# Patient Record
Sex: MALE | Race: WHITE | NOT HISPANIC OR LATINO | Employment: FULL TIME | URBAN - METROPOLITAN AREA
[De-identification: names, ages, dates, MRNs, and addresses within clinical notes are randomized per-mention and may not be internally consistent; named-entity substitution may affect disease eponyms.]

---

## 2017-02-17 ENCOUNTER — ALLSCRIPTS OFFICE VISIT (OUTPATIENT)
Dept: OTHER | Facility: OTHER | Age: 57
End: 2017-02-17

## 2017-06-19 DIAGNOSIS — R97.20 ELEVATED PROSTATE SPECIFIC ANTIGEN (PSA): ICD-10-CM

## 2017-06-29 ENCOUNTER — ALLSCRIPTS OFFICE VISIT (OUTPATIENT)
Dept: OTHER | Facility: OTHER | Age: 57
End: 2017-06-29

## 2017-08-03 DIAGNOSIS — E78.5 HYPERLIPIDEMIA: ICD-10-CM

## 2017-08-03 DIAGNOSIS — I10 ESSENTIAL (PRIMARY) HYPERTENSION: ICD-10-CM

## 2017-08-07 ENCOUNTER — GENERIC CONVERSION - ENCOUNTER (OUTPATIENT)
Dept: OTHER | Facility: OTHER | Age: 57
End: 2017-08-07

## 2017-08-07 LAB
AMBIG ABBREV CMP14 DEFAULT (HISTORICAL): NORMAL
AMBIG ABBREV LP DEFAULT (HISTORICAL): NORMAL
BASOPHILS # BLD AUTO: 0 X10E3/UL (ref 0–0.2)
BASOPHILS # BLD AUTO: 1 %
DEPRECATED RDW RBC AUTO: 13.1 % (ref 12.3–15.4)
EOSINOPHIL # BLD AUTO: 0.1 X10E3/UL (ref 0–0.4)
EOSINOPHIL # BLD AUTO: 2 %
HCT VFR BLD AUTO: 45 % (ref 37.5–51)
HGB BLD-MCNC: 15.2 G/DL (ref 12.6–17.7)
IMM.GRANULOCYTES (CD4/8) (HISTORICAL): 0 %
IMM.GRANULOCYTES (CD4/8) (HISTORICAL): 0 X10E3/UL (ref 0–0.1)
LYMPHOCYTES # BLD AUTO: 1.5 X10E3/UL (ref 0.7–3.1)
LYMPHOCYTES # BLD AUTO: 33 %
MCH RBC QN AUTO: 29.6 PG (ref 26.6–33)
MCHC RBC AUTO-ENTMCNC: 33.8 G/DL (ref 31.5–35.7)
MCV RBC AUTO: 88 FL (ref 79–97)
MONOCYTES # BLD AUTO: 0.5 X10E3/UL (ref 0.1–0.9)
MONOCYTES (HISTORICAL): 11 %
NEUTROPHILS # BLD AUTO: 2.3 X10E3/UL (ref 1.4–7)
NEUTROPHILS # BLD AUTO: 53 %
PLATELET # BLD AUTO: 208 X10E3/UL (ref 150–379)
RBC (HISTORICAL): 5.14 X10E6/UL (ref 4.14–5.8)
WBC # BLD AUTO: 4.4 X10E3/UL (ref 3.4–10.8)

## 2017-08-08 LAB
A/G RATIO (HISTORICAL): 2.3 (ref 1.2–2.2)
ALBUMIN SERPL BCP-MCNC: 4.4 G/DL (ref 3.5–5.5)
ALP SERPL-CCNC: 49 IU/L (ref 39–117)
ALT SERPL W P-5'-P-CCNC: 26 IU/L (ref 0–44)
AST SERPL W P-5'-P-CCNC: 25 IU/L (ref 0–40)
BILIRUB SERPL-MCNC: 0.4 MG/DL (ref 0–1.2)
BUN SERPL-MCNC: 20 MG/DL (ref 6–24)
BUN/CREA RATIO (HISTORICAL): 22 (ref 9–20)
CALCIUM SERPL-MCNC: 9.2 MG/DL (ref 8.7–10.2)
CHLORIDE SERPL-SCNC: 103 MMOL/L (ref 96–106)
CHOLEST SERPL-MCNC: 185 MG/DL (ref 100–199)
CO2 SERPL-SCNC: 23 MMOL/L (ref 18–29)
CREAT SERPL-MCNC: 0.92 MG/DL (ref 0.76–1.27)
EGFR AFRICAN AMERICAN (HISTORICAL): 108 ML/MIN/1.73
EGFR-AMERICAN CALC (HISTORICAL): 93 ML/MIN/1.73
GLUCOSE SERPL-MCNC: 92 MG/DL (ref 65–99)
HDLC SERPL-MCNC: 46 MG/DL
LDLC SERPL CALC-MCNC: 112 MG/DL (ref 0–99)
POTASSIUM SERPL-SCNC: 4.5 MMOL/L (ref 3.5–5.2)
SODIUM SERPL-SCNC: 143 MMOL/L (ref 134–144)
TOT. GLOBULIN, SERUM (HISTORICAL): 1.9 G/DL (ref 1.5–4.5)
TOTAL PROTEIN (HISTORICAL): 6.3 G/DL (ref 6–8.5)
TRIGL SERPL-MCNC: 135 MG/DL (ref 0–149)
TSH SERPL DL<=0.05 MIU/L-ACNC: 1.25 UIU/ML (ref 0.45–4.5)

## 2017-08-21 ENCOUNTER — ALLSCRIPTS OFFICE VISIT (OUTPATIENT)
Dept: OTHER | Facility: OTHER | Age: 57
End: 2017-08-21

## 2017-10-11 ENCOUNTER — ALLSCRIPTS OFFICE VISIT (OUTPATIENT)
Dept: OTHER | Facility: OTHER | Age: 57
End: 2017-10-11

## 2017-10-12 NOTE — PROGRESS NOTES
Assessment  1  Trigger little finger of right hand (727 03) (M65 351)    Plan  Trigger little finger of right hand    · Injection Tendon Sheath Ligament Single - POC; Status:Complete;   Done: 67JZI9783  09:38AM   · Follow-up visit in 2 months Evaluation and Treatment  Follow-up  Status: Hold For -  Scheduling  Requested for: 37WFH4043   · Please refer to the patient information sheet that was provided at your office visit today for  information on  your current condition ; Status:Complete;   Done: 70SGK2047 09:38AM    Discussion/Summary    Assessment: Right hand small finger trigger finger  anatomy and physiology of trigger finger was discussed with the patient today in the office  Edema and increased contact pressure within the flexor tendons at the A1 pulley can cause pain, crepitation, and limitation of function  Treatment options include resting MP blocking splints to decrease edema, oral anti-inflammatory medications, home or formal therapy exercises, up to 2 steroid injections or surgical release  While majority of patients do respond to conservative treatment, up to 20% may require surgical release  The patient has elected for injection  After the patient, site, and procedure were verbally agreed upon by the patient and the treatment team, verbal consent was obtained  The area was prepped sterilely, the right small finger trigger finger was injected with a combination of 1% lidocaine without epinephrine and 6 mg of Celestone  The patient tolerated the procedure well, sterile dressings were applied  Risks and benefits of the injection were discussed  note was dictated with dictation software  As such, and may contain typographical errors, improperly dictated words, background noise, or other errors  patient presents loose right hand small finger trigger finger that is been going on for approximately a year   Pain at night worse then during the day, he has to unlock it with his other hand multiple times during the night  He has pain along the flexor tendon sheath, no numbness tingling fevers chills constitutional symptoms  He denies any previous traumatic events  No other fingers are bothering him  past medical history, surgical history, family history, social history, medications, allergies, and review of systems have been read and reviewed on the chart and have been updated  Review of Systems was recorded in the office today on a separate evaluation sheet and is listed below  of Systems:NegativeNegativeNegativeNegativeNegativeNegativeNegative except as aboveAs aboveNegative except as aboveNegativeNegative    / Psych: Awake and Oriented, No acute distress, age appropriateNormocephalic, atraumatic, mucous membranes moist, neck supple, trachea midline  No rebound or signs of guardingNo discernible arrhythmia, 2+ pulses with good capillary refillNo audible wheezing or audible stridor[The patient is neurovascularly intact in the median, ulnar, and radial nerve distribution  There is normal sensation and good capillary refill within the digits  2+ pulses  ][No lesions, rashes, streaking, purulence, abscesses, lymphangitis]Tenderness to palpation right hand small finger  He has a nodule with active locking  No crepitation, full range of motion  Negative CMC shock, no evidence of carpal tunnel with negative Tinel's no other fingers triggering, negative de Quervain and no CMC joint pain  Negative Tinel's at the level of the cubital tunnel  Studies: [none]  Chief Complaint  1  Finger Problem    Active Problems  1  Acid reflux disease (530 81) (K21 9)   2  Allergic rhinitis due to pollen (477 0) (J30 1)   3  Arthralgia Of The Left Knee/Patella/Tibia/Fibula (719 46)   4  Bee sting-induced anaphylaxis (989 5,E905 3) (T63 441A)   5  Cardiomyopathy (425 4) (I42 9)   6  Chest pain, precordial (786 51) (R07 2)   7  Chronic low back pain (724 2,338 29) (M54 5,G89 29)   8  Elevated PSA (790 93) (R97 20)   9   Encounter for screening for malignant neoplasm of colon (V76 51) (Z12 11)   10  Failed back syndrome (722 80) (M96 1)   11  GERD (gastroesophageal reflux disease) (530 81) (K21 9)   12  Hematospermia (608 82) (R36 1)   13  Hypertension (401 9) (I10)   14  Lichenoid keratosis (701 1) (L82 1)   15  Lumbar Hernia Repair   16  Male erectile dysfunction (607 84) (N52 9)   17  Mild hyperlipidemia (272 4) (E78 5)   18  Nodular prostate (600 10) (N40 2)   19  Postoperative visit (V58 49) (Z48 89)   20  Trigger little finger of right hand (727 03) (M65 351)    Past Medical History   · History of Coronary Artery Disease (V12 59)   · History of Heart attack (410 90) (I21 9)   · History of Umbilical hernia (673 7) (K42 9)    Surgical History   · History of Back Surgery   · History of Laminectomy Lumbar   · Lumbar Hernia Repair   · History of Needle Biopsy Of Prostate   · History of Shoulder Surgery   · History of Tonsillectomy    Family History   · Family history of Carcinoma Of The Pancreas   · Denied: Family history of Colon cancer   · Denied: Family history of Crohn's disease   · Denied: Family history of liver disease   · Denied: Family history of Colon cancer   · Denied: Family history of Crohn's disease   · Denied: Family history of liver disease   · Family history of Cancer   · Family history of Family Health Status Of Father - Alive   · Family history of Mother  At Age 46    Social History   · Alcohol Use (History)   · Always uses seat belt   · Caffeine Use   · Full-time employment   ·    ·    · Never a smoker    Current Meds   1  Aspirin 325 MG Oral Tablet; Take 1 tablet  every other day; Therapy: (Recorded:83Nfq1726) to Recorded   2  BL Vitamin B-12 TABS; TAKE 1 TABLET DAILY AS DIRECTED; Therapy: (Recorded:16Ncx4265) to Recorded   3  Cyclobenzaprine HCl - 10 MG Oral Tablet; TAKE 1 TABLET AT BEDTIME AS NEEDED;    Therapy: 44Iaq1136 to (Logan Antonio)  Requested for: 2017; Last   Rx:2017 Ordered   4  DilTIAZem HCl ER Coated Beads 120 MG Oral Capsule Extended Release 24 Hour;   TAKE 1 CAPSULE ONCE DAILY; Therapy: 42FKG1198 to (Evaluate:65Cqq9263)  Requested for: 00Anu4263; Last   Rx:79Rao7578 Ordered   5  EpiPen 2-Jose F 0 3 MG/0 3ML Injection Solution Auto-injector; use; Therapy: 54MUA0931 to (Last Rx:74Tbg3084)  Requested for: 77Srl3293 Ordered   6  Fish Oil CAPS Recorded   7  Fluticasone Propionate 50 MCG/ACT Nasal Suspension; USE 2 SPRAYS IN EACH   NOSTRIL ONCE DAILY; Therapy: 72DIF5236 to (Last SD:73NWQ6051)  Requested for: 21Gut9032 Ordered   8  Omeprazole 40 MG Oral Capsule Delayed Release; take 1 capsule daily; Therapy: 49AXA7282 to (60 124 37 75)  Requested for: 44Shy2470; Last   DF:61MDT7951 Ordered   9  Viagra 100 MG Oral Tablet; TAKE AS DIRECTED; Therapy: 19DKL9019 to (Last Rx:42Htz2695)  Requested for: 36ETK9658 Ordered    Allergies  1  No Known Drug Allergies  2  Other   3  Seasonal    Vitals  Signs   Heart Rate: 74  Systolic: 444  Diastolic: 93  Height: 5 ft 7 in  Weight: 196 lb 8 oz  BMI Calculated: 30 78  BSA Calculated: 2 01    Future Appointments    Date/Time Provider Specialty Site   01/08/2018 09:20 AM Humaira Milton MD Neurology [facility] [address] [place] [address]   08/22/2018 03:00 PM STEPHANIA Avendano   [address] [facility]   01/03/2018 03:30 PM Chanda Thapa MD Urology [address]     Signatures   Electronically signed by : STEPHANIA Carlton ; Oct 11 2017  9:40AM EST                       (Author)

## 2017-12-13 ENCOUNTER — ALLSCRIPTS OFFICE VISIT (OUTPATIENT)
Dept: OTHER | Facility: OTHER | Age: 57
End: 2017-12-13

## 2017-12-15 NOTE — PROGRESS NOTES
Assessment  1  Trigger little finger of right hand (727 03) (M65 351)   2  De Quervain's tenosynovitis (727 04) (M65 4)    Plan  De Quervain's tenosynovitis    · Betamethasone Sod Phos & Acet 6 (3-3) MG/ML Injection Suspension(Celestone Soluspan)   · Follow-up visit in 2 months Evaluation and Treatment  Follow-up  Status: Hold For -Scheduling  Requested for: 25SAT5862   · Please refer to the patient information sheet that was provided at your office visit today forinformation on  your current condition ; Status:Complete;   Done: 14DGS9196  De Quervain's tenosynovitis, Trigger little finger of right hand    · Injection Tendon Sheath Ligament Single - POC; Status:Complete;   Done: 84AAE413526:79AF   · Injection Tendon Sheath Ligament Single - POC; Status:Complete;   Done: 55PXB091561:33ZG  Trigger little finger of right hand    · Betamethasone Sod Phos & Acet 6 (3-3) MG/ML Injection Suspension(Celestone Soluspan)    Discussion/Summary    19-year-old male with a history exam consistent with right small finger trigger and right de Quervain tenosynovitis  Injections were advised accepted administered as outlined above  We will see back in follow-up for repeat examination in 2 months  If he has recurrence of symptoms in his small finger trigger we will discuss surgical options  Chief Complaint  1  Finger Problem  Right wrist pain and right small finger catching      History of Present Illness  HPI: 19-year-old male with a right small trigger presenting for follow-up evaluation  His last visit 2 months steroid injection given the significant relief  For 6 weeks he had no catching locking or pain approximately 2 weeks ago noticed recurrence of the locking catching and small finger but has no pain   Over the past with received also noticed radial sided right wrist pain that radiates into his thumb this is made worse repetitive activity is relieved by rest  Denies any injury to the wrist The patient's medical history, surgical history, social history, family history, medications, allergies, and review of systems were reviewed and updated today _______________________________________________________________________________Review of Systems: Constitutional: No fever or chills, feels well, no tiredness, no recent weight gain or loss  Eyes: No complaints of eyesight problems, no red eyes  ENT: No loss of hearing, no nosebleeds, no sore throat  Cardiovascular: No chest pain, palpitations, leg claudication, or lower extremity edema  Respiratory: No shortness of breath, wheezing, or cough  Gastrointestinal: No abdominal pain, constipation, nausea / vomiting, no diarrhea  Genitourinary: No dysruia or incontinence  Musculoskeletal: As noted in HPI  Integumentary: No rash or skin lesions, no itching or dry skin, no wounds  Neurological: No headache, confusion, numbness or tingling, or dizziness  Endocrine: No muscle weakness, frequent urination, or excessive thirst  Psychiatric: No suicidal thoughts, anxiety, or depression  Active Problems  1  Acid reflux disease (530 81) (K21 9)   2  Allergic rhinitis due to pollen (477 0) (J30 1)   3  Arthralgia Of The Left Knee/Patella/Tibia/Fibula (719 46)   4  Bee sting-induced anaphylaxis (989 5,E905 3) (T63 441A)   5  Cardiomyopathy (425 4) (I42 9)   6  Chest pain, precordial (786 51) (R07 2)   7  Chronic low back pain (724 2,338 29) (M54 5,G89 29)   8  Elevated PSA (790 93) (R97 20)   9  Encounter for screening for malignant neoplasm of colon (V76 51) (Z12 11)   10  Failed back syndrome (722 80) (M96 1)   11  GERD (gastroesophageal reflux disease) (530 81) (K21 9)   12  Hematospermia (608 82) (R36 1)   13  Hypertension (401 9) (I10)   14  Lichenoid keratosis (701 1) (L82 1)   15  Lumbar Hernia Repair   16  Male erectile dysfunction (607 84) (N52 9)   17  Mild hyperlipidemia (272 4) (E78 5)   18  Nodular prostate (600 10) (N40 2)   19  Postoperative visit (V58 49) (Z48 89)   20   Trigger little finger of right hand (727 03) (M65 351)    Current Meds   1  Aspirin 325 MG Oral Tablet; Take 1 tablet  every other day; Therapy: (Recorded:62Iuc2419) to Recorded   2  BL Vitamin B-12 TABS; TAKE 1 TABLET DAILY AS DIRECTED; Therapy: (Recorded:01Wmc7982) to Recorded   3  Cyclobenzaprine HCl - 10 MG Oral Tablet; TAKE 1 TABLET AT BEDTIME AS NEEDED; Therapy: 44Wgj6811 to (William Espinal)  Requested for: 29Zwl8027; Last Rx:44Rak2068 Ordered   4  DilTIAZem HCl ER Coated Beads 120 MG Oral Capsule Extended Release 24 Hour; TAKE 1 CAPSULE ONCE DAILY; Therapy: 10LFW8219 to (Evaluate:34Bai5488)  Requested for: 38Hcg8792; Last Rx:14Tdv9634 Ordered   5  EpiPen 2-Jose F 0 3 MG/0 3ML Injection Solution Auto-injector; use; Therapy: 14YMK1211 to (Last Rx:13Yzl1300)  Requested for: 47Pao0432 Ordered   6  Fish Oil CAPS Recorded   7  Fluticasone Propionate 50 MCG/ACT Nasal Suspension; USE 2 SPRAYS IN EACH NOSTRIL ONCE DAILY; Therapy: 37BBE3510 to (Last VQ:52KPP4727)  Requested for: 89Tuz8447 Ordered   8  Omeprazole 40 MG Oral Capsule Delayed Release; take 1 capsule daily; Therapy: 43CSV4037 to (0731 40 44 23)  Requested for: 74Dxc8009; Last BD:40FGJ1999 Ordered   9  Viagra 100 MG Oral Tablet; TAKE AS DIRECTED; Therapy: 90ECE7443 to (Last Rx:33Keo1575)  Requested for: 82LCJ5560 Ordered    Allergies  1  No Known Drug Allergies  2  Other   3  Seasonal    Vitals  Signs   Heart Rate: 73  Systolic: 769  Diastolic: 84  Height: 5 ft 7 in  Weight: 201 lb 2 oz  BMI Calculated: 31 5  BSA Calculated: 2 03    Physical Exam     General: No acute distress, age-appropriate  Neck: Supple, trachea midline  HEENT: Normocephalic atraumatic, mucous membranes are moist, sclera are nonicteric  Cardiovascular: No discernable arrhythmia  Respiratory: Breathing is even and unlabored, no stridor or audible wheezing  Psychiatric: Awake alert and oriented x3, normal mood and affect  Abdomen: Without rebound or guarding     Right Trigger Finger: Crepitus, Nodules and Locking Small finger (Positive Tinel's on the radial aspect of wrist  Positive Finkelstein's  Nontender over the thumb A1 pulley no catching or locking of the thumb)   Skin: was evaluated and demonstrated no masses, no abrasions, no erythema, no effusion, no edema, no fluctuance, no induration, no laceration, no ulceration, no lymphadenopathy  Right Upper Neurovascular: were evaluated and demonstrated: The patient has normal motor strength to the median, ulnar and radial nerve  Normal sensation to the median, ulnar, and radial nerve  Normal pulses in the radial and ulnar artery  Capillary refill is < 2 seconds  Procedure                   After sterile preparation overlying the skin a mixture of 1 cc of lidocaine and Celestone was injected into the right small finger trigger right 1st dorsal compartment  Band-Aid applied patient tolerated procedure well      Attending Note  Attending Note Arelis Saldana: Attending Note: I interviewed, took the history and examined the patient,-- I discussed the case with the Resident and reviewed the Resident's note-- and-- I agree with the Resident management plan as it was presented to me  Level of Participation: I was present in clinic and examined the patient  Patient's History: Patient right hand small finger triggering and right de Quervain stenosing tenosynovitis  Patient failed conservative management  Previous injection worked pretty well  Key Parts of the Exam: Positive triggering right small finger, positive de Quervain with a positive Finkelstein test  Neurologically intact with full motion  Diagnosis and Plan: Right de Quervain and right small finger triggering-injection provided  Follow up 2 months  I agree with the Resident's note  Future Appointments    Date/Time Provider Specialty Site   01/08/2018 09:20 AM Jannette Fontanez MD Neurology [facility] [address] [place] [address]   02/14/2018 08:30 AM STEPHANIA Majano   Orthopedic Surgery [facility] [place]   08/22/2018 03:00 PM STEPHANIA Hendrix  [address] [facility] [employer]   01/03/2018 03:30 PM Herminia Burgos MD Urology [address]     Signatures   Electronically signed by :  STEPHANIA Hidalgo ; Dec 13 2017  9:47AM EST                       (Author)    Electronically signed by : STEPHANIA Morrell ; Dec 13 2017  3:22PM EST                       (Author)

## 2017-12-26 ENCOUNTER — GENERIC CONVERSION - ENCOUNTER (OUTPATIENT)
Dept: OTHER | Facility: OTHER | Age: 57
End: 2017-12-26

## 2017-12-27 LAB — PROSTATE SPECIFIC ANTIGEN (HISTORICAL): 6.7 NG/ML (ref 0–4)

## 2017-12-29 DIAGNOSIS — R97.20 ELEVATED PROSTATE SPECIFIC ANTIGEN (PSA): ICD-10-CM

## 2017-12-31 ENCOUNTER — APPOINTMENT (EMERGENCY)
Dept: RADIOLOGY | Facility: HOSPITAL | Age: 57
End: 2017-12-31
Payer: COMMERCIAL

## 2017-12-31 ENCOUNTER — HOSPITAL ENCOUNTER (EMERGENCY)
Facility: HOSPITAL | Age: 57
Discharge: HOME | End: 2017-12-31
Admitting: EMERGENCY MEDICINE
Payer: COMMERCIAL

## 2017-12-31 VITALS
TEMPERATURE: 97.4 F | HEIGHT: 67 IN | HEART RATE: 68 BPM | RESPIRATION RATE: 17 BRPM | SYSTOLIC BLOOD PRESSURE: 133 MMHG | BODY MASS INDEX: 31.71 KG/M2 | DIASTOLIC BLOOD PRESSURE: 89 MMHG | OXYGEN SATURATION: 97 % | WEIGHT: 202 LBS
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
## 2017-12-31 DIAGNOSIS — M65.4 TENDINITIS, DE QUERVAIN'S: Primary | ICD-10-CM

## 2017-12-31 PROCEDURE — 99283 EMERGENCY DEPT VISIT LOW MDM: CPT

## 2017-12-31 PROCEDURE — 73110 X-RAY EXAM OF WRIST: CPT

## 2017-12-31 RX ORDER — IBUPROFEN 400 MG/1
400 TABLET ORAL ONCE
Status: COMPLETED | OUTPATIENT
Start: 2017-12-31 | End: 2017-12-31

## 2017-12-31 RX ORDER — OMEGA-3 FATTY ACIDS CAP DELAYED RELEASE 1000 MG 1000 MG
1 CAPSULE DELAYED RELEASE ORAL DAILY
COMMUNITY

## 2017-12-31 RX ORDER — OMEPRAZOLE 40 MG/1
40 CAPSULE, DELAYED RELEASE ORAL DAILY
COMMUNITY
End: 2018-04-07 | Stop reason: SDUPTHER

## 2017-12-31 RX ORDER — NAPROXEN 500 MG/1
500 TABLET ORAL 2 TIMES DAILY WITH MEALS
Qty: 14 TABLET | Refills: 0 | Status: SHIPPED | OUTPATIENT
Start: 2017-12-31 | End: 2018-10-26 | Stop reason: SDDI

## 2017-12-31 RX ORDER — IBUPROFEN 600 MG/1
600 TABLET ORAL EVERY 6 HOURS PRN
COMMUNITY
End: 2022-06-13 | Stop reason: ALTCHOICE

## 2017-12-31 RX ORDER — DILTIAZEM HYDROCHLORIDE 120 MG/1
120 CAPSULE, EXTENDED RELEASE ORAL DAILY
COMMUNITY
End: 2018-10-26 | Stop reason: SDUPTHER

## 2017-12-31 RX ORDER — NAPROXEN 250 MG/1
500 TABLET ORAL ONCE
Status: DISCONTINUED | OUTPATIENT
Start: 2017-12-31 | End: 2017-12-31

## 2017-12-31 RX ADMIN — IBUPROFEN 400 MG: 400 TABLET, FILM COATED ORAL at 15:35

## 2017-12-31 NOTE — ED PROVIDER NOTES
History  Chief Complaint   Patient presents with    Wrist Injury     Pt presents to ED from home after injuring right wrist while chopping wood yesterday  Pt (+) wrist movement but w/ pain  Pt (+) cardiac hx  Patient presents emergency room 1 day after tapping are ice off flow void repetitively  Today he woke with the tenderness over the lateral aspect of his distal radius  He complains of swelling and increased pain with the range of motion and ulnar deviation  He has no history of having any previous problems with the wrist   He has never fractured in the past   He does have a history having and trigger finger which she has seen Dr  moved to 0 4 on his right little finger  He had 2 cortisone injections for that in the past with good relief  He denies any numbness or tingling  He does complain of giveaway weakness secondary to pain and guarding  History provided by:  Patient  Wrist Pain   Location:  Right wrist  Severity:  Moderate  Onset quality:  Gradual  Duration:  1 day  Timing:  Constant  Progression:  Waxing and waning  Chronicity:  New  Context:  Overuse injury chopping wood  Relieved by:  Rest  Worsened by:  Range of motion  Associated symptoms: no fatigue, no fever and no rash  Abdominal pain: none tried  Prior to Admission Medications   Prescriptions Last Dose Informant Patient Reported? Taking?    Omega-3 Fatty Acids (FISH OIL) 1000 MG CPDR  Self Yes Yes   Sig: Take by mouth   aspirin (ECOTRIN) 325 mg EC tablet 12/30/2017 Self Yes Yes   Sig: Take 325 mg by mouth every other day   diltiazem (DILACOR XR) 120 MG 24 hr capsule  Self Yes Yes   Sig: Take 120 mg by mouth daily   ibuprofen (MOTRIN) 600 mg tablet 12/31/2017 at 2200 Self Yes Yes   Sig: Take by mouth every 6 (six) hours as needed for mild pain   omeprazole (PriLOSEC) 40 MG capsule  Self Yes Yes   Sig: Take 40 mg by mouth daily      Facility-Administered Medications: None       Past Medical History:   Diagnosis Date    BPH (benign prostatic hyperplasia)     Cardiac disease     Cardiomyopathy [facility])        Past Surgical History:   Procedure Laterality Date    BACK SURGERY      TONSILLECTOMY         History reviewed  No pertinent family history  I have reviewed and agree with the history as documented  Social History   Substance Use Topics    Smoking status: Never Smoker    Smokeless tobacco: Never Used    Alcohol use Yes      Comment: occ        Review of Systems   Constitutional: Positive for activity change  Negative for appetite change, chills, fatigue and fever  Gastrointestinal: Abdominal pain: none tried  Musculoskeletal: Positive for arthralgias  Skin: Negative for color change, pallor and rash  Neurological: Negative for numbness  Psychiatric/Behavioral: Negative for confusion  All other systems reviewed and are negative  Physical Exam  ED Triage Vitals [12/31/17 1529]   Temperature Pulse Respirations Blood Pressure SpO2   (!) 97 4 °F (36 3 °C) 74 16 (!) 158/102 98 %      Temp Source Heart Rate Source Patient Position - Orthostatic VS BP Location FiO2 (%)   Oral Monitor Sitting Left arm --      Pain Score       7           Orthostatic Vital Signs  Vitals:    12/31/17 1529 12/31/17 1611   BP: (!) 158/102 133/89   Pulse: 74 68   Patient Position - Orthostatic VS: Sitting Sitting       Physical Exam   Constitutional: He is oriented to person, place, and time  He appears well-developed and well-nourished  No distress  HENT:   Head: Normocephalic and atraumatic  Eyes: Conjunctivae are normal  Right eye exhibits no discharge  Left eye exhibits no discharge  No scleral icterus  Pulmonary/Chest: Effort normal    Musculoskeletal:   Examination of the right wrist and hand there is tenderness palpated over the 1st dorsal compartment of the wrist   There is a positive Finkelstein test suggestive of to go veins tendinitis    The remainder of the wrist hand forearm and elbow are nontender with full range of motion  There is a trigger finger present over the 5th finger  There is tenderness over the A1 pulley  Capillary refills less than 2 seconds  There are palpable ulnar and radial pulses that are +2 and symmetrical   Sensation and motor is intact to the ulnar, radial, median distribution of the hand   Neurological: He is alert and oriented to person, place, and time  Skin: Skin is warm  Capillary refill takes less than 2 seconds  He is not diaphoretic  Psychiatric: He has a normal mood and affect  His behavior is normal  Judgment and thought content normal    Nursing note and vitals reviewed        ED Medications  Medications   ibuprofen (MOTRIN) tablet 400 mg (400 mg Oral Given 12/31/17 0925)       Diagnostic Studies  Results Reviewed     None                 XR wrist 3+ views RIGHT   ED Interpretation by Josette Thomson PA-C (12/31 1620)   No acute abnormality                 Procedures  Static Splint Application  Date/Time: 12/31/2017 4:42 PM  Performed by: Cheyanne Jacob  Authorized by: Cheyanne Jacob     Patient location:  Bedside  Procedure performed by emergency physician: Yes    Consent:     Consent obtained:  Verbal    Consent given by:  Patient    Risks discussed:  Discoloration, numbness, pain and swelling    Alternatives discussed:  No treatment  Universal protocol:     Procedure explained and questions answered to patient or proxy's satisfaction: yes      Imaging studies available: yes      Site/side marked: yes      Immediately prior to procedure a time out was called: yes      Patient identity confirmed:  Verbally with patient  Indication:     Indications: sprain/strain    Pre-procedure details:     Sensation:  Normal  Procedure details:     Laterality:  Right    Location:  Wrist    Splint type:  Thumb spica    Supplies:  Ortho-Glass  Post-procedure details:     Pain:  Improved    Sensation:  Normal    Neurovascular Exam: skin pink, capillary refill <2 sec, normal pulses and skin intact, warm, and dry      Patient tolerance of procedure: Tolerated well, no immediate complications           Phone Contacts  ED Phone Contact    ED Course  ED Course                                MDM  Number of Diagnoses or Management Options  Overuse injury: new and requires workup  Tendinitis, de Quervain's: new and requires workup     Amount and/or Complexity of Data Reviewed  Tests in the radiology section of CPT®: ordered and reviewed  Tests in the medicine section of CPT®: ordered and reviewed    Risk of Complications, Morbidity, and/or Mortality  Presenting problems: moderate  Diagnostic procedures: moderate  General comments: Patient presented to the emergency room for evaluation of his right wrist   He was chopping wood yesterday and noticed an onset of pain this morning when he awoke  He states that the pain became worse throughout the day  It is worse with range of motion  He complains of swelling over the lateral aspect of his wrist   He was seen and evaluated  He was diagnosed with the veins tendinitis  His x-rays were normal   A thumb spica splint was applied  The patient was medicated with Motrin upon arrival   He was discharged home with Naprosyn and was instructed to take his 325 mg aspirin in the morning and Naprosyn at night as needed for pain  He will follow up with Dr Fer Mancini,  his hand surgeon, within the next week  Patient Progress  Patient progress: stable    CritCare Time    Disposition  Final diagnoses:   Tendinitis, de Quervain's - Acute right wrist   Overuse injury - Acute right wrist     Time reflects when diagnosis was documented in both MDM as applicable and the Disposition within this note     Time User Action Codes Description Comment    12/31/2017  4:53 PM Gillian Abbasi Add [M65 4] Tendinitis, de Quervain's     12/31/2017  4:54 PM Stuart Abbasi Modify [M65 4] Tendinitis, de Quervain's Acute right wrist    12/31/2017  4:54 PM Gillian Abbasi Add Reny Keating Overuse injury     12/31/2017  4:54 PM Jayesh Louis [T14  8XXA] Overuse injury Acute right wrist      ED Disposition     ED Disposition Condition Comment    Discharge  Arun KAT Harper discharge to home/self care  Condition at discharge: Good        Follow-up Information     Follow up With Specialties Details Why Contact Info    Juan Manuel Hernandez MD Orthopedic Surgery In 1 week  [facility]  2nd [address] [place] [address]  [phone]          Discharge Medication List as of 12/31/2017  4:27 PM      START taking these medications    Details   naproxen (NAPROSYN) 500 mg tablet Take 1 tablet by mouth 2 (two) times a day with meals, Starting Sun 12/31/2017, Print         CONTINUE these medications which have NOT CHANGED    Details   aspirin (ECOTRIN) 325 mg EC tablet Take 325 mg by mouth every other day, Historical Med      diltiazem (DILACOR XR) 120 MG 24 hr capsule Take 120 mg by mouth daily, Historical Med      ibuprofen (MOTRIN) 600 mg tablet Take by mouth every 6 (six) hours as needed for mild pain, Historical Med      Omega-3 Fatty Acids (FISH OIL) 1000 MG CPDR Take by mouth, Historical Med      omeprazole (PriLOSEC) 40 MG capsule Take 40 mg by mouth daily, Historical Med           No discharge procedures on file      ED Provider  Electronically Signed by           Dorie Owen PA-C  12/31/17 7009

## 2017-12-31 NOTE — DISCHARGE INSTRUCTIONS
Dorthula Citron Disease   WHAT YOU NEED TO KNOW:   Florence Dhillonlist disease is inflammation of the tendons on the thumb side of your wrist  Tendons are thick strands of tissue that connect muscles to bones  DISCHARGE INSTRUCTIONS:   Splint or brace: These devices will help decrease pain, limit movement, and protect your wrist so that it can heal  Make sure your device is comfortable  If it is too tight, your fingers may feel numb or tingly  Do not push or lean on your device because it can break  Physical or occupational therapy:  You may need to see a physical or occupational therapist to teach you special exercises  These exercises help improve movement and decrease pain  They also help improve strength and decrease your risk for loss of function  Therapists will help you make changes to your daily activities to decrease stress and pressure on the tendons  Rest:  Rest your injured thumb or wrist  Avoid twisting, grasping, or gripping movements  Ask when you can return to your normal activities  Medicines:   · NSAIDs:  These medicines decrease swelling, pain, and fever  They are available without a doctor's order  Ask which medicine is right for you, and how much to take  Take as directed  NSAIDs can cause stomach bleeding or kidney problems if not taken correctly  · Take your medicine as directed  Contact your healthcare provider if you think your medicine is not helping or if you have side effects  Tell him of her if you are allergic to any medicine  Keep a list of the medicines, vitamins, and herbs you take  Include the amounts, and when and why you take them  Bring the list or the pill bottles to follow-up visits  Carry your medicine list with you in case of an emergency  Follow up with your healthcare provider as directed:  Write down your questions so you remember to ask them during your visits     Contact your healthcare provider if:   · Your splint or brace is too tight and you cannot loosen it     · You have a fever  · Your pain and swelling get worse or do not go away  · Your cannot grasp objects because of the pain and swelling  · You have questions or concerns about your condition or care  Return to the emergency department if:   · You cannot move your thumb or wrist     · Your fingers feel numb, tingly, cool to the touch, or look blue or pale  © 2017 [facility] Information is for End User's use only and may not be sold, redistributed or otherwise used for commercial purposes  All illustrations and images included in CareNotes® are the copyrighted property of [employer] , [employer]  or Donald Chacon  The above information is an  only  It is not intended as medical advice for individual conditions or treatments  Talk to your doctor, nurse or pharmacist before following any medical regimen to see if it is safe and effective for you

## 2017-12-31 NOTE — ED NOTES
Splint placed by Basilia Blanco [facility]; patient discharged after placement       See Marie RN  12/31/17 2604

## 2018-01-03 ENCOUNTER — ALLSCRIPTS OFFICE VISIT (OUTPATIENT)
Dept: OTHER | Facility: OTHER | Age: 58
End: 2018-01-03

## 2018-01-04 NOTE — PROGRESS NOTES
Assessment   1  Elevated PSA (790 93) (R97 20)    Plan   Elevated PSA    · Ciprofloxacin HCl - 500 MG Oral Tablet; TAKE 1 TABLET EVERY 12 HOURS DAILY   Rx By: Sheryl Brewer; Dispense: 3 Days ; #:6 Tablet; Refill: 0;For: Elevated PSA; LAYLA = N; Verified Transmission to [facility] #0274 Last Updated By: System, Jane; 1/3/2018 3:55:07 PM   · Prostate Needle Biopsy - POC; Status:Active; Requested PKW:[id number]; Perform: In Office; WVL:[id number];TPJDCDY;KIG:OSFDGLPV PSA; Ordered By:Missy Villanueva; Discussion/Summary   Discussion Summary:    My impression is history of Elevated PSA, prior prostate biopsy 2015  PSA he has increased to 6 7, previously 5 8  He underwent prostate biopsy in 2015 for PSA of 4 6  This revealed HGPIN  We discussed that because his PSA continues to rise he would benefit from a repeat prostate biopsy  This procedure was reviewed with the patient in detail  Risks and benefits were discussed  Preprocedure preparation was reviewed including antibiotics, holding all blood thinners, and bowel prep  he is agreeable to this treatment plan  He will return 2 weeks after the prostate biopsy for pathology review  All questions were answered  Counseling Documentation With Imm: total time of encounter was 15 minutes-- and-- 10 minutes was spent counseling  Chief Complaint   Chief Complaint Free Text Note Form: Patient presents for elevated PSA      History of Present Illness   HPI: 51-year-old male with history of elevated PSA presents today for six-month follow-up  He underwent prostate biopsy 9/2015 for a PSA of 4 6  This revealed HGPIN  he was last seen in June 2017 at which time his PSA had increased to 5 8  He opted to continue with conservative management and repeat PSA  He returns at this time  He denies any lower urinary tract symptoms except for occasional nocturia 2 times a night, urinary urgency and hesitancy  He does not find his urinary symptoms bothersome   He denies any changes in his overall health or recent illness or infection  Review of Systems   Complete-Male Urology:      Constitutional: No fever or chills, feels well, no tiredness, no recent weight gain or weight loss  Respiratory: No complaints of shortness of breath, no wheezing, no cough, no SOB on exertion, no orthopnea or PND  Cardiovascular: No complaints of slow heart rate, no fast heart rate, no chest pain, no palpitations, no leg claudication, no lower extremity  Gastrointestinal: No complaints of abdominal pain, no constipation, no nausea or vomiting, no diarrhea or bloody stools  Genitourinary: Empty sensation,-- feelings of urinary urgency-- and-- stream quality fair, but-- no dysuria,-- no hematuria-- and-- no incontinence--       The patient presents with complaints of urinary hesitancy (sometimes)  The patient presents with complaints of nocturia (2-3 times per night)      Musculoskeletal: No complaints of arthralgia, no myalgias, no joint swelling or stiffness, no limb pain or swelling  Integumentary: No complaints of skin rash or skin lesions, no itching, no skin wound, no dry skin  Hematologic/Lymphatic: No complaints of swollen glands, no swollen glands in the neck, does not bleed easily, no easy bruising  Neurological: No compliants of headache, no confusion, no convulsions, no numbness or tingling, no dizziness or fainting, no limb weakness, no difficulty walking  ROS Reviewed:    ROS reviewed  Active Problems   1  Acid reflux disease (530 81) (K21 9)   2  Allergic rhinitis due to pollen (477 0) (J30 1)   3  Arthralgia Of The Left Knee/Patella/Tibia/Fibula (719 46)   4  Bee sting-induced anaphylaxis (989 5,E905 3) (T63 441A)   5  Cardiomyopathy (425 4) (I42 9)   6  Chest pain, precordial (786 51) (R07 2)   7  Chronic low back pain (724 2,338 29) (M54 5,G89 29)   8  De Quervain's tenosynovitis (727 04) (M65 4)   9  Elevated PSA (790 93) (R97 20)   10  Encounter for screening for malignant neoplasm of colon (V76 51) (Z12 11)   11  Failed back syndrome (722 80) (M96 1)   12  GERD (gastroesophageal reflux disease) (530 81) (K21 9)   13  Hematospermia (608 82) (R36 1)   14  Hypertension (401 9) (I10)   15  Lichenoid keratosis (701 1) (L82 1)   16  Lumbar Hernia Repair   17  Male erectile dysfunction (607 84) (N52 9)   18  Mild hyperlipidemia (272 4) (E78 5)   19  Nodular prostate (600 10) (N40 2)   20  Postoperative visit (V58 49) (Z48 89)   21  Trigger little finger of right hand (727 03) (M65 351)    Past Medical History   1  History of Coronary Artery Disease (V12 59)   2  History of Heart attack (410 90) (I21 9)   3  History of Umbilical hernia (676 0) (K42 9)  Active Problems And Past Medical History Reviewed: The active problems and past medical history were reviewed and updated today  Surgical History   1  History of Back Surgery   2  History of Laminectomy Lumbar   3  Lumbar Hernia Repair   4  History of Needle Biopsy Of Prostate   5  History of Shoulder Surgery   6  History of Tonsillectomy  Surgical History Reviewed: The surgical history was reviewed and updated today  Family History   Mother    1  Family history of Carcinoma Of The Pancreas   2  Denied: Family history of Colon cancer   3  Denied: Family history of Crohn's disease   4  Denied: Family history of liver disease  Father    11  Denied: Family history of Colon cancer   6  Denied: Family history of Crohn's disease   7  Denied: Family history of liver disease  Family History    8  Family history of Cancer   9  Family history of Family Health Status Of Father - Alive   8  Family history of Mother  At Age 46  Family History Reviewed: The family history was reviewed and updated today  Social History    · Alcohol Use (History)   · Always uses seat belt   · Caffeine Use   · Full-time employment   ·    · Never a smoker  Social History Reviewed:  The social history was reviewed and updated today  The social history was reviewed and is unchanged  Current Meds    1  Aspirin 325 MG Oral Tablet; Take 1 tablet  every other day; Therapy: (Recorded:60Lig1496) to Recorded   2  BL Vitamin B-12 TABS; TAKE 1 TABLET DAILY AS DIRECTED; Therapy: (Recorded:51Xgz3399) to Recorded   3  Cyclobenzaprine HCl - 10 MG Oral Tablet; TAKE 1 TABLET AT BEDTIME AS NEEDED; Therapy: 46Tsx8871 to (Cecile Sampson)  Requested for: 21Ink2734; Last     Rx:17Fui4113 Ordered   4  DilTIAZem HCl ER Coated Beads 120 MG Oral Capsule Extended Release 24 Hour;     TAKE 1 CAPSULE ONCE DAILY; Therapy: 38FTD9631 to (Evaluate:2018)  Requested for: 2017; Last     Rx:53Urj2376 Ordered   5  EpiPen 2-Jose F 0 3 MG/0 3ML Injection Solution Auto-injector; use; Therapy: 60VXJ7665 to (Last Rx:20Nij7822)  Requested for: 02Vfc4000 Ordered   6  Fish Oil CAPS Recorded   7  Fluticasone Propionate 50 MCG/ACT Nasal Suspension; USE 2 SPRAYS IN EACH     NOSTRIL ONCE DAILY; Therapy: 99YIR4036 to (Last DE:85UQS6416)  Requested for: 22Mtu5232 Ordered   8  Omeprazole 40 MG Oral Capsule Delayed Release; take 1 capsule daily; Therapy: 87MZB0956 to (60 124 37 75)  Requested for: 48Ynh9919; Last     A85EYK4827 Ordered   9  Viagra 100 MG Oral Tablet; TAKE AS DIRECTED; Therapy: 32KQH5061 to (Last Rx:2015)  Requested for: 23JXT7450 Ordered  Medication List Reviewed: The medication list was reviewed and updated today  Allergies   1  No Known Drug Allergies  2  Other   3  Seasonal    Vitals   Vital Signs    Recorded: 01IUQ3068 03:27PM   Heart Rate 80   Systolic 214   Diastolic 439   Height 5 ft 7 in   Weight 202 lb    BMI Calculated 31 64   BSA Calculated 2 03     Physical Exam        Constitutional      General appearance: No acute distress, well appearing and well nourished  Pulmonary      Respiratory effort: No increased work of breathing or signs of respiratory distress  Cardiovascular      Palpation of heart: Normal PMI, no thrills  Examination of extremities for edema and/or varicosities: Normal        Abdomen      Abdomen: Non-tender, no masses  Genitourinary      Digital rectal exam of prostate: Normal size, no masses  -- soft, no nodule, about 40g  Musculoskeletal      Gait and station: Normal        Skin      Skin and subcutaneous tissue: Normal without rashes or lesions  Results/Data   (1) PSA (SCREEN) (Dx V76 44 Screen for Prostate Cancer) 72HNO1731 08:35AM Doloris Inches courtesy copy of this report has been sent to     [phone]  Test Name Result Flag Reference   Prostate Specific Ag, Serum 6 7 ng/mL H 0 0-4 0   Roche ECLIA methodology  According to the American Urological Association, Serum PSA should     decrease and remain at undetectable levels after radical     prostatectomy  The AUA defines biochemical recurrence as an initial     PSA value 0 2 ng/mL or greater followed by a subsequent confirmatory     PSA value 0 2 ng/mL or greater  Values obtained with different assay methods or kits cannot be used     interchangeably  Results cannot be interpreted as absolute evidence     of the presence or absence of malignant disease  Future Appointments      Date/Time Provider Specialty Site   01/08/2018 09:20 AM Marija Andujar MD Neurology [facility] [address] [place] [address]   02/14/2018 08:30 AM STEPHANIA Vieyra  Orthopedic Surgery [address]   08/22/2018 03:00 PM STEPHANIA Phillips   [address] [place] [address]   02/27/2018 09:30 AM Navneet Tyler MD Urology [address]     Signatures    Electronically signed by : Justin Casey[address]; [place]  3 2018  3:54PM EST                       (Author)     Electronically signed by : Nighat Blanco MD; David  3 2018  4:08PM EST                       (Author)

## 2018-01-10 NOTE — CONSULTS
Chief Complaint    1  Finger Problem    Active Problems    1  Acid reflux disease (530 81) (K21 9)   2  Allergic rhinitis due to pollen (477 0) (J30 1)   3  Arthralgia Of The Left Knee/Patella/Tibia/Fibula (719 46)   4  Bee sting-induced anaphylaxis (989 5,E905 3) (T63 441A)   5  Cardiomyopathy (425 4) (I42 9)   6  Chest pain, precordial (786 51) (R07 2)   7  Chronic low back pain (724 2,338 29) (M54 5,G89 29)   8  Elevated PSA (790 93) (R97 20)   9  Encounter for screening for malignant neoplasm of colon (V76 51) (Z12 11)   10  Failed back syndrome (722 80) (M96 1)   11  GERD (gastroesophageal reflux disease) (530 81) (K21 9)   12  Hematospermia (608 82) (R36 1)   13  Hypertension (401 9) (I10)   14  Lichenoid keratosis (701 1) (L82 1)   15  Lumbar Hernia Repair   16  Male erectile dysfunction (607 84) (N52 9)   17  Mild hyperlipidemia (272 4) (E78 5)   18  Nodular prostate (600 10) (N40 2)   19  Postoperative visit (V58 49) (Z48 89)   20  Trigger little finger of right hand (727 03) (M65 351)    Past Medical History    1  History of Coronary Artery Disease (V12 59)   2  History of Heart attack (410 90) (I21 9)   3  History of Umbilical hernia (349 5) (K42 9)    Surgical History    1  History of Back Surgery   2  History of Laminectomy Lumbar   3  Lumbar Hernia Repair   4  History of Needle Biopsy Of Prostate   5  History of Shoulder Surgery   6  History of Tonsillectomy    Family History    1  Family history of Carcinoma Of The Pancreas   2  Denied: Family history of Colon cancer   3  Denied: Family history of Crohn's disease   4  Denied: Family history of liver disease    5  Denied: Family history of Colon cancer   6  Denied: Family history of Crohn's disease   7  Denied: Family history of liver disease    8  Family history of Cancer   9  Family history of Family Health Status Of Father - Alive   8   Family history of Mother  At Age 46    Social History    · Alcohol Use (History)   · Always uses seat belt   · Caffeine Use   · Full-time employment   ·    · Never a smoker    Current Meds   1  Aspirin 325 MG Oral Tablet; Take 1 tablet  every other day; Therapy: (Recorded:70Grw7712) to Recorded   2  BL Vitamin B-12 TABS; TAKE 1 TABLET DAILY AS DIRECTED; Therapy: (Recorded:65Frk0484) to Recorded   3  Cyclobenzaprine HCl - 10 MG Oral Tablet; TAKE 1 TABLET AT BEDTIME AS NEEDED; Therapy: 56Dqo4979 to (Candance Barban)  Requested for: 80Mnj7737; Last   Rx:25Luj6746 Ordered   4  DilTIAZem HCl ER Coated Beads 120 MG Oral Capsule Extended Release 24 Hour;   TAKE 1 CAPSULE ONCE DAILY; Therapy: 23QFD4632 to (Evaluate:20Apr2018)  Requested for: 26Apr2017; Last   Rx:97Dtk1628 Ordered   5  EpiPen 2-Jose F 0 3 MG/0 3ML Injection Solution Auto-injector; use; Therapy: 93FBD1653 to (Last Rx:91Oxj7941)  Requested for: 57Imr0530 Ordered   6  Fish Oil CAPS Recorded   7  Fluticasone Propionate 50 MCG/ACT Nasal Suspension; USE 2 SPRAYS IN EACH   NOSTRIL ONCE DAILY; Therapy: 31APV9776 to (Last BV:90RSM4334)  Requested for: 89Btr3472 Ordered   8  Omeprazole 40 MG Oral Capsule Delayed Release; take 1 capsule daily; Therapy: 15SLM0589 to (Sahra Alcocer)  Requested for: 96Krz7002; Last   FB:65RSL1559 Ordered   9  Viagra 100 MG Oral Tablet; TAKE AS DIRECTED; Therapy: 13IGN9630 to (Last Rx:53Slg8251)  Requested for: 52ZGN5826 Ordered    Allergies    1  No Known Drug Allergies    2  Other   3  Seasonal    Vitals  Signs   Recorded: 35TPT6673 09:26AM   Heart Rate: 74  Systolic: 974  Diastolic: 93  Height: 5 ft 7 in  Weight: 196 lb 8 oz  BMI Calculated: 30 78  BSA Calculated: 2 01    Assessment    1  Trigger little finger of right hand (727 03) (M65 351)    Plan  Trigger little finger of right hand    1  Injection Tendon Sheath Ligament Single - POC; Status:Complete;   Done: 87EYY1103   09:38AM   2   Follow-up visit in 2 months Evaluation and Treatment  Follow-up  Status: Hold For -   Scheduling  Requested for: 18KEP9978   3  Please refer to the patient information sheet that was provided at your office visit today for   information on  your current condition ; Status:Complete;   Done: 55ORZ6178 09:38AM    Discussion/Summary  Discussion Summary:   Assessment: Right hand small finger trigger finger    Plan:   The anatomy and physiology of trigger finger was discussed with the patient today in the office  Edema and increased contact pressure within the flexor tendons at the A1 pulley can cause pain, crepitation, and limitation of function  Treatment options include resting MP blocking splints to decrease edema, oral anti-inflammatory medications, home or formal therapy exercises, up to 2 steroid injections or surgical release  While majority of patients do respond to conservative treatment, up to 20% may require surgical release  The patient has elected for injection  Procedure: After the patient, site, and procedure were verbally agreed upon by the patient and the treatment team, verbal consent was obtained  The area was prepped sterilely, the right small finger trigger finger was injected with a combination of 1% lidocaine without epinephrine and 6 mg of Celestone  The patient tolerated the procedure well, sterile dressings were applied  Risks and benefits of the injection were discussed  This note was dictated with dictation software  As such, and may contain typographical errors, improperly dictated words, background noise, or other errors  HPI:  The patient presents loose right hand small finger trigger finger that is been going on for approximately a year  Pain at night worse then during the day, he has to unlock it with his other hand multiple times during the night  He has pain along the flexor tendon sheath, no numbness tingling fevers chills constitutional symptoms  He denies any previous traumatic events  No other fingers are bothering him      The past medical history, surgical history, family history, social history, medications, allergies, and review of systems have been read and reviewed on the chart and have been updated  Review of Systems was recorded in the office today on a separate evaluation sheet and is listed below  Review of Systems:  Constitutional: Negative  HEENT: Negative  Cardiovascular: Negative  Pulmonary: Negative  : Negative  GI: Negative  Skin: Negative except as above  Musculoskeletal: As above  Neurologic: Negative except as above  Endocrine: Negative  Psychiatric: Negative    Examination:    General / Psych: Awake and Oriented, No acute distress, age appropriate  HEENT: Normocephalic, atraumatic, mucous membranes moist, neck supple, trachea midline  Abdomen: No rebound or signs of guarding  Cardio: No discernible arrhythmia, 2+ pulses with good capillary refill  Pulmonary: No audible wheezing or audible stridor  Neurologic: [The patient is neurovascularly intact in the median, ulnar, and radial nerve distribution  There is normal sensation and good capillary refill within the digits  2+ pulses ]  Skin: [No lesions, rashes, streaking, purulence, abscesses, lymphangitis]  Musculoskeletal: Tenderness to palpation right hand small finger  He has a nodule with active locking  No crepitation, full range of motion  Negative CMC shock, no evidence of carpal tunnel with negative Tinel's no other fingers triggering, negative de Quervain and no CMC joint pain  Negative Tinel's at the level of the cubital tunnel  Diagnostic Studies: [none]        Future Appointments    Signatures   Electronically signed by : STEPHANIA Nelson ; Oct 11 2017  9:40AM EST                       (Author)

## 2018-01-12 NOTE — PROGRESS NOTES
Assessment    1  Rhinosinusitis (473 9) (J32 9)    Plan  Rhinosinusitis    · Azithromycin 250 MG Oral Tablet (Zithromax Z-Jose F); TAKE 2 TABLETS ON DAY 1  THEN TAKE 1 TABLET A DAY FOR 4 DAYS    Chief Complaint    1  Cold Symptoms   2  Cough    History of Present Illness  Cold Symptoms:   aGry Loza presents with complaints of sudden onset of frequent episodes of moderate cold symptoms  Episodes started about 6 days ago  Associated symptoms include nasal congestion, runny nose, post nasal drainage, scratchy throat and productive cough  Active Problems    1  Acid reflux disease (530 81) (K21 9)   2  Allergic rhinitis due to pollen (477 0) (J30 1)   3  Arthralgia Of The Left Knee/Patella/Tibia/Fibula (719 46)   4  Bee sting-induced anaphylaxis (989 5,E905 3) (T63 441A)   5  Cardiomyopathy (425 4) (I42 9)   6  Elevated PSA (790 93) (R97 2)   7  Encounter for screening for malignant neoplasm of colon (V76 51) (Z12 11)   8  GERD (gastroesophageal reflux disease) (530 81) (K21 9)   9  Hematospermia (608 82) (R36 1)   10  Hypertension (401 9) (I10)   11  Left knee pain (719 46) (M25 562)   12  Lichenoid keratosis (701 1) (L57 0)   13  Lumbar Hernia Repair   14  Male erectile dysfunction (607 84) (N52 9)   15  Nodular prostate (600 10) (N40 2)   16  Postoperative visit (V58 49) (Z48 89)    Past Medical History    1  History of Coronary Artery Disease (V12 59)   2  History of Heart attack (410 90) (I21 3)   3  History of Umbilical hernia (521 0) (K42 9)    Family History    1  Family history of Carcinoma Of The Pancreas   2  Denied: Family history of Colon cancer   3  Denied: Family history of Crohn's disease   4  Denied: Family history of liver disease    5  Denied: Family history of Colon cancer   6  Denied: Family history of Crohn's disease   7  Denied: Family history of liver disease    8  Family history of Cancer   9  Family history of Family Health Status Of Father - Alive   8   Family history of Mother  At Age 46    Social History    · Alcohol Use (History)   · Always uses seat belt   · Caffeine Use   · Full-time employment   ·    · Never A Smoker   · Tobacco Non-user    Surgical History    1  History of Back Surgery   2  History of Laminectomy Lumbar   3  Lumbar Hernia Repair   4  History of Needle Biopsy Of Prostate   5  History of Shoulder Surgery   6  History of Tonsillectomy    Current Meds   1  Aspirin 325 MG Oral Tablet Recorded   2  Betamethasone Sod Phos & Acet 6 (3-3) MG/ML Injection Suspension; INJECT 6  MG   Intra-articular; To Be Done: 67EDZ8376; Status: HOLD FOR - Administration Ordered   3  BL Vitamin B-12 TABS Recorded   4  Daily Multiple Vitamins TABS Recorded   5  Diltiazem HCl ER Coated Beads 120 MG Oral Capsule Extended Release 24 Hour;   TAKE 1 CAPSULE EVERY DAY; Therapy: 61BSL7540 to (Desiree Jered)  Requested for: 31GKE2748; Last   Rx:04Jan2016 Ordered   6  EpiPen 2-Jose F 0 3 MG/0 3ML Injection Solution Auto-injector; use; Therapy: 83NLG8660 to (Last Rx:23Jan2015)  Requested for: 80GQZ5589 Ordered   7  Fish Oil CAPS Recorded   8  Flexeril 10 MG TABS; TAKE 1 TABLET AT BEDTIME AS NEEDED; Therapy: (Recorded:18Feb2015) to Recorded   9  Fluticasone Propionate 50 MCG/ACT Nasal Suspension; USE 2 SPRAYS IN EACH   NOSTRIL ONCE DAILY; Therapy: 94TJY0460 to (Last IW:18CXP5330)  Requested for: 08RCF5646 Ordered   10  Omeprazole 40 MG Oral Capsule Delayed Release; TAKE 1 CAPSULE DAILY; Therapy: 72BCB2619 to (Evaluate:06Stp1090)  Requested for: 77WYF5034; Last    Rx:17Jan2016 Ordered   11  Viagra 100 MG Oral Tablet; TAKE AS DIRECTED; Therapy: 31AUU7664 to (Last Rx:18Nov2015)  Requested for: 79NFF8820 Ordered   12  Vitamin D3 2000 UNIT Oral Capsule; TAKE 1 CAPSULE ONCE DAILY Recorded    Allergies    1   No Known Drug Allergies    Vitals   Recorded: 28OBD9799 02:31PM   Temperature 97 2 F   Heart Rate 77   Respiration 16   Systolic 420   Diastolic 78   Height 5 ft 8 in   Weight 216 lb BMI Calculated 32 84   BSA Calculated 2 12   O2 Saturation 97     Physical Exam    Constitutional   General appearance: No acute distress, well appearing and well nourished  Eyes   Conjunctiva and lids: No swelling, erythema, or discharge  Pupils and irises: Equal, round and reactive to light  Ears, Nose, Mouth, and Throat   External inspection of ears and nose: Normal     Otoscopic examination: Tympanic membrance translucent with normal light reflex  Canals patent without erythema  Nasal mucosa, septum, and turbinates: Abnormal   red nasal turbs  Oropharynx: Abnormal   post nasal dfrip  Pulmonary   Auscultation of lungs: Clear to auscultation, equal breath sounds bilaterally, no wheezes, no rales, no rhonci  Cardiovascular   Auscultation of heart: Normal rate and rhythm, normal S1 and S2, without murmurs  Future Appointments    Date/Time Provider Specialty Site   08/09/2016 01:00 PM STEPHANIA Infante   [address] [facility] [employer]   03/15/2016 01:45 PM Jerica Mayer MD Urology [address] [facility]   Electronically signed by : STEPHANIA Cosme ; Jan 29 2016  2:45PM EST                       (Author)

## 2018-01-12 NOTE — PROGRESS NOTES
Assessment    1  GERD (gastroesophageal reflux disease) (530 81) (K21 9)   2  Encounter for preventive health examination (V70 0) (Z00 00)   3  Allergic rhinitis due to pollen (477 0) (J30 1)   4  Trigger little finger of right hand (727 03) (M65 351)    Plan  Acid reflux disease    · Omeprazole 40 MG Oral Capsule Delayed Release; take 1 capsule daily  Allergic rhinitis due to pollen    · Fluticasone Propionate 50 MCG/ACT Nasal Suspension; USE 2 SPRAYS IN  EACH NOSTRIL ONCE DAILY  Allergic rhinitis due to pollen, Health Maintenance, GERD (gastroesophageal reflux  disease)    · Follow-up visit in 1 year Evaluation and Treatment  Follow-up and CPX  Status: Complete   Done: 05Vuw6839  Trigger little finger of right hand    · *1 - [facility]  *  Status: Active  Requested for:  37Kin2284  Care Summary provided  : Yes    Discussion/Summary  health maintenance visit Currently, he eats a healthy diet and has an adequate exercise regimen  Prostate cancer screening: the risks and benefits of prostate cancer screening were discussed, prostate cancer screening is managed by and prostate cancer screening is current  Colorectal cancer screening: the risks and benefits of colorectal cancer screening were discussed, colorectal cancer screening is current and colorectal cancer screening is managed by [facility]  History of Present Illness  HM, Adult Male: The patient is being seen for a health maintenance evaluation  General Health: The patient's health since the last visit is described as good  He has regular dental visits  He denies vision problems  He denies hearing loss  Immunizations status: up to date  Lifestyle:  He consumes a diverse and healthy diet  He does not have any weight concerns  He exercises regularly  He does not use tobacco  He denies alcohol use  He denies drug use  Screening: cancer screening reviewed and current  metabolic screening reviewed and current     risk screening reviewed and current  Gastroesophageal Reflux Disease (Follow-Up): The patient is being seen for follow-up of gastroesophageal reflux disease  The patient reports doing well  There are no comorbid illnesses  He has had no significant interval events  The patient is currently asymptomatic  No associated symptoms are reported  Medications include omeprazole (Prilosec)  Medications:  the patient is adherent to his medication regimen, but he denies medication side effects  Disease management:  the patient is doing well with his goals  Seasonal Allergy (Brief): The patient is being seen for a routine clinic follow-up of seasonal allergy  The patient is currently asymptomatic  No associated symptoms are reported  Current treatment includes intranasal corticosteroids  By report, there is fair compliance with treatment, good tolerance of treatment and good symptom control  Review of Systems    Constitutional: No fever or chills, feels well, no tiredness, no recent weight gain or weight loss  Eyes: No complaints of eye pain, no red eyes, no discharge from eyes, no itchy eyes  ENT: no complaints of earache, no hearing loss, no nosebleeds, no nasal discharge, no sore throat, no hoarseness  Cardiovascular: No complaints of slow heart rate, no fast heart rate, no chest pain, no palpitations, no leg claudication, no lower extremity  Respiratory: No complaints of shortness of breath, no wheezing, no cough, no SOB on exertion, no orthopnea or PND  Gastrointestinal: No complaints of abdominal pain, no constipation, no nausea or vomiting, no diarrhea or bloody stools  Genitourinary: No complaints of dysuria, no incontinence, no hesitancy, no nocturia, no genital lesion, no testicular pain  Musculoskeletal: Trigger finger right 5th, but as noted in HPI  Integumentary: No complaints of skin rash or skin lesions, no itching, no skin wound, no dry skin  Active Problems    1   Acid reflux disease (530 81) (K21 9)   2  Allergic rhinitis due to pollen (477 0) (J30 1)   3  Arthralgia Of The Left Knee/Patella/Tibia/Fibula (719 46)   4  Bee sting-induced anaphylaxis (989 5,E905 3) (T63 441A)   5  Cardiomyopathy (425 4) (I42 9)   6  Chest pain, precordial (786 51) (R07 2)   7  Chronic low back pain (724 2,338 29) (M54 5,G89 29)   8  Elevated PSA (790 93) (R97 20)   9  Encounter for screening for malignant neoplasm of colon (V76 51) (Z12 11)   10  Failed back syndrome (722 80) (M96 1)   11  GERD (gastroesophageal reflux disease) (530 81) (K21 9)   12  Hematospermia (608 82) (R36 1)   13  Hypertension (401 9) (I10)   14  Lichenoid keratosis (701 1) (L82 1)   15  Lumbar Hernia Repair   16  Male erectile dysfunction (607 84) (N52 9)   17  Mild hyperlipidemia (272 4) (E78 5)   18  Nodular prostate (600 10) (N40 2)   19  Postoperative visit (V58 49) (Z48 89)    Past Medical History    · History of Coronary Artery Disease (V12 59)   · History of Heart attack (410 90) (I21 3)   · History of Umbilical hernia (371 3) (K42 9)    The past medical history was reviewed and updated today  Surgical History    · History of Back Surgery   · History of Laminectomy Lumbar   · Lumbar Hernia Repair   · History of Needle Biopsy Of Prostate   · History of Shoulder Surgery   · History of Tonsillectomy    The surgical history was reviewed and updated today  Family History  Mother    · Family history of Carcinoma Of The Pancreas   · Denied: Family history of Colon cancer   · Denied: Family history of Crohn's disease   · Denied: Family history of liver disease  Father    · Denied: Family history of Colon cancer   · Denied: Family history of Crohn's disease   · Denied: Family history of liver disease  Family History    · Family history of Cancer   · Family history of Family Health Status Of Father - Alive   · Family history of Mother  At Age 46    The family history was reviewed and updated today         Social History    · Alcohol Use (History)   · Always uses seat belt   · Caffeine Use   · Full-time employment   ·    ·    · Never a smoker  The social history was reviewed and updated today  The social history was reviewed and is unchanged  Current Meds   1  Aspirin 325 MG Oral Tablet; Take 1 tablet  every other day; Therapy: (Recorded:08Loc6156) to Recorded   2  BL Vitamin B-12 TABS; TAKE 1 TABLET DAILY AS DIRECTED; Therapy: (Recorded:80Lfy1736) to Recorded   3  Cyclobenzaprine HCl - 10 MG Oral Tablet; TAKE 1 TABLET AT BEDTIME AS NEEDED; Therapy: 42Ehb5928 to (Devin Qiu)  Requested for: 77Dpt9325; Last   Rx:57Awp1132 Ordered   4  DilTIAZem HCl ER Coated Beads 120 MG Oral Capsule Extended Release 24 Hour;   TAKE 1 CAPSULE ONCE DAILY; Therapy: 47WQL5656 to (Evaluate:20Apr2018)  Requested for: 26Apr2017; Last   Rx:75Myv2884 Ordered   5  EpiPen 2-Jose F 0 3 MG/0 3ML Injection Solution Auto-injector; use; Therapy: 89RDS2797 to (Last Rx:71Hdz7387)  Requested for: 69Rei8901 Ordered   6  Fish Oil CAPS Recorded   7  Fluticasone Propionate 50 MCG/ACT Nasal Suspension; USE 2 SPRAYS IN EACH   NOSTRIL ONCE DAILY; Therapy: 73RTL0347 to (Last BB:33JDW0726)  Requested for: 04WNG3047 Ordered   8  Omeprazole 40 MG Oral Capsule Delayed Release; take 1 capsule daily; Therapy: 75KEI2399 to (Annette Prado)  Requested for: 54SVT4885; Last   Rx:09Mar2017 Ordered   9  Viagra 100 MG Oral Tablet; TAKE AS DIRECTED; Therapy: 70WDQ4034 to (Last Rx:00Qgq3329)  Requested for: 15RUE6157 Ordered    The medication list was reviewed and updated today  Allergies    1  No Known Drug Allergies    2  Other   3  Seasonal    Vitals   Recorded: 21Aug2017 09:06AM   Heart Rate 56   Respiration 16   Systolic 646   Diastolic 80   Weight 551 lb 6 oz   BMI Calculated 30 44   BSA Calculated 2     Physical Exam    Constitutional   General appearance: No acute distress, well appearing and well nourished      Eyes   Conjunctiva and lids: No swelling, erythema, or discharge  Pupils and irises: Equal, round and reactive to light  Ears, Nose, Mouth, and Throat   External inspection of ears and nose: Normal     Otoscopic examination: Tympanic membrance translucent with normal light reflex  Canals patent without erythema  Oropharynx: Normal with no erythema, edema, exudate or lesions  Pulmonary   Respiratory effort: No increased work of breathing or signs of respiratory distress  Auscultation of lungs: Clear to auscultation  Cardiovascular   Palpation of heart: Normal PMI, no thrills  Auscultation of heart: Normal rate and rhythm, normal S1 and S2, without murmurs  Examination of extremities for edema and/or varicosities: Normal     Abdomen   Abdomen: Non-tender, no masses  Liver and spleen: No hepatomegaly or splenomegaly  Lymphatic   Palpation of lymph nodes in neck: No lymphadenopathy  Musculoskeletal   Gait and station: Normal     Digits and nails: Normal without clubbing or cyanosis  Inspection/palpation of joints, bones, and muscles: Abnormal   trigger finger 5th  Skin   Skin and subcutaneous tissue: Normal without rashes or lesions  Neurologic   Cranial nerves: Cranial nerves 2-12 intact  Reflexes: 2+ and symmetric  Sensation: No sensory loss  Psychiatric   Orientation to person, place and time: Normal     Mood and affect: Normal        Future Appointments    Date/Time Provider Specialty Site   01/08/2018 09:20 AM Rina Stubbs MD Neurology [facility] [address] [place] [address]   10/11/2017 09:00 AM STEPHANIA Dubon  Orthopedic Surgery [address]   08/22/2018 03:00 PM STEPHANIA Krishnan   [address] [facility]   01/03/2018 03:30 PM Yesy Ha MD Urology [address] [place] [address]     Signatures   Electronically signed by : STEPHANIA Ratliff ; Aug 21 2017  9:05PM EST                       (Author)

## 2018-01-12 NOTE — CONSULTS
Chief Complaint    1  Finger Problem    Active Problems    1  Acid reflux disease (530 81) (K21 9)   2  Allergic rhinitis due to pollen (477 0) (J30 1)   3  Arthralgia Of The Left Knee/Patella/Tibia/Fibula (719 46)   4  Bee sting-induced anaphylaxis (989 5,E905 3) (T63 441A)   5  Cardiomyopathy (425 4) (I42 9)   6  Chest pain, precordial (786 51) (R07 2)   7  Chronic low back pain (724 2,338 29) (M54 5,G89 29)   8  Elevated PSA (790 93) (R97 20)   9  Encounter for screening for malignant neoplasm of colon (V76 51) (Z12 11)   10  Failed back syndrome (722 80) (M96 1)   11  GERD (gastroesophageal reflux disease) (530 81) (K21 9)   12  Hematospermia (608 82) (R36 1)   13  Hypertension (401 9) (I10)   14  Lichenoid keratosis (701 1) (L82 1)   15  Lumbar Hernia Repair   16  Male erectile dysfunction (607 84) (N52 9)   17  Mild hyperlipidemia (272 4) (E78 5)   18  Nodular prostate (600 10) (N40 2)   19  Postoperative visit (V58 49) (Z48 89)   20   Trigger little finger of right hand (727 03) (M65 351)    Past Medical History    · History of Coronary Artery Disease (V12 59)   · History of Heart attack (410 90) (I21 9)   · History of Umbilical hernia (193 3) (K42 9)    Surgical History    · History of Back Surgery   · History of Laminectomy Lumbar   · Lumbar Hernia Repair   · History of Needle Biopsy Of Prostate   · History of Shoulder Surgery   · History of Tonsillectomy    Family History    · Family history of Carcinoma Of The Pancreas   · Denied: Family history of Colon cancer   · Denied: Family history of Crohn's disease   · Denied: Family history of liver disease    · Denied: Family history of Colon cancer   · Denied: Family history of Crohn's disease   · Denied: Family history of liver disease    · Family history of Cancer   · Family history of Family Health Status Of Father - Alive   · Family history of Mother  At Age 46    Social History    · Alcohol Use (History)   · Always uses seat belt   · Caffeine Use   · Full-time employment   ·    ·    · Never a smoker    Current Meds   1  Aspirin 325 MG Oral Tablet; Take 1 tablet  every other day; Therapy: (Recorded:51Taw9349) to Recorded   2  BL Vitamin B-12 TABS; TAKE 1 TABLET DAILY AS DIRECTED; Therapy: (Recorded:14Rmm8180) to Recorded   3  Cyclobenzaprine HCl - 10 MG Oral Tablet; TAKE 1 TABLET AT BEDTIME AS NEEDED; Therapy: 23Oyv7293 to (Golden Fonseca)  Requested for: 12Dfo5811; Last   Rx:98Glr4442 Ordered   4  DilTIAZem HCl ER Coated Beads 120 MG Oral Capsule Extended Release 24 Hour;   TAKE 1 CAPSULE ONCE DAILY; Therapy: 33HTY6806 to (Evaluate:20Apr2018)  Requested for: 26Apr2017; Last   Rx:11Bxu8265 Ordered   5  EpiPen 2-Jose F 0 3 MG/0 3ML Injection Solution Auto-injector; use; Therapy: 28HKS6116 to (Last Rx:74Mfh2651)  Requested for: 76Ago5600 Ordered   6  Fish Oil CAPS Recorded   7  Fluticasone Propionate 50 MCG/ACT Nasal Suspension; USE 2 SPRAYS IN EACH   NOSTRIL ONCE DAILY; Therapy: 81HWC4709 to (Last XT:23LML3516)  Requested for: 63Tms3446 Ordered   8  Omeprazole 40 MG Oral Capsule Delayed Release; take 1 capsule daily; Therapy: 42PGG6370 to (66 91 28)  Requested for: 90Tdk3871; Last   CX:28YEN6364 Ordered   9  Viagra 100 MG Oral Tablet; TAKE AS DIRECTED; Therapy: 87KTM8887 to (Last Rx:18Nov2015)  Requested for: 88QMZ2637 Ordered    Allergies    1  No Known Drug Allergies    2  Other   3  Seasonal    Vitals  Signs    Heart Rate: 99SIBKYLTA: 793LWVLEVTOZ: 35TVNLWJ: 5 ft 7 inWeight: 196 lb 8 ozBMI Calculated: 30 78BSA Calculated: 2 01    Assessment    1   Trigger little finger of right hand (727 03) (M65 351)    Plan     Trigger little finger of right hand    · Injection Tendon Sheath Ligament Single - POC; Status:Complete;   Done: 59TCZ9411  09:38AM   · Follow-up visit in 2 months Evaluation and Treatment  Follow-up  Status: Hold For -  Scheduling  Requested for: 51AOS0759   · Please refer to the patient information sheet that was provided at your office visit today for  information on  your current condition ; Status:Complete;   Done: 34OTR7563 09:38AM    Discussion/Summary    Assessment: Right hand small finger trigger finger    Plan:   The anatomy and physiology of trigger finger was discussed with the patient today in the office  Edema and increased contact pressure within the flexor tendons at the A1 pulley can cause pain, crepitation, and limitation of function  Treatment options include resting MP blocking splints to decrease edema, oral anti-inflammatory medications, home or formal therapy exercises, up to 2 steroid injections or surgical release  While majority of patients do respond to conservative treatment, up to 20% may require surgical release  The patient has elected for injection  Procedure: After the patient, site, and procedure were verbally agreed upon by the patient and the treatment team, verbal consent was obtained  The area was prepped sterilely, the right small finger trigger finger was injected with a combination of 1% lidocaine without epinephrine and 6 mg of Celestone  The patient tolerated the procedure well, sterile dressings were applied  Risks and benefits of the injection were discussed  This note was dictated with dictation software  As such, and may contain typographical errors, improperly dictated words, background noise, or other errors  HPI:  The patient presents loose right hand small finger trigger finger that is been going on for approximately a year  Pain at night worse then during the day, he has to unlock it with his other hand multiple times during the night  He has pain along the flexor tendon sheath, no numbness tingling fevers chills constitutional symptoms  He denies any previous traumatic events  No other fingers are bothering him      The past medical history, surgical history, family history, social history, medications, allergies, and review of systems have been read and reviewed on the chart and have been updated  Review of Systems was recorded in the office today on a separate evaluation sheet and is listed below  Review of Systems:  Constitutional: Negative  HEENT: Negative  Cardiovascular: Negative  Pulmonary: Negative  : Negative  GI: Negative  Skin: Negative except as above  Musculoskeletal: As above  Neurologic: Negative except as above  Endocrine: Negative  Psychiatric: Negative    Examination:    General / Psych: Awake and Oriented, No acute distress, age appropriate  HEENT: Normocephalic, atraumatic, mucous membranes moist, neck supple, trachea midline  Abdomen: No rebound or signs of guarding  Cardio: No discernible arrhythmia, 2+ pulses with good capillary refill  Pulmonary: No audible wheezing or audible stridor  Neurologic: [The patient is neurovascularly intact in the median, ulnar, and radial nerve distribution  There is normal sensation and good capillary refill within the digits  2+ pulses ]  Skin: [No lesions, rashes, streaking, purulence, abscesses, lymphangitis]  Musculoskeletal: Tenderness to palpation right hand small finger  He has a nodule with active locking  No crepitation, full range of motion  Negative CMC shock, no evidence of carpal tunnel with negative Tinel's no other fingers triggering, negative de Quervain and no CMC joint pain  Negative Tinel's at the level of the cubital tunnel  Diagnostic Studies: [none]        Signatures   Electronically signed by : STEPHANIA Godwin ; Oct 11 2017  9:40AM EST                       (Author) no

## 2018-01-13 VITALS
HEIGHT: 67 IN | SYSTOLIC BLOOD PRESSURE: 120 MMHG | HEART RATE: 68 BPM | BODY MASS INDEX: 31.43 KG/M2 | WEIGHT: 200.25 LBS | DIASTOLIC BLOOD PRESSURE: 96 MMHG

## 2018-01-13 VITALS
SYSTOLIC BLOOD PRESSURE: 132 MMHG | DIASTOLIC BLOOD PRESSURE: 93 MMHG | HEART RATE: 74 BPM | BODY MASS INDEX: 30.84 KG/M2 | HEIGHT: 67 IN | WEIGHT: 196.5 LBS

## 2018-01-13 VITALS
BODY MASS INDEX: 30.56 KG/M2 | RESPIRATION RATE: 18 BRPM | OXYGEN SATURATION: 99 % | TEMPERATURE: 97.5 F | SYSTOLIC BLOOD PRESSURE: 106 MMHG | WEIGHT: 201 LBS | HEART RATE: 76 BPM | DIASTOLIC BLOOD PRESSURE: 78 MMHG

## 2018-01-13 VITALS
SYSTOLIC BLOOD PRESSURE: 122 MMHG | WEIGHT: 194.38 LBS | BODY MASS INDEX: 29.56 KG/M2 | HEART RATE: 56 BPM | DIASTOLIC BLOOD PRESSURE: 80 MMHG | RESPIRATION RATE: 16 BRPM

## 2018-01-16 NOTE — PROGRESS NOTES
Assessment    1  Never a smoker   2  Encounter for preventive health examination (V70 0) (Z00 00)   3  Acid reflux disease (530 81) (K21 9)    Plan  Acid reflux disease    · Omeprazole 40 MG Oral Capsule Delayed Release   · Ranitidine HCl - 150 MG Oral Tablet; One or two daily as needed0 2  Health Maintenance    · Follow-up visit in 1 year Evaluation and Treatment  Follow-up  Status: Hold For -  Scheduling  Requested for: 74Fiw0328    Discussion/Summary  Impression: health maintenance visit  Currently, he eats a healthy diet and has an adequate exercise regimen  Prostate cancer screening: prostate cancer screening is current and prostate cancer screening is managed by [facility]  Testicular cancer screening: the risks and benefits of testicular cancer screening were discussed  Colorectal cancer screening: colonoscopy is needed every ten years, the next colonoscopy is due 2025 and colorectal cancer screening is managed by [facility]  He was advised to be evaluated by an ophthalmologist and [facility]  Advice and education were given regarding nutrition, aerobic exercise, weight bearing exercise and weight loss  Patient discussion: discussed with the patient  History of Present Illness  HM, Adult Male: The patient is being seen for a health maintenance evaluation  Social History: Household members include spouse and adult children  He is   Work status: working full time and occupation:   The patient has never smoked cigarettes  He reports rare alcohol use  He has never used illicit drugs  General Health: The patient's health since the last visit is described as good  He has regular dental visits  He denies vision problems  He denies hearing loss  Immunizations status: up to date  Lifestyle:  He consumes a diverse and healthy diet  He does not have any weight concerns  He exercises regularly  He exercises 3 or more times per week for 30 or more minutes per session   Exercise includes walking  He does not use tobacco  He consumes alcohol  He reports occasional alcohol use  Alcohol concern: The patient has no concerns about alcohol abuse  He denies drug use  Reproductive health:  the patient is sexually active  birth control is not being practiced  He denies erectile dysfunction  Screening: cancer screening reviewed and current  metabolic screening reviewed and current  risk screening reviewed and current  Review of Systems    Constitutional: No fever or chills, feels well, no tiredness, no recent weight gain or weight loss  Eyes: No complaints of eye pain, no red eyes, no discharge from eyes, no itchy eyes  ENT: no complaints of earache, no hearing loss, no nosebleeds, no nasal discharge, no sore throat, no hoarseness  Cardiovascular: No complaints of slow heart rate, no fast heart rate, no chest pain, no palpitations, no leg claudication, no lower extremity  Respiratory: No complaints of shortness of breath, no wheezing, no cough, no SOB on exertion, no orthopnea or PND  Gastrointestinal: No complaints of abdominal pain, no constipation, no nausea or vomiting, no diarrhea or bloody stools  Genitourinary: No complaints of dysuria, no incontinence, no hesitancy, no nocturia, no genital lesion, no testicular pain  Musculoskeletal: No complaints of arthralgia, no myalgias, no joint swelling or stiffness, no limb pain or swelling  Integumentary: No complaints of skin rash or skin lesions, no itching, no skin wound, no dry skin  Neurological: No compliants of headache, no confusion, no convulsions, no numbness or tingling, no dizziness or fainting, no limb weakness, no difficulty walking  Psychiatric: Is not suicidal, no sleep disturbances, no anxiety or depression, no change in personality, no emotional problems  Endocrine: No complaints of proptosis, no hot flashes, no muscle weakness, no erectile dysfunction, no deepening of the voice, no feelings of weakness  Hematologic/Lymphatic: No complaints of swollen glands, no swollen glands in the neck, does not bleed easily, no easy bruising  Active Problems    1  Acid reflux disease (530 81) (K21 9)   2  Allergic rhinitis due to pollen (477 0) (J30 1)   3  Arthralgia Of The Left Knee/Patella/Tibia/Fibula (719 46)   4  Bee sting-induced anaphylaxis (989 5,E905 3) (T63 441A)   5  Cardiomyopathy (425 4) (I42 9)   6  Chronic low back pain (724 2,338 29) (M54 5,G89 29)   7  Elevated PSA (790 93) (R97 2)   8  Encounter for screening for malignant neoplasm of colon (V76 51) (Z12 11)   9  Failed back syndrome (722 80) (M96 1)   10  GERD (gastroesophageal reflux disease) (530 81) (K21 9)   11  Hematospermia (608 82) (R36 1)   12  Hypertension (401 9) (I10)   13  Left knee pain (719 46) (M25 562)   14  Lichenoid keratosis (701 1) (L82 1)   15  Lumbar Hernia Repair   16  Male erectile dysfunction (607 84) (N52 9)   17  Nodular prostate (600 10) (N40 2)   18  Postoperative visit (V58 49) (Z48 89)   19  Rhinosinusitis (473 9) (J32 9)    Past Medical History    · History of Coronary Artery Disease (V12 59)   · History of Heart attack (410 90) (I21 3)   · History of Umbilical hernia (649 8) (K42 9)    The past medical history was reviewed and updated today        Surgical History    · History of Back Surgery   · History of Laminectomy Lumbar   · Lumbar Hernia Repair   · History of Needle Biopsy Of Prostate   · History of Shoulder Surgery   · History of Tonsillectomy    Family History  Mother    · Family history of Carcinoma Of The Pancreas   · Denied: Family history of Colon cancer   · Denied: Family history of Crohn's disease   · Denied: Family history of liver disease  Father    · Denied: Family history of Colon cancer   · Denied: Family history of Crohn's disease   · Denied: Family history of liver disease  Family History    · Family history of Cancer   · Family history of Family Health Status Of Father - Alive   · Family history of Mother  At Age 46    Social History    · Alcohol Use (History)   · Always uses seat belt   · Caffeine Use   · Full-time employment   ·    ·    · Never a smoker  The social history was reviewed and updated today  The social history was reviewed and is unchanged  Current Meds   1  Aspirin 325 MG Oral Tablet; Take 1 tablet  every other day; Therapy: (Recorded:60Miw0928) to Recorded   2  BL Vitamin B-12 TABS; TAKE 1 TABLET DAILY AS DIRECTED; Therapy: (Recorded:35Cfk0822) to Recorded   3  Cyclobenzaprine HCl - 10 MG Oral Tablet; TAKE 1 TABLET Bedtime PRN  Requested   for: 23Nbq5026; Last Rx:17Vdl6290 Ordered   4  Diltiazem HCl ER Coated Beads 120 MG Oral Capsule Extended Release 24 Hour;   TAKE 1 CAPSULE EVERY DAY; Therapy: 52LCG3946 to (David Conde)  Requested for: 03PND2800; Last   Rx:2016 Ordered   5  EpiPen 2-Jose F 0 3 MG/0 3ML Injection Solution Auto-injector; use; Therapy: 18RNO2193 to (Last Rx:2016)  Requested for: 35Eil8783 Ordered   6  Fish Oil CAPS Recorded   7  Fluticasone Propionate 50 MCG/ACT Nasal Suspension; USE 2 SPRAYS IN EACH   NOSTRIL ONCE DAILY; Therapy: 67ZWQ0991 to (Last B29MUC3298)  Requested for: 90QGW8067 Ordered   8  Omeprazole 40 MG Oral Capsule Delayed Release; TAKE 1 CAPSULE DAILY; Therapy: 10FRV7045 to (Evaluate:2017)  Requested for: 70JWM7371; Last   Rx:83Bjx3244 Ordered   9  Viagra 100 MG Oral Tablet; TAKE AS DIRECTED; Therapy: 02SPN5715 to (Last Rx:2015)  Requested for: 57UOP6223 Ordered   10  Vitamin D3 2000 UNIT Oral Capsule; TAKE 1 CAPSULE ONCE DAILY Recorded    The medication list was reviewed and updated today  Allergies    1  No Known Drug Allergies    2  Other   3   Seasonal    Vitals   Recorded: 58KXU3219 56:25PH   Systolic 298   Diastolic 78   Heart Rate 88   Respiration 18   Weight 197 lb 8 0 oz     Physical Exam    Constitutional   General appearance: No acute distress, well appearing and well nourished  Eyes   Conjunctiva and lids: No swelling, erythema, or discharge  Pupils and irises: Equal, round and reactive to light  Ears, Nose, Mouth, and Throat   External inspection of ears and nose: Normal     Otoscopic examination: Tympanic membrance translucent with normal light reflex  Canals patent without erythema  Oropharynx: Normal with no erythema, edema, exudate or lesions  Pulmonary   Respiratory effort: No increased work of breathing or signs of respiratory distress  Auscultation of lungs: Clear to auscultation  Cardiovascular   Palpation of heart: Normal PMI, no thrills  Auscultation of heart: Normal rate and rhythm, normal S1 and S2, without murmurs  Examination of extremities for edema and/or varicosities: Normal     Abdomen   Abdomen: Non-tender, no masses  Liver and spleen: No hepatomegaly or splenomegaly  Lymphatic   Palpation of lymph nodes in neck: No lymphadenopathy  Musculoskeletal   Gait and station: Normal     Digits and nails: Normal without clubbing or cyanosis  Inspection/palpation of joints, bones, and muscles: Normal     Skin   Skin and subcutaneous tissue: Normal without rashes or lesions  Neurologic   Cranial nerves: Cranial nerves 2-12 intact  Reflexes: 2+ and symmetric  Sensation: No sensory loss      Psychiatric   Orientation to person, place and time: Normal     Mood and affect: Normal        Results/Data  (1) CBC/PLT/DIFF 24Mkk4018 08:55AM Ordonez Mean     Test Name Result Flag Reference   WBC 4 2 x10E3/uL  3 4-10 8   RBC 5 21 x10E6/uL  4 14-5 80   Hemoglobin 15 1 g/dL  12 6-17 7   Hematocrit 45 2 %  37 5-51 0   MCV 87 fL  79-97   MCH 29 0 pg  26 6-33 0   MCHC 33 4 g/dL  31 5-35 7   RDW 13 5 %  12 3-15 4   Platelets 001 V23S4/YK  150-379   Neutrophils 48 %     Lymphs 37 %     Monocytes 12 %     Eos 2 %     Basos 1 %     Neutrophils (Absolute) 2 1 x10E3/uL  1 4-7 0   Lymphs (Absolute) 1 5 x10E3/uL  0 7-3 1   Monocytes(Absolute) 0 5 x10E3/uL  0 1-0 9   Eos (Absolute) 0 1 x10E3/uL  0 0-0 4   Baso (Absolute) 0 0 x10E3/uL  0 0-0 2   Immature Granulocytes 0 %     Immature Grans (Abs) 0 0 x10E3/uL  0 0-0 1     (1) COMPREHENSIVE METABOLIC PANEL 79ZVY8094 52:53LV [facility] [employer]     Test Name Result Flag Reference   Glucose, Serum 98 mg/dL  65-99   BUN 16 mg/dL  6-24   Creatinine, Serum 1 04 mg/dL  0 76-1 27   eGFR If NonAfricn Am 81 mL/min/1 73  >59   eGFR If Africn Am 94 mL/min/1 73  >59   BUN/Creatinine Ratio 15  9-20   Sodium, Serum 143 mmol/L  134-144   Potassium, Serum 4 5 mmol/L  3 5-5 2   Chloride, Serum 104 mmol/L     Carbon Dioxide, Total 23 mmol/L  18-29   Calcium, Serum 9 0 mg/dL  8 7-10 2   Protein, Total, Serum 6 2 g/dL  6 0-8 5   Albumin, Serum 4 3 g/dL  3 5-5 5   Globulin, Total 1 9 g/dL  1 5-4 5   A/G Ratio 2 3  1 1-2 5   Bilirubin, Total 0 5 mg/dL  0 0-1 2   Alkaline Phosphatase, S 46 IU/L     AST (SGOT) 33 IU/L  0-40   ALT (SGPT) 45 IU/L H 0-44     (LC) Lipid Panel 62Rwu5116 08:55AM [facility] [employer]     Test Name Result Flag Reference   Cholesterol, Total 182 mg/dL  100-199   Triglycerides 57 mg/dL  0-149   HDL Cholesterol 44 mg/dL  >39   According to ATP-III Guidelines, HDL-C >59 mg/dL is considered a  negative risk factor for CHD  VLDL Cholesterol Kapil 11 mg/dL  5-40   LDL Cholesterol Calc 127 mg/dL H 0-99     (1) LDL,DIRECT 96ROD4591 08:55AM Keren [place]     Test Name Result Flag Reference   LDL Chol  (Direct) 142 mg/dL H 0-99     (1) TSH 60Uzo5262 08:55AM [employer] [employer]     Test Name Result Flag Reference   TSH 1 220 uIU/mL  0 450-4 500     Taz Chaudhrygeoff Parker GTO59 Default 79HAY8198 08:55AM Keren Olliebhakti     Test Name Result Flag Reference   Oswald Parker CMP14 Default Comment     A hand-written panel/profile was received from your office  In  accordance with the [facility] Ambiguous Test Code Policy dated July 1425, we have completed your order by using the closest currently  or formerly recognized AMA panel    We have assigned Comprehensive  Metabolic Panel (14), Test Code #[id number] to this request   If this  is not the testing you wished to receive on this specimen, please  contact the [address] [place] [address] Client Inquiry/Technical Services Department  to clarify the test order  We appreciate your business  Future Appointments    Date/Time Provider Specialty Site   02/06/2017 09:05 AM Courtney Bee MD Neurology [facility] [address] [place] [address]   08/29/2016 10:00 AM STEPHANIA Robledo   Cardiology [facility]   06/29/2017 10:15 AM Lilliana Edmonds MD Urology [address]     Signatures   Electronically signed by : STEPHANIA Davalos ; Aug  9 2016  1:32PM EST                       (Author)

## 2018-01-22 VITALS
WEIGHT: 202 LBS | BODY MASS INDEX: 31.71 KG/M2 | SYSTOLIC BLOOD PRESSURE: 140 MMHG | HEART RATE: 80 BPM | DIASTOLIC BLOOD PRESSURE: 100 MMHG | HEIGHT: 67 IN

## 2018-01-23 VITALS
BODY MASS INDEX: 31.57 KG/M2 | WEIGHT: 201.13 LBS | HEART RATE: 73 BPM | SYSTOLIC BLOOD PRESSURE: 134 MMHG | HEIGHT: 67 IN | DIASTOLIC BLOOD PRESSURE: 84 MMHG

## 2018-02-27 ENCOUNTER — PROCEDURE VISIT (OUTPATIENT)
Dept: UROLOGY | Facility: AMBULATORY SURGERY CENTER | Age: 58
End: 2018-02-27
Payer: COMMERCIAL

## 2018-02-27 DIAGNOSIS — R35.1 BENIGN PROSTATIC HYPERPLASIA WITH NOCTURIA: ICD-10-CM

## 2018-02-27 DIAGNOSIS — N40.1 BENIGN PROSTATIC HYPERPLASIA WITH NOCTURIA: ICD-10-CM

## 2018-02-27 DIAGNOSIS — R97.20 ELEVATED PSA: Primary | ICD-10-CM

## 2018-02-27 PROCEDURE — G0416 PROSTATE BIOPSY, ANY MTHD: HCPCS | Performed by: UROLOGY

## 2018-02-27 PROCEDURE — 55700 PR BIOPSY OF PROSTATE,NEEDLE/PUNCH: CPT | Performed by: UROLOGY

## 2018-02-27 PROCEDURE — 76942 ECHO GUIDE FOR BIOPSY: CPT | Performed by: UROLOGY

## 2018-02-27 PROCEDURE — 88342 IMHCHEM/IMCYTCHM 1ST ANTB: CPT | Performed by: UROLOGY

## 2018-02-27 PROCEDURE — 76872 US TRANSRECTAL: CPT | Performed by: UROLOGY

## 2018-02-27 PROCEDURE — 88344 IMHCHEM/IMCYTCHM EA MLT ANTB: CPT | Performed by: UROLOGY

## 2018-02-27 RX ORDER — TAMSULOSIN HYDROCHLORIDE 0.4 MG/1
0.4 CAPSULE ORAL
Qty: 90 CAPSULE | Refills: 3 | Status: SHIPPED | OUTPATIENT
Start: 2018-02-27 | End: 2019-02-25 | Stop reason: SDUPTHER

## 2018-02-27 RX ORDER — CIPROFLOXACIN 500 MG/1
1 TABLET, FILM COATED ORAL EVERY 12 HOURS
COMMUNITY
Start: 2018-01-03 | End: 2019-01-07 | Stop reason: ALTCHOICE

## 2018-02-27 NOTE — PROGRESS NOTES
Adelfa Lanes is a 24-year-old male who underwent prostate biopsy in 2015 for PSA of 4 6  Over the last 3 years his PSA has risen to 5 8 and more recently up to 6 7  He returns to the office today to undergo repeat prostate biopsy  Preparation was deemed to be adequate  Prostate Biopsy note: The patient returns to the office today to undergo a transrectal ultrasound-guided biopsy of the prostate secondary to an elevated PSA  Risk and benefits of the procedure were discussed  Informed consent was obtained  The patient's prebiopsy preparation was deemed to be adequate  Antibiotics had been taken as prescribed  If appropriate blood thinners had been placed on hold  The patient completed an enema as prescribed  The patient was placed in the lateral decubitus position  Digital rectal examination was performed revealing a 60-70 gram prostate  Viscous lidocaine jelly was instilled into the rectum  Transrectal ultrasonography was then performed  The prostate measured 80 grams  5 cc of 2% lidocaine were then injected bilaterally between the junction of the base of the prostate and the seminal vesicles  Ultrasound guidance was utilized to place the needle into proper position for the administration of the local anesthetic  A standard 12 core biopsy was then performed  4 cores were taken from the right peripheral zone  2 cores were taken from the right central zone  4 cores were taken from the left peripheral zone  2 cores were taken from the left central zone  Overall the patient tolerated the procedure and there were no complications  My overall impression status post transrectal ultrasound and biopsy the prostate secondary to an elevated PSA  I have recommended rest and completing antibiotics  Possible postbiopsy sequelae were discussed including hematuria and hematospermia  I've asked that he return in the next one to 2 weeks to review the results of the biopsy

## 2018-03-20 ENCOUNTER — OFFICE VISIT (OUTPATIENT)
Dept: UROLOGY | Facility: AMBULATORY SURGERY CENTER | Age: 58
End: 2018-03-20
Payer: COMMERCIAL

## 2018-03-20 VITALS
WEIGHT: 203.2 LBS | HEIGHT: 67 IN | DIASTOLIC BLOOD PRESSURE: 88 MMHG | BODY MASS INDEX: 31.89 KG/M2 | SYSTOLIC BLOOD PRESSURE: 126 MMHG | HEART RATE: 60 BPM

## 2018-03-20 DIAGNOSIS — R97.20 ELEVATED PSA: Primary | ICD-10-CM

## 2018-03-20 PROCEDURE — 99214 OFFICE O/P EST MOD 30 MIN: CPT | Performed by: UROLOGY

## 2018-03-20 NOTE — PROGRESS NOTES
3/20/2018    Catana Notch  1960  [id number]        Assessment  Elevated PSA, status post transrectal ultrasound-guided biopsy of the prostate with atypical glands      Discussion  I had a lengthy discussion with the patient and his wife in the office today regarding his most recent biopsy results performed for PSA of 6 7  In the left a small focus of atypical glands was identified  The glans were suspicious for, but nondiagnostic for, prostate cancer  We discussed options including a repeat prostate biopsy at this time versus follow-up in 6 months with his next PSA  The PSA continues to rise we discussed proceeding directly to a repeat prostate biopsy versus a multiparametric MRI of the prostate  The patient is amenable with this plan      History of Present Illness  66 y o  male with a history of a PSA of 6 7  This prompted a transrectal ultrasound-guided biopsy root of the prostate revealing a small focus of atypical glands identified within the left side of the prostate  He returns to the office today with his wife for discussion regarding the significance of the pathologic findings  He denies any significant post biopsy sequelae  AUA Symptom Score      Review of Systems  Review of Systems   Constitutional: Negative  HENT: Negative  Eyes: Negative  Respiratory: Negative  Cardiovascular: Negative  Gastrointestinal: Negative  Endocrine: Negative  Genitourinary: Negative  Musculoskeletal: Negative  Skin: Negative  Allergic/Immunologic: Negative  Neurological: Negative  Hematological: Negative  Psychiatric/Behavioral: Negative  All other systems reviewed and are negative          Past Medical History  Past Medical History:   Diagnosis Date    BPH (benign prostatic hyperplasia)     Cardiac disease     Cardiomyopathy [facility])        Past Social History  Past Surgical History:   Procedure Laterality Date    BACK SURGERY      TONSILLECTOMY         Past Family History  Family History   Problem Relation Age of Onset    Hypertension Father     Diabetes Father     Pancreatic cancer Mother        Past Social history  Social History     Social History    Marital status: /Civil Union     Spouse name: N/A    Number of children: N/A    Years of education: N/A     Occupational History    Not on file  Social History Main Topics    Smoking status: Never Smoker    Smokeless tobacco: Never Used    Alcohol use Yes      Comment: occ    Drug use: No    Sexual activity: Not on file     Other Topics Concern    Not on file     Social History Narrative    No narrative on file       Current Medications  Current Outpatient Prescriptions   Medication Sig Dispense Refill    aspirin (ECOTRIN) 325 mg EC tablet Take 325 mg by mouth every other day      diltiazem (DILACOR XR) 120 MG 24 hr capsule Take 120 mg by mouth daily      ibuprofen (MOTRIN) 600 mg tablet Take by mouth every 6 (six) hours as needed for mild pain      naproxen (NAPROSYN) 500 mg tablet Take 1 tablet by mouth 2 (two) times a day with meals 14 tablet 0    Omega-3 Fatty Acids (FISH OIL) 1000 MG CPDR Take by mouth      omeprazole (PriLOSEC) 40 MG capsule Take 40 mg by mouth daily      tamsulosin (FLOMAX) 0 4 mg Take 1 capsule (0 4 mg total) by mouth daily with dinner 90 capsule 3    ciprofloxacin (CIPRO) 500 mg tablet Take 1 tablet by mouth every 12 (twelve) hours       No current facility-administered medications for this visit  Allergies  Allergies   Allergen Reactions    Other Anaphylaxis     Annotation - [id number]: YELLOW JACKETS       Past Medical History, Social History, Family History, medications and allergies were reviewed      Vitals  Vitals:    03/20/18 1134   BP: 126/88   Pulse: 60   Weight: 92 2 kg (203 lb 3 2 oz)   Height: 5' 7" (1 702 m)       Physical Exam  Physical Exam        Results  Lab Results   Component Value Date    PSA 6 7 (H) 12/26/2017    PSA 3 4 06/09/2014     Lab Results   Component Value Date    GLUCOSE 92 08/07/2017    CALCIUM 9 2 08/07/2017     08/07/2017    K 4 5 08/07/2017    CO2 23 08/07/2017     08/07/2017    BUN 20 08/07/2017    CREATININE 0 92 08/07/2017     Lab Results   Component Value Date    WBC 4 4 08/07/2017    HGB 15 2 08/07/2017    HCT 45 0 08/07/2017    MCV 88 08/07/2017     08/07/2017         Office Urine Dip  No results found for this or any previous visit (from the past 1 hour(s))  ]      Total visit time was 25 minutes of which over 50% was spent on counseling

## 2018-04-07 DIAGNOSIS — K21.9 GASTROESOPHAGEAL REFLUX DISEASE WITHOUT ESOPHAGITIS: Primary | ICD-10-CM

## 2018-04-10 RX ORDER — OMEPRAZOLE 40 MG/1
CAPSULE, DELAYED RELEASE ORAL
Qty: 30 CAPSULE | Refills: 6 | Status: SHIPPED | OUTPATIENT
Start: 2018-04-10 | End: 2018-09-27 | Stop reason: SDUPTHER

## 2018-04-23 DIAGNOSIS — I10 ESSENTIAL HYPERTENSION: Primary | ICD-10-CM

## 2018-04-23 RX ORDER — DILTIAZEM HYDROCHLORIDE 120 MG/1
CAPSULE, COATED, EXTENDED RELEASE ORAL
Qty: 90 CAPSULE | Refills: 3 | Status: SHIPPED | OUTPATIENT
Start: 2018-04-23 | End: 2019-04-12 | Stop reason: SDUPTHER

## 2018-05-07 DIAGNOSIS — Z91.030 ALLERGY TO YELLOW JACKETS: Primary | ICD-10-CM

## 2018-05-07 RX ORDER — EPINEPHRINE 0.3 MG/.3ML
0.3 INJECTION SUBCUTANEOUS ONCE
Qty: 0.3 ML | Refills: 1 | Status: SHIPPED | OUTPATIENT
Start: 2018-05-07 | End: 2019-04-12 | Stop reason: SDUPTHER

## 2018-08-03 LAB
ALBUMIN SERPL-MCNC: 4.1 G/DL (ref 3.5–5.5)
ALBUMIN/GLOB SERPL: 2.1 {RATIO} (ref 1.2–2.2)
ALP SERPL-CCNC: 47 IU/L (ref 39–117)
ALT SERPL-CCNC: 25 IU/L (ref 0–44)
AMBIG ABBREV DEFAULT: NORMAL
AMBIG ABBREV DEFAULT: NORMAL
AST SERPL-CCNC: 28 IU/L (ref 0–40)
BASOPHILS # BLD AUTO: 0 X10E3/UL (ref 0–0.2)
BASOPHILS NFR BLD AUTO: 1 %
BILIRUB SERPL-MCNC: 0.3 MG/DL (ref 0–1.2)
BUN SERPL-MCNC: 22 MG/DL (ref 6–24)
BUN/CREAT SERPL: 21 (ref 9–20)
CALCIUM SERPL-MCNC: 9.2 MG/DL (ref 8.7–10.2)
CHLORIDE SERPL-SCNC: 103 MMOL/L (ref 96–106)
CHOLEST SERPL-MCNC: 155 MG/DL (ref 100–199)
CO2 SERPL-SCNC: 23 MMOL/L (ref 20–29)
CREAT SERPL-MCNC: 1.05 MG/DL (ref 0.76–1.27)
EOSINOPHIL # BLD AUTO: 0.2 X10E3/UL (ref 0–0.4)
EOSINOPHIL NFR BLD AUTO: 4 %
ERYTHROCYTE [DISTWIDTH] IN BLOOD BY AUTOMATED COUNT: 13.3 % (ref 12.3–15.4)
GLOBULIN SER-MCNC: 2 G/DL (ref 1.5–4.5)
GLUCOSE SERPL-MCNC: 92 MG/DL (ref 65–99)
HCT VFR BLD AUTO: 43.3 % (ref 37.5–51)
HDLC SERPL-MCNC: 44 MG/DL
HGB BLD-MCNC: 14.4 G/DL (ref 13–17.7)
IMM GRANULOCYTES # BLD: 0 X10E3/UL (ref 0–0.1)
IMM GRANULOCYTES NFR BLD: 0 %
LDLC SERPL CALC-MCNC: 101 MG/DL (ref 0–99)
LYMPHOCYTES # BLD AUTO: 1.4 X10E3/UL (ref 0.7–3.1)
LYMPHOCYTES NFR BLD AUTO: 37 %
MCH RBC QN AUTO: 29 PG (ref 26.6–33)
MCHC RBC AUTO-ENTMCNC: 33.3 G/DL (ref 31.5–35.7)
MCV RBC AUTO: 87 FL (ref 79–97)
MONOCYTES # BLD AUTO: 0.4 X10E3/UL (ref 0.1–0.9)
MONOCYTES NFR BLD AUTO: 10 %
NEUTROPHILS # BLD AUTO: 1.9 X10E3/UL (ref 1.4–7)
NEUTROPHILS NFR BLD AUTO: 48 %
PLATELET # BLD AUTO: 183 X10E3/UL (ref 150–379)
POTASSIUM SERPL-SCNC: 4.3 MMOL/L (ref 3.5–5.2)
PROT SERPL-MCNC: 6.1 G/DL (ref 6–8.5)
PSA SERPL-MCNC: 5.7 NG/ML (ref 0–4)
RBC # BLD AUTO: 4.96 X10E6/UL (ref 4.14–5.8)
SL AMB EGFR AFRICAN AMERICAN: 91 ML/MIN/1.73
SL AMB EGFR NON AFRICAN AMERICAN: 79 ML/MIN/1.73
SL AMB VLDL CHOLESTEROL CALC: 10 MG/DL (ref 5–40)
SODIUM SERPL-SCNC: 141 MMOL/L (ref 134–144)
TRIGL SERPL-MCNC: 52 MG/DL (ref 0–149)
TSH SERPL DL<=0.005 MIU/L-ACNC: 1.82 UIU/ML (ref 0.45–4.5)
WBC # BLD AUTO: 3.9 X10E3/UL (ref 3.4–10.8)

## 2018-08-22 ENCOUNTER — OFFICE VISIT (OUTPATIENT)
Dept: FAMILY MEDICINE CLINIC | Facility: MEDICAL CENTER | Age: 58
End: 2018-08-22
Payer: COMMERCIAL

## 2018-08-22 VITALS
BODY MASS INDEX: 30.45 KG/M2 | WEIGHT: 194 LBS | RESPIRATION RATE: 14 BRPM | HEIGHT: 67 IN | SYSTOLIC BLOOD PRESSURE: 118 MMHG | HEART RATE: 88 BPM | DIASTOLIC BLOOD PRESSURE: 70 MMHG

## 2018-08-22 DIAGNOSIS — N40.0 BENIGN PROSTATIC HYPERPLASIA WITHOUT LOWER URINARY TRACT SYMPTOMS: ICD-10-CM

## 2018-08-22 DIAGNOSIS — I10 ESSENTIAL HYPERTENSION: Primary | ICD-10-CM

## 2018-08-22 DIAGNOSIS — K21.9 GASTROESOPHAGEAL REFLUX DISEASE WITHOUT ESOPHAGITIS: ICD-10-CM

## 2018-08-22 DIAGNOSIS — R97.20 ELEVATED PSA: ICD-10-CM

## 2018-08-22 DIAGNOSIS — Z00.00 PREVENTATIVE HEALTH CARE: ICD-10-CM

## 2018-08-22 PROCEDURE — 3078F DIAST BP <80 MM HG: CPT | Performed by: FAMILY MEDICINE

## 2018-08-22 PROCEDURE — 99396 PREV VISIT EST AGE 40-64: CPT | Performed by: FAMILY MEDICINE

## 2018-08-22 PROCEDURE — 3074F SYST BP LT 130 MM HG: CPT | Performed by: FAMILY MEDICINE

## 2018-08-22 PROCEDURE — 99214 OFFICE O/P EST MOD 30 MIN: CPT | Performed by: FAMILY MEDICINE

## 2018-08-22 NOTE — ASSESSMENT & PLAN NOTE
There is some disagreement here with the diagnosis of hypertension  Patient takes diltiazem for cardiac reasons  He did have a minor myocardial infarction a few years back with a transient decrease in his ejection fraction  It should be noted that his ejection fraction currently is excellent and there is no cardiac dysfunction  Continue diltiazem

## 2018-08-22 NOTE — PROGRESS NOTES
Assessment/Plan:    Elevated PSA  Patient is following up with Urology  Please see his notes  His last PSA actually represents declined  Biopsy of the prostate was negative but with some atypical glands  Continue follow-up with Urology and regular PSA testing  Benign prostatic hyperplasia without lower urinary tract symptoms  Patient has BPH which is well controlled with tamsulosin  Continue tamsulosin  Essential hypertension  There is some disagreement here with the diagnosis of hypertension  Patient takes diltiazem for cardiac reasons  He did have a minor myocardial infarction a few years back with a transient decrease in his ejection fraction  It should be noted that his ejection fraction currently is excellent and there is no cardiac dysfunction  Continue diltiazem  Gastroesophageal reflux disease without esophagitis  Patient takes omeprazole for GERD symptoms  He has taken omeprazole long-term  He has tried going off the omeprazole and finds that he gets rebound GERD symptoms  I have suggested that he get himself some Zantac  mg  When he goes off the omeprazole next time start taking two of these tablets every day and then taper down to one Zantac 150 mg q day  If he is able to make to transition to H2 blockers I have suggested that he do so  He may even get to the point where he just needs an H2 blocker p r n  occasionally  Will observe  Diagnoses and all orders for this visit:    Essential hypertension    Gastroesophageal reflux disease without esophagitis          Subjective:      Patient ID: Fredrick Marin is a 66 y o  male  Patient here for annual wellness and physical exam     Please see assessment and plan for ongoing medical problems which we also addressed at this visit  He does get yearly PSA screenings  He had a colonoscopy in 2015 and was told to return in 2025  Dr Kimber Mathews performed the procedure  He is happily     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 [place]  He has grown children  Past medical history, surgical history, social history and family history were reviewed         The following portions of the patient's history were reviewed and updated as appropriate: allergies, current medications, past family history, past medical history, past social history, past surgical history and problem list     Review of Systems   Constitutional: Negative for activity change, fatigue and fever  HENT: Negative for congestion, ear discharge, ear pain, postnasal drip, rhinorrhea, sinus pain, sneezing and sore throat  Eyes: Negative for photophobia, pain, discharge and redness  Respiratory: Negative for apnea, cough, shortness of breath and wheezing  Cardiovascular: Negative for chest pain and palpitations  Gastrointestinal: Negative for abdominal pain, blood in stool, constipation, diarrhea, nausea and vomiting  Endocrine: Negative for polydipsia, polyphagia and polyuria  Genitourinary: Negative for decreased urine volume, difficulty urinating, discharge, dysuria, frequency, penile pain and urgency  Musculoskeletal: Negative for arthralgias, gait problem, joint swelling and neck pain  Skin: Negative for color change and rash  Neurological: Negative for dizziness, tremors, seizures, weakness and headaches  Psychiatric/Behavioral: Negative for agitation and sleep disturbance  The patient is not nervous/anxious  Objective:      /70   Pulse 88   Resp 14   Ht 5' 6 75" (1 695 m)   Wt 88 kg (194 lb)   BMI 30 61 kg/m²          Physical Exam   Constitutional: He is oriented to person, place, and time  Vital signs are normal  He appears well-developed and well-nourished  He is cooperative  HENT:   Head: Normocephalic     Right Ear: External ear normal    Left Ear: External ear normal    Nose: Nose normal    Mouth/Throat: Oropharynx is clear and moist    Eyes: Conjunctivae, EOM and lids are normal  Pupils are equal, round, and reactive to light  Neck: Normal range of motion  Neck supple  Carotid bruit is not present  No thyromegaly present  Cardiovascular: Normal rate, regular rhythm, S1 normal, S2 normal, normal heart sounds, intact distal pulses and normal pulses  No murmur heard  Pulmonary/Chest: Effort normal and breath sounds normal  No respiratory distress  He has no wheezes  He has no rales  Abdominal: Soft  Normal appearance and bowel sounds are normal  He exhibits no mass  There is no hepatosplenomegaly  There is no tenderness  Musculoskeletal: Normal range of motion  Lymphadenopathy:     He has no cervical adenopathy  Neurological: He is alert and oriented to person, place, and time  He has normal strength and normal reflexes  No cranial nerve deficit or sensory deficit  Skin: Skin is warm, dry and intact  No rash noted  No pallor  Psychiatric: He has a normal mood and affect  His behavior is normal  Judgment and thought content normal  Cognition and memory are normal    Nursing note and vitals reviewed

## 2018-08-22 NOTE — PROGRESS NOTES
8/23/2018    Jhony Raines  1960  [id number]      Assessment  -Elevated PSA s/p TRUS biopsy with atypical glands    Discussion/Plan  Vj Burton is a 66 y o  male being managed by Dr Naina Early  -AUA score 9, QOL 3  -We reviewed results of recent PSA which is 5 7, previously 6 7  Patient's PSA has ranged from 3 4-6 7  Because his PSA has decreased since last office visit, we will proceed with rechecking in 6 months with repeat PSA and ISIDRO  -He will continue to take Flomax daily  -Follow up in 6 months with PSA  -All questions answered, patient agrees with plan      History of Present Illness  66 y o  male with a history of elevated PSA s/p TRUS biopsy with atypical glands presents today for follow up  Patient's PSA at the time of biopsy was 6 7  Pathology revealed small focus of atypical glands in the left side of prostate, and was suspicious for prostate cancer  Patient states he continues to do well on Flomax  He reports a significant decrease in nocturia episodes, and has a stronger urinary stream   He states he feels he empties his bladder to completion, and denies any gross hematuria or dysuria  No overall changes in health since last office visit  Patient denies any strong family history of prostate cancer  Review of Systems  Review of Systems   Constitutional: Negative  HENT: Negative  Respiratory: Negative  Cardiovascular: Negative  Gastrointestinal: Negative  Genitourinary: Negative for decreased urine volume, difficulty urinating, dysuria, flank pain, hematuria and urgency  Frequency: occasional    Musculoskeletal: Negative  Skin: Negative  Neurological: Negative  Psychiatric/Behavioral: Negative            Past Medical History  Past Medical History:   Diagnosis Date    BPH (benign prostatic hyperplasia)     CAD (coronary artery disease)     Cardiac disease     [facility] [address] [facility]     [facility]     Umbilical hernia     Last Assessed:3/25/2015 Past Social History  Past Surgical History:   Procedure Laterality Date    BACK SURGERY      HERNIA REPAIR      Last Assessed:3/12/2014 ,lumbar    LUMBAR LAMINECTOMY      Last Assessed:3/12/2014    PROSTATE BIOPSY  09/10/2015    needle biopsy    SHOULDER SURGERY      TONSILLECTOMY         Past Family History  Family History   Problem Relation Age of Onset    Hypertension Father     Diabetes Father     Pancreatic cancer Mother     Cancer Family        Past Social history  Social History     Social History    Marital status: /Civil Union     Spouse name: N/A    Number of children: N/A    Years of education: N/A     Occupational History          full time     Social History Main Topics    Smoking status: Never Smoker    Smokeless tobacco: Never Used    Alcohol use Yes      Comment: occ    Drug use: No    Sexual activity: Not on file     Other Topics Concern    Not on file     Social History Narrative    Always uses seat belt    Caffeine use           Current Medications  Current Outpatient Prescriptions   Medication Sig Dispense Refill    aspirin (ECOTRIN) 325 mg EC tablet Take 325 mg by mouth every other day      ciprofloxacin (CIPRO) 500 mg tablet Take 1 tablet by mouth every 12 (twelve) hours      diltiazem (CARDIZEM CD) 120 mg 24 hr capsule TAKE 1 CAPSULE ONCE DAILY  90 capsule 3    diltiazem (DILACOR XR) 120 MG 24 hr capsule Take 120 mg by mouth daily      EPINEPHrine (EPIPEN) 0 3 mg/0 3 mL SOAJ Inject 0 3 mL (0 3 mg total) into the shoulder, thigh, or buttocks once for 1 dose 0 3 mL 1    ibuprofen (MOTRIN) 600 mg tablet Take by mouth every 6 (six) hours as needed for mild pain      naproxen (NAPROSYN) 500 mg tablet Take 1 tablet by mouth 2 (two) times a day with meals 14 tablet 0    Omega-3 Fatty Acids (FISH OIL) 1000 MG CPDR Take by mouth      omeprazole (PriLOSEC) 40 MG capsule TAKE 1 CAPSULE DAILY   30 capsule 6    tamsulosin (FLOMAX) 0 4 mg Take 1 capsule (0 4 mg total) by mouth daily with dinner 90 capsule 3     No current facility-administered medications for this visit  Allergies  Allergies   Allergen Reactions    Other Anaphylaxis     Annotation - [id number]: YELLOW JACKETS       Past Medical History, Social History, Family History, medications and allergies were reviewed  Vitals  There were no vitals filed for this visit  Physical Exam  Physical Exam   Constitutional: He is oriented to person, place, and time  He appears well-developed and well-nourished  HENT:   Head: Normocephalic  Eyes: Pupils are equal, round, and reactive to light  Neck: Normal range of motion  Cardiovascular: Normal rate and regular rhythm  Pulmonary/Chest: Effort normal    Abdominal: Soft  Normal appearance  There is no CVA tenderness  Musculoskeletal: Normal range of motion  Neurological: He is alert and oriented to person, place, and time  He has normal reflexes  Skin: Skin is warm and dry  Psychiatric: He has a normal mood and affect  His behavior is normal  Judgment and thought content normal        Results    I have personally reviewed all pertinent lab results and reviewed with patient  Lab Results   Component Value Date    PSA 6 7 (H) 12/26/2017    PSA 3 4 06/09/2014     Lab Results   Component Value Date    GLUCOSE 92 08/07/2017    CALCIUM 9 2 08/07/2017     08/07/2017    K 4 5 08/07/2017    CO2 23 08/07/2017     08/07/2017    BUN 22 08/02/2018    CREATININE 1 05 08/02/2018     Lab Results   Component Value Date    WBC 3 9 08/02/2018    HGB 14 4 08/02/2018    HCT 43 3 08/02/2018    MCV 87 08/02/2018     08/02/2018     No results found for this or any previous visit (from the past 1 hour(s))

## 2018-08-22 NOTE — ASSESSMENT & PLAN NOTE
Patient takes omeprazole for GERD symptoms  He has taken omeprazole long-term  He has tried going off the omeprazole and finds that he gets rebound GERD symptoms  I have suggested that he get himself some Zantac  mg  When he goes off the omeprazole next time start taking two of these tablets every day and then taper down to one Zantac 150 mg q day  If he is able to make to transition to H2 blockers I have suggested that he do so  He may even get to the point where he just needs an H2 blocker p r n  occasionally  Will observe

## 2018-08-22 NOTE — ASSESSMENT & PLAN NOTE
Patient is following up with Urology  Please see his notes  His last PSA actually represents declined  Biopsy of the prostate was negative but with some atypical glands  Continue follow-up with Urology and regular PSA testing

## 2018-08-23 ENCOUNTER — OFFICE VISIT (OUTPATIENT)
Dept: CARDIOLOGY CLINIC | Facility: CLINIC | Age: 58
End: 2018-08-23
Payer: COMMERCIAL

## 2018-08-23 ENCOUNTER — OFFICE VISIT (OUTPATIENT)
Dept: UROLOGY | Facility: AMBULATORY SURGERY CENTER | Age: 58
End: 2018-08-23
Payer: COMMERCIAL

## 2018-08-23 VITALS
HEIGHT: 66 IN | WEIGHT: 193.2 LBS | HEART RATE: 87 BPM | BODY MASS INDEX: 31.05 KG/M2 | SYSTOLIC BLOOD PRESSURE: 116 MMHG | DIASTOLIC BLOOD PRESSURE: 82 MMHG

## 2018-08-23 VITALS
HEIGHT: 66 IN | WEIGHT: 194.2 LBS | DIASTOLIC BLOOD PRESSURE: 84 MMHG | SYSTOLIC BLOOD PRESSURE: 112 MMHG | BODY MASS INDEX: 31.21 KG/M2 | HEART RATE: 84 BPM

## 2018-08-23 DIAGNOSIS — I42.9 CARDIOMYOPATHY, UNSPECIFIED TYPE (HCC): Primary | ICD-10-CM

## 2018-08-23 DIAGNOSIS — N40.1 BENIGN PROSTATIC HYPERPLASIA WITH NOCTURIA: ICD-10-CM

## 2018-08-23 DIAGNOSIS — R35.1 BENIGN PROSTATIC HYPERPLASIA WITH NOCTURIA: ICD-10-CM

## 2018-08-23 DIAGNOSIS — R97.20 ELEVATED PSA: Primary | ICD-10-CM

## 2018-08-23 PROCEDURE — 99213 OFFICE O/P EST LOW 20 MIN: CPT | Performed by: INTERNAL MEDICINE

## 2018-08-23 PROCEDURE — 99212 OFFICE O/P EST SF 10 MIN: CPT | Performed by: NURSE PRACTITIONER

## 2018-08-24 PROCEDURE — 93000 ELECTROCARDIOGRAM COMPLETE: CPT | Performed by: INTERNAL MEDICINE

## 2018-08-24 NOTE — PROGRESS NOTES
Cardiology Follow Up    Judge Asher  1960  [address] [place] [address]  [phone]    1  Cardiomyopathy, unspecified type ([facility] [address] )  POCT ECG         Discussion/Summary: All of his assessed cardiac problems are stable  I have reviewed his medications and made no changes  RTO 2 years, repeat ECHO at that time  Interval History: He has not had any cardiac problems since his last OV 2 years ago  He has a history of CP with previous cardiac cath showing patent coronary arteries and possible coronary spasm causing his symptoms  He has done well since on Cardizem  EF was down but has returned to normal   He is very active and denies CP, SOB, palpitations      Patient Active Problem List   Diagnosis    Essential hypertension    Gastroesophageal reflux disease without esophagitis    Elevated PSA    Allergic rhinitis due to pollen    Benign prostatic hyperplasia without lower urinary tract symptoms     Past Medical History:   Diagnosis Date    BPH (benign prostatic hyperplasia)     CAD (coronary artery disease)     Cardiac disease     Cardiomyopathy ([facility] [address] )     Heart attack ([facility] )     Hypertension     Umbilical hernia     Last Assessed:3/25/2015     Social History     Social History    Marital status: /Civil Union     Spouse name: N/A    Number of children: N/A    Years of education: N/A     Occupational History          full time     Social History Main Topics    Smoking status: Never Smoker    Smokeless tobacco: Never Used    Alcohol use Yes      Comment: occ    Drug use: No    Sexual activity: Not on file     Other Topics Concern    Not on file     Social History Narrative    Always uses seat belt    Caffeine use          Family History   Problem Relation Age of Onset    Hypertension Father     Diabetes Father     Pancreatic cancer Mother  Cancer Family     No Known Problems Sister     No Known Problems Brother      Past Surgical History:   Procedure Laterality Date    BACK SURGERY      HERNIA REPAIR      Last Assessed:3/12/2014 ,lumbar    LUMBAR LAMINECTOMY      Last Assessed:3/12/2014    PROSTATE BIOPSY  09/10/2015    needle biopsy    SHOULDER SURGERY      TONSILLECTOMY         Current Outpatient Prescriptions:     aspirin (ECOTRIN) 325 mg EC tablet, Take 325 mg by mouth every other day, Disp: , Rfl:     ciprofloxacin (CIPRO) 500 mg tablet, Take 1 tablet by mouth every 12 (twelve) hours, Disp: , Rfl:     diltiazem (CARDIZEM CD) 120 mg 24 hr capsule, TAKE 1 CAPSULE ONCE DAILY  , Disp: 90 capsule, Rfl: 3    diltiazem (DILACOR XR) 120 MG 24 hr capsule, Take 120 mg by mouth daily, Disp: , Rfl:     EPINEPHrine (EPIPEN) 0 3 mg/0 3 mL SOAJ, Inject 0 3 mL (0 3 mg total) into the shoulder, thigh, or buttocks once for 1 dose, Disp: 0 3 mL, Rfl: 1    ibuprofen (MOTRIN) 600 mg tablet, Take 600 mg by mouth every 6 (six) hours as needed for mild pain  , Disp: , Rfl:     naproxen (NAPROSYN) 500 mg tablet, Take 1 tablet by mouth 2 (two) times a day with meals, Disp: 14 tablet, Rfl: 0    Omega-3 Fatty Acids (FISH OIL) 1000 MG CPDR, Take by mouth, Disp: , Rfl:     omeprazole (PriLOSEC) 40 MG capsule, TAKE 1 CAPSULE DAILY  , Disp: 30 capsule, Rfl: 6    tamsulosin (FLOMAX) 0 4 mg, Take 1 capsule (0 4 mg total) by mouth daily with dinner, Disp: 90 capsule, Rfl: 3  Allergies   Allergen Reactions    Other Anaphylaxis     Annotation - [id number]: YELLOW JACKETS     Vitals:    08/23/18 1719   BP: 112/84   BP Location: Right arm   Patient Position: Sitting   Cuff Size: Large   Pulse: 84   Weight: 88 1 kg (194 lb 3 2 oz)   Height: 5' 6" (1 676 m)     Weight (last 2 days)     Date/Time   Weight    08/23/18 1719  88 1 (194 2)             Blood pressure 112/84, pulse 84, height 5' 6" (1 676 m), weight 88 1 kg (194 lb 3 2 oz)  , Body mass index is 31 34 kg/m²      Labs:  Orders Only on 08/02/2018   Component Date Value    White Blood Cell Count 08/02/2018 3 9     Red Blood Cell Count 08/02/2018 4 96     Hemoglobin 08/02/2018 14 4     HCT 08/02/2018 43 3     MCV 08/02/2018 87     MCH 08/02/2018 29 0     MCHC 08/02/2018 33 3     RDW 08/02/2018 13 3     Platelet Count 08/83/0128 183     Neutrophils 08/02/2018 48     Lymphocytes 08/02/2018 37     Monocytes 08/02/2018 10     Eosinophils 08/02/2018 4     Basophils Relative 08/02/2018 1     Neutrophils (Absolute) 08/02/2018 1 9     Lymphocytes (Absolute) 08/02/2018 1 4     Monocytes (Absolute) 08/02/2018 0 4     Eosinophils (Absolute) 08/02/2018 0 2     Basophils (Absolute) 08/02/2018 0 0     Immature Granulocytes 08/02/2018 0     Immature Granulocytes (A* 08/02/2018 0 0     SL AMB GLUCOSE 08/02/2018 92     BUN 08/02/2018 22     Creatinine, Serum 08/02/2018 1 05     eGFR Non  08/02/2018 79     SL AMB EGFR  AMER* 08/02/2018 91     SL AMB BUN/CREATININE RA* 08/02/2018 21*    SL AMB SODIUM 08/02/2018 141     SL AMB POTASSIUM 08/02/2018 4 3     SL AMB CHLORIDE 08/02/2018 103     SL AMB CARBON DIOXIDE 08/02/2018 23     CALCIUM 08/02/2018 9 2     SL AMB PROTEIN, TOTAL 08/02/2018 6 1     Serum Albumin 08/02/2018 4 1     Globulin, Total 08/02/2018 2 0     SL AMB ALBUMIN/GLOBULIN * 08/02/2018 2 1     SL AMB BILIRUBIN, TOTAL 08/02/2018 0 3     Alk Phos Isoenzymes 08/02/2018 47     SL AMB AST 08/02/2018 28     SL AMB ALT 08/02/2018 25     Cholesterol, Total 08/02/2018 155     Triglycerides 08/02/2018 52     HDL 08/02/2018 44     SL AMB VLDL CHOLESTEROL * 08/02/2018 10     LDL Direct 08/02/2018 101*    TSH 08/02/2018 1 820     AMBIG ABBREV DEFAULT 08/02/2018 Comment     AMBIG ABBREV DEFAULT 08/02/2018 Comment    Orders Only on 08/02/2018   Component Date Value    Prostate Specific Antige* 08/02/2018 5 7*   Procedure visit on 02/27/2018   Component Date Value    Case Report 02/27/2018                      Value:Surgical Pathology Report                         Case: [id number]                                   Authorizing Provider:  Gareth Doty MD         Collected:           02/27/2018 3118              Ordering Location:     [facility]        Received:            02/27/2018 72                                     [facility]                                                            Pathologist:           Basilia Brooks MD                                                               Specimens:   A) - Prostate, RPZ                                                                                  B) - Prostate, RCZ                                                                                  C) - Prostate, HARDY                                                                                  D) - Prostate, LCZ                                                                         Final Diagnosis 02/27/2018                      Value: This result contains rich text formatting which cannot be displayed here   Additional Information 02/27/2018                      Value: This result contains rich text formatting which cannot be displayed here  Erika Thrasher Gross Description 02/27/2018                      Value: This result contains rich text formatting which cannot be displayed here   Clinical Information 02/27/2018                      Value:Elevated PSA     Imaging: No results found  Review of Systems:  Review of Systems   Constitutional: Negative for diaphoresis, fatigue, fever and unexpected weight change  HENT: Negative  Respiratory: Negative for cough, shortness of breath and wheezing  Cardiovascular: Negative for chest pain, palpitations and leg swelling  Gastrointestinal: Negative for abdominal pain, diarrhea and nausea  Musculoskeletal: Negative for gait problem and myalgias  Skin: Negative for rash     Neurological: Negative for dizziness and numbness  Psychiatric/Behavioral: Negative  Physical Exam:  Physical Exam   Constitutional: He is oriented to person, place, and time  He appears well-developed and well-nourished  HENT:   Head: Normocephalic and atraumatic  Eyes: Pupils are equal, round, and reactive to light  Neck: Normal range of motion  Neck supple  No JVD present  Cardiovascular: Regular rhythm, S1 normal, S2 normal and normal pulses  Pulses:       Carotid pulses are 2+ on the right side, and 2+ on the left side  Pulmonary/Chest: Effort normal and breath sounds normal  He has no wheezes  He has no rales  Abdominal: Soft  Bowel sounds are normal  There is no tenderness  Musculoskeletal: Normal range of motion  He exhibits no edema or tenderness  Neurological: He is alert and oriented to person, place, and time  He has normal reflexes  No cranial nerve deficit  Skin: Skin is warm  Psychiatric: He has a normal mood and affect

## 2018-09-27 DIAGNOSIS — K21.9 GASTROESOPHAGEAL REFLUX DISEASE WITHOUT ESOPHAGITIS: ICD-10-CM

## 2018-10-01 RX ORDER — OMEPRAZOLE 40 MG/1
CAPSULE, DELAYED RELEASE ORAL
Qty: 30 CAPSULE | Refills: 3 | Status: SHIPPED | OUTPATIENT
Start: 2018-10-01 | End: 2019-02-01 | Stop reason: SDUPTHER

## 2018-10-26 ENCOUNTER — OFFICE VISIT (OUTPATIENT)
Dept: NEUROLOGY | Facility: CLINIC | Age: 58
End: 2018-10-26
Payer: COMMERCIAL

## 2018-10-26 VITALS
WEIGHT: 196.6 LBS | HEART RATE: 77 BPM | DIASTOLIC BLOOD PRESSURE: 82 MMHG | BODY MASS INDEX: 29.8 KG/M2 | SYSTOLIC BLOOD PRESSURE: 120 MMHG | HEIGHT: 68 IN

## 2018-10-26 DIAGNOSIS — M96.1 FAILED BACK SYNDROME: Primary | ICD-10-CM

## 2018-10-26 DIAGNOSIS — M51.26 HNP (HERNIATED NUCLEUS PULPOSUS), LUMBAR: ICD-10-CM

## 2018-10-26 DIAGNOSIS — M54.16 RADICULOPATHY, LUMBAR REGION: ICD-10-CM

## 2018-10-26 PROCEDURE — 99214 OFFICE O/P EST MOD 30 MIN: CPT | Performed by: PSYCHIATRY & NEUROLOGY

## 2018-10-26 RX ORDER — CYCLOBENZAPRINE HCL 10 MG
10 TABLET ORAL
Qty: 30 TABLET | Refills: 1 | Status: SHIPPED | OUTPATIENT
Start: 2018-10-26 | End: 2018-12-22 | Stop reason: SDUPTHER

## 2018-10-26 RX ORDER — TRAMADOL HYDROCHLORIDE 50 MG/1
50 TABLET ORAL EVERY 8 HOURS PRN
Qty: 60 TABLET | Refills: 1 | Status: SHIPPED | OUTPATIENT
Start: 2018-10-26 | End: 2019-01-07 | Stop reason: ALTCHOICE

## 2018-10-26 RX ORDER — GABAPENTIN 100 MG/1
100 CAPSULE ORAL 3 TIMES DAILY
Qty: 90 CAPSULE | Refills: 1 | Status: SHIPPED | OUTPATIENT
Start: 2018-10-26 | End: 2018-12-22 | Stop reason: SDUPTHER

## 2018-10-26 NOTE — PROGRESS NOTES
Progress Note - Neurology   Papito Gooden 66 y o  male MRN: [id number]  Unit/Bed#:  Encounter: [id number]      Subjective:   Patient is here for a follow-up visit and was last seen by me over 2 years ago, and has a history of failed back syndrome  Intermittently he does experience low back pain for a couple of days with extra strenuous activities and generally uses cyclobenzaprine with relief of symptoms and uses it only p r n     But in the recent past patient claims for the last 1-1/2 months he has been experiencing constant back pain and over the last couple of weeks has also noticed pain radiating down the left lower extremity along the posterior thigh to the foot associated with tingling numbness and at times the left leg feels weak  Denies any bladder bowel symptoms  Patient is not seeing adequate relief with the help of Flexeril and denies any precipitating factors or traumatic injuries but does work as a  in school as a teacher  Patient's last MRI was in 2014 which showed evidence of scar tissue at L4-L5 with diskectomy done at L4-L5 on the left side  He also had bulging discs at L3-L4 and L5-S1     ROS:   Review of Systems   Constitutional: Negative  Negative for appetite change and fever  HENT: Negative  Negative for hearing loss, tinnitus, trouble swallowing and voice change  Eyes: Negative  Negative for photophobia and pain  Respiratory: Negative  Negative for shortness of breath  Cardiovascular: Negative  Negative for palpitations  Gastrointestinal: Negative  Negative for nausea and vomiting  Endocrine: Negative  Negative for cold intolerance and heat intolerance  Genitourinary: Positive for frequency and urgency  Negative for dysuria  Musculoskeletal: Positive for back pain and gait problem  Negative for myalgias and neck pain  Skin: Negative  Negative for rash  Neurological: Positive for numbness (left leg)   Negative for dizziness, tremors, seizures, syncope, facial asymmetry, speech difficulty, weakness, light-headedness and headaches  Hematological: Negative  Does not bruise/bleed easily  Psychiatric/Behavioral: Negative  Negative for confusion, hallucinations and sleep disturbance  All other systems reviewed and are negative  Vitals:   Vitals:    10/26/18 1006   BP: 120/82   Pulse: 77   ,Body mass index is 30 34 kg/m²  MEDS:      Current Outpatient Prescriptions:     aspirin (ECOTRIN) 325 mg EC tablet, Take 325 mg by mouth every other day, Disp: , Rfl:     ciprofloxacin (CIPRO) 500 mg tablet, Take 1 tablet by mouth every 12 (twelve) hours, Disp: , Rfl:     diltiazem (CARDIZEM CD) 120 mg 24 hr capsule, TAKE 1 CAPSULE ONCE DAILY  , Disp: 90 capsule, Rfl: 3    EPINEPHrine (EPIPEN) 0 3 mg/0 3 mL SOAJ, Inject 0 3 mL (0 3 mg total) into the shoulder, thigh, or buttocks once for 1 dose, Disp: 0 3 mL, Rfl: 1    ibuprofen (MOTRIN) 600 mg tablet, Take 600 mg by mouth every 6 (six) hours as needed for mild pain  , Disp: , Rfl:     Omega-3 Fatty Acids (FISH OIL) 1000 MG CPDR, Take 1 capsule by mouth daily  , Disp: , Rfl:     omeprazole (PriLOSEC) 40 MG capsule, TAKE 1 CAPSULE DAILY  , Disp: 30 capsule, Rfl: 3    tamsulosin (FLOMAX) 0 4 mg, Take 1 capsule (0 4 mg total) by mouth daily with dinner, Disp: 90 capsule, Rfl: 3  :    Physical Exam:  General appearance: alert, appears stated age and cooperative  Head: Normocephalic, without obvious abnormality, atraumatic    Neurologic:  Patient is alert awake oriented, high functions are intact, speech is fluent  No evidence of any aphasia or dysarthria    Cranial nerve examination reveals visual fields are full to threat, pupils equal and reactive, extraocular movements intact, fundi showed sharp disc margins, sensation in the V1 V2 V3 distribution is symmetric, no obvious facial asymmetry noted,Hearing is preserved, tongue is midline and gag is adequate, shoulder shrug is symmetric bilaterally  Motor examination reveals normal tone and bulk, no evidence of any drift to the outstretched extremities, strength is 5/5 preserved bilaterally in both upper and lower extremities except for trace weakness noted in the left hamstrings, dorsiflexors and the tibialis anterior in the 5-/5 range , deep tendon reflexes are diminished at the left ankle as compared to the right or the reflexes are preserved  , toes are downgoing  Sensory examination reveals diminished sensation to pinprick and light touch in the left S1 distribution as well as L5 distribution, proprioception vibration is preserved bilaterally  patient does not extinguish double simultaneous stimuli  Coordination no evidence of any finger-to-nose dysmetria  Gait patient ambulates with an antalgic pattern on the left  He has evidence of a positive SLR on the left with evidence of significant lumbosacral paraspinal tenderness  Lab Results: I have personally reviewed pertinent reports  Imaging Studies: I have personally reviewed pertinent reports  Assessment:  1  Worsening lumbosacral strain with left S1 radiculopathy most likely secondary to L5-S1 HNP  2  Failed back syndrome status post diskectomy at L4-L5  Plan:  Patient is advised adequate rest at this time, also has severe GERD and is advised to discontinue ibuprofen  Patient was prescribed tramadol 50 mg up to 3 times a day on a p r n  Basis for pain, along with gabapentin 100 mg 3 times a day, and Flexeril 10 mg at bedtime  MRI of the lumbosacral spine with and without contrast will be useful, patient will also be referred for physical therapy at this time  He does not wish to take time off from work and is advised to return back to see me in 6-8 weeks  Patient is familiar with his restrictions  10/26/2018,10:10 AM    Dictation voice to text software has been used in the creation of this document   Please consider this in light of any contextual or grammatical errors

## 2018-11-01 LAB
BUN SERPL-MCNC: 19 MG/DL (ref 6–24)
BUN/CREAT SERPL: 17 (ref 9–20)
CREAT SERPL-MCNC: 1.1 MG/DL (ref 0.76–1.27)
SL AMB EGFR AFRICAN AMERICAN: 86 ML/MIN/1.73
SL AMB EGFR NON AFRICAN AMERICAN: 75 ML/MIN/1.73

## 2018-11-09 ENCOUNTER — HOSPITAL ENCOUNTER (OUTPATIENT)
Dept: MRI IMAGING | Facility: HOSPITAL | Age: 58
Discharge: HOME | End: 2018-11-09
Attending: PSYCHIATRY & NEUROLOGY
Payer: COMMERCIAL

## 2018-11-09 DIAGNOSIS — M54.16 RADICULOPATHY, LUMBAR REGION: ICD-10-CM

## 2018-11-09 DIAGNOSIS — M96.1 FAILED BACK SYNDROME: ICD-10-CM

## 2018-11-09 DIAGNOSIS — M51.26 HNP (HERNIATED NUCLEUS PULPOSUS), LUMBAR: ICD-10-CM

## 2018-11-09 PROCEDURE — A9585 GADOBUTROL INJECTION: HCPCS | Performed by: PSYCHIATRY & NEUROLOGY

## 2018-11-09 PROCEDURE — 72158 MRI LUMBAR SPINE W/O & W/DYE: CPT

## 2018-11-09 RX ADMIN — GADOBUTROL 8 ML: 604.72 INJECTION INTRAVENOUS at 12:35

## 2018-12-22 DIAGNOSIS — M54.16 RADICULOPATHY, LUMBAR REGION: ICD-10-CM

## 2018-12-22 DIAGNOSIS — M51.26 HNP (HERNIATED NUCLEUS PULPOSUS), LUMBAR: ICD-10-CM

## 2018-12-22 DIAGNOSIS — M96.1 FAILED BACK SYNDROME: ICD-10-CM

## 2018-12-24 RX ORDER — GABAPENTIN 100 MG/1
CAPSULE ORAL
Qty: 90 CAPSULE | Refills: 1 | Status: SHIPPED | OUTPATIENT
Start: 2018-12-24 | End: 2019-01-07 | Stop reason: SDUPTHER

## 2018-12-24 RX ORDER — CYCLOBENZAPRINE HCL 10 MG
10 TABLET ORAL
Qty: 30 TABLET | Refills: 1 | Status: SHIPPED | OUTPATIENT
Start: 2018-12-24 | End: 2019-03-17 | Stop reason: SDUPTHER

## 2018-12-28 ENCOUNTER — TELEPHONE (OUTPATIENT)
Dept: NEUROLOGY | Facility: CLINIC | Age: 58
End: 2018-12-28

## 2018-12-28 NOTE — TELEPHONE ENCOUNTER
Wife calls for results of MRI, I did review MRI- please review & advise  Is upset that they cannot see neuro until may  Pt does not f/u w/PCP, PM, PT for LBP    Flexeril 10mg Qhs prn  Gabapentin 100mg, 1tab Qam &2tab Qhs    L/le pain worse w/movement & increased activity (such as climbing up on a ladder & painting the house), denies N/T  LBP generalized, pt is not home to discuss symptoms    Please advise

## 2018-12-28 NOTE — TELEPHONE ENCOUNTER
Patient can increase Neurontin to 200 mg twice a day and also would recommend referring the patient to see a pain specialist as he has a lumbar disc herniation in a failed back syndrome  If agreeable please let us know we could put in the consult

## 2019-01-07 ENCOUNTER — OFFICE VISIT (OUTPATIENT)
Dept: NEUROLOGY | Facility: CLINIC | Age: 59
End: 2019-01-07
Payer: COMMERCIAL

## 2019-01-07 VITALS
HEART RATE: 78 BPM | BODY MASS INDEX: 31.86 KG/M2 | SYSTOLIC BLOOD PRESSURE: 118 MMHG | WEIGHT: 203 LBS | HEIGHT: 67 IN | DIASTOLIC BLOOD PRESSURE: 82 MMHG

## 2019-01-07 DIAGNOSIS — M54.16 RADICULOPATHY, LUMBAR REGION: ICD-10-CM

## 2019-01-07 DIAGNOSIS — M51.26 HNP (HERNIATED NUCLEUS PULPOSUS), LUMBAR: ICD-10-CM

## 2019-01-07 DIAGNOSIS — M96.1 FAILED BACK SYNDROME: ICD-10-CM

## 2019-01-07 PROCEDURE — 99214 OFFICE O/P EST MOD 30 MIN: CPT | Performed by: PSYCHIATRY & NEUROLOGY

## 2019-01-07 RX ORDER — GABAPENTIN 300 MG/1
300 CAPSULE ORAL 2 TIMES DAILY
Qty: 60 CAPSULE | Refills: 1 | Status: SHIPPED | OUTPATIENT
Start: 2019-01-07 | End: 2019-01-16 | Stop reason: SDUPTHER

## 2019-01-07 RX ORDER — METHYLPREDNISOLONE 4 MG/1
TABLET ORAL
Qty: 21 TABLET | Refills: 0 | Status: SHIPPED | OUTPATIENT
Start: 2019-01-07 | End: 2019-01-28

## 2019-01-07 NOTE — PROGRESS NOTES
Progress Note - Neurology   Nery Smith 66 y o  male MRN: [id number]  Unit/Bed#:  Encounter: [id number]      Subjective:   Patient is here for a follow-up visit accompanied with his wife in a last seen by me in October with acute low back pain, with a history of prior 2 back surgeries in the past   Patient was experiencing pain in the left lower extremity and felt to have left sciatic radiculopathy and was advised an MRI of the LS spine which was subsequently performed and showed evidence of a disc protrusion at L5-S1 with mass effect on the left S1 nerve root with other postoperative changes  Patient was started on tramadol, gabapentin and Flexeril and advised physical therapy, patient decided to wait on physical therapy till his MRI was done and he had a follow-up appointment with me  He was doing well with relief of pain and actively working until this weekend when he has had a recurrent bout of severe low back pain radiating down the left lower extremity with tingling of his toes and difficulty ambulating  Denies any bladder bowel symptoms  ROS:   Review of Systems   Constitutional: Negative for appetite change and fever  HENT: Negative  Negative for ear pain, hearing loss, tinnitus, trouble swallowing and voice change  Eyes: Negative  Negative for photophobia, pain and visual disturbance  Respiratory: Negative  Negative for shortness of breath  Cardiovascular: Negative  Negative for chest pain and palpitations  Gastrointestinal: Negative  Negative for abdominal pain, constipation, diarrhea, nausea and vomiting  Endocrine: Negative  Negative for cold intolerance and heat intolerance  Genitourinary: Positive for difficulty urinating  Negative for dysuria, frequency and urgency  Musculoskeletal: Positive for back pain, gait problem and myalgias  Negative for neck pain  Skin: Negative  Negative for rash  Neurological: Positive for weakness   Negative for dizziness, tremors, seizures, syncope, facial asymmetry, speech difficulty, light-headedness, numbness and headaches  Left leg weakness  Tingling of toes of left foot   Hematological: Negative  Does not bruise/bleed easily  Psychiatric/Behavioral: Positive for sleep disturbance  Negative for confusion and hallucinations  Vitals:   Vitals:    01/07/19 1232   BP: 118/82   Pulse: 78   ,There is no height or weight on file to calculate BMI  MEDS:      Current Outpatient Prescriptions:     aspirin (ECOTRIN) 325 mg EC tablet, Take 325 mg by mouth every other day, Disp: , Rfl:     cyclobenzaprine (FLEXERIL) 10 mg tablet, TAKE 1 TABLET (10 MG TOTAL) BY MOUTH DAILY AT BEDTIME, Disp: 30 tablet, Rfl: 1    diltiazem (CARDIZEM CD) 120 mg 24 hr capsule, TAKE 1 CAPSULE ONCE DAILY  , Disp: 90 capsule, Rfl: 3    EPINEPHrine (EPIPEN) 0 3 mg/0 3 mL SOAJ, Inject 0 3 mL (0 3 mg total) into the shoulder, thigh, or buttocks once for 1 dose, Disp: 0 3 mL, Rfl: 1    gabapentin (NEURONTIN) 100 mg capsule, TAKE 1 CAPSULE BY MOUTH THREE TIMES A DAY, Disp: 90 capsule, Rfl: 1    ibuprofen (MOTRIN) 600 mg tablet, Take 600 mg by mouth every 6 (six) hours as needed for mild pain  , Disp: , Rfl:     Omega-3 Fatty Acids (FISH OIL) 1000 MG CPDR, Take 1 capsule by mouth daily  , Disp: , Rfl:     omeprazole (PriLOSEC) 40 MG capsule, TAKE 1 CAPSULE DAILY  , Disp: 30 capsule, Rfl: 3    tamsulosin (FLOMAX) 0 4 mg, Take 1 capsule (0 4 mg total) by mouth daily with dinner, Disp: 90 capsule, Rfl: 3    traMADol (ULTRAM) 50 mg tablet, Take 1 tablet (50 mg total) by mouth every 8 (eight) hours as needed for moderate pain, Disp: 60 tablet, Rfl: 1  :    Physical Exam:  General appearance: alert, appears stated age and cooperative  Head: Normocephalic, without obvious abnormality, atraumatic    Neurologic:  On examination patient has evidence of positive SLR bilaterally, with lumbosacral tenderness, and mild weakness of the left hamstrings as well as the plantar flexors in the 5-/5 range, of the left foot with sensory changes in the left L5-S1 distribution  Patient ambulates with an antalgic pattern  Lab Results: I have personally reviewed pertinent reports  Imaging Studies: I have personally reviewed pertinent reports  Assessment:  1  Left lumbar radiculopathy secondary to lumbar HNP with severe lumbosacral strain  Plan:  Patient is advised to resume physical therapy, will discontinue tramadol since it does not seem to be giving him any relief, will start the patient on Medrol Dosepak, increase the dose of gabapentin to 300 mg twice a day and continue Flexeril 10 mg at bedtime  In the meanwhile will also initiate a consultation for pain management as well as neurosurgical consultation  Patient is advised bedrest for the next 2 weeks and will return back to see me in 6-8 weeks  1/7/2019,12:37 PM    Dictation voice to text software has been used in the creation of this document  Please consider this in light of any contextual or grammatical errors

## 2019-01-10 ENCOUNTER — OFFICE VISIT (OUTPATIENT)
Dept: NEUROSURGERY | Facility: CLINIC | Age: 59
End: 2019-01-10
Payer: COMMERCIAL

## 2019-01-10 ENCOUNTER — TELEPHONE (OUTPATIENT)
Dept: OBGYN CLINIC | Facility: HOSPITAL | Age: 59
End: 2019-01-10

## 2019-01-10 VITALS
HEIGHT: 67 IN | SYSTOLIC BLOOD PRESSURE: 120 MMHG | HEART RATE: 79 BPM | BODY MASS INDEX: 31.86 KG/M2 | TEMPERATURE: 98.7 F | DIASTOLIC BLOOD PRESSURE: 90 MMHG | WEIGHT: 203 LBS

## 2019-01-10 DIAGNOSIS — M54.16 RADICULOPATHY, LUMBAR REGION: ICD-10-CM

## 2019-01-10 DIAGNOSIS — M51.36 DDD (DEGENERATIVE DISC DISEASE), LUMBAR: ICD-10-CM

## 2019-01-10 DIAGNOSIS — M47.26 OTHER SPONDYLOSIS WITH RADICULOPATHY, LUMBAR REGION: Primary | ICD-10-CM

## 2019-01-10 PROCEDURE — 99203 OFFICE O/P NEW LOW 30 MIN: CPT | Performed by: PHYSICIAN ASSISTANT

## 2019-01-10 NOTE — PATIENT INSTRUCTIONS
Recommend you pursue Pain Management evaluation as previously referred by your Neurologist     Contact our office or present to hospital Emergency Room if experience worsening or new back / leg pain, sensory or motor change, bladder or bowel dysfunction, or other neurological change

## 2019-01-10 NOTE — LETTER
January 14, 2019     Jp Minor MD  [facility] [address] [place] [address]    Patient: Willard Moyer   YOB: 1960   Date of Visit: 1/10/2019       Dear Dr Huong Albert:    Thank you for referring Orlando Worthington to me for evaluation  Below are my notes for this consultation  If you have questions, please do not hesitate to call me  I look forward to following your patient along with you  Sincerely,        Galilea Chong MD        CC: MD Glenroy Valencia PA-C  1/14/2019 12:54 AM  Sign at close encounter  Assessment/Plan:       Diagnoses and all orders for this visit:    Other spondylosis with radiculopathy, lumbar region    Radiculopathy, lumbar region  -     Ambulatory referral to Neurosurgery    DDD (degenerative disc disease), lumbar          Discussion / Summary: This is a 66 y o  male known to practice as he is s/p right L3-L4 microdiskectomy in 2010 and left L4-L5 microdiskectomy in 2012  He has history of chronic LBP but reports worsening LBP and development of new LLE pain in Sept 2018  Lumbar spine MRI  ( 11/9/18)  was reviewed in detail by Dr Reardon Letters  There is normal alignment of lumbar spine  There is presence of multilevel lumbar spondylosis but not significant stenosis  There is facet arthropathy at L4-L5  Neurosurgical intervention is not advised at this juncture  Mr Annamarie Gallo is recommended to pursue Pain Management evaluation  He already has a referral to [facility] that was provided by Dr Huong Albert  Mr Annamarie Gallo reports he will contact [facility] to arrange Pain Management consultation  Discussed with patient that he would likely benefit having his pain better controlled prior to course of PT  Further neurosurgical follow up with our office may be on an as needed basis from neurosurgical standpoint    Patient advised to contact our office if symptoms are refractory to conservative treatment or if symptoms worsen despite conservative treatment  Patient is to contact our office or present to hospital Emergency Room if experience worsening or new back / leg pain, sensory or motor change, bladder or bowel dysfunction, or other neurological change  Patient expressed understanding and agreement     __________________________________________________________________________________________    Subjective:      Patient ID: Willard Moyer is a 66 y o  male who presents for neurosurgical consultation in regards to worsening LBP and development of LLE in Sept 2018  He is referred by Dr Huong Albert  Mr Annamarie Gallo is s/p L-spine MRI 11/9/18  Mr Annamarie Gallo is accompanied with his wife  Mr Annamarie Gallo is known to practice as he is s/p L3-L4 right metrx microdiskectomy performed on 1/22/2010 by Dr Caron Raza  Patient underwent a metrx microdiskectomy at L4-L5 on the left on 7/30/2012 performed by Dr Caron Raza  HPI   Mr Annamarie Gallo has history of chronic LBP that predates his lumbar surgeries in 2010 but he reports worsening of his LBP and development of LLE pain in Sept 2018  While he denies a specific inciting event in Sept 2018 he does mention he had been chopping wood around that time  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  Patient sought evaluation with Dr Huong Albert of Neurology  He underwent L-spine MRI  He was placed on Gabapentin and Toradol which helped a bit  Patient reports worsening of his pain around Saturday  He reports he went to work on Monday but left because of his pain  He followed up with Dr Huong Albert who increased his Gabapentin and added a Medrol dose pack  Patient repots he has been out of work since this past Monday  Patient reports left-sided low back pain  He currently rates back pain as 7-8/10 on pain scale  He describes the back pain as throbbing in character and at times stabbing pain    His pain is aggravated with sitting, standing 15-20 minutes, and is worse when he is up and moving about  He reports he typically feels better in AMs  He reports pain extends from his low back to the left buttock down the posteriorlateral left thigh to anterior left shin  He rates left lower extremity pain currently as 6-7/10 on pain scale  He describes the leg pain as a throbbing numb pain  He occasionally experiences a shooting leg pain down the left leg  He reports sitting, standing, lying, leaning forward when brushing teeth and reaching are aggravating factors  He reports gabapentin and Medrol Dosepak provided a little relief  He takes cyclobenzaprine 10 mg oral daily at bedtime p r n  He reports intermittent tingling in the toes of his left foot although unable to identify which specific toes  He reports weakness of his left leg more noticeable since this past Saturday  He denies pain, numbness, tingling, or weakness of his right lower extremity    Patient reports he has Physical therapy consult pending for Monday  He denies seeing a Pain Management provider but reports he has referral to see a Pain Management provider (Darin)  Patient reports he is able controlled with urine but does indicate he has history of enlarged prostate -- follow with Urologist (Dr Rayshawn Sam) -- on tamsulosin  He denies bowel dysfunction  He denies saddle paresthesias  The following portions of the patient's history were reviewed and updated as appropriate: allergies, current medications, past family history, past medical history, past social history and past surgical history  Review of Systems   Constitutional: Negative for fever  HENT: Negative  Eyes: Negative for visual disturbance  Respiratory: Negative for shortness of breath  Cardiovascular: Negative for chest pain  Gastrointestinal: Negative for diarrhea and vomiting  Genitourinary: Negative for difficulty urinating  Musculoskeletal: Positive for back pain  Neurological:        See HPI   Psychiatric/Behavioral: Negative for agitation  Objective:      /90 (BP Location: Left arm, Patient Position: Sitting, Cuff Size: Standard)   Pulse 79   Temp 98 7 °F (37 1 °C) (Temporal)   Ht 5' 6 5" (1 689 m)   Wt 92 1 kg (203 lb)   BMI 32 27 kg/m²           Physical Exam   Constitutional: He appears well-developed and well-nourished  No distress  Eyes: Conjunctivae are normal  No scleral icterus  Pulmonary/Chest: Effort normal    Musculoskeletal:        Lumbar back: He exhibits tenderness (inferior lumbar in midline)  He exhibits no deformity  Mature lumbar spine scars x 2    Neurological: He is alert  He has a normal Tandem Gait Test    Reflex Scores:       Tricep reflexes are 1+ on the right side and 2+ on the left side  Bicep reflexes are 2+ on the right side and 2+ on the left side  Brachioradialis reflexes are 1+ on the right side and 2+ on the left side  Patellar reflexes are 2+ on the right side and 2+ on the left side  Achilles reflexes are 2+ on the right side and 2+ on the left side  Skin: Skin is warm and dry  Psychiatric: He has a normal mood and affect  His speech is normal        Neurologic Exam     Mental Status   Speech: speech is normal   Level of consciousness: alert    Motor Exam     Motor:  Shoulder abduction 5/5 bilaterally  Elbow flexion/extension 5/5 bilaterally  Wrist flexion/extension 5/5 bilaterally  Finger  / abduction 5/5 bilaterally  Hip flexion 5/5 bilaterally  (elicits left sided LBP when complete on left side)  Hip abduction/adduction 5/5 bilaterally  Knee flexion/extension 5/5 bilaterally  Ankle DF left 5-/5 and right 5/5  Ankle PF 5/5 bilaterally  Great toe DF 5/5 bilaterally  Sensory Exam     Sensation to pinprick is diminished of lateral left calf and lateral left foot    Sensation to pinprick otherwise intact of b/l upper extremities, torso, and b/l lower extremities  JPS and Vibratory sense intact b/l thumbs and great toes  Gait, Coordination, and Reflexes     Gait  Gait: (Independent  Antalgic appearing favoring LLE  No buckling or giveway of lower extremities )    Coordination   Tandem walking coordination: normal    Tremor   Resting tremor: absent    Reflexes   Right brachioradialis: 1+  Left brachioradialis: 2+  Right biceps: 2+  Left biceps: 2+  Right triceps: 1+  Left triceps: 2+  Right patellar: 2+  Left patellar: 2+  Right achilles: 2+  Left achilles: 2+  Right plantar: normal  Left plantar: normal  Right ankle clonus: absent  Left ankle clonus: absent      Imaging study:    11/9/18 [facility] L-spine MRI -- per report: " MRI LUMBAR SPINE WITH AND WITHOUT CONTRAST     INDICATION: M96 1: Postlaminectomy syndrome, not elsewhere classified  M54 16: Radiculopathy, lumbar region  M51 26: Other intervertebral disc displacement, lumbar region      COMPARISON:  3/25/2014     TECHNIQUE:  Sagittal T1, sagittal T2, sagittal inversion recovery, axial T1 and axial T2, coronal T2  Sagittal and axial T1 postcontrast      IV Contrast:  8 mL of gadobutrol injection (MULTI-DOSE)      IMAGE QUALITY:  Diagnostic     FINDINGS:     ALIGNMENT:  Normal alignment of the lumbar spine  No compression fracture  No spondylolysis or spondylolisthesis  No scoliosis      MARROW SIGNAL:  Mild endplate marrow degenerative change L3-4 and L4-5  Small hemangioma within the L5 vertebral body      DISTAL CORD AND CONUS:  Normal size and signal of the distal cord and conus  The conus ends at the L1 level      PARASPINAL SOFT TISSUES:  Paraspinal soft tissues are unremarkable      SACRUM:  Normal signal within the sacrum   No evidence of insufficiency or stress fracture      LOWER THORACIC DISC SPACES:  Normal disc height and signal   No disc herniation, canal stenosis or foraminal narrowing      LUMBAR DISC SPACES:     L1-L2:  Normal      L2-L3: Mild annular bulging with a tiny central disc herniation  No significant canal stenosis or foraminal narrowing  No nerve impingement      L3-L4:  Slight annular bulging with a tiny central disc protrusion  Minimal canal stenosis and foraminal narrowing without nerve impingement      L4-L5:  Loss of disc height with mild annular bulging  Mild enhancement within the left paramedian and foraminal epidural space suggesting mild epidural fibrosis  Minimal distortion of the left anterolateral aspect of the thecal sac  Minimal foraminal   narrowing without nerve impingement      L5-S1:  Loss of disc height  There is a focal left paracentral disc protrusion distorting the left anterolateral aspect of the thecal sac with mass effect upon the left S1 nerve  Slight peripheral enhancement of the disc protrusion  Mild left canal   stenosis and foraminal narrowing      POSTCONTRAST IMAGING:  There is no abnormal enhancement identified within the thecal sac  See above description of enhancement at L4-5 and L5-S1      IMPRESSION:     Mild noncompressive lumbar degenerative change at the L2-3 and L3-4 levels  Minimal canal stenosis without foraminal nerve impingement      At L4-5 there is minimal enhancement within the left paramedian and foraminal epidural space suggesting mild epidural fibrosis  There is minimal distortion of the left anterolateral aspect of the thecal sac      Small focal left paracentral disc protrusion at L5-S1 with minimal peripheral enhancement    Mild canal stenosis and foraminal narrowing         Workstation performed: [id number] "

## 2019-01-10 NOTE — PROGRESS NOTES
Assessment/Plan:       Diagnoses and all orders for this visit:    Other spondylosis with radiculopathy, lumbar region    Radiculopathy, lumbar region  -     Ambulatory referral to Neurosurgery    DDD (degenerative disc disease), lumbar          Discussion / Summary: This is a 66 y o  male known to practice as he is s/p right L3-L4 microdiskectomy in 2010 and left L4-L5 microdiskectomy in 2012  He has history of chronic LBP but reports worsening LBP and development of new LLE pain in Sept 2018  Lumbar spine MRI  ( 11/9/18)  was reviewed in detail by Dr Jenkins Dawley  There is normal alignment of lumbar spine  There is presence of multilevel lumbar spondylosis but not significant stenosis  There is facet arthropathy at L4-L5  Neurosurgical intervention is not advised at this juncture  Mr Shalonda Martinez is recommended to pursue Pain Management evaluation  He already has a referral to [facility] that was provided by Dr Nahid Aden  Mr Shalonda Martinez reports he will contact [facility] to arrange Pain Management consultation  Discussed with patient that he would likely benefit having his pain better controlled prior to course of PT  Further neurosurgical follow up with our office may be on an as needed basis from neurosurgical standpoint  Patient advised to contact our office if symptoms are refractory to conservative treatment or if symptoms worsen despite conservative treatment  Patient is to contact our office or present to hospital Emergency Room if experience worsening or new back / leg pain, sensory or motor change, bladder or bowel dysfunction, or other neurological change      Patient expressed understanding and agreement     __________________________________________________________________________________________    Subjective:      Patient ID: Jose Dean is a 66 y o  male who presents for neurosurgical consultation in regards to worsening LBP and development of LLE in Sept 2018  He is referred by Dr Rosa Altamirano  Mr Bryan Collier is s/p L-spine MRI 11/9/18  Mr Bryan Collier is accompanied with his wife  Mr Bryan Collier is known to practice as he is s/p L3-L4 right metrx microdiskectomy performed on 1/22/2010 by Dr Ly Chang  Patient underwent a metrx microdiskectomy at L4-L5 on the left on 7/30/2012 performed by Dr Ly Chang  HPI   Mr Bryan Collier has history of chronic LBP that predates his lumbar surgeries in 2010 but he reports worsening of his LBP and development of LLE pain in Sept 2018  While he denies a specific inciting event in Sept 2018 he does mention he had been chopping wood around that time  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  Patient sought evaluation with Dr Rosa Altamirano of Neurology  He underwent L-spine MRI  He was placed on Gabapentin and Toradol which helped a bit  Patient reports worsening of his pain around Saturday  He reports he went to work on Monday but left because of his pain  He followed up with Dr Rosa Altamirano who increased his Gabapentin and added a Medrol dose pack  Patient repots he has been out of work since this past Monday  Patient reports left-sided low back pain  He currently rates back pain as 7-8/10 on pain scale  He describes the back pain as throbbing in character and at times stabbing pain  His pain is aggravated with sitting, standing 15-20 minutes, and is worse when he is up and moving about  He reports he typically feels better in AMs  He reports pain extends from his low back to the left buttock down the posteriorlateral left thigh to anterior left shin  He rates left lower extremity pain currently as 6-7/10 on pain scale  He describes the leg pain as a throbbing numb pain  He occasionally experiences a shooting leg pain down the left leg  He reports sitting, standing, lying, leaning forward when brushing teeth and reaching are aggravating factors    He reports gabapentin and Medrol Dosepak provided a little relief  He takes cyclobenzaprine 10 mg oral daily at bedtime p r n  He reports intermittent tingling in the toes of his left foot although unable to identify which specific toes  He reports weakness of his left leg more noticeable since this past Saturday  He denies pain, numbness, tingling, or weakness of his right lower extremity    Patient reports he has Physical therapy consult pending for Monday  He denies seeing a Pain Management provider but reports he has referral to see a Pain Management provider (Darin)  Patient reports he is able controlled with urine but does indicate he has history of enlarged prostate -- follow with Urologist (Dr Danilo Llanes) -- on tamsulosin  He denies bowel dysfunction  He denies saddle paresthesias  The following portions of the patient's history were reviewed and updated as appropriate: allergies, current medications, past family history, past medical history, past social history and past surgical history  Review of Systems   Constitutional: Negative for fever  HENT: Negative  Eyes: Negative for visual disturbance  Respiratory: Negative for shortness of breath  Cardiovascular: Negative for chest pain  Gastrointestinal: Negative for diarrhea and vomiting  Genitourinary: Negative for difficulty urinating  Musculoskeletal: Positive for back pain  Neurological:        See HPI   Psychiatric/Behavioral: Negative for agitation  Objective:      /90 (BP Location: Left arm, Patient Position: Sitting, Cuff Size: Standard)   Pulse 79   Temp 98 7 °F (37 1 °C) (Temporal)   Ht 5' 6 5" (1 689 m)   Wt 92 1 kg (203 lb)   BMI 32 27 kg/m²          Physical Exam   Constitutional: He appears well-developed and well-nourished  No distress  Eyes: Conjunctivae are normal  No scleral icterus     Pulmonary/Chest: Effort normal    Musculoskeletal:        Lumbar back: He exhibits tenderness (inferior lumbar in midline)  He exhibits no deformity  Mature lumbar spine scars x 2    Neurological: He is alert  He has a normal Tandem Gait Test    Reflex Scores:       Tricep reflexes are 1+ on the right side and 2+ on the left side  Bicep reflexes are 2+ on the right side and 2+ on the left side  Brachioradialis reflexes are 1+ on the right side and 2+ on the left side  Patellar reflexes are 2+ on the right side and 2+ on the left side  Achilles reflexes are 2+ on the right side and 2+ on the left side  Skin: Skin is warm and dry  Psychiatric: He has a normal mood and affect  His speech is normal        Neurologic Exam     Mental Status   Speech: speech is normal   Level of consciousness: alert    Motor Exam     Motor:  Shoulder abduction 5/5 bilaterally  Elbow flexion/extension 5/5 bilaterally  Wrist flexion/extension 5/5 bilaterally  Finger  / abduction 5/5 bilaterally  Hip flexion 5/5 bilaterally  (elicits left sided LBP when complete on left side)  Hip abduction/adduction 5/5 bilaterally  Knee flexion/extension 5/5 bilaterally  Ankle DF left 5-/5 and right 5/5  Ankle PF 5/5 bilaterally  Great toe DF 5/5 bilaterally  Sensory Exam     Sensation to pinprick is diminished of lateral left calf and lateral left foot  Sensation to pinprick otherwise intact of b/l upper extremities, torso, and b/l lower extremities  JPS and Vibratory sense intact b/l thumbs and great toes  Gait, Coordination, and Reflexes     Gait  Gait: (Independent  Antalgic appearing favoring LLE    No buckling or giveway of lower extremities )    Coordination   Tandem walking coordination: normal    Tremor   Resting tremor: absent    Reflexes   Right brachioradialis: 1+  Left brachioradialis: 2+  Right biceps: 2+  Left biceps: 2+  Right triceps: 1+  Left triceps: 2+  Right patellar: 2+  Left patellar: 2+  Right achilles: 2+  Left achilles: 2+  Right plantar: normal  Left plantar: normal  Right ankle clonus: absent  Left ankle clonus: absent      Imaging study:    11/9/18 [facility] L-spine MRI -- per report: " MRI LUMBAR SPINE WITH AND WITHOUT CONTRAST     INDICATION: M96 1: Postlaminectomy syndrome, not elsewhere classified  M54 16: Radiculopathy, lumbar region  M51 26: Other intervertebral disc displacement, lumbar region      COMPARISON:  3/25/2014     TECHNIQUE:  Sagittal T1, sagittal T2, sagittal inversion recovery, axial T1 and axial T2, coronal T2  Sagittal and axial T1 postcontrast      IV Contrast:  8 mL of gadobutrol injection (MULTI-DOSE)      IMAGE QUALITY:  Diagnostic     FINDINGS:     ALIGNMENT:  Normal alignment of the lumbar spine  No compression fracture  No spondylolysis or spondylolisthesis  No scoliosis      MARROW SIGNAL:  Mild endplate marrow degenerative change L3-4 and L4-5  Small hemangioma within the L5 vertebral body      DISTAL CORD AND CONUS:  Normal size and signal of the distal cord and conus  The conus ends at the L1 level      PARASPINAL SOFT TISSUES:  Paraspinal soft tissues are unremarkable      SACRUM:  Normal signal within the sacrum  No evidence of insufficiency or stress fracture      LOWER THORACIC DISC SPACES:  Normal disc height and signal   No disc herniation, canal stenosis or foraminal narrowing      LUMBAR DISC SPACES:     L1-L2:  Normal      L2-L3:  Mild annular bulging with a tiny central disc herniation  No significant canal stenosis or foraminal narrowing  No nerve impingement      L3-L4:  Slight annular bulging with a tiny central disc protrusion  Minimal canal stenosis and foraminal narrowing without nerve impingement      L4-L5:  Loss of disc height with mild annular bulging  Mild enhancement within the left paramedian and foraminal epidural space suggesting mild epidural fibrosis  Minimal distortion of the left anterolateral aspect of the thecal sac    Minimal foraminal   narrowing without nerve impingement      L5-S1:  Loss of disc height  There is a focal left paracentral disc protrusion distorting the left anterolateral aspect of the thecal sac with mass effect upon the left S1 nerve  Slight peripheral enhancement of the disc protrusion  Mild left canal   stenosis and foraminal narrowing      POSTCONTRAST IMAGING:  There is no abnormal enhancement identified within the thecal sac  See above description of enhancement at L4-5 and L5-S1      IMPRESSION:     Mild noncompressive lumbar degenerative change at the L2-3 and L3-4 levels  Minimal canal stenosis without foraminal nerve impingement      At L4-5 there is minimal enhancement within the left paramedian and foraminal epidural space suggesting mild epidural fibrosis  There is minimal distortion of the left anterolateral aspect of the thecal sac      Small focal left paracentral disc protrusion at L5-S1 with minimal peripheral enhancement    Mild canal stenosis and foraminal narrowing         Workstation performed: [id number] "

## 2019-01-10 NOTE — TELEPHONE ENCOUNTER
Patient called to set up an appointment for back pain in the [facility] office  Patient he is being referred by Dr Raheem Fernandes  His family doctor is Dr Sabrina Grande  He has [employer] through an employer  Patient saw pain management 5+ years ago  Patient is scheduled for 1/28  Please mail the new patient paperwork to the address on file

## 2019-01-14 ENCOUNTER — EVALUATION (OUTPATIENT)
Dept: PHYSICAL THERAPY | Facility: CLINIC | Age: 59
End: 2019-01-14
Payer: COMMERCIAL

## 2019-01-14 DIAGNOSIS — M51.26 HNP (HERNIATED NUCLEUS PULPOSUS), LUMBAR: ICD-10-CM

## 2019-01-14 DIAGNOSIS — M54.16 RADICULOPATHY, LUMBAR REGION: Primary | ICD-10-CM

## 2019-01-14 PROCEDURE — 97112 NEUROMUSCULAR REEDUCATION: CPT | Performed by: PHYSICAL THERAPIST

## 2019-01-14 PROCEDURE — 97162 PT EVAL MOD COMPLEX 30 MIN: CPT | Performed by: PHYSICAL THERAPIST

## 2019-01-14 PROCEDURE — 97110 THERAPEUTIC EXERCISES: CPT | Performed by: PHYSICAL THERAPIST

## 2019-01-14 NOTE — PROGRESS NOTES
PT Evaluation     Today's date: 2019  Patient name: Kingston Soares  : 1960  MRN: [id number]  Referring provider: Macarena Faria MD  Dx:   Encounter Diagnosis     ICD-10-CM    1  Radiculopathy, lumbar region M54 16    2  HNP (herniated nucleus pulposus), lumbar M51 26                   Assessment  Assessment details: Kingtson Soares is a 66 y o  male referred with primary diagnosis of Radiculopathy, lumbar region  (primary encounter diagnosis)  HNP (herniated nucleus pulposus), lumbar   Patient presents with the following functional limitations:  Pain with bending, lifting and twisting  He demonstrates decreased core strength and LE flexibility, as well as, pain with mobility  Symptoms were centralized with extension based program   Treatment to include: Manual therapy techniques, extremity/core strengthening, neuromuscular control exercises,  instruction in a comprehensive HEP, and modalities as needed  They will benefit from skilled PT services to address the above functional deficits and to decrease pain to promote a return to their premorbid level of function  Impairments: abnormal or restricted ROM, activity intolerance, impaired physical strength, pain with function and poor posture   Functional limitations: Pain with bending, lifting and twisting  Understanding of Dx/Px/POC: good   Prognosis: good  Prognosis details: Chronic pain    Goals  STG (3 weeks)  1  Patient will report pain as a 3-4/10 at worst with activity  2  Patient will transfer sit to/from standing without increased pain  LTG (6 weeks)  1  Patient will report pain as a 1-2/10 at worst with normal activities  2  Patient will report the ability to resume previous level of function without increased pain  3  Patient will be independent and compliant with a HEP in order to  Maintain gains made with skilled PT services      Plan  Patient would benefit from: skilled physical therapy  Planned modality interventions: thermotherapy: hydrocollator packs  Planned therapy interventions: abdominal trunk stabilization, home exercise program, therapeutic exercise, therapeutic activities, stretching, strengthening, patient education, neuromuscular re-education, manual therapy and joint mobilization  Frequency: 2x week  Duration in weeks: 6  Plan of Care beginning date: 2019  Plan of Care expiration date: 2019  Treatment plan discussed with: patient        Subjective Evaluation    History of Present Illness  Mechanism of injury: Patient reports that in October he began having pain in his left lower back and then radiating down in to his left LE  A week ago he had severe pain into left lower back and left LE  Patient saw his neurologist who put him on a steroid pack  He saw his surgeon and was advised he is not a surgical candidate and was referred to pain management  He is referred to outpatient PT services  Quality of life: good    Pain  Current pain ratin  At best pain rating: 3  At worst pain rating: 10  Location: Left LB, left LB, anterior thigh, and numbness and tingling into L5-S1 distrubution  Quality: burning and sharp  Relieving factors: heat  Progression: improved    Social Support  Stairs in house: yes (Right HR)   12  Lives in: multiple-level home    Employment status: working ( [employer] )    Diagnostic Tests  MRI studies: abnormal    FCE comments: Previous history of 2 lumbar microdiscectomies with chronic, unremitting pain, which he rates as a 2/10  Negotiates stairs reciprocally, but slowly    Patient reports weakness in left LE since previous surgery, but feels it is a little more weak than normal Treatments  Previous treatment: medication  Current treatment: medication  Patient Goals  Patient goals for therapy: increased strength, decreased pain and independence with ADLs/IADLs          Objective     Special Questions  Negative for disturbed sleep, bladder dysfunction, bowel dysfunction and saddle (S4) numbness    Postural Observations  Seated posture: poor  Standing posture: poor  Correction of posture: makes symptoms better        Palpation   Left   No palpable tenderness to the erector spinae and lumbar paraspinals  Right   No palpable tenderness to the erector spinae and lumbar paraspinals  Tenderness     Lumbar Spine  Tenderness in the spinous process (L5)  Left Hip   No tenderness in the ASIS and PSIS  Right Hip   No tenderness in the ASIS and PSIS  Neurological Testing     Sensation     Lumbar   Left   Intact: light touch    Right   Intact: light touch    Active Range of Motion     Additional Active Range of Motion Details  Lumbar ROM 50% limited    Strength/Myotome Testing     Left Hip   Planes of Motion   Flexion: 4+  Extension: 4+  Abduction: 4+    Right Hip   Planes of Motion   Flexion: 4+  Extension: 4+  Abduction: 4+    Left Knee   Flexion: 4+  Extension: 4+    Right Knee   Flexion: 4+  Extension: 4+    Left Ankle/Foot   Dorsiflexion: 4+  Plantar flexion: 4+    Right Ankle/Foot   Dorsiflexion: 4+  Plantar flexion: 4+    Tests       Thoracic   Negative slump  Lumbar   Positive repeated extension  Negative repeated flexion and slump  Left   Negative crossed SLR and passive SLR  Right   Negative crossed SLR and passive SLR  Left Pelvic Girdle/Sacrum   Positive: sacral spring  Negative: sacrum compression and gapping  Right Pelvic Girdle/Sacrum   Negative: sacrum compression and gapping  Additional Tests Details  RFIS - no change in pain  MARSHALL - no change in pain  Prone press-ups 2x10 centralized pain to a 1/10  Long axis traction left LE relieved pain  (+) quad and HS tightness bilaterally        Precautions s/p right L3-L4 microdiskectomy in 2010 and left L4-L5 microdiskectomy in 2012      Specialty Daily Treatment Diary     Manual  1/14       Left LE LAD JF                                           Exercise Diary  1/14       Pt education Posture, body mechanics, disc pathology       Nustep with UE        Vicky garcia H,M,L        Standing HS stretch alis  Supine PPT        PPT with vinny North alis          Prone UE raise        Prone LE raise        Prone press-ups  2x10                                                                                   Modalities        HP prn lower back

## 2019-01-15 ENCOUNTER — OFFICE VISIT (OUTPATIENT)
Dept: PHYSICAL THERAPY | Facility: CLINIC | Age: 59
End: 2019-01-15
Payer: COMMERCIAL

## 2019-01-15 DIAGNOSIS — M54.16 RADICULOPATHY, LUMBAR REGION: Primary | ICD-10-CM

## 2019-01-15 DIAGNOSIS — M51.26 HNP (HERNIATED NUCLEUS PULPOSUS), LUMBAR: ICD-10-CM

## 2019-01-15 PROCEDURE — 97530 THERAPEUTIC ACTIVITIES: CPT

## 2019-01-15 PROCEDURE — 97112 NEUROMUSCULAR REEDUCATION: CPT

## 2019-01-15 PROCEDURE — 97110 THERAPEUTIC EXERCISES: CPT

## 2019-01-15 NOTE — PROGRESS NOTES
Daily Note     Today's date: 1/15/2019  Patient name: Carliss Closs  : 1960  MRN: [id number]  Referring provider: Isabel Sandhoff, MD  Dx:   Encounter Diagnosis     ICD-10-CM    1  Radiculopathy, lumbar region M54 16    2  HNP (herniated nucleus pulposus), lumbar M51 26                   Subjective: Pt c/o 2/10 pain in LB today  He states that today is not too bad  Objective: See treatment diary below  Precautions s/p right L3-L4 microdiskectomy in  and left L4-L5 microdiskectomy in       Specialty Daily Treatment Diary      Manual             Left LE LAD JF                                                                         Exercise Diary  1/14  1/15         Pt education Posture, body mechanics, disc pathology  postural alignment         Nustep with UE    L5 10'         Webslide rows H,M,L    GTB 20x         Standing HS stretch alis     30" 3x         Supine PPT    5" 20x         PPT with march    30x         Bridges    20x         Clamshells alis     20x         Prone UE raise             Prone LE raise             Prone press-ups  2x10  20x                                                                                                                                             Modalities             HP prn lower back                                              Assessment: pt had no pain post treatment despite noting some ms soreness  He was advised to focus on abdominal engagement to decrease LB pain  He was educated on HEP including DOMS due to increase in intensity today  He was advised to practice planks before beginning push ups with his class to ensure abdominal engagement to decrease risk of further injury  Plan: Progress treatment as tolerated

## 2019-01-16 DIAGNOSIS — M54.16 RADICULOPATHY, LUMBAR REGION: ICD-10-CM

## 2019-01-16 DIAGNOSIS — M51.26 HNP (HERNIATED NUCLEUS PULPOSUS), LUMBAR: ICD-10-CM

## 2019-01-16 DIAGNOSIS — M96.1 FAILED BACK SYNDROME: ICD-10-CM

## 2019-01-16 RX ORDER — GABAPENTIN 300 MG/1
300 CAPSULE ORAL 2 TIMES DAILY
Qty: 180 CAPSULE | Refills: 1 | Status: SHIPPED | OUTPATIENT
Start: 2019-01-16 | End: 2019-12-26 | Stop reason: SDUPTHER

## 2019-01-21 ENCOUNTER — OFFICE VISIT (OUTPATIENT)
Dept: PHYSICAL THERAPY | Facility: CLINIC | Age: 59
End: 2019-01-21
Payer: COMMERCIAL

## 2019-01-21 DIAGNOSIS — M54.16 RADICULOPATHY, LUMBAR REGION: Primary | ICD-10-CM

## 2019-01-21 DIAGNOSIS — M51.26 HNP (HERNIATED NUCLEUS PULPOSUS), LUMBAR: ICD-10-CM

## 2019-01-21 PROCEDURE — 97112 NEUROMUSCULAR REEDUCATION: CPT

## 2019-01-21 PROCEDURE — 97110 THERAPEUTIC EXERCISES: CPT

## 2019-01-21 NOTE — PROGRESS NOTES
Daily Note     Today's date: 2019  Patient name: Hans Crigler  : 1960  MRN: [id number]  Referring provider: Arlene Andrade MD  Dx:   Encounter Diagnosis     ICD-10-CM    1  Radiculopathy, lumbar region M54 16    2  HNP (herniated nucleus pulposus), lumbar M51 26                    Subjective: Pt denies any pain upon arrival today  He states that he has been feeling okay and that he has not been able to practice push ups because his work schedule has been busy  Objective: See treatment diary below  Precautions s/p right L3-L4 microdiskectomy in  and left L4-L5 microdiskectomy in       Specialty Daily Treatment Diary      Manual             Left LE LAD JF                                                                         Exercise Diary  1/14  1/15  1/21       Pt education Posture, body mechanics, disc pathology  postural alignment         Nustep with UE    L5 10'  L5x10'       Webslide rows H,M,L    GTB 20x  GTB x20       Standing HS stretch alis     30" 3x  30" 3x       Supine PPT    5" 20x  5" 20x       PPT with march    30x  30x       Bridges    20x  20x       Clamshells alis     20x  20x       Prone UE raise      20x       Prone LE raise      20x       Prone press-ups  2x10  20x  20x                                                                                                                                           Modalities             HP prn lower back                                               Assessment: Pt had no pain throughout nor post treatment today  He was educated on DOMS due to increase in intensity  He was advised to continue with HEP including new LINN to decrease pain outside of therapy  He was advised to practice abdominal engagement to limit LB tension  Plan: Progress treatment as tolerated

## 2019-01-22 ENCOUNTER — OFFICE VISIT (OUTPATIENT)
Dept: PHYSICAL THERAPY | Facility: CLINIC | Age: 59
End: 2019-01-22
Payer: COMMERCIAL

## 2019-01-22 DIAGNOSIS — M54.16 RADICULOPATHY, LUMBAR REGION: Primary | ICD-10-CM

## 2019-01-22 DIAGNOSIS — M51.26 HNP (HERNIATED NUCLEUS PULPOSUS), LUMBAR: ICD-10-CM

## 2019-01-22 PROCEDURE — 97110 THERAPEUTIC EXERCISES: CPT

## 2019-01-22 PROCEDURE — 97112 NEUROMUSCULAR REEDUCATION: CPT

## 2019-01-22 NOTE — PROGRESS NOTES
Daily Note     Today's date: 2019  Patient name: Khoi Romero  : 1960  MRN: [id number]  Referring provider: Vincent Roth MD  Dx:   Encounter Diagnosis     ICD-10-CM    1  Radiculopathy, lumbar region M54 16    2  HNP (herniated nucleus pulposus), lumbar M51 26                   Subjective: Pt c/o 5/10 pain and soreness in his L lumbar region  He states that he is a little sore from the new Destiny from the other day  Objective: See treatment diary below  Precautions s/p right L3-L4 microdiskectomy in  and left L4-L5 microdiskectomy in       Specialty Daily Treatment Diary      Manual             Left LE LAD JF                                                                         Exercise Diary  1/14  1/15  1/21  1/22     Pt education Posture, body mechanics, disc pathology  postural alignment    DOMS     Nustep with UE    L5 10'  L5x10'  L5 10'     Webslide rows H,M,L    GTB 20x  GTB x20  GTB 20x     Standing HS stretch alis     30" 3x  30" 3x  30" 3x     Supine PPT    5" 20x  5" 20x  5" 20x     PPT with march    30x  30x  30x     Bridges    20x  20x  20x     Clamshells alis     20x  20x  20x     Prone UE raise      20x  20x     Prone LE raise      20x  20x     Prone press-ups  2x10  20x  20x  20x                                                                                                                                         Modalities             HP prn lower back                                                Assessment: pt had no pain post treatment today  He noted feeling better and having much less tension after performing Destiny  He was educated on the importance of compliance with HEP and advised to perform Destiny when his pain begins to increase  Plan: Progress treatment as tolerated

## 2019-01-28 ENCOUNTER — OFFICE VISIT (OUTPATIENT)
Dept: PAIN MEDICINE | Facility: CLINIC | Age: 59
End: 2019-01-28
Payer: COMMERCIAL

## 2019-01-28 ENCOUNTER — OFFICE VISIT (OUTPATIENT)
Dept: PHYSICAL THERAPY | Facility: CLINIC | Age: 59
End: 2019-01-28
Payer: COMMERCIAL

## 2019-01-28 VITALS
DIASTOLIC BLOOD PRESSURE: 86 MMHG | HEART RATE: 98 BPM | TEMPERATURE: 97.7 F | WEIGHT: 203 LBS | SYSTOLIC BLOOD PRESSURE: 124 MMHG | BODY MASS INDEX: 32.27 KG/M2

## 2019-01-28 DIAGNOSIS — M51.17 INTERVERTEBRAL DISC DISORDER WITH RADICULOPATHY OF LUMBOSACRAL REGION: Primary | ICD-10-CM

## 2019-01-28 DIAGNOSIS — M51.26 HNP (HERNIATED NUCLEUS PULPOSUS), LUMBAR: ICD-10-CM

## 2019-01-28 DIAGNOSIS — M54.16 RADICULOPATHY, LUMBAR REGION: Primary | ICD-10-CM

## 2019-01-28 DIAGNOSIS — M96.1 POSTLAMINECTOMY SYNDROME OF LUMBAR REGION: ICD-10-CM

## 2019-01-28 PROCEDURE — 99204 OFFICE O/P NEW MOD 45 MIN: CPT | Performed by: ANESTHESIOLOGY

## 2019-01-28 PROCEDURE — 97112 NEUROMUSCULAR REEDUCATION: CPT | Performed by: PHYSICAL THERAPIST

## 2019-01-28 PROCEDURE — 97110 THERAPEUTIC EXERCISES: CPT | Performed by: PHYSICAL THERAPIST

## 2019-01-28 NOTE — PROGRESS NOTES
Daily Note     Today's date: 2019  Patient name: Miquel Stuart  : 1960  MRN: [id number]  Referring provider: Brady Salgado MD  Dx:   Encounter Diagnosis     ICD-10-CM    1  Radiculopathy, lumbar region M54 16    2  HNP (herniated nucleus pulposus), lumbar M51 26                   Subjective: Patient stated insidious onset of pain in the second digit of his left foot; no other c/o pain prior to treatment session  Objective: See treatment diary below  Precautions s/p right L3-L4 microdiskectomy in  and left L4-L5 microdiskectomy in       Specialty Daily Treatment Diary      Manual             Left LE LAD JF                                                                         Exercise Diary  1/14  1/15  1/21  1/22  1/28   Pt education Posture, body mechanics, disc pathology  postural alignment    DOMS     Nustep with UE    L5 10'  L5x10'  L5 10'  L5  10'   Webslide rows H,M,L    GTB 20x  GTB x20  GTB 20x  BTB 20x   Standing HS stretch alis     30" 3x  30" 3x  30" 3x  30"  3x   Supine PPT    5" 20x  5" 20x  5" 20x  5"  20x   PPT with march    30x  30x  30x  30x   Bridges    20x  20x  20x  20x   Clamshells alis     20x  20x  20x  GTB  20 ea  Prone UE raise      20x  20x  20x   Prone LE raise      20x  20x  20x   Prone press-ups  2x10  20x  20x  20x  20x                                                                                                                                       Modalities             HP prn lower back                                                Assessment: Patient performed progression to BTB with webslide rows without c/o pain; performed progression to GTB with clamshells without c/o pain; no c/o pain at completion of treatment session  Plan: Progress treatment as tolerated

## 2019-01-28 NOTE — PROGRESS NOTES
Assessment:  1  Intervertebral disc disorder with radiculopathy of lumbosacral region    2  Postlaminectomy syndrome of lumbar region      Plan:   The patient's symptoms, history/physical are consistent with pain that is multifactorial in origin but predominantly the result of his L5-S1 disc herniation which is leading to left-sided radicular pain  At this time, I discussed performing a left L5-S1 transforaminal epidural steroid injection to help reduce swelling and inflammation which is leading to his pain symptoms  He was apprised of the most common risks and would like to proceed  He will be scheduled for an upcoming Tuesday or Thursday under fluoroscopic guidance  Complete risks and benefits including bleeding, infection, tissue reaction, nerve injury and allergic reaction were discussed  The approach was demonstrated using models and literature was provided  Verbal and written consent was obtained  My impressions and treatment recommendations were discussed in detail with the patient who verbalized understanding and had no further questions  Discharge instructions were provided  I personally saw and examined the patient and I agree with the above discussed plan of care  Orders Placed This Encounter   Procedures    FL spine and pain procedure     Standing Status:   Future     Standing Expiration Date:   1/28/2023     Order Specific Question:   Reason for Exam:     Answer:   Left L5- S1 TF SOURAV     Order Specific Question:   Anticoagulant hold needed? Answer:   No     No orders of the defined types were placed in this encounter  History of Present Illness:    Karmen Prasad is a 66 y o  male who presents for consultation in regards to lower back and left-sided leg pain  Symptoms have been present for 5 months without any precipitating injury or trauma  Symptoms are moderate to severe rated 7-8/10 on a numeric rating scale and felt nearly constantly    Symptoms described to be dull, aching in the lower back, sharp and shooting with numbness into the left leg  Pain is aggravated with standing, bending, sitting, walking, exercise and decreased with lying down and relaxation  Treatment history has included lower back surgery in the 2010 and again in 2012 with Dr Arcadio Jade which provided excellent relief  Physical therapy and exercise has provided moderate relief as has heat/ice  He is currently on a combination of cyclobenzaprine, tramadol and gabapentin which provides moderate relief  I have personally reviewed and/or updated the patient's past medical history, past surgical history, family history, social history, current medications, allergies, and vital signs today  Review of Systems:    Review of Systems   Constitutional: Negative for fever and unexpected weight change  HENT: Negative for trouble swallowing  Eyes: Negative for visual disturbance  Respiratory: Negative for shortness of breath and wheezing  Cardiovascular: Negative for chest pain and palpitations  Gastrointestinal: Negative for constipation, diarrhea, nausea and vomiting  Endocrine: Negative for cold intolerance, heat intolerance and polydipsia  Genitourinary: Negative for difficulty urinating and frequency  Musculoskeletal: Negative for arthralgias, gait problem, joint swelling and myalgias  Skin: Negative for rash  Neurological: Negative for dizziness, seizures, syncope, weakness and headaches  Hematological: Does not bruise/bleed easily  Psychiatric/Behavioral: Negative for dysphoric mood  All other systems reviewed and are negative        Patient Active Problem List   Diagnosis    Essential hypertension    Gastroesophageal reflux disease without esophagitis    Elevated PSA    Allergic rhinitis due to pollen    Benign prostatic hyperplasia without lower urinary tract symptoms    Failed back syndrome    Radiculopathy, lumbar region    HNP (herniated nucleus pulposus), lumbar       Past Medical History:   Diagnosis Date    BPH (benign prostatic hyperplasia)     CAD (coronary artery disease)     Cardiac disease     [facility]opathy [facility] [address] [facility]     Failed back syndrome     Heart attack ([facility] [address]     Hypertension     Patient denies    Umbilical hernia     Last Assessed:3/25/2015       Past Surgical History:   Procedure Laterality Date    BACK SURGERY      HERNIA REPAIR      Last Assessed:3/12/2014 ,lumbar    LUMBAR LAMINECTOMY      Last Assessed:3/12/2014    PROSTATE BIOPSY  09/10/2015    needle biopsy    SHOULDER SURGERY      TONSILLECTOMY         Family History   Problem Relation Age of Onset    Hypertension Father     Diabetes Father     Pancreatic cancer Mother     Cancer Family     No Known Problems Sister     No Known Problems Brother        Social History     Occupational History          full time     Social History Main Topics    Smoking status: Never Smoker    Smokeless tobacco: Never Used    Alcohol use Yes      Comment: occ    Drug use: No    Sexual activity: Not on file       Current Outpatient Prescriptions on File Prior to Visit   Medication Sig    aspirin (ECOTRIN) 325 mg EC tablet Take 325 mg by mouth every other day    cyclobenzaprine (FLEXERIL) 10 mg tablet TAKE 1 TABLET (10 MG TOTAL) BY MOUTH DAILY AT BEDTIME    diltiazem (CARDIZEM CD) 120 mg 24 hr capsule TAKE 1 CAPSULE ONCE DAILY   EPINEPHrine (EPIPEN) 0 3 mg/0 3 mL SOAJ Inject 0 3 mL (0 3 mg total) into the shoulder, thigh, or buttocks once for 1 dose    gabapentin (NEURONTIN) 300 mg capsule Take 1 capsule (300 mg total) by mouth 2 (two) times a day    ibuprofen (MOTRIN) 600 mg tablet Take 600 mg by mouth every 6 (six) hours as needed for mild pain      Omega-3 Fatty Acids (FISH OIL) 1000 MG CPDR Take 1 capsule by mouth daily      omeprazole (PriLOSEC) 40 MG capsule TAKE 1 CAPSULE DAILY      tamsulosin (FLOMAX) 0 4 mg Take 1 capsule (0 4 mg total) by mouth daily with dinner (Patient taking differently: Take 0 4 mg by mouth daily as needed  )    [DISCONTINUED] Methylprednisolone 4 MG TBPK Use as directed on package     No current facility-administered medications on file prior to visit  Allergies   Allergen Reactions    Other Anaphylaxis     Awais Puga [id number]: YELLOW JACKETS       Physical Exam:    /86   Pulse 98   Temp 97 7 °F (36 5 °C) (Oral)   Wt 92 1 kg (203 lb)   BMI 32 27 kg/m²     Constitutional: normal, well developed, well nourished, alert, in no distress and non-toxic and no overt pain behavior   and obese  Eyes: anicteric  HEENT: grossly intact  Neck: supple, symmetric, trachea midline and no masses   Pulmonary:even and unlabored  Cardiovascular:No edema or pitting edema present  Skin:Normal without rashes or lesions and well hydrated  Psychiatric:Mood and affect appropriate  Neurologic:Cranial Nerves II-XII grossly intact  Musculoskeletal:normal     Lumbar Spine Exam  Appearance:  Normal lordosis  Palpation/Tenderness:  left piriformis tenderness  Sensory:  no sensory deficits noted  Range of Motion:  Flexion:  No limitation  with pain  Extension:  No limitation  without pain  Lateral Flexion - Left:  No limitation  without pain  Lateral Flexion - Right:  No limitation  without pain  Rotation - Left:  No limitation  without pain  Rotation - Right:  No limitation  without pain  Motor Strength:  Left hip flexion:  5/5  Left hip extension:  5/5  Right hip flexion:  5/5  Right hip extension:  5/5  Left knee flexion:  5/5  Left knee extension:  5/5  Right knee flexion:  5/5  Right knee extension:  5/5  Left foot dorsiflexion:  5/5  Left foot plantar flexion:  5/5  Right foot dorsiflexion:  5/5  Right foot plantar flexion:  5/5  Reflexes:  Left Patellar:  1+   Right Patellar:  1+   Left Achilles:  1+   Right Achilles:  1+   Special Tests:  Left Straight Leg Test:  positive  Right Straight Leg Test:  negative  Left Ricky's Maneuver:  negative  Right Ricky's Maneuver: negative    Imaging    MRI LUMBAR SPINE WITH AND WITHOUT CONTRAST (11/9/2018)     INDICATION: M96 1: Postlaminectomy syndrome, not elsewhere classified  M54 16: Radiculopathy, lumbar region  M51 26: Other intervertebral disc displacement, lumbar region      COMPARISON:  3/25/2014     TECHNIQUE:  Sagittal T1, sagittal T2, sagittal inversion recovery, axial T1 and axial T2, coronal T2  Sagittal and axial T1 postcontrast      IV Contrast:  8 mL of gadobutrol injection (MULTI-DOSE)      IMAGE QUALITY:  Diagnostic     FINDINGS:     ALIGNMENT:  Normal alignment of the lumbar spine  No compression fracture  No spondylolysis or spondylolisthesis  No scoliosis      MARROW SIGNAL:  Mild endplate marrow degenerative change L3-4 and L4-5  Small hemangioma within the L5 vertebral body      DISTAL CORD AND CONUS:  Normal size and signal of the distal cord and conus  The conus ends at the L1 level      PARASPINAL SOFT TISSUES:  Paraspinal soft tissues are unremarkable      SACRUM:  Normal signal within the sacrum  No evidence of insufficiency or stress fracture      LOWER THORACIC DISC SPACES:  Normal disc height and signal   No disc herniation, canal stenosis or foraminal narrowing      LUMBAR DISC SPACES:     L1-L2:  Normal      L2-L3:  Mild annular bulging with a tiny central disc herniation  No significant canal stenosis or foraminal narrowing  No nerve impingement      L3-L4:  Slight annular bulging with a tiny central disc protrusion  Minimal canal stenosis and foraminal narrowing without nerve impingement      L4-L5:  Loss of disc height with mild annular bulging  Mild enhancement within the left paramedian and foraminal epidural space suggesting mild epidural fibrosis  Minimal distortion of the left anterolateral aspect of the thecal sac  Minimal foraminal   narrowing without nerve impingement      L5-S1:  Loss of disc height    There is a focal left paracentral disc protrusion distorting the left anterolateral aspect of the thecal sac with mass effect upon the left S1 nerve  Slight peripheral enhancement of the disc protrusion  Mild left canal   stenosis and foraminal narrowing      POSTCONTRAST IMAGING:  There is no abnormal enhancement identified within the thecal sac  See above description of enhancement at L4-5 and L5-S1      IMPRESSION:     Mild noncompressive lumbar degenerative change at the L2-3 and L3-4 levels  Minimal canal stenosis without foraminal nerve impingement      At L4-5 there is minimal enhancement within the left paramedian and foraminal epidural space suggesting mild epidural fibrosis  There is minimal distortion of the left anterolateral aspect of the thecal sac      Small focal left paracentral disc protrusion at L5-S1 with minimal peripheral enhancement    Mild canal stenosis and foraminal narrowing

## 2019-01-29 ENCOUNTER — HOSPITAL ENCOUNTER (OUTPATIENT)
Dept: RADIOLOGY | Facility: CLINIC | Age: 59
Discharge: HOME | End: 2019-01-29
Attending: ANESTHESIOLOGY | Admitting: ANESTHESIOLOGY
Payer: COMMERCIAL

## 2019-01-29 ENCOUNTER — APPOINTMENT (OUTPATIENT)
Dept: PHYSICAL THERAPY | Facility: CLINIC | Age: 59
End: 2019-01-29
Payer: COMMERCIAL

## 2019-01-29 VITALS
HEART RATE: 86 BPM | SYSTOLIC BLOOD PRESSURE: 124 MMHG | DIASTOLIC BLOOD PRESSURE: 91 MMHG | OXYGEN SATURATION: 96 % | RESPIRATION RATE: 20 BRPM | TEMPERATURE: 98.2 F

## 2019-01-29 DIAGNOSIS — M51.17 INTERVERTEBRAL DISC DISORDER WITH RADICULOPATHY OF LUMBOSACRAL REGION: ICD-10-CM

## 2019-01-29 PROCEDURE — 64483 NJX AA&/STRD TFRM EPI L/S 1: CPT | Performed by: ANESTHESIOLOGY

## 2019-01-29 PROCEDURE — 64484 NJX AA&/STRD TFRM EPI L/S EA: CPT | Performed by: ANESTHESIOLOGY

## 2019-01-29 RX ORDER — 0.9 % SODIUM CHLORIDE 0.9 %
10 VIAL (ML) INJECTION ONCE
Status: COMPLETED | OUTPATIENT
Start: 2019-01-29 | End: 2019-01-29

## 2019-01-29 RX ORDER — BUPIVACAINE HCL/PF 2.5 MG/ML
30 VIAL (ML) INJECTION ONCE
Status: COMPLETED | OUTPATIENT
Start: 2019-01-29 | End: 2019-01-29

## 2019-01-29 RX ORDER — METHYLPREDNISOLONE ACETATE 80 MG/ML
80 INJECTION, SUSPENSION INTRA-ARTICULAR; INTRALESIONAL; INTRAMUSCULAR; PARENTERAL; SOFT TISSUE ONCE
Status: COMPLETED | OUTPATIENT
Start: 2019-01-29 | End: 2019-01-29

## 2019-01-29 RX ADMIN — Medication 5 ML: at 13:47

## 2019-01-29 RX ADMIN — Medication 4 ML: at 13:47

## 2019-01-29 RX ADMIN — SODIUM CHLORIDE 5 ML: 9 INJECTION, SOLUTION INTRAMUSCULAR; INTRAVENOUS; SUBCUTANEOUS at 13:47

## 2019-01-29 RX ADMIN — METHYLPREDNISOLONE ACETATE 80 MG: 80 INJECTION, SUSPENSION INTRA-ARTICULAR; INTRALESIONAL; INTRAMUSCULAR; PARENTERAL; SOFT TISSUE at 13:46

## 2019-01-29 RX ADMIN — IOHEXOL 1 ML: 300 INJECTION, SOLUTION INTRAVENOUS at 13:47

## 2019-01-29 NOTE — DISCHARGE INSTR - LAB
Epidural Steroid Injection   WHAT YOU NEED TO KNOW:   An epidural steroid injection (SOURAV) is a procedure to inject steroid medicine into the epidural space  The epidural space is between your spinal cord and vertebrae  Steroids reduce inflammation and fluid buildup in your spine that may be causing pain  You may be given pain medicine along with the steroids  ACTIVITY  · Do not drive or operate machinery today  · No strenuous activity today - bending, lifting, etc   · You may resume normal activites starting tomorrow - start slowly and as tolerated  · You may shower today, but no tub baths or hot tubs  · You may have numbness for several hours from the local anesthetic  Please use caution and common sense, especially with weight-bearing activities  CARE OF THE INJECTION SITE  · If you have soreness or pain, apply ice to the area today (20 minutes on/20 minutes off)  · Starting tomorrow, you may use warm, moist heat or ice if needed  · You may have an increase or change in your discomfort for 36-48 hours after your treatment  · Apply ice and continue with any pain medication you have been prescribed  · Notify the [facility] if you have any of the following: redness, drainage, swelling, headache, stiff neck or fever above 100°F     SPECIAL INSTRUCTIONS  · Our office will contact you in approximately 7 days for a progress report  MEDICATIONS  · Continue to take all routine medications  · Our office may have instructed you to hold some medications  If you have a problem specifically related to your procedure, please call our office at [phone]  Problems not related to your procedure should be directed to your primary care physician

## 2019-01-29 NOTE — H&P
History of Present Illness: The patient is a 66 y o  male who presents with complaints of left lower back and leg pain secondary to lumbar herniated disc and is here today for left L5-S1 transforaminal epidural steroid injection  Patient Active Problem List   Diagnosis    Essential hypertension    Gastroesophageal reflux disease without esophagitis    Elevated PSA    Allergic rhinitis due to pollen    Benign prostatic hyperplasia without lower urinary tract symptoms    Failed back syndrome    Radiculopathy, lumbar region    HNP (herniated nucleus pulposus), lumbar       Past Medical History:   Diagnosis Date    BPH (benign prostatic hyperplasia)     CAD (coronary artery disease)     Cardiac disease     [facility]omyopathy [facility] [address] [facility]     Failed back syndrome     Heart attack ([facility] [address] )     Hypertension     Patient denies    Umbilical hernia     Last Assessed:3/25/2015       Past Surgical History:   Procedure Laterality Date    BACK SURGERY      HERNIA REPAIR      Last Assessed:3/12/2014 ,lumbar    LUMBAR LAMINECTOMY      Last Assessed:3/12/2014    PROSTATE BIOPSY  09/10/2015    needle biopsy    SHOULDER SURGERY      TONSILLECTOMY           Current Outpatient Prescriptions:     aspirin (ECOTRIN) 325 mg EC tablet, Take 325 mg by mouth every other day, Disp: , Rfl:     cyclobenzaprine (FLEXERIL) 10 mg tablet, TAKE 1 TABLET (10 MG TOTAL) BY MOUTH DAILY AT BEDTIME, Disp: 30 tablet, Rfl: 1    diltiazem (CARDIZEM CD) 120 mg 24 hr capsule, TAKE 1 CAPSULE ONCE DAILY  , Disp: 90 capsule, Rfl: 3    EPINEPHrine (EPIPEN) 0 3 mg/0 3 mL SOAJ, Inject 0 3 mL (0 3 mg total) into the shoulder, thigh, or buttocks once for 1 dose, Disp: 0 3 mL, Rfl: 1    gabapentin (NEURONTIN) 300 mg capsule, Take 1 capsule (300 mg total) by mouth 2 (two) times a day, Disp: 180 capsule, Rfl: 1    ibuprofen (MOTRIN) 600 mg tablet, Take 600 mg by mouth every 6 (six) hours as needed for mild pain  , Disp: , Rfl:     Omega-3 Fatty Acids (FISH OIL) 1000 MG CPDR, Take 1 capsule by mouth daily  , Disp: , Rfl:     omeprazole (PriLOSEC) 40 MG capsule, TAKE 1 CAPSULE DAILY  , Disp: 30 capsule, Rfl: 3    tamsulosin (FLOMAX) 0 4 mg, Take 1 capsule (0 4 mg total) by mouth daily with dinner (Patient taking differently: Take 0 4 mg by mouth daily as needed  ), Disp: 90 capsule, Rfl: 3    Current Facility-Administered Medications:     bupivacaine (PF) (MARCAINE) 0 25 % injection 30 mL, 30 mL, Epidural, Once, Inga Skinner MD    iohexol (OMNIPAQUE) 300 mg/mL injection 50 mL, 50 mL, Epidural, Once, Inga Skinner MD    lidocaine (PF) (XYLOCAINE-MPF) 2 % injection 5 mL, 5 mL, Infiltration, Once, Inga Skinner MD    methylPREDNISolone acetate (DEPO-MEDROL) injection 80 mg, 80 mg, Epidural, Once, Inga Skinner MD    sodium chloride (PF) 0 9 % injection 10 mL, 10 mL, Infiltration, Once, Inga Skinner MD    Allergies   Allergen Reactions    Other Anaphylaxis     [id number]: YELLOW JACKETS       Physical Exam:   Vitals:    01/29/19 1332   BP: 129/84   Pulse: 84   Resp: 20   Temp: 98 2 °F (36 8 °C)   SpO2: 95%     General: Awake, Alert, Oriented x 3, Mood and affect appropriate  Respiratory: Respirations even and unlabored  Cardiovascular: Peripheral pulses intact; no edema  Musculoskeletal Exam:   Left lower back and leg tenderness    ASA Score: 3    Patient/Chart Verification  Patient ID Verified: Verbal  Consents Confirmed: Procedural, To be obtained in the Pre-Procedure area  H&P( within 30 days) Verified: To be obtained in the Pre-Procedure area  Allergies Reviewed: Yes  Anticoag/NSAID held?: NA  Currently on antibiotics?: No    Assessment:   1   Intervertebral disc disorder with radiculopathy of lumbosacral region        Plan: Left L5- S1 TF SOURAV

## 2019-02-01 DIAGNOSIS — K21.9 GASTROESOPHAGEAL REFLUX DISEASE WITHOUT ESOPHAGITIS: ICD-10-CM

## 2019-02-01 RX ORDER — OMEPRAZOLE 40 MG/1
CAPSULE, DELAYED RELEASE ORAL
Qty: 30 CAPSULE | Refills: 3 | Status: SHIPPED | OUTPATIENT
Start: 2019-02-01 | End: 2019-05-02 | Stop reason: SDUPTHER

## 2019-02-04 ENCOUNTER — OFFICE VISIT (OUTPATIENT)
Dept: PHYSICAL THERAPY | Facility: CLINIC | Age: 59
End: 2019-02-04
Payer: COMMERCIAL

## 2019-02-04 DIAGNOSIS — M54.16 RADICULOPATHY, LUMBAR REGION: Primary | ICD-10-CM

## 2019-02-04 DIAGNOSIS — M51.26 HNP (HERNIATED NUCLEUS PULPOSUS), LUMBAR: ICD-10-CM

## 2019-02-04 PROCEDURE — 97110 THERAPEUTIC EXERCISES: CPT

## 2019-02-04 PROCEDURE — 97112 NEUROMUSCULAR REEDUCATION: CPT

## 2019-02-04 PROCEDURE — 97530 THERAPEUTIC ACTIVITIES: CPT

## 2019-02-04 NOTE — PROGRESS NOTES
Daily Note     Today's date: 2019  Patient name: Verona Stevens  : 1960  MRN: [id number]  Referring provider: Marc Perera MD  Dx:   Encounter Diagnosis     ICD-10-CM    1  Radiculopathy, lumbar region M54 16    2  HNP (herniated nucleus pulposus), lumbar M51 26                   Subjective: Pt c/o 7/10 pain in LB today with radicular symptoms down his LLE  He states that all day his pain has been worse than the past few weeks  Objective: See treatment diary below  Precautions s/p right L3-L4 microdiskectomy in  and left L4-L5 microdiskectomy in       Specialty Daily Treatment Diary      Manual             Left LE LAD JF                                                                         Exercise Diary  2/4  1/15  1/21  1/22  1/28   Pt education Posture, body mechanics, disc pathology  postural alignment    DOMS     Nustep with UE  L5 10'  L5 10'  L5x10'  L5 10'  L5  10'   Webslide rows H,M,L  BTB 20x  GTB 20x  GTB x20  GTB 20x  BTB 20x   Standing HS stretch alis   30" 3x  30" 3x  30" 3x  30" 3x  30"  3x   Supine PPT  5" 20x  5" 20x  5" 20x  5" 20x  5"  20x   PPT with march  30x  30x  30x  30x  30x   Bridges  20x  20x  20x  20x  20x   Clamshells alis   20x  20x  20x  20x  GTB  20 ea  Prone UE raise  20x    20x  20x  20x   Prone LE raise  20x    20x  20x  20x   Prone press-ups  2x10  20x  20x  20x  20x    slump sliders  3" 10x                                                                                                                                 Modalities             HP prn lower back                                               Assessment: Pt had a decrease in pain post treatment noting a 3/10 and no radicular symptoms  He noted feeling much better post slump sliders  He was educated on postural alignment, anatomy and radicular symptoms  He was advised to continue with HEP including slump sliders to decrease symptoms outside of therapy         Plan: Progress treatment as tolerated

## 2019-02-05 ENCOUNTER — OFFICE VISIT (OUTPATIENT)
Dept: PHYSICAL THERAPY | Facility: CLINIC | Age: 59
End: 2019-02-05
Payer: COMMERCIAL

## 2019-02-05 ENCOUNTER — TELEPHONE (OUTPATIENT)
Dept: PAIN MEDICINE | Facility: CLINIC | Age: 59
End: 2019-02-05

## 2019-02-05 DIAGNOSIS — M51.26 HNP (HERNIATED NUCLEUS PULPOSUS), LUMBAR: ICD-10-CM

## 2019-02-05 DIAGNOSIS — M54.16 RADICULOPATHY, LUMBAR REGION: Primary | ICD-10-CM

## 2019-02-05 PROCEDURE — 97112 NEUROMUSCULAR REEDUCATION: CPT

## 2019-02-05 PROCEDURE — 97110 THERAPEUTIC EXERCISES: CPT

## 2019-02-05 PROCEDURE — 97530 THERAPEUTIC ACTIVITIES: CPT

## 2019-02-05 NOTE — PROGRESS NOTES
Daily Note     Today's date: 2019  Patient name: Carliss Closs  : 1960  MRN: [id number]  Referring provider: Isabel Sandhoff, MD  Dx:   Encounter Diagnosis     ICD-10-CM    1  Radiculopathy, lumbar region M54 16    2  HNP (herniated nucleus pulposus), lumbar M51 26                   Subjective: Pt reports that he is feeling pretty good today having 5/10 pain in his low back  Notes that the slump sliders continue to help decrease his symptoms  Notes no current radiating symptoms now, but noticed that it comes and goes throughout the day  Objective: See treatment diary below  Precautions s/p right L3-L4 microdiskectomy in  and left L4-L5 microdiskectomy in       Specialty Daily Treatment Diary      Manual             Left LE LAD JF                                                                         Exercise Diary     Pt education Posture, body mechanics, disc pathology Posture, HEP    DOMS     Nustep with UE  L5 10' L5 10'  L5x10'  L5 10'  L5  10'   Webslide rows H,M,L  BTB 20x BTB 20x  GTB x20  GTB 20x  BTB 20x   Standing HS stretch alis   30" 3x 30"x3  30" 3x  30" 3x  30"  3x   Supine PPT  5" 20x 5"x20  5" 20x  5" 20x  5"  20x   PPT with march  30x 30x  30x  30x  30x   Bridges  20x 20x  20x  20x  20x   Clamshells alis   20x 20x GTB  20x  20x  GTB  20 ea  Prone UE raise  20x 20x  20x  20x  20x   Prone LE raise  20x 20x  20x  20x  20x   Prone press-ups  2x10 2x10  20x  20x  20x    slump sliders  3" 10x 3"x10                                                                                                                       Modalities             HP prn lower back                                                Assessment: Tolerated treatment well  Continues to feel instant relief with slump sliders  Patient demonstrated fatigue post treatment, exhibited good technique with therapeutic exercises and would benefit from continued PT   Pt felt good overall leaving today        Plan: Continue per plan of care

## 2019-02-11 ENCOUNTER — OFFICE VISIT (OUTPATIENT)
Dept: PHYSICAL THERAPY | Facility: CLINIC | Age: 59
End: 2019-02-11
Payer: COMMERCIAL

## 2019-02-11 DIAGNOSIS — M51.26 HNP (HERNIATED NUCLEUS PULPOSUS), LUMBAR: ICD-10-CM

## 2019-02-11 DIAGNOSIS — M54.16 RADICULOPATHY, LUMBAR REGION: Primary | ICD-10-CM

## 2019-02-11 PROCEDURE — 97112 NEUROMUSCULAR REEDUCATION: CPT

## 2019-02-11 PROCEDURE — 97530 THERAPEUTIC ACTIVITIES: CPT

## 2019-02-11 PROCEDURE — 97110 THERAPEUTIC EXERCISES: CPT

## 2019-02-11 NOTE — PROGRESS NOTES
Daily Note     Today's date: 2019  Patient name: Mariam Dubois  : 1960  MRN: [id number]  Referring provider: Elkin Crouch MD  Dx:   Encounter Diagnosis     ICD-10-CM    1  Radiculopathy, lumbar region M54 16    2  HNP (herniated nucleus pulposus), lumbar M51 26                   Subjective: Pt denies any pain upon arrival today  He states that he has been feeling pretty good lately and is ready to continue with exercise  Objective: See treatment diary below  Precautions s/p right L3-L4 microdiskectomy in  and left L4-L5 microdiskectomy in       Specialty Daily Treatment Diary      Manual             Left LE LAD JF                                                                         Exercise Diary     Pt education Posture, body mechanics, disc pathology Posture, HEP    DOMS     Nustep with UE  L5 10' L5 10'  L5x10'  L5 10'  L5  10'   Webslide rows H,M,L  BTB 20x BTB 20x  PTB x20  GTB 20x  BTB 20x   Standing HS stretch alis   30" 3x 30"x3  30" 3x  30" 3x  30"  3x   Supine PPT  5" 20x 5"x20  5" 20x  5" 20x  5"  20x   PPT with march  30x 30x  30x  30x  30x   Bridges  20x 20x  20x  20x  20x   Clamshells alis   20x 20x GTB  20x  20x  GTB  20 ea  Prone UE raise  20x 20x  20x  20x  20x   Prone LE raise  20x 20x  20x  20x  20x   Prone press-ups  2x10 2x10  20x  20x  20x    slump sliders  3" 10x 3"x10  NP                                                                                                                             Modalities             HP prn lower back                                               Assessment: Pt had no pain throughout nor post treatment today  He noted feeling as if he is ready to DC to HEP  He will DC tomorrow  Plan: Potential discharge next visit

## 2019-02-12 ENCOUNTER — EVALUATION (OUTPATIENT)
Dept: PHYSICAL THERAPY | Facility: CLINIC | Age: 59
End: 2019-02-12
Payer: COMMERCIAL

## 2019-02-12 DIAGNOSIS — M51.26 HNP (HERNIATED NUCLEUS PULPOSUS), LUMBAR: ICD-10-CM

## 2019-02-12 DIAGNOSIS — M54.16 RADICULOPATHY, LUMBAR REGION: Primary | ICD-10-CM

## 2019-02-12 PROCEDURE — 97112 NEUROMUSCULAR REEDUCATION: CPT | Performed by: PHYSICAL THERAPIST

## 2019-02-12 PROCEDURE — 97110 THERAPEUTIC EXERCISES: CPT | Performed by: PHYSICAL THERAPIST

## 2019-02-12 PROCEDURE — 97530 THERAPEUTIC ACTIVITIES: CPT | Performed by: PHYSICAL THERAPIST

## 2019-02-12 NOTE — TELEPHONE ENCOUNTER
Patient is calling back stating that his pain level is a 1 or 2 and he was discharge from PT today  He will pass on the second injection at this time

## 2019-02-12 NOTE — PROGRESS NOTES
PT Discharge    Today's date: 2019  Patient name: Karen Pham  : 1960  MRN: [id number]  Referring provider: Evens Huynh MD  Dx:   Encounter Diagnosis     ICD-10-CM    1  Radiculopathy, lumbar region M54 16 PT plan of care cert/re-cert   2  HNP (herniated nucleus pulposus), lumbar M51 26 PT plan of care cert/re-cert       Start Time:   Stop Time: 433  Total time in clinic (min): 50 minutes    Assessment  Assessment details: Patient reports feeling 90% improved to date  Pain has returned to it's previous baseline  Good ROM and flexibility in l-spine  Reviewed HEP with patient and he demonstrates good technique  Denies functional limitations and has resumed his previous level of function  Impairments: abnormal or restricted ROM, activity intolerance, impaired physical strength, pain with function and poor posture   Functional limitations: Pain with bending, lifting and twisting  Prognosis: good  Prognosis details: Chronic pain    Goals  STG (3 weeks)  1  Patient will report pain as a 3-4/10 at worst with activity - met  2  Patient will transfer sit to/from standing without increased pain - met  LTG (6 weeks)  1  Patient will report pain as a 1-2/10 at worst with normal activities - met  2  Patient will report the ability to resume previous level of function without increased pain - met  3  Patient will be independent and compliant with a HEP in order to  Maintain gains made with skilled PT services  - met    Plan  Planned therapy interventions: home exercise program and patient education  Plan of Care beginning date: 2019  Plan of Care expiration date: 2019  Treatment plan discussed with: patient        Subjective Evaluation    History of Present Illness  Mechanism of injury: Patient feels 90% returned to normal   Pain is greatly decreased and is a 3/10 at worst   Has resumed all normal activities  Performs all HEP daily    Quality of life: good    Pain  Current pain rating: 0  At best pain ratin  At worst pain rating: 3  Location: Left lower back  Relieving factors: heat  Progression: improved    Social Support  Stairs in house: yes (Right HR)   12  Lives in: multiple-level home    Employment status: working ( [employer] )    Diagnostic Tests  MRI studies: abnormal    FCE comments: Left LE feels weaker since surgery  Treatments  Previous treatment: medication  Current treatment: medication  Patient Goals  Patient goals for therapy: increased strength, decreased pain and independence with ADLs/IADLs          Objective     Concurrent Complaints  Negative for disturbed sleep, bladder dysfunction, bowel dysfunction and saddle (S4) numbness    Postural Observations  Seated posture: fair  Standing posture: fair  Correction of posture: makes symptoms better        Palpation   Left   No palpable tenderness to the erector spinae and lumbar paraspinals  Right   No palpable tenderness to the erector spinae and lumbar paraspinals  Tenderness     Lumbar Spine  No tenderness in the spinous process (L5)  Left Hip   No tenderness in the ASIS and PSIS  Right Hip   No tenderness in the ASIS and PSIS  Neurological Testing     Sensation     Lumbar   Left   Intact: light touch    Right   Intact: light touch    Active Range of Motion     Additional Active Range of Motion Details  Lumbar ROM WFL    Strength/Myotome Testing     Left Hip   Planes of Motion   Flexion: 4+  Extension: 4+  Abduction: 4+    Right Hip   Planes of Motion   Flexion: 4+  Extension: 4+  Abduction: 4+    Left Knee   Flexion: 4+  Extension: 4+    Right Knee   Flexion: 4+  Extension: 4+    Left Ankle/Foot   Dorsiflexion: 4+  Plantar flexion: 4+    Right Ankle/Foot   Dorsiflexion: 4+  Plantar flexion: 4+    Additional Strength Details  Upper abs    Tests     Lumbar     Left   Negative crossed SLR and passive SLR  Right   Negative crossed SLR and passive SLR       Additional Tests Details  RFIS - no change in pain  MARSHALL - no change in pain  Prone press-ups 2x10 centralized pain to a 1/10  Long axis traction left LE relieved pain  (+) quad and HS tightness bilaterally      Specialty Daily Treatment Diary      Manual  1/14           Left LE LAD JF                                                                         Exercise Diary  2/4 2/5 2/11 2/12 1/28   Pt education Posture, body mechanics, disc pathology Posture, HEP         Nustep with UE  L5 10' L5 10'  L5x10'  L5 10'  L5  10'   Webslide rows H,M,L  BTB 20x BTB 20x  PTB x20  PTB 20x  BTB 20x   Standing HS stretch alis   30" 3x 30"x3  30" 3x  30" 3x  30"  3x   Supine PPT  5" 20x 5"x20  5" 30x  5" 30x  5"  20x   PPT with march  30x 30x  30x  30x  30x   Bridges  20x 20x  20x  30x  20x   Clamshells alis   20x 20x GTB  20x  20x  GTB  20 ea     Prone UE raise  20x 20x  20x  20x  20x   Prone LE raise  20x 20x  20x  20x  20x   Prone press-ups  2x10 2x10  20x  20x  20x    slump sliders  3" 10x 3"x10  NP                                                                                                                             Modalities             HP prn lower back

## 2019-02-15 ENCOUNTER — APPOINTMENT (OUTPATIENT)
Dept: LAB | Facility: MEDICAL CENTER | Age: 59
End: 2019-02-15
Payer: COMMERCIAL

## 2019-02-15 DIAGNOSIS — R97.20 ELEVATED PSA: ICD-10-CM

## 2019-02-15 PROCEDURE — G0103 PSA SCREENING: HCPCS

## 2019-02-15 PROCEDURE — 36415 COLL VENOUS BLD VENIPUNCTURE: CPT

## 2019-02-16 LAB — PSA SERPL-MCNC: 6.6 NG/ML (ref 0–4)

## 2019-02-18 ENCOUNTER — APPOINTMENT (OUTPATIENT)
Dept: PHYSICAL THERAPY | Facility: CLINIC | Age: 59
End: 2019-02-18
Payer: COMMERCIAL

## 2019-02-19 ENCOUNTER — APPOINTMENT (OUTPATIENT)
Dept: PHYSICAL THERAPY | Facility: CLINIC | Age: 59
End: 2019-02-19
Payer: COMMERCIAL

## 2019-02-22 ENCOUNTER — TELEPHONE (OUTPATIENT)
Dept: PAIN MEDICINE | Facility: CLINIC | Age: 59
End: 2019-02-22

## 2019-02-22 ENCOUNTER — OFFICE VISIT (OUTPATIENT)
Dept: NEUROLOGY | Facility: CLINIC | Age: 59
End: 2019-02-22
Payer: COMMERCIAL

## 2019-02-22 VITALS
DIASTOLIC BLOOD PRESSURE: 86 MMHG | SYSTOLIC BLOOD PRESSURE: 110 MMHG | HEIGHT: 67 IN | WEIGHT: 203 LBS | HEART RATE: 96 BPM | BODY MASS INDEX: 31.86 KG/M2

## 2019-02-22 DIAGNOSIS — M51.26 HNP (HERNIATED NUCLEUS PULPOSUS), LUMBAR: ICD-10-CM

## 2019-02-22 DIAGNOSIS — M54.16 RADICULOPATHY, LUMBAR REGION: Primary | ICD-10-CM

## 2019-02-22 PROCEDURE — 99213 OFFICE O/P EST LOW 20 MIN: CPT | Performed by: PSYCHIATRY & NEUROLOGY

## 2019-02-22 NOTE — PROGRESS NOTES
Progress Note - Neurology   Jamila Screen 66 y o  male MRN: [id number]  Unit/Bed#:  Encounter: [id number]      Subjective:   Patient is here for a follow-up visit with a history of low back pain, left S1 radiculopathy secondary to lumbar disc disease, and since his last visit has had significant improvement with the help of steroid Dosepak, medications, physical therapy and a lumbar epidural steroid injection  He saw a complete relief has gone back to work and recently started experiencing discomfort again due to extra strenuous activities  He denies any bladder bowel symptoms and occasionally complains of a tingling sensation going down the lateral aspect of the left leg  ROS:   Review of Systems   Constitutional: Negative  Negative for appetite change and fever  HENT: Negative  Negative for hearing loss, tinnitus, trouble swallowing and voice change  Eyes: Negative  Negative for photophobia and pain  Respiratory: Negative  Negative for shortness of breath  Cardiovascular: Negative  Negative for palpitations  Gastrointestinal: Negative  Negative for nausea and vomiting  Endocrine: Negative  Negative for cold intolerance and heat intolerance  Genitourinary: Negative  Negative for dysuria, frequency and urgency  Musculoskeletal: Positive for back pain  Negative for myalgias and neck pain  Tingling down left leg  Occasional balance difficulty   Skin: Negative  Negative for rash  Neurological: Positive for numbness  Negative for dizziness, tremors, seizures, syncope, facial asymmetry, speech difficulty, weakness, light-headedness and headaches  Hematological: Negative  Does not bruise/bleed easily  Psychiatric/Behavioral: Negative  Negative for confusion, hallucinations and sleep disturbance         Vitals:   Vitals:    02/22/19 1400   BP: 110/86   BP Location: Left arm   Patient Position: Sitting   Cuff Size: Adult   Pulse: 96   Weight: 92 1 kg (203 lb)   Height: 5' 6 5" (1 689 m)   ,Body mass index is 32 27 kg/m²  MEDS:      Current Outpatient Medications:     aspirin (ECOTRIN) 325 mg EC tablet, Take 325 mg by mouth every other day, Disp: , Rfl:     cyclobenzaprine (FLEXERIL) 10 mg tablet, TAKE 1 TABLET (10 MG TOTAL) BY MOUTH DAILY AT BEDTIME (Patient taking differently: Take 10 mg by mouth daily at bedtime as needed ), Disp: 30 tablet, Rfl: 1    diltiazem (CARDIZEM CD) 120 mg 24 hr capsule, TAKE 1 CAPSULE ONCE DAILY  , Disp: 90 capsule, Rfl: 3    EPINEPHrine (EPIPEN) 0 3 mg/0 3 mL SOAJ, Inject 0 3 mL (0 3 mg total) into the shoulder, thigh, or buttocks once for 1 dose, Disp: 0 3 mL, Rfl: 1    gabapentin (NEURONTIN) 300 mg capsule, Take 1 capsule (300 mg total) by mouth 2 (two) times a day, Disp: 180 capsule, Rfl: 1    ibuprofen (MOTRIN) 600 mg tablet, Take 600 mg by mouth every 6 (six) hours as needed for mild pain  , Disp: , Rfl:     Omega-3 Fatty Acids (FISH OIL) 1000 MG CPDR, Take 1 capsule by mouth daily  , Disp: , Rfl:     omeprazole (PriLOSEC) 40 MG capsule, TAKE 1 CAPSULE DAILY  , Disp: 30 capsule, Rfl: 3    tamsulosin (FLOMAX) 0 4 mg, Take 1 capsule (0 4 mg total) by mouth daily with dinner, Disp: 90 capsule, Rfl: 3  :    Physical Exam:  General appearance: alert, appears stated age and cooperative  Head: Normocephalic, without obvious abnormality, atraumatic    On examination patient has no evidence of any significant lumbosacral tenderness, mild tenderness is noted along the left pyriformis muscle, and mild weakness is noted in the left hamstring in the 5-/5 range, deep tendon reflexes are preserved at the knees, with sensory loss noted in the left L5-S1 distribution  His gait is normal based  Lab Results: I have personally reviewed pertinent reports  Imaging Studies: I have personally reviewed pertinent reports  Assessment:  1   Chronic lumbosacral strain secondary to lumbar disc disease, status post lumbar disc surgery, left L5-S1 radiculopathy  Plan:  Patient is advised to continue home exercise program, continue present medications on a p r n  Basis, and will follow up with pain management in the near future  He will return back to see me in 3-4 months     2/22/2019,2:05 PM    Dictation voice to text software has been used in the creation of this document  Please consider this in light of any contextual or grammatical errors

## 2019-02-22 NOTE — TELEPHONE ENCOUNTER
Patient called requesting another epidural injection  He is experiencing tingling sensation and feels as though his pain may be returning  His last injection was on 1/28/18   Please advise, thx    Call back# [phone]    Dr Valdez's patient

## 2019-02-25 ENCOUNTER — TELEPHONE (OUTPATIENT)
Dept: PAIN MEDICINE | Facility: CLINIC | Age: 59
End: 2019-02-25

## 2019-02-25 DIAGNOSIS — R35.1 BENIGN PROSTATIC HYPERPLASIA WITH NOCTURIA: ICD-10-CM

## 2019-02-25 DIAGNOSIS — M51.17 INTERVERTEBRAL DISC DISORDER WITH RADICULOPATHY OF LUMBOSACRAL REGION: Primary | ICD-10-CM

## 2019-02-25 DIAGNOSIS — N40.1 BENIGN PROSTATIC HYPERPLASIA WITH NOCTURIA: ICD-10-CM

## 2019-02-25 RX ORDER — TAMSULOSIN HYDROCHLORIDE 0.4 MG/1
0.4 CAPSULE ORAL
Qty: 90 CAPSULE | Refills: 3 | Status: SHIPPED | OUTPATIENT
Start: 2019-02-25 | End: 2020-03-06

## 2019-02-26 ENCOUNTER — OFFICE VISIT (OUTPATIENT)
Dept: UROLOGY | Facility: AMBULATORY SURGERY CENTER | Age: 59
End: 2019-02-26
Payer: COMMERCIAL

## 2019-02-26 VITALS
BODY MASS INDEX: 31.39 KG/M2 | HEIGHT: 67 IN | DIASTOLIC BLOOD PRESSURE: 62 MMHG | HEART RATE: 72 BPM | WEIGHT: 200 LBS | SYSTOLIC BLOOD PRESSURE: 132 MMHG

## 2019-02-26 DIAGNOSIS — N40.1 BENIGN PROSTATIC HYPERPLASIA WITH LOWER URINARY TRACT SYMPTOMS, SYMPTOM DETAILS UNSPECIFIED: ICD-10-CM

## 2019-02-26 DIAGNOSIS — R97.20 ELEVATED PSA: Primary | ICD-10-CM

## 2019-02-26 PROCEDURE — 99214 OFFICE O/P EST MOD 30 MIN: CPT | Performed by: NURSE PRACTITIONER

## 2019-02-26 NOTE — PROGRESS NOTES
2/26/2019    Carliss Closs  1960  [id number]      Assessment  -Elevated PSA s/p TRUS biopsy with atypical glands (2/27/18)  -BPH with lower urinary tract symptoms     Discussion/Plan  Enma Collazo is a 64 y o  male being managed by Dr Joyce Carrasco        -We reviewed results of recent PSA which is 6 6, previously 5 7  His PSA range is 3 4-6 7  Patient underwent transrectal ultrasound-guided biopsy on 02/27/2018  Final pathology revealed atypical glands  It was previously discussed by Dr Joyce Carrasco, that if PSA remains elevated to proceed with prostate biopsy verses a multiparametric MRI of the prostate  The patient wishes to proceed with MRI of the prostate  Order placed in [facility]  We did discuss the possibility of insurance not covering test   At that time, we would proceed with prostate biopsy  He will follow up with Dr Joyce Carrasco to review MRI results  He will continue to take Flomax daily  Patient was instructed to call with any issues  -All questions answered, patient agrees with plan      History of Present Illness  64 y o  male with a history of elevated PSA s/p TRUS biopsy with atypical glands and BPH with LUTS presents today for follow up  TRUS prostate biopsy performed 2/27/18  PSA at that time was 6 7  Final pathology revealed small focus of atypical glands in the left side of prostate  His initial prostate biopsy was performed Fall 2015  Last PSA 8/2/18 was 5 7  His PSA ranges from 3 4-6 7  He continues to take Flomax daily  Patient denies any urinary complaints  No reports of gross hematuria or dysuria  He denies any strong family history of prostate cancer  Review of Systems  Review of Systems   Constitutional: Negative  HENT: Negative  Respiratory: Negative  Cardiovascular: Negative  Gastrointestinal: Negative  Genitourinary: Negative for decreased urine volume, difficulty urinating, dysuria, flank pain, frequency, hematuria and urgency  Musculoskeletal: Negative  Skin: Negative  Neurological: Negative  Psychiatric/Behavioral: Negative  AUA SYMPTOM SCORE      Most Recent Value   AUA SYMPTOM SCORE   How often have you had a sensation of not emptying your bladder completely after you finished urinating? 2   How often have you had to urinate again less than two hours after you finished urinating? 1   How often have you found you stopped and started again several times when you urinate? 4   How often have you found it difficult to postpone urination? 1   How often have you had a weak urinary stream?  1   How often have you had to push or strain to begin urination? 1   How many times did you most typically get up to urinate from the time you went to bed at night until the time you got up in the morning?   2   Quality of Life: If you were to spend the rest of your life with your urinary condition just the way it is now, how would you feel about that?  3   AUA SYMPTOM SCORE  12            Past Medical History  Past Medical History:   Diagnosis Date    BPH (benign prostatic hyperplasia)     CAD (coronary artery disease)     Cardiac disease     [facility] [address] [facility]     Failed back syndrome     Heart attack ([facility] [address] [facility]     Hypertension     Patient denies    Umbilical hernia     Last Assessed:3/25/2015       Past Social History  Past Surgical History:   Procedure Laterality Date    BACK SURGERY      HERNIA REPAIR      Last Assessed:3/12/2014 ,lumbar    LUMBAR LAMINECTOMY      Last Assessed:3/12/2014    PROSTATE BIOPSY  09/10/2015    needle biopsy    SHOULDER SURGERY      TONSILLECTOMY         Past Family History  Family History   Problem Relation Age of Onset    Hypertension Father     Diabetes Father     Pancreatic cancer Mother     Cancer Family     No Known Problems Sister     No Known Problems Brother        Past Social history  Social History     Socioeconomic History    Marital status: /Civil Union     Spouse name: Not on file    Number of children: Not on file    Years of education: Not on file    Highest education level: Not on file   Occupational History    Occupation:      Comment: full time   Social Needs    Financial resource strain: Not on file    Food insecurity:     Worry: Not on file     Inability: Not on file   [employer] needs:     Medical: Not on file     Non-medical: Not on file   Tobacco Use    Smoking status: Never Smoker    Smokeless tobacco: Never Used   Substance and Sexual Activity    Alcohol use: Yes     Comment: occ    Drug use: No    Sexual activity: Not on file   Lifestyle    Physical activity:     Days per week: Not on file     Minutes per session: Not on file    Stress: Not on file   Relationships    Social connections:     Talks on phone: Not on file     Gets together: Not on file     Attends Religion service: Not on file     Active member of club or organization: Not on file     Attends meetings of clubs or organizations: Not on file     Relationship status: Not on file    Intimate partner violence:     Fear of current or ex partner: Not on file     Emotionally abused: Not on file     Physically abused: Not on file     Forced sexual activity: Not on file   Other Topics Concern    Not on file   Social History Narrative    Always uses seat belt    Caffeine use       Current Medications  Current Outpatient Medications   Medication Sig Dispense Refill    aspirin (ECOTRIN) 325 mg EC tablet Take 325 mg by mouth every other day      cyclobenzaprine (FLEXERIL) 10 mg tablet TAKE 1 TABLET (10 MG TOTAL) BY MOUTH DAILY AT BEDTIME (Patient taking differently: Take 10 mg by mouth daily at bedtime as needed ) 30 tablet 1    diltiazem (CARDIZEM CD) 120 mg 24 hr capsule TAKE 1 CAPSULE ONCE DAILY   90 capsule 3    EPINEPHrine (EPIPEN) 0 3 mg/0 3 mL SOAJ Inject 0 3 mL (0 3 mg total) into the shoulder, thigh, or buttocks once for 1 dose 0 3 mL 1    gabapentin (NEURONTIN) 300 mg capsule Take 1 capsule (300 mg total) by mouth 2 (two) times a day 180 capsule 1    ibuprofen (MOTRIN) 600 mg tablet Take 600 mg by mouth every 6 (six) hours as needed for mild pain        Omega-3 Fatty Acids (FISH OIL) 1000 MG CPDR Take 1 capsule by mouth daily        omeprazole (PriLOSEC) 40 MG capsule TAKE 1 CAPSULE DAILY  30 capsule 3    tamsulosin (FLOMAX) 0 4 mg TAKE 1 CAPSULE (0 4 MG TOTAL) BY MOUTH DAILY WITH DINNER 90 capsule 3     No current facility-administered medications for this visit  Allergies  Allergies   Allergen Reactions    Other Anaphylaxis     [facility] [id number]: YELLOW [employer]       Past Medical History, Social History, Family History, medications and allergies were reviewed  Vitals  There were no vitals filed for this visit  Physical Exam  Physical Exam   Constitutional: He is oriented to person, place, and time  He appears well-developed and well-nourished  HENT:   Head: Normocephalic  Eyes: Pupils are equal, round, and reactive to light  Neck: Normal range of motion  Cardiovascular: Normal rate and regular rhythm  Pulmonary/Chest: Effort normal    Abdominal: Soft  Normal appearance  There is no CVA tenderness  Musculoskeletal: Normal range of motion  Neurological: He is alert and oriented to person, place, and time  Skin: Skin is warm and dry  Psychiatric: He has a normal mood and affect   His behavior is normal  Judgment and thought content normal        Results    I have personally reviewed all pertinent lab results and reviewed with patient  Lab Results   Component Value Date    PSA 6 6 (H) 02/15/2019    PSA 5 7 (H) 08/02/2018    PSA 6 7 (H) 12/26/2017     Lab Results   Component Value Date    GLUCOSE 92 08/07/2017    CALCIUM 9 2 08/07/2017     08/07/2017    K 4 3 08/02/2018    CO2 23 08/02/2018     08/02/2018    BUN 19 10/31/2018    CREATININE 0 92 08/07/2017     Lab Results   Component Value Date    WBC 3 9 08/02/2018    HGB 14 4 08/02/2018    HCT 43 3 08/02/2018    MCV 87 08/02/2018     08/02/2018     No results found for this or any previous visit (from the past 1 hour(s))

## 2019-03-11 ENCOUNTER — HOSPITAL ENCOUNTER (OUTPATIENT)
Dept: RADIOLOGY | Facility: HOSPITAL | Age: 59
Discharge: HOME | End: 2019-03-11
Payer: COMMERCIAL

## 2019-03-11 DIAGNOSIS — R97.20 ELEVATED PSA: ICD-10-CM

## 2019-03-11 PROCEDURE — 76377 3D RENDER W/INTRP POSTPROCES: CPT

## 2019-03-11 PROCEDURE — 72197 MRI PELVIS W/O & W/DYE: CPT

## 2019-03-11 PROCEDURE — A9585 GADOBUTROL INJECTION: HCPCS | Performed by: NURSE PRACTITIONER

## 2019-03-11 RX ADMIN — GADOBUTROL 9 ML: 604.72 INJECTION INTRAVENOUS at 16:42

## 2019-03-14 ENCOUNTER — HOSPITAL ENCOUNTER (OUTPATIENT)
Dept: RADIOLOGY | Facility: CLINIC | Age: 59
Discharge: HOME | End: 2019-03-14
Attending: ANESTHESIOLOGY
Payer: COMMERCIAL

## 2019-03-14 VITALS
OXYGEN SATURATION: 97 % | TEMPERATURE: 98.6 F | DIASTOLIC BLOOD PRESSURE: 88 MMHG | SYSTOLIC BLOOD PRESSURE: 123 MMHG | HEART RATE: 88 BPM | RESPIRATION RATE: 18 BRPM

## 2019-03-14 DIAGNOSIS — M51.17 INTERVERTEBRAL DISC DISORDER WITH RADICULOPATHY OF LUMBOSACRAL REGION: ICD-10-CM

## 2019-03-14 PROCEDURE — 64484 NJX AA&/STRD TFRM EPI L/S EA: CPT | Performed by: ANESTHESIOLOGY

## 2019-03-14 PROCEDURE — 64483 NJX AA&/STRD TFRM EPI L/S 1: CPT | Performed by: ANESTHESIOLOGY

## 2019-03-14 RX ORDER — METHYLPREDNISOLONE ACETATE 80 MG/ML
80 INJECTION, SUSPENSION INTRA-ARTICULAR; INTRALESIONAL; INTRAMUSCULAR; PARENTERAL; SOFT TISSUE ONCE
Status: COMPLETED | OUTPATIENT
Start: 2019-03-14 | End: 2019-03-14

## 2019-03-14 RX ORDER — BUPIVACAINE HCL/PF 2.5 MG/ML
10 VIAL (ML) INJECTION ONCE
Status: COMPLETED | OUTPATIENT
Start: 2019-03-14 | End: 2019-03-14

## 2019-03-14 RX ORDER — 0.9 % SODIUM CHLORIDE 0.9 %
10 VIAL (ML) INJECTION ONCE
Status: COMPLETED | OUTPATIENT
Start: 2019-03-14 | End: 2019-03-14

## 2019-03-14 RX ADMIN — IOHEXOL 1 ML: 300 INJECTION, SOLUTION INTRAVENOUS at 15:15

## 2019-03-14 RX ADMIN — BUPIVACAINE HYDROCHLORIDE 2 ML: 2.5 INJECTION, SOLUTION EPIDURAL; INFILTRATION; INTRACAUDAL at 15:15

## 2019-03-14 RX ADMIN — METHYLPREDNISOLONE ACETATE 80 MG: 80 INJECTION, SUSPENSION INTRA-ARTICULAR; INTRALESIONAL; INTRAMUSCULAR; PARENTERAL; SOFT TISSUE at 15:15

## 2019-03-14 RX ADMIN — SODIUM CHLORIDE 5 ML: 9 INJECTION, SOLUTION INTRAMUSCULAR; INTRAVENOUS; SUBCUTANEOUS at 15:12

## 2019-03-14 RX ADMIN — Medication 5 ML: at 15:12

## 2019-03-14 NOTE — H&P
History of Present Illness: The patient is a 64 y o  male who presents with complaints of left lower back and leg pain secondary to lumbar degenerative disc is here today for left L5-S1 transforaminal epidural steroid injection    Patient Active Problem List   Diagnosis    Essential hypertension    Gastroesophageal reflux disease without esophagitis    Elevated PSA    Allergic rhinitis due to pollen    Benign prostatic hyperplasia without lower urinary tract symptoms    Failed back syndrome    Radiculopathy, lumbar region    HNP (herniated nucleus pulposus), lumbar    Intervertebral disc disorder with radiculopathy of lumbosacral region       Past Medical History:   Diagnosis Date    Back disorder 02/26/2019    getting epidural shots, physical therapy    BPH (benign prostatic hyperplasia)     CAD (coronary artery disease)     Cardiac disease     [facility] [address]     Failed back syndrome     [facility] attack ([facility] [address] [facility]     Hypertension     Patient denies    Umbilical hernia     Last Assessed:3/25/2015       Past Surgical History:   Procedure Laterality Date    BACK SURGERY      HERNIA REPAIR      Last Assessed:3/12/2014 ,lumbar    LUMBAR LAMINECTOMY      Last Assessed:3/12/2014    PROSTATE BIOPSY  09/10/2015    needle biopsy    SHOULDER SURGERY      TONSILLECTOMY           Current Outpatient Medications:     aspirin (ECOTRIN) 325 mg EC tablet, Take 325 mg by mouth every other day, Disp: , Rfl:     cyclobenzaprine (FLEXERIL) 10 mg tablet, TAKE 1 TABLET (10 MG TOTAL) BY MOUTH DAILY AT BEDTIME (Patient taking differently: Take 10 mg by mouth daily at bedtime as needed ), Disp: 30 tablet, Rfl: 1    diltiazem (CARDIZEM CD) 120 mg 24 hr capsule, TAKE 1 CAPSULE ONCE DAILY  , Disp: 90 capsule, Rfl: 3    EPINEPHrine (EPIPEN) 0 3 mg/0 3 mL SOAJ, Inject 0 3 mL (0 3 mg total) into the shoulder, thigh, or buttocks once for 1 dose, Disp: 0 3 mL, Rfl: 1    gabapentin (NEURONTIN) 300 mg capsule, Take 1 capsule (300 mg total) by mouth 2 (two) times a day, Disp: 180 capsule, Rfl: 1    ibuprofen (MOTRIN) 600 mg tablet, Take 600 mg by mouth every 6 (six) hours as needed for mild pain  , Disp: , Rfl:     Omega-3 Fatty Acids (FISH OIL) 1000 MG CPDR, Take 1 capsule by mouth daily  , Disp: , Rfl:     omeprazole (PriLOSEC) 40 MG capsule, TAKE 1 CAPSULE DAILY  , Disp: 30 capsule, Rfl: 3    tamsulosin (FLOMAX) 0 4 mg, TAKE 1 CAPSULE (0 4 MG TOTAL) BY MOUTH DAILY WITH DINNER, Disp: 90 capsule, Rfl: 3  No current facility-administered medications for this encounter  Allergies   Allergen Reactions    Other Anaphylaxis     Annotation - [id number]: YELLOW JACKETS       Physical Exam:   Vitals:    03/14/19 1503   BP: 113/80   Pulse: 95   Resp: 18   Temp: 98 6 °F (37 °C)   SpO2: 95%     General: Awake, Alert, Oriented x 3, Mood and affect appropriate  Respiratory: Respirations even and unlabored  Cardiovascular: Peripheral pulses intact; no edema  Musculoskeletal Exam:   Left lower back tenderness    ASA Score: 2    Patient/Chart Verification  Patient ID Verified: Verbal  ID Band Applied: No  Consents Confirmed: Procedural  H&P( within 30 days) Verified: To be obtained in the Pre-Procedure area  Interval H&P(within 24 hr) Complete (required for Outpatients and Surgery Admit only): To be obtained in the Pre-Procedure area  Allergies Reviewed: Yes  Anticoag/NSAID held?: NA  Currently on antibiotics?: No    Assessment:   1   Intervertebral disc disorder with radiculopathy of lumbosacral region        Plan: Left L5-S1 TF SOURAV

## 2019-03-14 NOTE — DISCHARGE INSTR - LAB
Epidural Steroid Injection   WHAT YOU NEED TO KNOW:   An epidural steroid injection (SOURAV) is a procedure to inject steroid medicine into the epidural space  The epidural space is between your spinal cord and vertebrae  Steroids reduce inflammation and fluid buildup in your spine that may be causing pain  You may be given pain medicine along with the steroids  ACTIVITY  · Do not drive or operate machinery today  · No strenuous activity today - bending, lifting, etc   · You may resume normal activites starting tomorrow - start slowly and as tolerated  · You may shower today, but no tub baths or hot tubs  · You may have numbness for several hours from the local anesthetic  Please use caution and common sense, especially with weight-bearing activities  CARE OF THE INJECTION SITE  · If you have soreness or pain, apply ice to the area today (20 minutes on/20 minutes off)  · Starting tomorrow, you may use warm, moist heat or ice if needed  · You may have an increase or change in your discomfort for 36-48 hours after your treatment  · Apply ice and continue with any pain medication you have been prescribed  · Notify [facility] if you have any of the following: redness, drainage, swelling, headache, stiff neck or fever above 100°F     SPECIAL INSTRUCTIONS  · Our office will contact you in approximately 7 days for a progress report  MEDICATIONS  · Continue to take all routine medications  · Our office may have instructed you to hold some medications  If you have a problem specifically related to your procedure, please call our office at [phone]  Problems not related to your procedure should be directed to your primary care physician

## 2019-03-17 DIAGNOSIS — M96.1 FAILED BACK SYNDROME: ICD-10-CM

## 2019-03-17 DIAGNOSIS — M51.26 HNP (HERNIATED NUCLEUS PULPOSUS), LUMBAR: ICD-10-CM

## 2019-03-17 DIAGNOSIS — M54.16 RADICULOPATHY, LUMBAR REGION: ICD-10-CM

## 2019-03-18 RX ORDER — CYCLOBENZAPRINE HCL 10 MG
10 TABLET ORAL
Qty: 30 TABLET | Refills: 0 | Status: SHIPPED | OUTPATIENT
Start: 2019-03-18 | End: 2019-10-07 | Stop reason: SDUPTHER

## 2019-03-19 ENCOUNTER — OFFICE VISIT (OUTPATIENT)
Dept: UROLOGY | Facility: AMBULATORY SURGERY CENTER | Age: 59
End: 2019-03-19
Payer: COMMERCIAL

## 2019-03-19 VITALS
HEART RATE: 81 BPM | SYSTOLIC BLOOD PRESSURE: 126 MMHG | WEIGHT: 204 LBS | HEIGHT: 67 IN | DIASTOLIC BLOOD PRESSURE: 80 MMHG | BODY MASS INDEX: 32.02 KG/M2

## 2019-03-19 DIAGNOSIS — R97.20 ELEVATED PSA: Primary | ICD-10-CM

## 2019-03-19 PROCEDURE — 99214 OFFICE O/P EST MOD 30 MIN: CPT | Performed by: UROLOGY

## 2019-03-19 NOTE — PROGRESS NOTES
3/19/2019    Angella De Jesus  1960  [id number]        Assessment  Elevated PSA, transrectal ultrasound-guided biopsy of the prostate x2, multiparametric MRI with PIRADS 2 scoring      Discussion  The patient I reviewed his 2 prior prostate biopsies with the most recent from 2018 revealing some atypical glands  PSA has remained relatively stable at approximately 6  In addition his multiparametric MRI of the prostate revealed PIRADS 2  I would hold on a repeat prostate biopsy at this time  I recommend follow-up in 6 months with a repeat PSA and digital rectal examination  If these parameters remained stable he can then be seen on a yearly        History of Present Illness  64 y o  male with a history of a PSA elevated at 6 7  This prompted his 2nd transrectal ultrasound-guided biopsy of the prostate performed in early 2018  This biopsy revealed a small focus of atypical glands suspicious but nondiagnostic for prostate cancer  Patient also had a prostate biopsy performed in 2015 which was negative for prostate cancer  More recently underwent a multiparametric MRI of the prostate revealing PIRADS 2 scoring  This was performed in an effort to avoid a 3rd prostate biopsy  He returns in follow-up today to discuss results of the MRI  AUA Symptom Score      Review of Systems  Review of Systems   Constitutional: Negative  HENT: Negative  Eyes: Negative  Respiratory: Negative  Cardiovascular: Negative  Gastrointestinal: Negative  Endocrine: Negative  Genitourinary: Negative  Musculoskeletal: Negative  Skin: Negative  Allergic/Immunologic: Negative  Neurological: Negative  Hematological: Negative  Psychiatric/Behavioral: Negative            Past Medical History  Past Medical History:   Diagnosis Date    Back disorder 02/26/2019    getting epidural shots, physical therapy    BPH (benign prostatic hyperplasia)     CAD (coronary artery disease)     Cardiac disease     [facility]     Failed back syndrome     Heart attack ([facility] )     Hypertension     Patient denies    Umbilical hernia     Last Assessed:3/25/2015       Past Social History  Past Surgical History:   Procedure Laterality Date    BACK SURGERY      HERNIA REPAIR      Last Assessed:3/12/2014 ,lumbar    LUMBAR LAMINECTOMY      Last Assessed:3/12/2014    PROSTATE BIOPSY  09/10/2015    needle biopsy    SHOULDER SURGERY      TONSILLECTOMY         Past Family History  Family History   Problem Relation Age of Onset    Hypertension Father     Diabetes Father     Pancreatic cancer Mother     Cancer Family     No Known Problems Sister     No Known Problems Brother        Past Social history  Social History     Socioeconomic History    Marital status: /Civil Union     Spouse name: Not on file    Number of children: Not on file    Years of education: Not on file    Highest education level: Not on file   Occupational History    Occupation:      Comment: full time   Social Needs    Financial resource strain: Not on file    Food insecurity:     Worry: Not on file     Inability: Not on file   [employer] needs:     Medical: Not on file     Non-medical: Not on file   Tobacco Use    Smoking status: Never Smoker    Smokeless tobacco: Never Used   Substance and Sexual Activity    Alcohol use: Yes     Comment: occ    Drug use: No    Sexual activity: Not on file   Lifestyle    Physical activity:     Days per week: Not on file     Minutes per session: Not on file    Stress: Not on file   Relationships    Social connections:     Talks on phone: Not on file     Gets together: Not on file     Attends [employer] service: Not on file     Active member of club or organization: Not on file     Attends meetings of clubs or organizations: Not on file     Relationship status: Not on file    Intimate partner violence:     Fear of current or ex partner: Not on file     Emotionally abused: Not on file     Physically abused: Not on file     Forced sexual activity: Not on file   Other Topics Concern    Not on file   Social History Narrative    Always uses seat belt    Caffeine use       Current Medications  Current Outpatient Medications   Medication Sig Dispense Refill    aspirin (ECOTRIN) 325 mg EC tablet Take 325 mg by mouth every other day      cyclobenzaprine (FLEXERIL) 10 mg tablet Take 1 tablet (10 mg total) by mouth daily at bedtime as needed for muscle spasms 30 tablet 0    diltiazem (CARDIZEM CD) 120 mg 24 hr capsule TAKE 1 CAPSULE ONCE DAILY  90 capsule 3    EPINEPHrine (EPIPEN) 0 3 mg/0 3 mL SOAJ Inject 0 3 mL (0 3 mg total) into the shoulder, thigh, or buttocks once for 1 dose 0 3 mL 1    gabapentin (NEURONTIN) 300 mg capsule Take 1 capsule (300 mg total) by mouth 2 (two) times a day (Patient taking differently: Take 300 mg by mouth daily ) 180 capsule 1    ibuprofen (MOTRIN) 600 mg tablet Take 600 mg by mouth every 6 (six) hours as needed for mild pain        Omega-3 Fatty Acids (FISH OIL) 1000 MG CPDR Take 1 capsule by mouth daily        omeprazole (PriLOSEC) 40 MG capsule TAKE 1 CAPSULE DAILY  30 capsule 3    tamsulosin (FLOMAX) 0 4 mg TAKE 1 CAPSULE (0 4 MG TOTAL) BY MOUTH DAILY WITH DINNER 90 capsule 3     No current facility-administered medications for this visit  Allergies  Allergies   Allergen Reactions    Other Anaphylaxis     Annotation - [id number]: Qio       Past Medical History, Social History, Family History, medications and allergies were reviewed  Vitals  Vitals:    03/19/19 1227   BP: 126/80   BP Location: Left arm   Patient Position: Sitting   Cuff Size: Adult   Pulse: 81   Weight: 92 5 kg (204 lb)   Height: 5' 6 5" (1 689 m)       Physical Exam  Physical Exam    On examination he is in no acute distress    Gait normal   Affect normal    Results  Lab Results   Component Value Date    PSA 6 6 (H) 02/15/2019    PSA 5 7 (H) 08/02/2018    PSA 6 7 (H) 12/26/2017     Lab Results   Component Value Date    GLUCOSE 92 08/07/2017    CALCIUM 9 2 08/07/2017     08/07/2017    K 4 3 08/02/2018    CO2 23 08/02/2018     08/02/2018    BUN 19 10/31/2018    CREATININE 0 92 08/07/2017     Lab Results   Component Value Date    WBC 3 9 08/02/2018    HGB 14 4 08/02/2018    HCT 43 3 08/02/2018    MCV 87 08/02/2018     08/02/2018         Office Urine Dip  No results found for this or any previous visit (from the past 1 hour(s))  ]      Total visit time was 25 minutes of which over 50% was spent on counseling

## 2019-03-21 ENCOUNTER — TELEPHONE (OUTPATIENT)
Dept: PAIN MEDICINE | Facility: CLINIC | Age: 59
End: 2019-03-21

## 2019-04-05 ENCOUNTER — OFFICE VISIT (OUTPATIENT)
Dept: PAIN MEDICINE | Facility: CLINIC | Age: 59
End: 2019-04-05
Payer: COMMERCIAL

## 2019-04-05 VITALS
TEMPERATURE: 98.2 F | BODY MASS INDEX: 31.96 KG/M2 | WEIGHT: 201 LBS | HEART RATE: 75 BPM | DIASTOLIC BLOOD PRESSURE: 72 MMHG | SYSTOLIC BLOOD PRESSURE: 118 MMHG

## 2019-04-05 DIAGNOSIS — M51.17 INTERVERTEBRAL DISC DISORDER WITH RADICULOPATHY OF LUMBOSACRAL REGION: Primary | ICD-10-CM

## 2019-04-05 PROCEDURE — 99213 OFFICE O/P EST LOW 20 MIN: CPT | Performed by: ANESTHESIOLOGY

## 2019-04-12 DIAGNOSIS — I10 ESSENTIAL HYPERTENSION: ICD-10-CM

## 2019-04-12 DIAGNOSIS — Z91.030 ALLERGY TO YELLOW JACKETS: ICD-10-CM

## 2019-04-12 RX ORDER — DILTIAZEM HYDROCHLORIDE 120 MG/1
CAPSULE, COATED, EXTENDED RELEASE ORAL
Qty: 90 CAPSULE | Refills: 3 | Status: SHIPPED | OUTPATIENT
Start: 2019-04-12 | End: 2020-04-16 | Stop reason: SDUPTHER

## 2019-04-16 RX ORDER — EPINEPHRINE 0.3 MG/.3ML
0.3 INJECTION SUBCUTANEOUS ONCE
Qty: 0.3 ML | Refills: 1 | Status: SHIPPED | OUTPATIENT
Start: 2019-04-16 | End: 2020-04-03 | Stop reason: SDUPTHER

## 2019-05-02 DIAGNOSIS — K21.9 GASTROESOPHAGEAL REFLUX DISEASE WITHOUT ESOPHAGITIS: ICD-10-CM

## 2019-05-02 RX ORDER — OMEPRAZOLE 40 MG/1
CAPSULE, DELAYED RELEASE ORAL
Qty: 30 CAPSULE | Refills: 2 | Status: SHIPPED | OUTPATIENT
Start: 2019-05-02 | End: 2019-08-06 | Stop reason: SDUPTHER

## 2019-08-06 DIAGNOSIS — K21.9 GASTROESOPHAGEAL REFLUX DISEASE WITHOUT ESOPHAGITIS: ICD-10-CM

## 2019-08-06 RX ORDER — OMEPRAZOLE 40 MG/1
CAPSULE, DELAYED RELEASE ORAL
Qty: 90 CAPSULE | Refills: 0 | Status: SHIPPED | OUTPATIENT
Start: 2019-08-06 | End: 2019-11-06 | Stop reason: SDUPTHER

## 2019-08-13 ENCOUNTER — OFFICE VISIT (OUTPATIENT)
Dept: NEUROLOGY | Facility: CLINIC | Age: 59
End: 2019-08-13
Payer: COMMERCIAL

## 2019-08-13 VITALS
SYSTOLIC BLOOD PRESSURE: 118 MMHG | BODY MASS INDEX: 32.33 KG/M2 | HEIGHT: 67 IN | WEIGHT: 206 LBS | DIASTOLIC BLOOD PRESSURE: 80 MMHG | HEART RATE: 86 BPM

## 2019-08-13 DIAGNOSIS — M51.17 INTERVERTEBRAL DISC DISORDER WITH RADICULOPATHY OF LUMBOSACRAL REGION: Primary | ICD-10-CM

## 2019-08-13 PROCEDURE — 99213 OFFICE O/P EST LOW 20 MIN: CPT | Performed by: PSYCHIATRY & NEUROLOGY

## 2019-08-13 NOTE — PROGRESS NOTES
Progress Note - Neurology   Avtar Jade 64 y o  male MRN: [id number]  Unit/Bed#:  Encounter: [id number]      Subjective:   Patient is here for a follow-up visit with a history of chronic low back pain, left lumbar radiculopathy, status post lumbar disc surgery, and since his last visit has had significant relief of symptoms, did receive 2 epidural injections, has been actively working and has tapered himself off medications currently uses Motrin, Flexeril, gabapentin on a p r n  Basis  He generally uses gabapentin 300 mg in the morning due to persistent numbness in the left foot  Patient denies any bladder bowel symptoms and denies any other neurological symptoms  ROS:   Review of Systems   Constitutional: Negative  Negative for appetite change and fever  HENT: Negative  Negative for hearing loss, tinnitus, trouble swallowing and voice change  Eyes: Negative  Negative for photophobia and pain  Respiratory: Negative  Negative for shortness of breath  Cardiovascular: Negative  Negative for palpitations  Gastrointestinal: Negative  Negative for nausea and vomiting  Endocrine: Negative  Negative for cold intolerance and heat intolerance  Genitourinary: Negative  Negative for dysuria, frequency and urgency  Musculoskeletal: Negative  Negative for myalgias and neck pain  Skin: Negative  Negative for rash  Neurological: Negative  Negative for dizziness, tremors, seizures, syncope, facial asymmetry, speech difficulty, weakness, light-headedness, numbness and headaches  Hematological: Negative  Does not bruise/bleed easily  Psychiatric/Behavioral: Negative  Negative for confusion, hallucinations and sleep disturbance  Vitals:   Vitals:    08/13/19 0954   BP: 118/80   BP Location: Left arm   Patient Position: Sitting   Cuff Size: Standard   Pulse: 86   Weight: 93 4 kg (206 lb)   Height: 5' 6 5" (1 689 m)   ,Body mass index is 32 75 kg/m²      MEDS:      Current Outpatient Medications:     aspirin (ECOTRIN) 325 mg EC tablet, Take 325 mg by mouth every other day, Disp: , Rfl:     cyclobenzaprine (FLEXERIL) 10 mg tablet, Take 1 tablet (10 mg total) by mouth daily at bedtime as needed for muscle spasms, Disp: 30 tablet, Rfl: 0    diltiazem (CARDIZEM CD) 120 mg 24 hr capsule, TAKE 1 CAPSULE ONCE DAILY  , Disp: 90 capsule, Rfl: 3    gabapentin (NEURONTIN) 300 mg capsule, Take 1 capsule (300 mg total) by mouth 2 (two) times a day (Patient taking differently: Take 300 mg by mouth daily ), Disp: 180 capsule, Rfl: 1    ibuprofen (MOTRIN) 600 mg tablet, Take 600 mg by mouth every 6 (six) hours as needed for mild pain  , Disp: , Rfl:     Omega-3 Fatty Acids (FISH OIL) 1000 MG CPDR, Take 1 capsule by mouth daily  , Disp: , Rfl:     omeprazole (PriLOSEC) 40 MG capsule, TAKE 1 CAPSULE DAILY  , Disp: 90 capsule, Rfl: 0    tamsulosin (FLOMAX) 0 4 mg, TAKE 1 CAPSULE (0 4 MG TOTAL) BY MOUTH DAILY WITH DINNER, Disp: 90 capsule, Rfl: 3    EPINEPHrine (EPIPEN) 0 3 mg/0 3 mL SOAJ, Inject 0 3 mL (0 3 mg total) into a muscle once for 1 dose, Disp: 0 3 mL, Rfl: 1  :    Physical Exam:  General appearance: alert, appears stated age and cooperative  Head: Normocephalic, without obvious abnormality, atraumatic    His examination shows no evidence of any significant lumbosacral paraspinal tenderness, no focal weakness was detected in the upper lower extremities, has persistent sensory loss in the left L5-S1 distribution, deep tendon reflexes are hypoactive and his gait is normal based  Lab Results: I have personally reviewed pertinent reports  Imaging Studies: I have personally reviewed pertinent reports  Assessment:  1  Chronic lumbosacral strain with left lumbar radiculopathy secondary to disc disease, status post lumbar disc surgery  Plan:  Patient is advised to continue home exercise program, continue present medications on a p r n   Basis and may continue with gabapentin 300 mg daily in [place]   He will return back to see me in 6 months and is familiar with his restrictions  8/13/2019,9:56 AM    Dictation voice to text software has been used in the creation of this document  Please consider this in light of any contextual or grammatical errors

## 2019-09-19 LAB — PSA SERPL-MCNC: 7 NG/ML (ref 0–4)

## 2019-09-25 ENCOUNTER — OFFICE VISIT (OUTPATIENT)
Dept: UROLOGY | Facility: AMBULATORY SURGERY CENTER | Age: 59
End: 2019-09-25
Payer: COMMERCIAL

## 2019-09-25 VITALS
HEART RATE: 82 BPM | WEIGHT: 206 LBS | SYSTOLIC BLOOD PRESSURE: 138 MMHG | BODY MASS INDEX: 32.33 KG/M2 | HEIGHT: 67 IN | DIASTOLIC BLOOD PRESSURE: 98 MMHG

## 2019-09-25 DIAGNOSIS — N52.9 ERECTILE DYSFUNCTION, UNSPECIFIED ERECTILE DYSFUNCTION TYPE: Primary | ICD-10-CM

## 2019-09-25 DIAGNOSIS — R97.20 ELEVATED PSA: ICD-10-CM

## 2019-09-25 PROCEDURE — 99214 OFFICE O/P EST MOD 30 MIN: CPT | Performed by: NURSE PRACTITIONER

## 2019-09-25 RX ORDER — SILDENAFIL 100 MG/1
100 TABLET, FILM COATED ORAL DAILY PRN
Qty: 30 TABLET | Refills: 3 | Status: SHIPPED | OUTPATIENT
Start: 2019-09-25 | End: 2020-09-30 | Stop reason: SDUPTHER

## 2019-09-25 NOTE — PATIENT INSTRUCTIONS
Repeat PSA in 6 months  Call the office for concerning symptoms  Viagra as directed  Continue to maintain hydration

## 2019-09-25 NOTE — PROGRESS NOTES
9/25/2019    Chief Complaint   Patient presents with    Elevated PSA     PSA 7 0 9/18/19     Assessment and Plan    64 y o  male managed by Dr Jose Wright    1  Elevated PSA  · PSA performed 09/18/2019 is 7 0  · ISIDRO- prostate approximately 50 g smooth, nontender  No nodules noted  Symmetrical  · PSA in 6 months  · Return to office 6 months with PVR    2  Erectile dysfunction  · Sildenafil prescription provided  Discussed side effects and ER precautions  Patient verbalized understanding    History of Present Illness  Adriana Zhou is a 64 y o  male here for follow up evaluation of  urinary symptoms secondary to benign prostatic hyperplasia and erectile dysfunction  Patient's most recent PSA performed 09/18/2019 is 7 0  Patient with a significant history of an elevated PSA and 2-prostate biopsies, last 1 performed early 2018  He also had a MRI of the prostate performed 03/11/2019 which revealed PIRADS 2 lesion prompting continued surveillance with PSA and digital rectal exam   Patient denies all lower urinary tract symptoms reporting that they are well controlled with tamsulosin 0 4 mg p o  Daily  Patient does complain of erectile dysfunction for which sildenafil 100 mg p o  P r n  Is quite effective at reducing an erection suitable for sexual activity  PSA trend as below  Patient denies changes to his overall health since his last office visit  Component       PSA, Total   Latest Ref Rng & Units       0 0 - 4 0 ng/mL   12/26/2017      8:35 AM 6 7 (H)   8/2/2018      8:26 AM 5 7 (H)   2/15/2019      4:13 PM 6 6 (H)   9/18/2019      7:23 AM 7 0 (H)     Review of Systems   Constitutional: Negative for chills and fever  Respiratory: Negative for cough and shortness of breath  Cardiovascular: Negative for chest pain  Gastrointestinal: Negative for abdominal distention, abdominal pain, blood in stool, nausea and vomiting     Genitourinary: Negative for difficulty urinating, dysuria, enuresis, flank pain, frequency, hematuria and urgency  Musculoskeletal: Negative for back pain  Skin: Negative for rash  Neurological: Negative for dizziness and syncope  AUA SYMPTOM SCORE      Most Recent Value   AUA SYMPTOM SCORE   How often have you had a sensation of not emptying your bladder completely after you finished urinating? 2   How often have you had to urinate again less than two hours after you finished urinating? 4   How often have you found you stopped and started again several times when you urinate? 1   How often have you found it difficult to postpone urination? 2   How often have you had a weak urinary stream?  2   How often have you had to push or strain to begin urination? 2   How many times did you most typically get up to urinate from the time you went to bed at night until the time you got up in the morning?   2   Quality of Life: If you were to spend the rest of your life with your urinary condition just the way it is now, how would you feel about that?  3   AUA SYMPTOM SCORE  15         Past Medical History  Past Medical History:   Diagnosis Date    Back disorder 02/26/2019    getting epidural shots, physical therapy    BPH (benign prostatic hyperplasia)     CAD (coronary artery disease)     Cardiac disease     [facility]     Failed back syndrome     Heart attack ([facility] [address] [facility]     Hypertension     Patient denies    Umbilical hernia     Last Assessed:3/25/2015       Past Social History  Past Surgical History:   Procedure Laterality Date    BACK SURGERY      HERNIA REPAIR      Last Assessed:3/12/2014 ,lumbar    LUMBAR LAMINECTOMY      Last Assessed:3/12/2014    PROSTATE BIOPSY  09/10/2015    needle biopsy    SHOULDER SURGERY      TONSILLECTOMY       Social History     Tobacco Use   Smoking Status Never Smoker   Smokeless Tobacco Never Used       Past Family History  Family History   Problem Relation Age of Onset    Hypertension Father     Diabetes Father     Pancreatic cancer Mother     Cancer Family     No Known Problems Sister     No Known Problems Brother        Past Social history  Social History     Socioeconomic History    Marital status: /Civil Union     Spouse name: Not on file    Number of children: Not on file    Years of education: Not on file    Highest education level: Not on file   Occupational History    Occupation:      Comment: full time   Social Needs    Financial resource strain: Not on file    Food insecurity:     Worry: Not on file     Inability: Not on file   [employer] needs:     Medical: Not on file     Non-medical: Not on file   Tobacco Use    Smoking status: Never Smoker    Smokeless tobacco: Never Used   Substance and Sexual Activity    Alcohol use: Yes     Comment: occ    Drug use: No    Sexual activity: Not on file   Lifestyle    Physical activity:     Days per week: Not on file     Minutes per session: Not on file    Stress: Not on file   Relationships    Social connections:     Talks on phone: Not on file     Gets together: Not on file     Attends Mos[employer] service: Not on file     Active member of club or organization: Not on file     Attends meetings of clubs or organizations: Not on file     Relationship status: Not on file    Intimate partner violence:     Fear of current or ex partner: Not on file     Emotionally abused: Not on file     Physically abused: Not on file     Forced sexual activity: Not on file   Other Topics Concern    Not on file   Social History Narrative    Always uses seat belt    Caffeine use       Current Medications  Current Outpatient Medications   Medication Sig Dispense Refill    aspirin (ECOTRIN) 325 mg EC tablet Take 325 mg by mouth every other day      cyclobenzaprine (FLEXERIL) 10 mg tablet Take 1 tablet (10 mg total) by mouth daily at bedtime as needed for muscle spasms 30 tablet 0    diltiazem (CARDIZEM CD) 120 mg 24 hr capsule TAKE 1 CAPSULE ONCE DAILY   90 capsule 3    gabapentin (NEURONTIN) 300 mg capsule Take 1 capsule (300 mg total) by mouth 2 (two) times a day (Patient taking differently: Take 300 mg by mouth daily ) 180 capsule 1    ibuprofen (MOTRIN) 600 mg tablet Take 600 mg by mouth every 6 (six) hours as needed for mild pain        Omega-3 Fatty Acids (FISH OIL) 1000 MG CPDR Take 1 capsule by mouth daily        omeprazole (PriLOSEC) 40 MG capsule TAKE 1 CAPSULE DAILY  90 capsule 0    tamsulosin (FLOMAX) 0 4 mg TAKE 1 CAPSULE (0 4 MG TOTAL) BY MOUTH DAILY WITH DINNER 90 capsule 3    EPINEPHrine (EPIPEN) 0 3 mg/0 3 mL SOAJ Inject 0 3 mL (0 3 mg total) into a muscle once for 1 dose 0 3 mL 1    sildenafil (VIAGRA) 100 mg tablet Take 1 tablet (100 mg total) by mouth daily as needed for erectile dysfunction 30 tablet 3     No current facility-administered medications for this visit  Allergies  Allergies   Allergen Reactions    Other Anaphylaxis     [facility] - [id number]: YELLOW JACKETS         The following portions of the patient's history were reviewed and updated as appropriate: allergies, current medications, past medical history, past social history, past surgical history and problem list       Vitals  Vitals:    09/25/19 1036   BP: 138/98   BP Location: Left arm   Patient Position: Sitting   Cuff Size: Adult   Pulse: 82   Weight: 93 4 kg (206 lb)   Height: 5' 6 5" (1 689 m)           Physical Exam  Physical Exam   Constitutional: He is oriented to person, place, and time  He appears well-developed and well-nourished  HENT:   Head: Atraumatic  Eyes: EOM are normal    Neck: Neck supple  Pulmonary/Chest: Effort normal  No respiratory distress  Genitourinary: Prostate is enlarged  Prostate is not tender  Genitourinary Comments: Digital rectal exam reveals prostate size approximately 50 g smooth, nontender  Firm, non nodular  Musculoskeletal: Normal range of motion  Neurological: He is alert and oriented to person, place, and time     Skin: Skin is warm and dry  Psychiatric: He has a normal mood and affect  Vitals reviewed          Results  No results found for this or any previous visit (from the past 1 hour(s)) ]  Lab Results   Component Value Date    PSA 7 0 (H) 09/18/2019    PSA 6 6 (H) 02/15/2019    PSA 5 7 (H) 08/02/2018     Lab Results   Component Value Date    GLUCOSE 92 08/07/2017    CALCIUM 9 2 08/07/2017     08/07/2017    K 4 3 08/02/2018    CO2 23 08/02/2018     08/02/2018    BUN 19 10/31/2018    CREATININE 1 10 10/31/2018     Lab Results   Component Value Date    WBC 3 9 08/02/2018    HGB 14 4 08/02/2018    HCT 43 3 08/02/2018    MCV 87 08/02/2018     08/02/2018     Orders  Orders Placed This Encounter   Procedures    PSA Total, Diagnostic     Standing Status:   Future     Standing Expiration Date:   9/25/2020       LIO Delgadillo

## 2019-10-07 ENCOUNTER — OFFICE VISIT (OUTPATIENT)
Dept: FAMILY MEDICINE CLINIC | Facility: MEDICAL CENTER | Age: 59
End: 2019-10-07
Payer: COMMERCIAL

## 2019-10-07 VITALS
DIASTOLIC BLOOD PRESSURE: 80 MMHG | HEART RATE: 86 BPM | WEIGHT: 202 LBS | SYSTOLIC BLOOD PRESSURE: 130 MMHG | BODY MASS INDEX: 32.12 KG/M2 | OXYGEN SATURATION: 97 %
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
## 2019-10-07 DIAGNOSIS — M96.1 FAILED BACK SYNDROME: ICD-10-CM

## 2019-10-07 DIAGNOSIS — Z13.1 SCREENING FOR DIABETES MELLITUS: ICD-10-CM

## 2019-10-07 DIAGNOSIS — M51.26 HNP (HERNIATED NUCLEUS PULPOSUS), LUMBAR: ICD-10-CM

## 2019-10-07 DIAGNOSIS — M54.16 RADICULOPATHY, LUMBAR REGION: ICD-10-CM

## 2019-10-07 DIAGNOSIS — K21.9 GASTROESOPHAGEAL REFLUX DISEASE WITHOUT ESOPHAGITIS: ICD-10-CM

## 2019-10-07 DIAGNOSIS — Z13.220 SCREENING FOR LIPID DISORDERS: ICD-10-CM

## 2019-10-07 DIAGNOSIS — Z13.29 SCREENING FOR THYROID DISORDER: ICD-10-CM

## 2019-10-07 DIAGNOSIS — I10 ESSENTIAL HYPERTENSION: ICD-10-CM

## 2019-10-07 PROCEDURE — 3079F DIAST BP 80-89 MM HG: CPT | Performed by: FAMILY MEDICINE

## 2019-10-07 PROCEDURE — 99213 OFFICE O/P EST LOW 20 MIN: CPT | Performed by: FAMILY MEDICINE

## 2019-10-07 PROCEDURE — 3075F SYST BP GE 130 - 139MM HG: CPT | Performed by: FAMILY MEDICINE

## 2019-10-07 RX ORDER — CYCLOBENZAPRINE HCL 10 MG
10 TABLET ORAL
Qty: 30 TABLET | Refills: 0 | Status: SHIPPED | OUTPATIENT
Start: 2019-10-07 | End: 2020-02-17 | Stop reason: SDUPTHER

## 2019-10-07 RX ORDER — DOXYCYCLINE 100 MG/1
200 TABLET ORAL ONCE
Qty: 2 TABLET | Refills: 0 | Status: SHIPPED | OUTPATIENT
Start: 2019-10-07 | End: 2019-10-07

## 2019-10-08 NOTE — PROGRESS NOTES
Patient discovered a tick behind his right knee on Friday night  It was imbedded any removed it in total     He does have redness and swelling around it  He has no systemic symptoms  /80 (BP Location: Left arm, Patient Position: Sitting, Cuff Size: Adult)   Pulse 86   Wt 91 6 kg (202 lb)   SpO2 97%   BMI 32 12 kg/m²     Patient has a bite behind his right knee with significant local reaction  It is within the 72 hour window of time  Will prescribe stat dose of doxy, 200 mg

## 2019-10-29 LAB
ALBUMIN SERPL-MCNC: 4.2 G/DL (ref 3.5–5.5)
ALBUMIN/GLOB SERPL: 2.3 {RATIO} (ref 1.2–2.2)
ALP SERPL-CCNC: 50 IU/L (ref 39–117)
ALT SERPL-CCNC: 43 IU/L (ref 0–44)
AST SERPL-CCNC: 27 IU/L (ref 0–40)
BASOPHILS # BLD AUTO: 0 X10E3/UL (ref 0–0.2)
BASOPHILS NFR BLD AUTO: 1 %
BILIRUB SERPL-MCNC: <0.2 MG/DL (ref 0–1.2)
BUN SERPL-MCNC: 21 MG/DL (ref 6–24)
BUN/CREAT SERPL: 18 (ref 9–20)
CALCIUM SERPL-MCNC: 9.6 MG/DL (ref 8.7–10.2)
CHLORIDE SERPL-SCNC: 105 MMOL/L (ref 96–106)
CHOLEST SERPL-MCNC: 164 MG/DL (ref 100–199)
CO2 SERPL-SCNC: 23 MMOL/L (ref 20–29)
CREAT SERPL-MCNC: 1.17 MG/DL (ref 0.76–1.27)
EOSINOPHIL # BLD AUTO: 0.3 X10E3/UL (ref 0–0.4)
EOSINOPHIL NFR BLD AUTO: 7 %
ERYTHROCYTE [DISTWIDTH] IN BLOOD BY AUTOMATED COUNT: 13.1 % (ref 12.3–15.4)
GLOBULIN SER-MCNC: 1.8 G/DL (ref 1.5–4.5)
GLUCOSE SERPL-MCNC: 105 MG/DL (ref 65–99)
HCT VFR BLD AUTO: 41.7 % (ref 37.5–51)
HDLC SERPL-MCNC: 43 MG/DL
HGB BLD-MCNC: 14.7 G/DL (ref 13–17.7)
IMM GRANULOCYTES # BLD: 0 X10E3/UL (ref 0–0.1)
IMM GRANULOCYTES NFR BLD: 0 %
LDLC SERPL CALC-MCNC: 104 MG/DL (ref 0–99)
LYMPHOCYTES # BLD AUTO: 1.5 X10E3/UL (ref 0.7–3.1)
LYMPHOCYTES NFR BLD AUTO: 34 %
MCH RBC QN AUTO: 29.1 PG (ref 26.6–33)
MCHC RBC AUTO-ENTMCNC: 35.3 G/DL (ref 31.5–35.7)
MCV RBC AUTO: 82 FL (ref 79–97)
MONOCYTES # BLD AUTO: 0.6 X10E3/UL (ref 0.1–0.9)
MONOCYTES NFR BLD AUTO: 13 %
NEUTROPHILS # BLD AUTO: 2 X10E3/UL (ref 1.4–7)
NEUTROPHILS NFR BLD AUTO: 45 %
PLATELET # BLD AUTO: 200 X10E3/UL (ref 150–450)
POTASSIUM SERPL-SCNC: 4.2 MMOL/L (ref 3.5–5.2)
PROT SERPL-MCNC: 6 G/DL (ref 6–8.5)
RBC # BLD AUTO: 5.06 X10E6/UL (ref 4.14–5.8)
SL AMB EGFR AFRICAN AMERICAN: 80 ML/MIN/1.73
SL AMB EGFR NON AFRICAN AMERICAN: 69 ML/MIN/1.73
SODIUM SERPL-SCNC: 143 MMOL/L (ref 134–144)
TRIGL SERPL-MCNC: 83 MG/DL (ref 0–149)
TSH SERPL DL<=0.005 MIU/L-ACNC: 2.19 UIU/ML (ref 0.45–4.5)
WBC # BLD AUTO: 4.4 X10E3/UL (ref 3.4–10.8)

## 2019-11-06 DIAGNOSIS — K21.9 GASTROESOPHAGEAL REFLUX DISEASE WITHOUT ESOPHAGITIS: ICD-10-CM

## 2019-11-07 RX ORDER — OMEPRAZOLE 40 MG/1
CAPSULE, DELAYED RELEASE ORAL
Qty: 90 CAPSULE | Refills: 0 | Status: SHIPPED | OUTPATIENT
Start: 2019-11-07 | End: 2020-02-03

## 2019-11-11 ENCOUNTER — OFFICE VISIT (OUTPATIENT)
Dept: FAMILY MEDICINE CLINIC | Facility: MEDICAL CENTER | Age: 59
End: 2019-11-11
Payer: COMMERCIAL

## 2019-11-11 VITALS
BODY MASS INDEX: 32.49 KG/M2 | SYSTOLIC BLOOD PRESSURE: 120 MMHG | OXYGEN SATURATION: 94 % | HEART RATE: 80 BPM | DIASTOLIC BLOOD PRESSURE: 84 MMHG | WEIGHT: 207 LBS | HEIGHT: 67 IN

## 2019-11-11 DIAGNOSIS — I10 ESSENTIAL HYPERTENSION: ICD-10-CM

## 2019-11-11 DIAGNOSIS — M51.26 HNP (HERNIATED NUCLEUS PULPOSUS), LUMBAR: ICD-10-CM

## 2019-11-11 DIAGNOSIS — J30.1 SEASONAL ALLERGIC RHINITIS DUE TO POLLEN: ICD-10-CM

## 2019-11-11 DIAGNOSIS — N40.0 BENIGN PROSTATIC HYPERPLASIA WITHOUT LOWER URINARY TRACT SYMPTOMS: ICD-10-CM

## 2019-11-11 DIAGNOSIS — K21.9 GASTROESOPHAGEAL REFLUX DISEASE WITHOUT ESOPHAGITIS: ICD-10-CM

## 2019-11-11 DIAGNOSIS — R97.20 ELEVATED PSA: ICD-10-CM

## 2019-11-11 DIAGNOSIS — Z00.00 PREVENTATIVE HEALTH CARE: Primary | ICD-10-CM

## 2019-11-11 PROCEDURE — 1036F TOBACCO NON-USER: CPT | Performed by: FAMILY MEDICINE

## 2019-11-11 PROCEDURE — 99396 PREV VISIT EST AGE 40-64: CPT | Performed by: FAMILY MEDICINE

## 2019-11-11 PROCEDURE — 99213 OFFICE O/P EST LOW 20 MIN: CPT | Performed by: FAMILY MEDICINE

## 2019-11-11 NOTE — PROGRESS NOTES
GERD   Lisa Given Breakthrough on PPI 2x/week  Will refer to gastroenterologist for further evaluation and possible EGD  Skin Lesion    He has a benign-appearing lesion on his back  Photograph was taken for the chart, will observe and follow up longitudinally  HTN    Blood pressure is currently well controlled with diltiazem  He eats a low-salt diet  BPH/Elevated PSA    He follows with PSA in Urology  He takes tamsulosin for BPH  Back/Radiculopathy he does see neurologist and takes gabapentin as needed  CAD    He follows up with Cardiology  He has good cardiac systolic function  He is currently asymptomatic  Last echo showed systolic ejection fraction is 60% with grade 1 diastolic dysfunction  Review of systems for GI  cardiac pulmonary and neurologic systems are all negative  ENT review of systems is also negative  Past medical history, past surgical history, family medical history, social history, and medication list were all reviewed  He is a Phys Ed instructor at [employer]  He is  lives with his wife  He has two adult kids  PSA UTD  Colonoscopy 2015, due 2020 or 2025    /84 (BP Location: Left arm, Patient Position: Sitting, Cuff Size: Adult)   Pulse 80   Ht 5' 6 75" (1 695 m)   Wt 93 9 kg (207 lb)   SpO2 94%   BMI 32 66 kg/m²     HEENT examination is within normal limits no acute findings  Neck was supple  Chest clear  Cardiac exam revealed a regular rate and rhythm without murmur rub or gallop  Abdomen is soft and nontender  Maintain healthy lifestyle with regular exercise and diet  See above for assessment and plan

## 2019-12-26 DIAGNOSIS — M54.16 RADICULOPATHY, LUMBAR REGION: ICD-10-CM

## 2019-12-26 DIAGNOSIS — M96.1 FAILED BACK SYNDROME: ICD-10-CM

## 2019-12-26 DIAGNOSIS — M51.26 HNP (HERNIATED NUCLEUS PULPOSUS), LUMBAR: ICD-10-CM

## 2019-12-26 RX ORDER — GABAPENTIN 300 MG/1
300 CAPSULE ORAL DAILY
Qty: 30 CAPSULE | Refills: 6 | Status: SHIPPED | OUTPATIENT
Start: 2019-12-26 | End: 2020-11-20

## 2020-02-03 DIAGNOSIS — K21.9 GASTROESOPHAGEAL REFLUX DISEASE WITHOUT ESOPHAGITIS: ICD-10-CM

## 2020-02-03 RX ORDER — OMEPRAZOLE 40 MG/1
CAPSULE, DELAYED RELEASE ORAL
Qty: 90 CAPSULE | Refills: 0 | Status: SHIPPED | OUTPATIENT
Start: 2020-02-03 | End: 2020-07-28

## 2020-02-17 ENCOUNTER — OFFICE VISIT (OUTPATIENT)
Dept: NEUROLOGY | Facility: CLINIC | Age: 60
End: 2020-02-17
Payer: COMMERCIAL

## 2020-02-17 VITALS
SYSTOLIC BLOOD PRESSURE: 124 MMHG | HEART RATE: 82 BPM | HEIGHT: 67 IN | BODY MASS INDEX: 32.27 KG/M2 | WEIGHT: 205.6 LBS | DIASTOLIC BLOOD PRESSURE: 72 MMHG

## 2020-02-17 DIAGNOSIS — M54.16 RADICULOPATHY, LUMBAR REGION: Primary | ICD-10-CM

## 2020-02-17 PROCEDURE — 3008F BODY MASS INDEX DOCD: CPT | Performed by: PSYCHIATRY & NEUROLOGY

## 2020-02-17 PROCEDURE — 99213 OFFICE O/P EST LOW 20 MIN: CPT | Performed by: PSYCHIATRY & NEUROLOGY

## 2020-02-17 PROCEDURE — 3074F SYST BP LT 130 MM HG: CPT | Performed by: PSYCHIATRY & NEUROLOGY

## 2020-02-17 PROCEDURE — 3078F DIAST BP <80 MM HG: CPT | Performed by: PSYCHIATRY & NEUROLOGY

## 2020-02-17 PROCEDURE — 1036F TOBACCO NON-USER: CPT | Performed by: PSYCHIATRY & NEUROLOGY

## 2020-02-17 RX ORDER — CYCLOBENZAPRINE HCL 10 MG
10 TABLET ORAL
Qty: 30 TABLET | Refills: 3 | Status: SHIPPED | OUTPATIENT
Start: 2020-02-17 | End: 2020-02-17

## 2020-02-17 RX ORDER — CYCLOBENZAPRINE HCL 10 MG
10 TABLET ORAL
Qty: 30 TABLET | Refills: 3 | Status: SHIPPED | OUTPATIENT
Start: 2020-02-17

## 2020-02-17 NOTE — PROGRESS NOTES
Progress Note - Neurology   Claudia Rodriguez 64 y o  male MRN: [id number]  Unit/Bed#:  Encounter: [id number]      Subjective:   Patient is here for a follow-up visit for chronic low back pain, lumbar radiculopathy, and has been doing well since his last visit and uses Flexeril on a p r n  Basis  Patient has been actively working, occasionally complains of back pain with extra strenuous activities and denies any new motor or sensory symptoms  ROS:   Review of Systems   Constitutional: Negative  Negative for appetite change and fever  HENT: Negative  Negative for hearing loss, tinnitus, trouble swallowing and voice change  Eyes: Negative  Negative for photophobia and pain  Respiratory: Negative  Negative for shortness of breath  Cardiovascular: Negative  Negative for palpitations  Gastrointestinal: Negative  Negative for nausea and vomiting  Endocrine: Negative  Negative for cold intolerance and heat intolerance  Genitourinary: Negative  Negative for dysuria, frequency and urgency  Musculoskeletal: Positive for back pain  Negative for myalgias and neck pain  Skin: Negative  Negative for rash  Neurological: Negative  Negative for dizziness, tremors, seizures, syncope, facial asymmetry, speech difficulty, weakness, light-headedness, numbness and headaches  Hematological: Negative  Does not bruise/bleed easily  Psychiatric/Behavioral: Negative  Negative for confusion, hallucinations and sleep disturbance  Vitals:   Vitals:    02/17/20 1010   BP: 124/72   BP Location: Left arm   Patient Position: Sitting   Cuff Size: Adult   Pulse: 82   Weight: 93 3 kg (205 lb 9 6 oz)   Height: 5' 7" (1 702 m)   ,Body mass index is 32 2 kg/m²      MEDS:      Current Outpatient Medications:     aspirin (ECOTRIN) 325 mg EC tablet, Take 325 mg by mouth every other day, Disp: , Rfl:     cyclobenzaprine (FLEXERIL) 10 mg tablet, Take 1 tablet (10 mg total) by mouth daily at bedtime as needed for muscle spasms, Disp: 30 tablet, Rfl: 0    diltiazem (CARDIZEM CD) 120 mg 24 hr capsule, TAKE 1 CAPSULE ONCE DAILY  , Disp: 90 capsule, Rfl: 3    EPINEPHrine (EPIPEN) 0 3 mg/0 3 mL SOAJ, Inject 0 3 mL (0 3 mg total) into a muscle once for 1 dose, Disp: 0 3 mL, Rfl: 1    ibuprofen (MOTRIN) 600 mg tablet, Take 600 mg by mouth every 6 (six) hours as needed for mild pain  , Disp: , Rfl:     Omega-3 Fatty Acids (FISH OIL) 1000 MG CPDR, Take 1 capsule by mouth daily  , Disp: , Rfl:     omeprazole (PriLOSEC) 40 MG capsule, TAKE 1 CAPSULE DAILY  , Disp: 90 capsule, Rfl: 0    sildenafil (VIAGRA) 100 mg tablet, Take 1 tablet (100 mg total) by mouth daily as needed for erectile dysfunction, Disp: 30 tablet, Rfl: 3    tamsulosin (FLOMAX) 0 4 mg, TAKE 1 CAPSULE (0 4 MG TOTAL) BY MOUTH DAILY WITH DINNER, Disp: 90 capsule, Rfl: 3    gabapentin (NEURONTIN) 300 mg capsule, Take 1 capsule (300 mg total) by mouth daily (Patient not taking: Reported on 2/17/2020), Disp: 30 capsule, Rfl: 6  :    Physical Exam:  General appearance: alert, appears stated age and cooperative  Head: Normocephalic, without obvious abnormality, atraumatic    His examination shows no significant lumbosacral tenderness, otherwise alert awake oriented, speech is fluent, cranial nerves 2-12 intact, and no new deficits were noted on motor and sensory exam   His gait is normal based  Lab Results: I have personally reviewed pertinent reports  Imaging Studies: I have personally reviewed pertinent reports  Assessment:  1  Lumbar disc disease with lumbar radiculopathy  Plan:  Patient is advised to continue home exercise program also advised to wear his support brace while performing extra strenuous activities will continue with Flexeril on a p r n  Basis and will now return back to see me in 1 year  2/17/2020,10:13 AM    Dictation voice to text software has been used in the creation of this document   Please consider this in light of any contextual or grammatical errors

## 2020-03-06 DIAGNOSIS — R35.1 BENIGN PROSTATIC HYPERPLASIA WITH NOCTURIA: ICD-10-CM

## 2020-03-06 DIAGNOSIS — N40.1 BENIGN PROSTATIC HYPERPLASIA WITH NOCTURIA: ICD-10-CM

## 2020-03-06 RX ORDER — TAMSULOSIN HYDROCHLORIDE 0.4 MG/1
0.4 CAPSULE ORAL
Qty: 90 CAPSULE | Refills: 3 | Status: SHIPPED | OUTPATIENT
Start: 2020-03-06 | End: 2021-03-23

## 2020-03-18 DIAGNOSIS — C61 PROSTATE CANCER (HCC): Primary | ICD-10-CM

## 2020-03-19 ENCOUNTER — TELEPHONE (OUTPATIENT)
Dept: UROLOGY | Facility: AMBULATORY SURGERY CENTER | Age: 60
End: 2020-03-19

## 2020-03-19 LAB — PSA SERPL-MCNC: 6.9 NG/ML (ref 0–4)

## 2020-03-19 NOTE — TELEPHONE ENCOUNTER
Patient calling to discuss results of most recent PSA  Advised patient of PSA results of 6 9  Reviewed PSA trend with patient  Patient is in agreement with checking PSA again in 6 months with return in the office at that time for physical evaluation

## 2020-03-19 NOTE — TELEPHONE ENCOUNTER
----- Message from Ladan Ogden, [address] sent at 3/19/2020  2:12 PM EDT -----  Patient rescheduled, but would like call regarding recent PSA he had done    ----- Message -----  From: LIO Adame  Sent: 3/19/2020   2:06 PM EDT  To: [facility] [place] [facility]    Repeat PSA in 6 months with an office follow-up  Please call patient to schedule appointment for 6 months  Thank you

## 2020-04-03 DIAGNOSIS — Z91.030 ALLERGY TO YELLOW JACKETS: ICD-10-CM

## 2020-04-06 RX ORDER — EPINEPHRINE 0.3 MG/.3ML
0.3 INJECTION SUBCUTANEOUS ONCE
Qty: 0.3 ML | Refills: 1 | Status: SHIPPED | OUTPATIENT
Start: 2020-04-06 | End: 2021-05-17 | Stop reason: SDUPTHER

## 2020-04-16 ENCOUNTER — TELEPHONE (OUTPATIENT)
Dept: CARDIOLOGY CLINIC | Facility: CLINIC | Age: 60
End: 2020-04-16

## 2020-04-16 DIAGNOSIS — I10 ESSENTIAL HYPERTENSION: ICD-10-CM

## 2020-04-16 RX ORDER — DILTIAZEM HYDROCHLORIDE 120 MG/1
120 CAPSULE, COATED, EXTENDED RELEASE ORAL DAILY
Qty: 90 CAPSULE | Refills: 4 | Status: SHIPPED | OUTPATIENT
Start: 2020-04-16 | End: 2021-04-20

## 2020-07-25 DIAGNOSIS — K21.9 GASTROESOPHAGEAL REFLUX DISEASE WITHOUT ESOPHAGITIS: ICD-10-CM

## 2020-07-28 RX ORDER — OMEPRAZOLE 40 MG/1
CAPSULE, DELAYED RELEASE ORAL
Qty: 90 CAPSULE | Refills: 1 | Status: SHIPPED | OUTPATIENT
Start: 2020-07-28 | End: 2021-01-21

## 2020-09-08 ENCOUNTER — OFFICE VISIT (OUTPATIENT)
Dept: CARDIOLOGY CLINIC | Facility: CLINIC | Age: 60
End: 2020-09-08
Payer: COMMERCIAL

## 2020-09-08 VITALS
WEIGHT: 195.9 LBS | BODY MASS INDEX: 30.75 KG/M2 | DIASTOLIC BLOOD PRESSURE: 78 MMHG | SYSTOLIC BLOOD PRESSURE: 116 MMHG | TEMPERATURE: 98 F | HEART RATE: 77 BPM | HEIGHT: 67 IN

## 2020-09-08 DIAGNOSIS — I42.9 CARDIOMYOPATHY, UNSPECIFIED TYPE (HCC): ICD-10-CM

## 2020-09-08 DIAGNOSIS — I10 ESSENTIAL HYPERTENSION: Primary | ICD-10-CM

## 2020-09-08 PROCEDURE — 99213 OFFICE O/P EST LOW 20 MIN: CPT | Performed by: INTERNAL MEDICINE

## 2020-09-08 PROCEDURE — 93000 ELECTROCARDIOGRAM COMPLETE: CPT | Performed by: INTERNAL MEDICINE

## 2020-09-09 NOTE — PROGRESS NOTES
Cardiology Follow Up    Karen Pham  1960  [id number]  [address] [facility]  [address] [address] [address] [phone]    1  Essential hypertension  POCT ECG   2  Cardiomyopathy, unspecified type ([facility] )  POCT ECG         Discussion/Summary: All of his assessed cardiac problems are stable  I have reviewed his medications and made no changes  No cardiac testing is ordered  RTO 2 years  Interval History: He remains active and denies CP, SOB, palpitations, LE edema  BP is well controlled  116/78  He has non ischemic CM with patent coronary arteries by cardiac cath several years ago  EF has returned to 55%  ECG today - NSR  Normal ECG      Patient Active Problem List   Diagnosis    Essential hypertension    Gastroesophageal reflux disease without esophagitis    Elevated PSA    Allergic rhinitis due to pollen    Benign prostatic hyperplasia without lower urinary tract symptoms    Failed back syndrome    HNP (herniated nucleus pulposus), lumbar    Intervertebral disc disorder with radiculopathy of lumbosacral region    Cardiomyopathy [facility])     Past Medical History:   Diagnosis Date    Back disorder 02/26/2019    getting epidural shots, physical therapy    BPH (benign prostatic hyperplasia)     CAD (coronary artery disease)     Cardiac disease     Cardiomyopathy ([facility] )     Failed back syndrome     GERD (gastroesophageal reflux disease)     Heart attack ([facility] )     Mild MI 1996     Hypertension     Patient denies    Umbilical hernia     Last Assessed:3/25/2015     Social History     Socioeconomic History    Marital status: /Civil Union     Spouse name: Not on file    Number of children: Not on file    Years of education: Not on file    Highest education level: Not on file   Occupational History    Occupation:      Comment: full time   Social Needs    Financial resource strain: Not on file    Food insecurity     Worry: Not on file     Inability: Not on file    Transportation needs     Medical: Not on file     Non-medical: Not on file   Tobacco Use    Smoking status: Never Smoker    Smokeless tobacco: Never Used   Substance and Sexual Activity    Alcohol use: Yes     Comment: occ    Drug use: No    Sexual activity: Not on file   Lifestyle    Physical activity     Days per week: Not on file     Minutes per session: Not on file    Stress: Not on file   Relationships    Social connections     Talks on phone: Not on file     Gets together: Not on file     Attends [employer] service: Not on file     Active member of club or organization: Not on file     Attends meetings of clubs or organizations: Not on file     Relationship status: Not on file    Intimate partner violence     Fear of current or ex partner: Not on file     Emotionally abused: Not on file     Physically abused: Not on file     Forced sexual activity: Not on file   Other Topics Concern    Not on file   Social History Narrative    Always uses seat belt    Caffeine use      Family History   Problem Relation Age of Onset    Hypertension Father     Diabetes Father     Pancreatic cancer Mother     Cancer Family     No Known Problems Sister     No Known Problems Brother      Past Surgical History:   Procedure Laterality Date    BACK SURGERY      HERNIA REPAIR      Last Assessed:3/12/2014 ,lumbar    LUMBAR LAMINECTOMY      Last Assessed:3/12/2014    PROSTATE BIOPSY  09/10/2015    needle biopsy    SHOULDER SURGERY      TONSILLECTOMY         Current Outpatient Medications:     aspirin (ECOTRIN) 325 mg EC tablet, Take 325 mg by mouth every other day, Disp: , Rfl:     cyclobenzaprine (FLEXERIL) 10 mg tablet, Take 1 tablet (10 mg total) by mouth daily at bedtime as needed for muscle spasms, Disp: 30 tablet, Rfl: 3    diltiazem (CARDIZEM CD) 120 mg 24 hr capsule, Take 1 capsule (120 mg total) by mouth daily, Disp: 90 capsule, Rfl: 4    EPINEPHrine (EPIPEN) 0 3 mg/0 3 mL SOAJ, Inject 0 3 mL (0 3 mg total) into a muscle once for 1 dose, Disp: 0 3 mL, Rfl: 1    ibuprofen (MOTRIN) 600 mg tablet, Take 600 mg by mouth every 6 (six) hours as needed for mild pain  , Disp: , Rfl:     Omega-3 Fatty Acids (FISH OIL) 1000 MG CPDR, Take 1 capsule by mouth daily  , Disp: , Rfl:     omeprazole (PriLOSEC) 40 MG capsule, TAKE 1 CAPSULE EVERY DAY, Disp: 90 capsule, Rfl: 1    sildenafil (VIAGRA) 100 mg tablet, Take 1 tablet (100 mg total) by mouth daily as needed for erectile dysfunction, Disp: 30 tablet, Rfl: 3    tamsulosin (FLOMAX) 0 4 mg, TAKE 1 CAPSULE (0 4 MG TOTAL) BY MOUTH DAILY WITH DINNER, Disp: 90 capsule, Rfl: 3    gabapentin (NEURONTIN) 300 mg capsule, Take 1 capsule (300 mg total) by mouth daily (Patient not taking: Reported on 2/17/2020), Disp: 30 capsule, Rfl: 6  Allergies   Allergen Reactions    Other Anaphylaxis     Annotation - [id number]: YELLOW JACKETS     Vitals:    09/08/20 1611   BP: 116/78   BP Location: Right arm   Patient Position: Sitting   Cuff Size: Standard   Pulse: 77   Temp: 98 °F (36 7 °C)   TempSrc: Temporal   Weight: 88 9 kg (195 lb 14 4 oz)   Height: 5' 7" (1 702 m)     Weight (last 2 days)     Date/Time   Weight    09/08/20 1611   88 9 (195 9)             Blood pressure 116/78, pulse 77, temperature 98 °F (36 7 °C), temperature source Temporal, height 5' 7" (1 702 m), weight 88 9 kg (195 lb 14 4 oz)  , Body mass index is 30 68 kg/m²  Labs:  Orders Only on 03/18/2020   Component Date Value    Prostate Specific Antige* 03/18/2020 6 9*     Imaging: No results found  Review of Systems:  Review of Systems   Constitutional: Negative for diaphoresis, fatigue, fever and unexpected weight change  HENT: Negative  Respiratory: Negative for cough, shortness of breath and wheezing  Cardiovascular: Negative for chest pain, palpitations and leg swelling     Gastrointestinal: Negative for abdominal pain, diarrhea and nausea  Musculoskeletal: Negative for gait problem and myalgias  Skin: Negative for rash  Neurological: Negative for dizziness and numbness  Psychiatric/Behavioral: Negative  Physical Exam:  Physical Exam  Constitutional:       Appearance: He is well-developed  HENT:      Head: Normocephalic and atraumatic  Eyes:      Pupils: Pupils are equal, round, and reactive to light  Neck:      Musculoskeletal: Normal range of motion and neck supple  Vascular: No JVD  Cardiovascular:      Rate and Rhythm: Regular rhythm  Pulses: Normal pulses  Carotid pulses are 2+ on the right side and 2+ on the left side  Heart sounds: S1 normal and S2 normal    Pulmonary:      Effort: Pulmonary effort is normal       Breath sounds: Normal breath sounds  No wheezing or rales  Abdominal:      General: Bowel sounds are normal       Palpations: Abdomen is soft  Tenderness: There is no abdominal tenderness  Musculoskeletal: Normal range of motion  General: No tenderness  Skin:     General: Skin is warm  Neurological:      Mental Status: He is alert and oriented to person, place, and time  Cranial Nerves: No cranial nerve deficit  Deep Tendon Reflexes: Reflexes are normal and symmetric

## 2020-09-17 LAB — PSA SERPL-MCNC: 6.7 NG/ML (ref 0–4)

## 2020-09-28 NOTE — PROGRESS NOTES
Assessment and plan:       1  Elevated PSA  ? PSA performed 09/16/2020 is 6 7  ? Patient is status post 2-biopsies as well as a PI RADS Category 2 MRI  ? ISIDRO- prostate approximately 60 g smooth, nontender  No nodules noted  Symmetrical  ? PSA in 6 months     2  Erectile dysfunction  ? Sildenafil working well  Refills provided  Side effect profile and emergency department precautions reviewed  3  BPH with lower urinary tract symptoms  - Symptoms continue to be well controlled on tamsulosin    Fabiano Lee PA-C         Chief Complaint     Chief Complaint   Patient presents with    Benign Prostatic Hypertrophy    Elevated PSA     PSA 6 7 (09/16/20)         History of Present Illness     Valentín Fierro is a 64 y o  male patient managed by Dr Vaughn Kaiser for lower urinary tract symptoms due to BPH as well as elevated PSA, status post 2-biopsies, and erectile dysfunction  Patient with a significant history of an elevated PSA and 2-prostate biopsies, last 1 performed early 2018  He also had a MRI of the prostate performed 03/11/2019 which revealed PIRADS 2 lesion prompting continued surveillance with PSA and digital rectal exam     Patient was provided with a prescription of sildenafil 1 year ago  He reports this is working well for him without any adverse side effects  PSA remains elevated but stable at 6 7 drawn 09/16/2020  AUA symptom score is 5 with an overall bother score of 3  He is emptying well with a PVR of 31 mL  Laboratory     Lab Results   Component Value Date    CREATININE 1 17 10/28/2019       Lab Results   Component Value Date    PSA 6 7 (H) 09/16/2020    PSA 6 9 (H) 03/18/2020    PSA 7 0 (H) 09/18/2019       Recent Results (from the past 1 hour(s))   POCT Measure PVR    Collection Time: 09/30/20 11:00 AM   Result Value Ref Range    POST-VOID RESIDUAL VOLUME, ML POC 31 mL         Review of Systems     Review of Systems   Constitutional: Negative for chills and fever     HENT: Negative  Eyes: Negative  Respiratory: Negative for cough and shortness of breath  Cardiovascular: Negative for chest pain  Gastrointestinal: Negative for constipation, diarrhea, nausea and vomiting  Endocrine: Negative  Genitourinary: Negative for difficulty urinating, dysuria, enuresis, flank pain, frequency, hematuria and urgency  Musculoskeletal: Negative  Skin: Negative  AUA SYMPTOM SCORE      Most Recent Value   AUA SYMPTOM SCORE   How often have you had a sensation of not emptying your bladder completely after you finished urinating? 2   How often have you had to urinate again less than two hours after you finished urinating? 0   How often have you found you stopped and started again several times when you urinate? 1   How often have you found it difficult to postpone urination? 0   How often have you had a weak urinary stream?  1   How often have you had to push or strain to begin urination? 0   How many times did you most typically get up to urinate from the time you went to bed at night until the time you got up in the morning? 1   Quality of Life: If you were to spend the rest of your life with your urinary condition just the way it is now, how would you feel about that?  3   AUA SYMPTOM SCORE  5                Allergies     Allergies   Allergen Reactions    Other Anaphylaxis     [facility] [id number]: YELLOW JACKMARJORIE       Physical Exam     Physical Exam  Vitals signs and nursing note reviewed  Constitutional:       General: He is not in acute distress  Appearance: Normal appearance  He is well-developed  He is not ill-appearing, toxic-appearing or diaphoretic  HENT:      Head: Normocephalic and atraumatic  Right Ear: External ear normal       Left Ear: External ear normal    Eyes:      General: No scleral icterus  Right eye: No discharge  Left eye: No discharge  Cardiovascular:      Rate and Rhythm: Normal rate     Pulmonary:      Effort: Pulmonary effort is normal    Genitourinary:     Comments: Digital rectal exam reveals an approximately 60 g prostate is smooth, nontender, and without nodules  Musculoskeletal:      Comments: Ambulates independently   Skin:     General: Skin is warm and dry  Neurological:      Mental Status: He is alert and oriented to person, place, and time  Psychiatric:         Mood and Affect: Mood normal          Behavior: Behavior normal          Thought Content:  Thought content normal          Judgment: Judgment normal            Vital Signs     Vitals:    09/30/20 1057   BP: 120/66   BP Location: Left arm   Patient Position: Sitting   Cuff Size: Adult   Temp: (!) 97 2 °F (36 2 °C)   Weight: 89 8 kg (198 lb)   Height: 5' 7" (1 702 m)         Current Medications       Current Outpatient Medications:     aspirin (ECOTRIN) 325 mg EC tablet, Take 325 mg by mouth every other day, Disp: , Rfl:     cyclobenzaprine (FLEXERIL) 10 mg tablet, Take 1 tablet (10 mg total) by mouth daily at bedtime as needed for muscle spasms, Disp: 30 tablet, Rfl: 3    diltiazem (CARDIZEM CD) 120 mg 24 hr capsule, Take 1 capsule (120 mg total) by mouth daily, Disp: 90 capsule, Rfl: 4    gabapentin (NEURONTIN) 300 mg capsule, Take 1 capsule (300 mg total) by mouth daily, Disp: 30 capsule, Rfl: 6    ibuprofen (MOTRIN) 600 mg tablet, Take 600 mg by mouth every 6 (six) hours as needed for mild pain  , Disp: , Rfl:     Omega-3 Fatty Acids (FISH OIL) 1000 MG CPDR, Take 1 capsule by mouth daily  , Disp: , Rfl:     omeprazole (PriLOSEC) 40 MG capsule, TAKE 1 CAPSULE EVERY DAY, Disp: 90 capsule, Rfl: 1    sildenafil (VIAGRA) 100 mg tablet, Take 1 tablet (100 mg total) by mouth daily as needed for erectile dysfunction, Disp: 30 tablet, Rfl: 3    tamsulosin (FLOMAX) 0 4 mg, TAKE 1 CAPSULE (0 4 MG TOTAL) BY MOUTH DAILY WITH DINNER, Disp: 90 capsule, Rfl: 3    EPINEPHrine (EPIPEN) 0 3 mg/0 3 mL SOAJ, Inject 0 3 mL (0 3 mg total) into a muscle once for 1 dose, Disp: 0 3 mL, Rfl: 1      Active Problems     Patient Active Problem List   Diagnosis    Essential hypertension    Gastroesophageal reflux disease without esophagitis    Elevated PSA    Allergic rhinitis due to pollen    Benign prostatic hyperplasia without lower urinary tract symptoms    Failed back syndrome    HNP (herniated nucleus pulposus), lumbar    Intervertebral disc disorder with radiculopathy of lumbosacral region    Cardiomyopathy [facility])         Past Medical History     Past Medical History:   Diagnosis Date    Back disorder 02/26/2019    getting epidural shots, physical therapy    BPH (benign prostatic hyperplasia)     CAD (coronary artery disease)     Cardiac disease     Cardiomyopathy ([facility] [address] )     Failed back syndrome     GERD (gastroesophageal reflux disease)     Heart attack ([facility] )     Mild MI 1996     Hypertension     Patient denies    Umbilical hernia     Last Assessed:3/25/2015         Surgical History     Past Surgical History:   Procedure Laterality Date    BACK SURGERY      HERNIA REPAIR      Last Assessed:3/12/2014 ,lumbar    LUMBAR LAMINECTOMY      Last Assessed:3/12/2014    PROSTATE BIOPSY  09/10/2015    needle biopsy    SHOULDER SURGERY      TONSILLECTOMY           Family History     Family History   Problem Relation Age of Onset    Hypertension Father     Diabetes Father     Pancreatic cancer Mother     Cancer Family     No Known Problems Sister     No Known Problems Brother          Social History     Social History       Radiology

## 2020-09-30 ENCOUNTER — OFFICE VISIT (OUTPATIENT)
Dept: UROLOGY | Facility: CLINIC | Age: 60
End: 2020-09-30
Payer: COMMERCIAL

## 2020-09-30 VITALS
BODY MASS INDEX: 31.08 KG/M2 | DIASTOLIC BLOOD PRESSURE: 66 MMHG | WEIGHT: 198 LBS | TEMPERATURE: 97.2 F | HEIGHT: 67 IN | SYSTOLIC BLOOD PRESSURE: 120 MMHG

## 2020-09-30 DIAGNOSIS — N52.9 ERECTILE DYSFUNCTION, UNSPECIFIED ERECTILE DYSFUNCTION TYPE: ICD-10-CM

## 2020-09-30 DIAGNOSIS — N40.1 BENIGN PROSTATIC HYPERPLASIA WITH LOWER URINARY TRACT SYMPTOMS, SYMPTOM DETAILS UNSPECIFIED: Primary | ICD-10-CM

## 2020-09-30 DIAGNOSIS — R97.20 ELEVATED PSA: ICD-10-CM

## 2020-09-30 LAB — POST-VOID RESIDUAL VOLUME, ML POC: 31 ML

## 2020-09-30 PROCEDURE — 51798 US URINE CAPACITY MEASURE: CPT | Performed by: PHYSICIAN ASSISTANT

## 2020-09-30 PROCEDURE — 3078F DIAST BP <80 MM HG: CPT | Performed by: PHYSICIAN ASSISTANT

## 2020-09-30 PROCEDURE — 99213 OFFICE O/P EST LOW 20 MIN: CPT | Performed by: PHYSICIAN ASSISTANT

## 2020-09-30 PROCEDURE — 1036F TOBACCO NON-USER: CPT | Performed by: PHYSICIAN ASSISTANT

## 2020-09-30 RX ORDER — SILDENAFIL 100 MG/1
100 TABLET, FILM COATED ORAL DAILY PRN
Qty: 30 TABLET | Refills: 3 | Status: SHIPPED | OUTPATIENT
Start: 2020-09-30 | End: 2022-04-18

## 2020-11-20 ENCOUNTER — OFFICE VISIT (OUTPATIENT)
Dept: FAMILY MEDICINE CLINIC | Facility: MEDICAL CENTER | Age: 60
End: 2020-11-20
Payer: COMMERCIAL

## 2020-11-20 VITALS
SYSTOLIC BLOOD PRESSURE: 126 MMHG | OXYGEN SATURATION: 98 % | BODY MASS INDEX: 31.2 KG/M2 | HEART RATE: 70 BPM | HEIGHT: 67 IN | WEIGHT: 198.8 LBS | DIASTOLIC BLOOD PRESSURE: 70 MMHG | TEMPERATURE: 97.3 F

## 2020-11-20 DIAGNOSIS — E78.2 MIXED HYPERLIPIDEMIA: ICD-10-CM

## 2020-11-20 DIAGNOSIS — Z11.4 SCREENING FOR HIV (HUMAN IMMUNODEFICIENCY VIRUS): ICD-10-CM

## 2020-11-20 DIAGNOSIS — Z00.00 ANNUAL PHYSICAL EXAM: Primary | ICD-10-CM

## 2020-11-20 DIAGNOSIS — Z11.59 NEED FOR HEPATITIS C SCREENING TEST: ICD-10-CM

## 2020-11-20 DIAGNOSIS — I10 ESSENTIAL HYPERTENSION: ICD-10-CM

## 2020-11-20 DIAGNOSIS — E55.9 VITAMIN D DEFICIENCY: ICD-10-CM

## 2020-11-20 PROCEDURE — 99396 PREV VISIT EST AGE 40-64: CPT | Performed by: NURSE PRACTITIONER

## 2020-11-20 PROCEDURE — 3008F BODY MASS INDEX DOCD: CPT | Performed by: NURSE PRACTITIONER

## 2020-11-20 PROCEDURE — 1036F TOBACCO NON-USER: CPT | Performed by: NURSE PRACTITIONER

## 2020-11-20 PROCEDURE — 3078F DIAST BP <80 MM HG: CPT | Performed by: NURSE PRACTITIONER

## 2020-11-20 PROCEDURE — 3725F SCREEN DEPRESSION PERFORMED: CPT | Performed by: NURSE PRACTITIONER

## 2020-11-20 PROCEDURE — 3074F SYST BP LT 130 MM HG: CPT | Performed by: NURSE PRACTITIONER

## 2021-01-06 NOTE — TELEPHONE ENCOUNTER
Continued Stay Note  [facility]     Patient Name: Barak Rivera  MRN: [id number]  Today's Date: 1/6/2021    Admit Date: 1/4/2021    Discharge Plan     Row Name 01/06/21 1500       Plan    Plan  Home    Patient/Family in Agreement with Plan  yes    Plan Comments  Met & spoke with Mr Rivera at the bedside earlier this morning. He was sitting up in the chair with his eyes closed and hardly responded to me when I said his name. He was not engaging in conversation and when he attempted to answer simple questions, it was inappropriate and wrong answers. I called and spoke with his dad, Joe, and asked him to please bring his son's portable oxygen tank up to the room when he visits so he can have it at discharge. Joe stated he would look for and bring the tank. Plan at this time is home with his dad to provide transportation.    Final Discharge Disposition Code  01 - home or self-care        Discharge Codes    No documentation.             Cecilia Craft RN     Patient contacted call center to check on the status of a request he made for another epidural shot               Call back# [phone]  Heather Jasso

## 2021-01-17 DIAGNOSIS — K21.9 GASTROESOPHAGEAL REFLUX DISEASE WITHOUT ESOPHAGITIS: ICD-10-CM

## 2021-01-21 RX ORDER — OMEPRAZOLE 40 MG/1
CAPSULE, DELAYED RELEASE ORAL
Qty: 90 CAPSULE | Refills: 1 | Status: SHIPPED | OUTPATIENT
Start: 2021-01-21 | End: 2021-07-23

## 2021-02-05 ENCOUNTER — VBI (OUTPATIENT)
Dept: ADMINISTRATIVE | Facility: OTHER | Age: 61
End: 2021-02-05

## 2021-03-15 ENCOUNTER — TELEPHONE (OUTPATIENT)
Dept: UROLOGY | Facility: MEDICAL CENTER | Age: 61
End: 2021-03-15

## 2021-03-15 NOTE — TELEPHONE ENCOUNTER
Prior pt Judy Moctezuma scheduled with Niki Leon 3/30 pt requesting updated physician order emailed to address on file as he uses [facility] [employer]

## 2021-03-17 LAB
25(OH)D3+25(OH)D2 SERPL-MCNC: 75.7 NG/ML (ref 30–100)
ALBUMIN SERPL-MCNC: 4.5 G/DL (ref 3.8–4.9)
ALBUMIN/GLOB SERPL: 2.4 {RATIO} (ref 1.2–2.2)
ALP SERPL-CCNC: 50 IU/L (ref 39–117)
ALT SERPL-CCNC: 29 IU/L (ref 0–44)
APPEARANCE UR: CLEAR
AST SERPL-CCNC: 23 IU/L (ref 0–40)
BACTERIA URNS QL MICRO: NORMAL
BASOPHILS # BLD AUTO: 0.1 X10E3/UL (ref 0–0.2)
BASOPHILS NFR BLD AUTO: 1 %
BILIRUB SERPL-MCNC: 0.4 MG/DL (ref 0–1.2)
BILIRUB UR QL STRIP: NEGATIVE
BUN SERPL-MCNC: 22 MG/DL (ref 6–24)
BUN/CREAT SERPL: 21 (ref 9–20)
CALCIUM SERPL-MCNC: 9.1 MG/DL (ref 8.7–10.2)
CHLORIDE SERPL-SCNC: 104 MMOL/L (ref 96–106)
CHOLEST SERPL-MCNC: 170 MG/DL (ref 100–199)
CO2 SERPL-SCNC: 25 MMOL/L (ref 20–29)
COLOR UR: YELLOW
CREAT SERPL-MCNC: 1.04 MG/DL (ref 0.76–1.27)
EOSINOPHIL # BLD AUTO: 0.2 X10E3/UL (ref 0–0.4)
EOSINOPHIL NFR BLD AUTO: 3 %
EPI CELLS #/AREA URNS HPF: NORMAL /HPF (ref 0–10)
ERYTHROCYTE [DISTWIDTH] IN BLOOD BY AUTOMATED COUNT: 12.4 % (ref 11.6–15.4)
GLOBULIN SER-MCNC: 1.9 G/DL (ref 1.5–4.5)
GLUCOSE SERPL-MCNC: 95 MG/DL (ref 65–99)
GLUCOSE UR QL: NEGATIVE
HCT VFR BLD AUTO: 45.4 % (ref 37.5–51)
HCV AB S/CO SERPL IA: <0.1 S/CO RATIO (ref 0–0.9)
HDLC SERPL-MCNC: 45 MG/DL
HGB BLD-MCNC: 15.6 G/DL (ref 13–17.7)
HGB UR QL STRIP: NEGATIVE
HIV 1+2 AB+HIV1 P24 AG SERPL QL IA: NON REACTIVE
IMM GRANULOCYTES # BLD: 0 X10E3/UL (ref 0–0.1)
IMM GRANULOCYTES NFR BLD: 0 %
KETONES UR QL STRIP: NEGATIVE
LDLC SERPL CALC-MCNC: 106 MG/DL (ref 0–99)
LEUKOCYTE ESTERASE UR QL STRIP: NEGATIVE
LYMPHOCYTES # BLD AUTO: 1.4 X10E3/UL (ref 0.7–3.1)
LYMPHOCYTES NFR BLD AUTO: 29 %
MCH RBC QN AUTO: 29.3 PG (ref 26.6–33)
MCHC RBC AUTO-ENTMCNC: 34.4 G/DL (ref 31.5–35.7)
MCV RBC AUTO: 85 FL (ref 79–97)
MICRO URNS: NORMAL
MICRO URNS: NORMAL
MONOCYTES # BLD AUTO: 0.5 X10E3/UL (ref 0.1–0.9)
MONOCYTES NFR BLD AUTO: 10 %
MUCOUS THREADS URNS QL MICRO: PRESENT
NEUTROPHILS # BLD AUTO: 2.9 X10E3/UL (ref 1.4–7)
NEUTROPHILS NFR BLD AUTO: 57 %
NITRITE UR QL STRIP: NEGATIVE
PH UR STRIP: 6 [PH] (ref 5–7.5)
PLATELET # BLD AUTO: 195 X10E3/UL (ref 150–450)
POTASSIUM SERPL-SCNC: 4.7 MMOL/L (ref 3.5–5.2)
PROT SERPL-MCNC: 6.4 G/DL (ref 6–8.5)
PROT UR QL STRIP: NEGATIVE
PSA SERPL-MCNC: 7.9 NG/ML (ref 0–4)
RBC # BLD AUTO: 5.33 X10E6/UL (ref 4.14–5.8)
RBC #/AREA URNS HPF: NORMAL /HPF (ref 0–2)
SL AMB % FREE PSA: 10.3 %
SL AMB EGFR AFRICAN AMERICAN: 90 ML/MIN/1.73
SL AMB EGFR NON AFRICAN AMERICAN: 78 ML/MIN/1.73
SL AMB PSA, FREE: 0.81 NG/ML
SL AMB REFLEX CRITERIA: ABNORMAL
SL AMB URINALYSIS REFLEX: NORMAL
SL AMB VLDL CHOLESTEROL CALC: 19 MG/DL (ref 5–40)
SODIUM SERPL-SCNC: 142 MMOL/L (ref 134–144)
SP GR UR: 1.02 (ref 1–1.03)
TRIGL SERPL-MCNC: 107 MG/DL (ref 0–149)
TSH SERPL DL<=0.005 MIU/L-ACNC: 1.82 UIU/ML (ref 0.45–4.5)
UROBILINOGEN UR STRIP-ACNC: 0.2 MG/DL (ref 0.2–1)
WBC # BLD AUTO: 5 X10E3/UL (ref 3.4–10.8)
WBC #/AREA URNS HPF: NORMAL /HPF (ref 0–5)

## 2021-03-23 DIAGNOSIS — R35.1 BENIGN PROSTATIC HYPERPLASIA WITH NOCTURIA: ICD-10-CM

## 2021-03-23 DIAGNOSIS — N40.1 BENIGN PROSTATIC HYPERPLASIA WITH NOCTURIA: ICD-10-CM

## 2021-03-23 RX ORDER — TAMSULOSIN HYDROCHLORIDE 0.4 MG/1
CAPSULE ORAL
Qty: 90 CAPSULE | Refills: 3 | Status: SHIPPED | OUTPATIENT
Start: 2021-03-23 | End: 2021-03-30 | Stop reason: SDUPTHER

## 2021-03-30 ENCOUNTER — OFFICE VISIT (OUTPATIENT)
Dept: UROLOGY | Facility: CLINIC | Age: 61
End: 2021-03-30
Payer: COMMERCIAL

## 2021-03-30 VITALS
DIASTOLIC BLOOD PRESSURE: 84 MMHG | WEIGHT: 198 LBS | BODY MASS INDEX: 31.08 KG/M2 | HEIGHT: 67 IN | SYSTOLIC BLOOD PRESSURE: 118 MMHG

## 2021-03-30 DIAGNOSIS — R35.1 BENIGN PROSTATIC HYPERPLASIA WITH NOCTURIA: ICD-10-CM

## 2021-03-30 DIAGNOSIS — N40.1 BENIGN PROSTATIC HYPERPLASIA WITH NOCTURIA: ICD-10-CM

## 2021-03-30 DIAGNOSIS — R97.20 ELEVATED PSA: Primary | ICD-10-CM

## 2021-03-30 PROCEDURE — 99213 OFFICE O/P EST LOW 20 MIN: CPT | Performed by: PHYSICIAN ASSISTANT

## 2021-03-30 RX ORDER — TAMSULOSIN HYDROCHLORIDE 0.4 MG/1
0.8 CAPSULE ORAL
Qty: 180 CAPSULE | Refills: 3 | Status: SHIPPED | OUTPATIENT
Start: 2021-03-30 | End: 2022-06-06 | Stop reason: SDUPTHER

## 2021-03-30 NOTE — PROGRESS NOTES
UROLOGY PROGRESS NOTE   Patient Identifiers: Maurice Prado (MRN [id number])  Date of Service: 3/30/2021    Subjective:     72-year-old man history of BPH and elevated PSA  He had 2 previous prostate biopsies both of which were benign  His last biopsy did show some atypical cells as well as chronic inflammation  He had a multiparametric MRI in 2019 showing PI-RADS category 2  Current PSA is 7 9  Previous PSA was 6 7  The highest previous PSA was 7 0  He is on tamsulosin  He complains intermittent stream slow flow and trouble starting and stopping        Reason for visit:  Prostate hypertrophy and elevated PSA follow-up     Objective:     VITALS:    Vitals:    03/30/21 1117   BP: 118/84           LABS:  Lab Results   Component Value Date    HGB 15 6 03/16/2021    HCT 45 4 03/16/2021    WBC 5 0 03/16/2021     03/16/2021   ]    Lab Results   Component Value Date     08/07/2017    K 4 7 03/16/2021     03/16/2021    CO2 25 03/16/2021    BUN 22 03/16/2021    CREATININE 1 04 03/16/2021    CALCIUM 9 2 08/07/2017    GLUCOSE 92 08/07/2017   ]        INPATIENT MEDS:    Current Outpatient Medications:     aspirin (ECOTRIN) 325 mg EC tablet, Take 325 mg by mouth every other day, Disp: , Rfl:     cyclobenzaprine (FLEXERIL) 10 mg tablet, Take 1 tablet (10 mg total) by mouth daily at bedtime as needed for muscle spasms, Disp: 30 tablet, Rfl: 3    diltiazem (CARDIZEM CD) 120 mg 24 hr capsule, Take 1 capsule (120 mg total) by mouth daily, Disp: 90 capsule, Rfl: 4    ibuprofen (MOTRIN) 600 mg tablet, Take 600 mg by mouth every 6 (six) hours as needed for mild pain  , Disp: , Rfl:     Omega-3 Fatty Acids (FISH OIL) 1000 MG CPDR, Take 1 capsule by mouth daily  , Disp: , Rfl:     omeprazole (PriLOSEC) 40 MG capsule, TAKE 1 CAPSULE BY MOUTH EVERY DAY, Disp: 90 capsule, Rfl: 1    sildenafil (VIAGRA) 100 mg tablet, Take 1 tablet (100 mg total) by mouth daily as needed for erectile dysfunction, Disp: 30 tablet, Rfl: 3    tamsulosin (FLOMAX) 0 4 mg, Take 2 capsules (0 8 mg total) by mouth daily with dinner, Disp: 180 capsule, Rfl: 3    EPINEPHrine (EPIPEN) 0 3 mg/0 3 mL SOAJ, Inject 0 3 mL (0 3 mg total) into a muscle once for 1 dose, Disp: 0 3 mL, Rfl: 1      Physical Exam:   /84 (BP Location: Left arm, Patient Position: Sitting, Cuff Size: Adult)   Ht 5' 6 5" (1 689 m)   Wt 89 8 kg (198 lb)   BMI 31 48 kg/m²   GEN: no acute distress    RESP: breathing comfortably with no accessory muscle use    ABD: soft, non-tender, non-distended   INCISION:    EXT: no significant peripheral edema   (Male): Penis circumcised, phallus normal, meatus patent  Testicles descended into scrotum bilaterally without masses nor tenderness  No inguinal hernias bilaterally  ISIDRO: Prostate is enlarged at 50+ grams  The prostate is not boggy  The prostate is not tender  No nodules noted      RADIOLOGY:    none     Assessment:    1  Elevated PSA   2  Prostate hypertrophy     Plan:   - recommend repeating multiparametric MRI    I will call him with the results  - prostate biopsy would be recommended for BI-RADS category 4 or 5 or a persistently rising PSA  - follow-up in 3 months with PSA prior to visit  - he will increase his tamsulosin to 0 8 mg  - cystoscopy, transrectal ultrasound and uroflow if no significant improvement following medication change

## 2021-04-06 ENCOUNTER — TELEPHONE (OUTPATIENT)
Dept: FAMILY MEDICINE CLINIC | Facility: MEDICAL CENTER | Age: 61
End: 2021-04-06

## 2021-04-06 NOTE — TELEPHONE ENCOUNTER
----- Message from Zhen Hardy MD sent at 4/5/2021 10:05 PM EDT -----  Per SB--  Notify blood work results  Everything is good  Lipid panel about the same  ASCVD risk is about the same at 7 5%  Could consider statins or  Continue to watch diet carefully and increase his exercise to help increase his  Good HDL  Hepatitis-C is negative  HIV negative  Chemistries all normal     F/u with Dr Barb Moreau in 6 months

## 2021-04-07 LAB
BUN SERPL-MCNC: 18 MG/DL (ref 6–24)
BUN/CREAT SERPL: 17 (ref 9–20)
CHLORIDE SERPL-SCNC: 104 MMOL/L (ref 96–106)
CO2 SERPL-SCNC: 24 MMOL/L (ref 20–29)
CREAT SERPL-MCNC: 1.09 MG/DL (ref 0.76–1.27)
GLUCOSE SERPL-MCNC: 92 MG/DL (ref 65–99)
POTASSIUM SERPL-SCNC: 4.8 MMOL/L (ref 3.5–5.2)
SL AMB EGFR AFRICAN AMERICAN: 85 ML/MIN/1.73
SL AMB EGFR NON AFRICAN AMERICAN: 74 ML/MIN/1.73
SODIUM SERPL-SCNC: 141 MMOL/L (ref 134–144)

## 2021-04-16 ENCOUNTER — HOSPITAL ENCOUNTER (OUTPATIENT)
Dept: RADIOLOGY | Age: 61
Discharge: HOME | End: 2021-04-16
Payer: COMMERCIAL

## 2021-04-16 DIAGNOSIS — R97.20 ELEVATED PSA: ICD-10-CM

## 2021-04-16 PROCEDURE — 76377 3D RENDER W/INTRP POSTPROCES: CPT

## 2021-04-16 PROCEDURE — G1004 CDSM NDSC: HCPCS

## 2021-04-16 PROCEDURE — A9585 GADOBUTROL INJECTION: HCPCS | Performed by: PHYSICIAN ASSISTANT

## 2021-04-16 PROCEDURE — 72197 MRI PELVIS W/O & W/DYE: CPT

## 2021-04-16 RX ADMIN — GADOBUTROL 8 ML: 604.72 INJECTION INTRAVENOUS at 08:16

## 2021-04-20 DIAGNOSIS — I10 ESSENTIAL HYPERTENSION: ICD-10-CM

## 2021-04-20 RX ORDER — DILTIAZEM HYDROCHLORIDE 120 MG/1
CAPSULE, COATED, EXTENDED RELEASE ORAL
Qty: 90 CAPSULE | Refills: 4 | Status: SHIPPED | OUTPATIENT
Start: 2021-04-20 | End: 2022-04-22

## 2021-05-04 ENCOUNTER — TELEPHONE (OUTPATIENT)
Dept: UROLOGY | Facility: CLINIC | Age: 61
End: 2021-05-04

## 2021-05-04 NOTE — TELEPHONE ENCOUNTER
Called and spoke with patient at this time  Reviewed recent MRI results and plan for now a 6 month follow up  Previously scheduled 3 month follow up was rescheduled  Patient knows to have PSA done prior to new appt  Patient requested an appt card be mailed, please mail for patient

## 2021-05-04 NOTE — TELEPHONE ENCOUNTER
----- Message from Francesco Downey PA-C sent at 5/3/2021  3:04 PM EDT -----  Please let the patient know MRI was unremarkable for suspicious prostate and set him up for 6 month PSA prior to visit

## 2021-05-17 DIAGNOSIS — Z91.030 ALLERGY TO YELLOW JACKETS: ICD-10-CM

## 2021-05-17 RX ORDER — EPINEPHRINE 0.3 MG/.3ML
0.3 INJECTION SUBCUTANEOUS ONCE
Qty: 0.3 ML | Refills: 1 | Status: SHIPPED | OUTPATIENT
Start: 2021-05-17 | End: 2022-04-13 | Stop reason: SDUPTHER

## 2021-07-21 DIAGNOSIS — K21.9 GASTROESOPHAGEAL REFLUX DISEASE WITHOUT ESOPHAGITIS: ICD-10-CM

## 2021-07-23 RX ORDER — OMEPRAZOLE 40 MG/1
CAPSULE, DELAYED RELEASE ORAL
Qty: 90 CAPSULE | Refills: 1 | Status: SHIPPED | OUTPATIENT
Start: 2021-07-23 | End: 2022-01-04

## 2021-09-22 ENCOUNTER — TELEPHONE (OUTPATIENT)
Dept: OTHER | Facility: OTHER | Age: 61
End: 2021-09-22

## 2021-09-22 NOTE — TELEPHONE ENCOUNTER
There is a PSA order available in the system  If he goes to [facility] ER lab will be able to have this performed  He will just need to say that he needs a PSA performed      Thank you

## 2021-09-22 NOTE — TELEPHONE ENCOUNTER
The patient would like to verify that his PSA test and blood work scripts are put in for his appointment on 10/05/2021

## 2021-09-24 LAB
BUN SERPL-MCNC: 22 MG/DL (ref 6–24)
BUN/CREAT SERPL: 20 (ref 9–20)
CHLORIDE SERPL-SCNC: 106 MMOL/L (ref 96–106)
CO2 SERPL-SCNC: 23 MMOL/L (ref 20–29)
CREAT SERPL-MCNC: 1.09 MG/DL (ref 0.76–1.27)
GLUCOSE SERPL-MCNC: 97 MG/DL (ref 65–99)
POTASSIUM SERPL-SCNC: 4.6 MMOL/L (ref 3.5–5.2)
PSA SERPL-MCNC: 9.3 NG/ML (ref 0–4)
SL AMB % FREE PSA: 14.9 %
SL AMB EGFR AFRICAN AMERICAN: 85 ML/MIN/1.73
SL AMB EGFR NON AFRICAN AMERICAN: 74 ML/MIN/1.73
SL AMB PSA, FREE: 1.39 NG/ML
SL AMB REFLEX CRITERIA: ABNORMAL
SODIUM SERPL-SCNC: 140 MMOL/L (ref 134–144)

## 2021-10-05 ENCOUNTER — TELEPHONE (OUTPATIENT)
Dept: FAMILY MEDICINE CLINIC | Facility: MEDICAL CENTER | Age: 61
End: 2021-10-05

## 2021-10-05 ENCOUNTER — OFFICE VISIT (OUTPATIENT)
Dept: UROLOGY | Facility: CLINIC | Age: 61
End: 2021-10-05
Payer: COMMERCIAL

## 2021-10-05 VITALS
BODY MASS INDEX: 31.08 KG/M2 | SYSTOLIC BLOOD PRESSURE: 120 MMHG | DIASTOLIC BLOOD PRESSURE: 84 MMHG | WEIGHT: 198 LBS | HEIGHT: 67 IN

## 2021-10-05 DIAGNOSIS — R97.20 ELEVATED PSA: Primary | ICD-10-CM

## 2021-10-05 PROCEDURE — 3074F SYST BP LT 130 MM HG: CPT | Performed by: PHYSICIAN ASSISTANT

## 2021-10-05 PROCEDURE — 3079F DIAST BP 80-89 MM HG: CPT | Performed by: PHYSICIAN ASSISTANT

## 2021-10-05 PROCEDURE — 99213 OFFICE O/P EST LOW 20 MIN: CPT | Performed by: PHYSICIAN ASSISTANT

## 2021-10-12 ENCOUNTER — OFFICE VISIT (OUTPATIENT)
Dept: FAMILY MEDICINE CLINIC | Facility: MEDICAL CENTER | Age: 61
End: 2021-10-12
Payer: COMMERCIAL

## 2021-10-12 VITALS
HEART RATE: 80 BPM | SYSTOLIC BLOOD PRESSURE: 116 MMHG | BODY MASS INDEX: 31.96 KG/M2 | DIASTOLIC BLOOD PRESSURE: 80 MMHG | WEIGHT: 203.6 LBS | TEMPERATURE: 97.9 F | HEIGHT: 67 IN | OXYGEN SATURATION: 96 %

## 2021-10-12 DIAGNOSIS — E78.2 MIXED HYPERLIPIDEMIA: ICD-10-CM

## 2021-10-12 DIAGNOSIS — Z13.0 SCREENING FOR DEFICIENCY ANEMIA: ICD-10-CM

## 2021-10-12 DIAGNOSIS — K21.9 GASTROESOPHAGEAL REFLUX DISEASE WITHOUT ESOPHAGITIS: ICD-10-CM

## 2021-10-12 DIAGNOSIS — Z13.29 SCREENING FOR THYROID DISORDER: ICD-10-CM

## 2021-10-12 DIAGNOSIS — I10 ESSENTIAL HYPERTENSION: Primary | ICD-10-CM

## 2021-10-12 PROCEDURE — 99214 OFFICE O/P EST MOD 30 MIN: CPT | Performed by: FAMILY MEDICINE

## 2021-10-12 PROCEDURE — 1036F TOBACCO NON-USER: CPT | Performed by: FAMILY MEDICINE

## 2021-10-12 PROCEDURE — 3725F SCREEN DEPRESSION PERFORMED: CPT | Performed by: FAMILY MEDICINE

## 2022-01-04 DIAGNOSIS — K21.9 GASTROESOPHAGEAL REFLUX DISEASE WITHOUT ESOPHAGITIS: ICD-10-CM

## 2022-01-04 RX ORDER — OMEPRAZOLE 40 MG/1
CAPSULE, DELAYED RELEASE ORAL
Qty: 90 CAPSULE | Refills: 1 | Status: SHIPPED | OUTPATIENT
Start: 2022-01-04 | End: 2022-07-05

## 2022-02-10 ENCOUNTER — TELEPHONE (OUTPATIENT)
Dept: OTHER | Facility: OTHER | Age: 62
End: 2022-02-10

## 2022-02-10 NOTE — TELEPHONE ENCOUNTER
Yuniel Benitez (Self) [phone] (M)     Is calling to request PSA Total, Diagnostic   Is emailed to him at [email or web address]

## 2022-02-17 LAB
PSA SERPL-MCNC: 8.5 NG/ML (ref 0–4)
SL AMB % FREE PSA: 12.8 %
SL AMB PSA, FREE: 1.09 NG/ML
SL AMB REFLEX CRITERIA: ABNORMAL

## 2022-02-28 ENCOUNTER — PROCEDURE VISIT (OUTPATIENT)
Dept: UROLOGY | Facility: CLINIC | Age: 62
End: 2022-02-28
Payer: COMMERCIAL

## 2022-02-28 VITALS
SYSTOLIC BLOOD PRESSURE: 130 MMHG | WEIGHT: 206.4 LBS | OXYGEN SATURATION: 98 % | DIASTOLIC BLOOD PRESSURE: 78 MMHG | HEIGHT: 66 IN | RESPIRATION RATE: 16 BRPM | HEART RATE: 74 BPM | BODY MASS INDEX: 33.17 KG/M2

## 2022-02-28 DIAGNOSIS — N52.9 ERECTILE DYSFUNCTION, UNSPECIFIED ERECTILE DYSFUNCTION TYPE: ICD-10-CM

## 2022-02-28 DIAGNOSIS — R35.1 BENIGN PROSTATIC HYPERPLASIA WITH NOCTURIA: Primary | ICD-10-CM

## 2022-02-28 DIAGNOSIS — R97.20 ELEVATED PSA: ICD-10-CM

## 2022-02-28 DIAGNOSIS — N40.1 BENIGN PROSTATIC HYPERPLASIA WITH NOCTURIA: Primary | ICD-10-CM

## 2022-02-28 PROBLEM — N40.0 BENIGN PROSTATIC HYPERPLASIA WITHOUT LOWER URINARY TRACT SYMPTOMS: Status: RESOLVED | Noted: 2018-08-22 | Resolved: 2022-02-28

## 2022-02-28 PROCEDURE — 52000 CYSTOURETHROSCOPY: CPT | Performed by: UROLOGY

## 2022-02-28 PROCEDURE — 76872 US TRANSRECTAL: CPT | Performed by: UROLOGY

## 2022-02-28 RX ORDER — FINASTERIDE 5 MG/1
5 TABLET, FILM COATED ORAL DAILY
Qty: 60 TABLET | Refills: 6 | Status: SHIPPED | OUTPATIENT
Start: 2022-02-28 | End: 2022-04-18

## 2022-02-28 RX ORDER — SILDENAFIL 100 MG/1
100 TABLET, FILM COATED ORAL DAILY PRN
Qty: 10 TABLET | Refills: 11 | Status: SHIPPED | OUTPATIENT
Start: 2022-02-28

## 2022-02-28 NOTE — ASSESSMENT & PLAN NOTE
Patient doing well with Viagra 100 mg    I have written a another script for him to take to his local pharmacy

## 2022-02-28 NOTE — ASSESSMENT & PLAN NOTE
Patient with roughly stable PSA and recent MRI a without a concerning lesion with a large gland  I do not think he needs another biopsy  Of note his last biopsy did show some atypical cells which were concerning for but did not meet criteria for low risk prostate cancer  We discussed this  Even if he does have low risk prostate cancer would recommend surveillance with repeat PSAs and MRIs as we were doing  We also discussed starting finasteride will reduce his his PSA by approximately 50% so will need to account for this in future PSA interpretation

## 2022-02-28 NOTE — PROGRESS NOTES
Assessment/Plan:    Elevated PSA  Patient with roughly stable PSA and recent MRI a without a concerning lesion with a large gland  I do not think he needs another biopsy  Of note his last biopsy did show some atypical cells which were concerning for but did not meet criteria for low risk prostate cancer  We discussed this  Even if he does have low risk prostate cancer would recommend surveillance with repeat PSAs and MRIs as we were doing  We also discussed starting finasteride will reduce his his PSA by approximately 50% so will need to account for this in future PSA interpretation  Benign prostatic hyperplasia with nocturia  The patient has moderate obstructive and irritative symptoms despite Flomax in setting of a large gland  We discussed options    We can consider addition of finasteride which will help shrink the prostate over time and may help improve voiding symptoms  We also discussed options for procedural intervention  Given the size of his gland these center on TURP vs robotic simple prostatectomy versus HoLEP surgery versus prostate artery embolization  Discussed the risks and benefits of each  TURP surgery may be effective but for a gland his size sometimes cannot resect as much tissue as desired and therefore higher risk for persistent symptoms and recurrence  Robotic simple prostatectomy offers excellent functional outcomes requires entry into the abdomen and catheter for 2 weeks while the bladder is healing with cystogram to follow and has risks of bleeding urine leak bowel injury and usually temporary incontinence  HoLEP surgery requires special equipment and an experienced urologist and has risks of bleeding and fluid absorption and almost guaranteed incontinence for a few weeks to months  Prostate artery embolization is performed by Interventional Radiology through a minimally invasive approach through femoral vessels    This is a relatively new technology that has not been studied in a wide population but limited results show good efficacy but will take time for them to result as the prostate shrinks over time  We discussed these procedures will likely improve obstructive symptoms and may or may not improve irritative symptoms such as frequency and urgency  After discussion the patient wants to start with finasteride and then go from there  Risks including libido, energy, ED issues discussed as as well as PSA changes  We will see him back in 4 months  Erectile dysfunction  Patient doing well with Viagra 100 mg  I have written a another script for him to take to his local pharmacy          Subjective:      Patient ID: Alka Bennett is a 63 y o  male  HPI    53-year-old man history elevated PSA  He had 2 negative prostate biopsies (last biopsy showed atypical cells concerning for but not diagnostic of CaP and chronic inflammation)  Multiparametric MRI in 2019 showed PI-RADS category 2  Previous PSA was 7 9  Then PSA was 9 3 and repeated and now 8 5 (Feb 2022)  Repeat MRI in April 2021 also showed PI-RADS category 2  Prostate volume of 112 g  PSA is 31cc  He has moderate outlet symptoms and is on 0 8 mg of tamsulosin  AUA score of 20/3  His main complaint is nocturia x4  He also has a weak stream and can take a very long time to void  Uroflow today showed a voided volume of 50 cc with average flow 3 2 mL/sec and Q max of 7 mL/second, PVR not checked  Cystoscopy showed trilobar hypertrophy with a small to moderate-sized median lobe and mild trabeculations  TRUS volume 90 cc      Patient also has known erectile dysfunction and is on Viagra 100 mg        Past Surgical History:   Procedure Laterality Date    BACK SURGERY      HERNIA REPAIR      Last Assessed:3/12/2014 ,lumbar    LUMBAR LAMINECTOMY      Last Assessed:3/12/2014    PROSTATE BIOPSY  09/10/2015    needle biopsy    SHOULDER SURGERY      TONSILLECTOMY          Past Medical History: Diagnosis Date    Back disorder 02/26/2019    getting epidural shots, physical therapy    BPH (benign prostatic hyperplasia)     CAD (coronary artery disease)     Cardiac disease     [facility] )     Failed back syndrome     GERD (gastroesophageal reflux disease)     Heart attack ([facility] )     Mild MI 1996     Hypertension     Patient denies    Myocardial infarction ([facility] )     Mild    Umbilical hernia     Last Assessed:3/25/2015        AUA SYMPTOM SCORE      Most Recent Value   AUA SYMPTOM SCORE    How often have you had a sensation of not emptying your bladder completely after you finished urinating? 3 (P)     How often have you had to urinate again less than two hours after you finished urinating? 3 (P)     How often have you found you stopped and started again several times when you urinate? 3 (P)     How often have you found it difficult to postpone urination? 3 (P)     How often have you had a weak urinary stream? 3 (P)     How often have you had to push or strain to begin urination? 2 (P)     How many times did you most typically get up to urinate from the time you went to bed at night until the time you got up in the morning? 3 (P)     Quality of Life: If you were to spend the rest of your life with your urinary condition just the way it is now, how would you feel about that? 3 (P)     AUA SYMPTOM SCORE 20 (P)            Review of Systems   Constitutional: Negative for chills and fever  HENT: Negative for ear pain and sore throat  Eyes: Negative for pain and visual disturbance  Respiratory: Negative for cough and shortness of breath  Cardiovascular: Negative for chest pain and palpitations  Gastrointestinal: Negative for abdominal pain and vomiting  Genitourinary: Negative for dysuria and hematuria  Musculoskeletal: Negative for arthralgias and back pain  Skin: Negative for color change and rash  Neurological: Negative for seizures and syncope     All other systems reviewed and are negative  Objective:      /78 (BP Location: Left arm, Patient Position: Sitting, Cuff Size: Large)   Pulse 74   Resp 16   Ht 5' 6" (1 676 m)   Wt 93 6 kg (206 lb 6 4 oz)   SpO2 98%   BMI 33 31 kg/m²     Lab Results   Component Value Date    PSA 8 5 (H) 02/16/2022    PSA 9 3 (H) 09/23/2021    PSA 7 9 (H) 03/16/2021    PSA 6 7 (H) 09/16/2020    PSA 6 9 (H) 03/18/2020    PSA 7 0 (H) 09/18/2019    PSA 6 6 (H) 02/15/2019    PSA 5 7 (H) 08/02/2018    PSA 6 7 (H) 12/26/2017    PSA 3 4 06/09/2014          Physical Exam  Vitals reviewed  Constitutional:       Appearance: Normal appearance  He is normal weight  HENT:      Head: Normocephalic and atraumatic  Eyes:      Pupils: Pupils are equal, round, and reactive to light  Abdominal:      General: Abdomen is flat  Neurological:      General: No focal deficit present  Mental Status: He is alert and oriented to person, place, and time  Psychiatric:         Mood and Affect: Mood normal          Thought Content: Thought content normal                Cystoscopy     Date/Time 2/28/2022 10:17 AM     Performed by  Arlene Caballero MD     Authorized by Arlene Caballero MD      Universal Protocol:  Consent: Written consent obtained  Risks and benefits: risks, benefits and alternatives were discussed  Consent given by: patient  Time out: Immediately prior to procedure a "time out" was called to verify the correct patient, procedure, equipment, support staff and site/side marked as required    Patient understanding: patient states understanding of the procedure being performed  Patient consent: the patient's understanding of the procedure matches consent given  Procedure consent: procedure consent matches procedure scheduled  Patient identity confirmed: verbally with patient        Procedure Details:  Procedure type: cystoscopy    Patient tolerance: Patient tolerated the procedure well with no immediate complications    Additional Procedure Details: A time-out was performed identifying the correct patient site and procedure  A MA chaperone was in the room  A flexible cystoscope was introduced into the urethra  The pendulous urethra was normal   The prostatic urethra showed a long fossa (5cm) with significant kissing bilateral lobar hypertrophy with a small to moderate sized median lobe  The bladder did not have any lesions concerning for malignancy  There were mild trabeculations and no diverticula  The ureteral orifices were in orthotopic position  Biopsy prostate     Date/Time 2/28/2022 10:17 AM     Performed by  Dayanna Cabello MD     Authorized by Dayanna Cabello MD      Universal Protocol   Consent: Written consent obtained  Consent given by: patient  Time out: Immediately prior to procedure a "time out" was called to verify the correct patient, procedure, equipment, support staff and site/side marked as required  Patient understanding: patient states understanding of the procedure being performed  Patient consent: the patient's understanding of the procedure matches consent given  Procedure consent: procedure consent matches procedure scheduled  Relevant documents: relevant documents present and verified  Patient identity confirmed: verbally with patient        Local anesthesia used: no     Anesthesia   Local anesthesia used: no     Sedation   Patient sedated: no        Specimen: no   Procedure Details   Procedure Notes: The patient was placed in left lateral decubitus position  A digital rectal examination was performed  The prostate did not have any nodules  An ultrasound probe was introduced into the rectum  The prostate was evaluated and measurements made showing the prostate to be 90cc in size    Patient tolerance: patient tolerated the procedure well with no immediate complications             Orders  Orders Placed This Encounter   Procedures    Cystoscopy     This order was created via procedure documentation    Biopsy prostate     This order was created via procedure documentation

## 2022-02-28 NOTE — ASSESSMENT & PLAN NOTE
The patient has moderate obstructive and irritative symptoms despite Flomax in setting of a large gland  We discussed options    We can consider addition of finasteride which will help shrink the prostate over time and may help improve voiding symptoms  We also discussed options for procedural intervention  Given the size of his gland these center on TURP vs robotic simple prostatectomy versus HoLEP surgery versus prostate artery embolization  Discussed the risks and benefits of each  TURP surgery may be effective but for a gland his size sometimes cannot resect as much tissue as desired and therefore higher risk for persistent symptoms and recurrence  Robotic simple prostatectomy offers excellent functional outcomes requires entry into the abdomen and catheter for 2 weeks while the bladder is healing with cystogram to follow and has risks of bleeding urine leak bowel injury and usually temporary incontinence  HoLEP surgery requires special equipment and an experienced urologist and has risks of bleeding and fluid absorption and almost guaranteed incontinence for a few weeks to months  Prostate artery embolization is performed by Interventional Radiology through a minimally invasive approach through femoral vessels  This is a relatively new technology that has not been studied in a wide population but limited results show good efficacy but will take time for them to result as the prostate shrinks over time  We discussed these procedures will likely improve obstructive symptoms and may or may not improve irritative symptoms such as frequency and urgency  After discussion the patient wants to start with finasteride and then go from there  Risks including libido, energy, ED issues discussed as as well as PSA changes  We will see him back in 4 months

## 2022-04-05 LAB
ALBUMIN SERPL-MCNC: 4.2 G/DL (ref 3.8–4.9)
ALBUMIN/GLOB SERPL: 2.1 {RATIO} (ref 1.2–2.2)
ALP SERPL-CCNC: 48 IU/L (ref 44–121)
ALT SERPL-CCNC: 28 IU/L (ref 0–44)
AST SERPL-CCNC: 22 IU/L (ref 0–40)
BASOPHILS # BLD AUTO: 0.1 X10E3/UL (ref 0–0.2)
BASOPHILS NFR BLD AUTO: 1 %
BILIRUB SERPL-MCNC: 0.4 MG/DL (ref 0–1.2)
BUN SERPL-MCNC: 20 MG/DL (ref 8–27)
BUN/CREAT SERPL: 21 (ref 10–24)
CALCIUM SERPL-MCNC: 9.1 MG/DL (ref 8.6–10.2)
CHLORIDE SERPL-SCNC: 104 MMOL/L (ref 96–106)
CHOLEST SERPL-MCNC: 170 MG/DL (ref 100–199)
CO2 SERPL-SCNC: 23 MMOL/L (ref 20–29)
CREAT SERPL-MCNC: 0.97 MG/DL (ref 0.76–1.27)
EGFR: 89 ML/MIN/1.73
EOSINOPHIL # BLD AUTO: 0.3 X10E3/UL (ref 0–0.4)
EOSINOPHIL NFR BLD AUTO: 6 %
ERYTHROCYTE [DISTWIDTH] IN BLOOD BY AUTOMATED COUNT: 12.9 % (ref 11.6–15.4)
GLOBULIN SER-MCNC: 2 G/DL (ref 1.5–4.5)
GLUCOSE SERPL-MCNC: 100 MG/DL (ref 65–99)
HCT VFR BLD AUTO: 44.8 % (ref 37.5–51)
HDLC SERPL-MCNC: 43 MG/DL
HGB BLD-MCNC: 15.4 G/DL (ref 13–17.7)
IMM GRANULOCYTES # BLD: 0 X10E3/UL (ref 0–0.1)
IMM GRANULOCYTES NFR BLD: 0 %
LDLC SERPL CALC-MCNC: 111 MG/DL (ref 0–99)
LYMPHOCYTES # BLD AUTO: 1.4 X10E3/UL (ref 0.7–3.1)
LYMPHOCYTES NFR BLD AUTO: 29 %
MCH RBC QN AUTO: 29.1 PG (ref 26.6–33)
MCHC RBC AUTO-ENTMCNC: 34.4 G/DL (ref 31.5–35.7)
MCV RBC AUTO: 85 FL (ref 79–97)
MONOCYTES # BLD AUTO: 0.6 X10E3/UL (ref 0.1–0.9)
MONOCYTES NFR BLD AUTO: 12 %
NEUTROPHILS # BLD AUTO: 2.6 X10E3/UL (ref 1.4–7)
NEUTROPHILS NFR BLD AUTO: 52 %
PLATELET # BLD AUTO: 190 X10E3/UL (ref 150–450)
POTASSIUM SERPL-SCNC: 4.5 MMOL/L (ref 3.5–5.2)
PROT SERPL-MCNC: 6.2 G/DL (ref 6–8.5)
RBC # BLD AUTO: 5.3 X10E6/UL (ref 4.14–5.8)
SL AMB VLDL CHOLESTEROL CALC: 16 MG/DL (ref 5–40)
SODIUM SERPL-SCNC: 141 MMOL/L (ref 134–144)
TRIGL SERPL-MCNC: 86 MG/DL (ref 0–149)
TSH SERPL DL<=0.005 MIU/L-ACNC: 2 UIU/ML (ref 0.45–4.5)
WBC # BLD AUTO: 5 X10E3/UL (ref 3.4–10.8)

## 2022-04-07 ENCOUNTER — TELEPHONE (OUTPATIENT)
Dept: OTHER | Facility: OTHER | Age: 62
End: 2022-04-07

## 2022-04-07 NOTE — TELEPHONE ENCOUNTER
Spoke with patient  He has decided against starting finasteride as he is not interested in dealing with the possible side effects  He was originally coming back to see AP in 4 months from visit with Dr Mary Singletary 2/28/22 to see how symptoms are after taking finasteride  Since he is not starting finasteride, patient would like to come in for sooner appt to discuss procedural intervention  Rescheduled his appt with Lauretha Fleischer Pypi[place],PA for 4/29/22 instead of waiting until 6/30/22  Patient verbalized understanding and agrees to plan

## 2022-04-07 NOTE — TELEPHONE ENCOUNTER
Patient has concerns regarding the side effects of medication he was prescribed and would like a call back to see if their is alternative       finasteride (PROSCAR) 5 mg tablet

## 2022-04-13 ENCOUNTER — OFFICE VISIT (OUTPATIENT)
Dept: FAMILY MEDICINE CLINIC | Facility: MEDICAL CENTER | Age: 62
End: 2022-04-13
Payer: COMMERCIAL

## 2022-04-13 VITALS
BODY MASS INDEX: 33.27 KG/M2 | HEIGHT: 66 IN | DIASTOLIC BLOOD PRESSURE: 70 MMHG | WEIGHT: 207 LBS | TEMPERATURE: 98.7 F | OXYGEN SATURATION: 96 % | SYSTOLIC BLOOD PRESSURE: 110 MMHG | HEART RATE: 103 BPM

## 2022-04-13 DIAGNOSIS — I42.9 CARDIOMYOPATHY, UNSPECIFIED TYPE (HCC): ICD-10-CM

## 2022-04-13 DIAGNOSIS — I10 ESSENTIAL HYPERTENSION: Primary | ICD-10-CM

## 2022-04-13 DIAGNOSIS — Z91.030 ALLERGY TO YELLOW JACKETS: ICD-10-CM

## 2022-04-13 DIAGNOSIS — R35.1 BENIGN PROSTATIC HYPERPLASIA WITH NOCTURIA: ICD-10-CM

## 2022-04-13 DIAGNOSIS — E78.2 MIXED HYPERLIPIDEMIA: ICD-10-CM

## 2022-04-13 DIAGNOSIS — K21.9 GASTROESOPHAGEAL REFLUX DISEASE WITHOUT ESOPHAGITIS: ICD-10-CM

## 2022-04-13 DIAGNOSIS — N40.1 BENIGN PROSTATIC HYPERPLASIA WITH NOCTURIA: ICD-10-CM

## 2022-04-13 PROBLEM — Z11.4 SCREENING FOR HIV (HUMAN IMMUNODEFICIENCY VIRUS): Status: RESOLVED | Noted: 2020-11-20 | Resolved: 2022-04-13

## 2022-04-13 PROBLEM — Z11.59 NEED FOR HEPATITIS C SCREENING TEST: Status: RESOLVED | Noted: 2020-11-20 | Resolved: 2022-04-13

## 2022-04-13 PROBLEM — Z00.00 ANNUAL PHYSICAL EXAM: Status: RESOLVED | Noted: 2020-11-20 | Resolved: 2022-04-13

## 2022-04-13 PROCEDURE — 99213 OFFICE O/P EST LOW 20 MIN: CPT | Performed by: FAMILY MEDICINE

## 2022-04-13 PROCEDURE — 3725F SCREEN DEPRESSION PERFORMED: CPT | Performed by: FAMILY MEDICINE

## 2022-04-13 PROCEDURE — 99396 PREV VISIT EST AGE 40-64: CPT | Performed by: FAMILY MEDICINE

## 2022-04-13 PROCEDURE — 1036F TOBACCO NON-USER: CPT | Performed by: FAMILY MEDICINE

## 2022-04-13 RX ORDER — EPINEPHRINE 0.3 MG/.3ML
0.3 INJECTION SUBCUTANEOUS ONCE
Qty: 0.3 ML | Refills: 1 | Status: SHIPPED | OUTPATIENT
Start: 2022-04-13 | End: 2022-04-13

## 2022-04-18 NOTE — ASSESSMENT & PLAN NOTE
His last ejection fraction was 55%  He is asymptomatic  Continue follow-up with Cardiology, continue diltiazem

## 2022-04-18 NOTE — ASSESSMENT & PLAN NOTE
His LDL was 111  He is only eating a low-fat diet  He is not on medication  Continue low fat diet  Continue exercise, continue attempts at losing weight

## 2022-04-20 NOTE — ASSESSMENT & PLAN NOTE
He is taking tamsulosin  He has concurrently having symptoms  Continue follow-up with Urology, continue tamsulosin

## 2022-04-20 NOTE — ASSESSMENT & PLAN NOTE
Blood pressure today is 110/70  He takes diltiazem 120 mg q day  he follows up also with the cardiologist       Continue diltiazem, continue low-salt, continue exercise and attempts at losing weight

## 2022-04-20 NOTE — PROGRESS NOTES
Assessment/Plan:    Mixed hyperlipidemia  His LDL was 111  He is only eating a low-fat diet  He is not on medication  Continue low fat diet  Continue exercise, continue attempts at losing weight  Elevated PSA  1 09    Cardiomyopathy ([facility] )  His last ejection fraction was 55%  He is asymptomatic  Continue follow-up with Cardiology, continue diltiazem  Gastroesophageal reflux disease without esophagitis  Takes omeprazole 40 mg  It is effective and not having breakthrough  He does have some breakthrough when he eats just before bed  Continue omeprazole  We will refer him to GI if he has a lot of breakthrough  Watch eating before bed  Essential hypertension  Blood pressure today is 110/70  He takes diltiazem 120 mg q day  he follows up also with the cardiologist       Continue diltiazem, continue low-salt, continue exercise and attempts at losing weight  Benign prostatic hyperplasia with nocturia  He is taking tamsulosin  He has concurrently having symptoms  Continue follow-up with Urology, continue tamsulosin  Problem List Items Addressed This Visit        Digestive    Gastroesophageal reflux disease without esophagitis     Takes omeprazole 40 mg  It is effective and not having breakthrough  He does have some breakthrough when he eats just before bed  Continue omeprazole  We will refer him to GI if he has a lot of breakthrough  Watch eating before bed  Cardiovascular and Mediastinum    Essential hypertension - Primary     Blood pressure today is 110/70  He takes diltiazem 120 mg q day  he follows up also with the cardiologist       Continue diltiazem, continue low-salt, continue exercise and attempts at losing weight  Cardiomyopathy ([facility] )     His last ejection fraction was 55%  He is asymptomatic  Continue follow-up with Cardiology, continue diltiazem  Other    Mixed hyperlipidemia     His LDL was 111      He is only eating a low-fat diet   He is not on medication  Continue low fat diet  Continue exercise, continue attempts at losing weight  Benign prostatic hyperplasia with nocturia     He is taking tamsulosin  He has concurrently having symptoms  Continue follow-up with Urology, continue tamsulosin  Other Visit Diagnoses     Allergy to yellow jackets                Subjective:      Patient ID: Edmund Aranda is a 63 y o  male  This is a pleasant 65-year-old man who is a   He lives with his wife  They have two adult children  He is up-to-date for colorectal screening and prostate cancer screening  The following portions of the patient's history were reviewed and updated as appropriate: allergies, current medications, past family history, past medical history, past social history, past surgical history and problem list     Review of Systems   Constitutional: Negative for activity change, fatigue and fever  HENT: Negative for congestion, ear discharge, ear pain, postnasal drip, rhinorrhea, sinus pain, sneezing and sore throat  Eyes: Negative for photophobia, pain, discharge and redness  Respiratory: Negative for apnea, cough, shortness of breath and wheezing  Cardiovascular: Negative for chest pain and palpitations  Gastrointestinal: Negative for abdominal pain, blood in stool, constipation, diarrhea, nausea and vomiting  Endocrine: Negative for polydipsia, polyphagia and polyuria  Genitourinary: Negative for decreased urine volume, difficulty urinating, dysuria, frequency, penile discharge, penile pain and urgency  Musculoskeletal: Negative for arthralgias, gait problem, joint swelling and neck pain  Skin: Negative for color change and rash  Neurological: Negative for dizziness, tremors, seizures, weakness and headaches  Psychiatric/Behavioral: Negative for agitation and sleep disturbance  The patient is not nervous/anxious            Objective:      /70 (BP Location: Left arm, Patient Position: Sitting, Cuff Size: Adult)   Pulse 103   Temp 98 7 °F (37 1 °C)   Ht 5' 6" (1 676 m)   Wt 93 9 kg (207 lb)   SpO2 96%   BMI 33 41 kg/m²          Physical Exam  Vitals and nursing note reviewed  Constitutional:       Appearance: Normal appearance  He is well-developed  HENT:      Head: Normocephalic  Right Ear: External ear normal       Left Ear: External ear normal       Nose: Nose normal    Eyes:      General: Lids are normal       Conjunctiva/sclera: Conjunctivae normal       Pupils: Pupils are equal, round, and reactive to light  Neck:      Thyroid: No thyromegaly  Vascular: No carotid bruit  Cardiovascular:      Rate and Rhythm: Normal rate and regular rhythm  Pulses: Normal pulses  Heart sounds: Normal heart sounds, S1 normal and S2 normal  No murmur heard  Pulmonary:      Effort: Pulmonary effort is normal  No respiratory distress  Breath sounds: Normal breath sounds  No wheezing or rales  Abdominal:      General: Bowel sounds are normal       Palpations: Abdomen is soft  There is no mass  Tenderness: There is no abdominal tenderness  Musculoskeletal:         General: Normal range of motion  Cervical back: Normal range of motion and neck supple  Lymphadenopathy:      Cervical: No cervical adenopathy  Skin:     General: Skin is warm and dry  Coloration: Skin is not pale  Findings: No rash  Neurological:      Mental Status: He is alert and oriented to person, place, and time  Cranial Nerves: No cranial nerve deficit  Sensory: No sensory deficit  Deep Tendon Reflexes: Reflexes are normal and symmetric  Psychiatric:         Behavior: Behavior normal  Behavior is cooperative  Thought Content:  Thought content normal          Judgment: Judgment normal

## 2022-04-20 NOTE — ASSESSMENT & PLAN NOTE
Takes omeprazole 40 mg  It is effective and not having breakthrough  He does have some breakthrough when he eats just before bed  Continue omeprazole  We will refer him to GI if he has a lot of breakthrough  Watch eating before bed

## 2022-04-29 ENCOUNTER — OFFICE VISIT (OUTPATIENT)
Dept: UROLOGY | Facility: CLINIC | Age: 62
End: 2022-04-29
Payer: COMMERCIAL

## 2022-04-29 VITALS
HEART RATE: 84 BPM | SYSTOLIC BLOOD PRESSURE: 120 MMHG | RESPIRATION RATE: 16 BRPM | HEIGHT: 66 IN | OXYGEN SATURATION: 96 % | DIASTOLIC BLOOD PRESSURE: 70 MMHG | BODY MASS INDEX: 33.23 KG/M2 | WEIGHT: 206.8 LBS

## 2022-04-29 DIAGNOSIS — R97.20 ELEVATED PSA: Primary | ICD-10-CM

## 2022-04-29 PROCEDURE — 3008F BODY MASS INDEX DOCD: CPT | Performed by: FAMILY MEDICINE

## 2022-04-29 PROCEDURE — 99213 OFFICE O/P EST LOW 20 MIN: CPT | Performed by: PHYSICIAN ASSISTANT

## 2022-05-02 NOTE — PROGRESS NOTES
UROLOGY PROGRESS NOTE   Patient Identifiers: Tang Mcnair (MRN [id number])  Date of Service: 5/2/2022    Subjective:     82-year-old man history of elevated PSA  He had 2 previous prostate biopsies both benign  Multiparametric MRI showing PI-RADS category 2  PSA is 8 5 and stable  Prostate size of 112 g  He has been managed with 0 8 mg tamsulosin        Reason for visit:  Elevated PSA follow-up    Objective:     VITALS:    Vitals:    04/29/22 1041   BP: 120/70   Pulse: 84   Resp: 16   SpO2: 96%     AUA SYMPTOM SCORE      Most Recent Value   AUA SYMPTOM SCORE    How often have you had a sensation of not emptying your bladder completely after you finished urinating? 4 (P)     How often have you had to urinate again less than two hours after you finished urinating? 4 (P)     How often have you found you stopped and started again several times when you urinate? 3 (P)     How often have you found it difficult to postpone urination? 1 (P)     How often have you had a weak urinary stream? 4 (P)     How often have you had to push or strain to begin urination? 1 (P)     How many times did you most typically get up to urinate from the time you went to bed at night until the time you got up in the morning? 3 (P)     Quality of Life: If you were to spend the rest of your life with your urinary condition just the way it is now, how would you feel about that? 3 (P)     AUA SYMPTOM SCORE 20 (P)             LABS:  Lab Results   Component Value Date    HGB 15 4 04/04/2022    HCT 44 8 04/04/2022    WBC 5 0 04/04/2022     04/04/2022   ]    Lab Results   Component Value Date     08/07/2017    K 4 5 04/04/2022     04/04/2022    CO2 23 04/04/2022    BUN 20 04/04/2022    CREATININE 0 97 04/04/2022    CALCIUM 9 2 08/07/2017    GLUCOSE 92 08/07/2017   ]        INPATIENT MEDS:    Current Outpatient Medications:     aspirin (ECOTRIN) 325 mg EC tablet, Take 325 mg by mouth every other day, Disp: , Rfl:     cyclobenzaprine (FLEXERIL) 10 mg tablet, Take 1 tablet (10 mg total) by mouth daily at bedtime as needed for muscle spasms, Disp: 30 tablet, Rfl: 3    diltiazem (CARDIZEM CD) 120 mg 24 hr capsule, TAKE 1 CAPSULE BY MOUTH EVERY DAY, Disp: 90 capsule, Rfl: 4    ibuprofen (MOTRIN) 600 mg tablet, Take 600 mg by mouth every 6 (six) hours as needed for mild pain  , Disp: , Rfl:     Omega-3 Fatty Acids (FISH OIL) 1000 MG CPDR, Take 1 capsule by mouth daily  , Disp: , Rfl:     omeprazole (PriLOSEC) 40 MG capsule, TAKE 1 CAPSULE BY MOUTH EVERY DAY, Disp: 90 capsule, Rfl: 1    sildenafil (VIAGRA) 100 mg tablet, Take 1 tablet (100 mg total) by mouth daily as needed for erectile dysfunction, Disp: 10 tablet, Rfl: 11    tamsulosin (FLOMAX) 0 4 mg, Take 2 capsules (0 8 mg total) by mouth daily with dinner, Disp: 180 capsule, Rfl: 3    EPINEPHrine (EPIPEN) 0 3 mg/0 3 mL SOAJ, Inject 0 3 mL (0 3 mg total) into a muscle once for 1 dose, Disp: 0 3 mL, Rfl: 1      Physical Exam:   /70 (BP Location: Left arm, Patient Position: Sitting, Cuff Size: Large)   Pulse 84   Resp 16   Ht 5' 6" (1 676 m)   Wt 93 8 kg (206 lb 12 8 oz)   SpO2 96%   BMI 33 38 kg/m²   GEN: no acute distress    RESP: breathing comfortably with no accessory muscle use    ABD: soft, non-tender, non-distended   INCISION:    EXT: no significant peripheral edema   (Male): Penis circumcised, phallus normal, meatus patent  Testicles descended into scrotum bilaterally without masses nor tenderness  No inguinal hernias bilaterally  ISIDRO: Prostate is enlarged at 45 grams  The prostate is not boggy  The prostate is not tender  No nodules noted      RADIOLOGY:    none     Assessment:    1  Elevated PSA   2   Prostate hypertrophy     Plan:   -follow up in August PSA prior  -  -  -

## 2022-06-06 DIAGNOSIS — R35.1 BENIGN PROSTATIC HYPERPLASIA WITH NOCTURIA: ICD-10-CM

## 2022-06-06 DIAGNOSIS — N40.1 BENIGN PROSTATIC HYPERPLASIA WITH NOCTURIA: ICD-10-CM

## 2022-06-06 RX ORDER — TAMSULOSIN HYDROCHLORIDE 0.4 MG/1
0.8 CAPSULE ORAL
Qty: 180 CAPSULE | Refills: 3 | Status: SHIPPED | OUTPATIENT
Start: 2022-06-06

## 2022-06-06 NOTE — TELEPHONE ENCOUNTER
Medication Refill Request     Name tamsulosin   Dose/Frequency 0 8 mg daily   Quantity 90 days with 3 refills  Verified pharmacy   [x]  Verified ordering Provider   [x]  Verified enough for 3 days  [x]

## 2022-06-13 ENCOUNTER — OFFICE VISIT (OUTPATIENT)
Dept: NEUROLOGY | Facility: CLINIC | Age: 62
End: 2022-06-13
Payer: COMMERCIAL

## 2022-06-13 ENCOUNTER — TELEPHONE (OUTPATIENT)
Dept: NEUROLOGY | Facility: CLINIC | Age: 62
End: 2022-06-13

## 2022-06-13 VITALS
HEART RATE: 85 BPM | WEIGHT: 206 LBS | BODY MASS INDEX: 33.11 KG/M2 | DIASTOLIC BLOOD PRESSURE: 88 MMHG | TEMPERATURE: 98.3 F | HEIGHT: 66 IN | OXYGEN SATURATION: 94 % | SYSTOLIC BLOOD PRESSURE: 128 MMHG

## 2022-06-13 DIAGNOSIS — M54.16 RADICULOPATHY, LUMBAR REGION: ICD-10-CM

## 2022-06-13 DIAGNOSIS — N40.1 BENIGN PROSTATIC HYPERPLASIA WITH NOCTURIA: ICD-10-CM

## 2022-06-13 DIAGNOSIS — E78.2 MIXED HYPERLIPIDEMIA: ICD-10-CM

## 2022-06-13 DIAGNOSIS — R35.1 BENIGN PROSTATIC HYPERPLASIA WITH NOCTURIA: ICD-10-CM

## 2022-06-13 DIAGNOSIS — I10 ESSENTIAL HYPERTENSION: ICD-10-CM

## 2022-06-13 PROCEDURE — 99215 OFFICE O/P EST HI 40 MIN: CPT | Performed by: PSYCHIATRY & NEUROLOGY

## 2022-06-13 PROCEDURE — 1036F TOBACCO NON-USER: CPT | Performed by: PSYCHIATRY & NEUROLOGY

## 2022-06-13 PROCEDURE — 3008F BODY MASS INDEX DOCD: CPT | Performed by: PSYCHIATRY & NEUROLOGY

## 2022-06-13 PROCEDURE — 3074F SYST BP LT 130 MM HG: CPT | Performed by: PSYCHIATRY & NEUROLOGY

## 2022-06-13 PROCEDURE — 3079F DIAST BP 80-89 MM HG: CPT | Performed by: PSYCHIATRY & NEUROLOGY

## 2022-06-13 RX ORDER — MELOXICAM 15 MG/1
TABLET ORAL
Qty: 30 TABLET | Refills: 1 | Status: SHIPPED | OUTPATIENT
Start: 2022-06-13 | End: 2022-08-08

## 2022-06-13 NOTE — TELEPHONE ENCOUNTER
Pt calling to schedule an appt  Pt was last seen by Dr Evan Chacon  Pt states he has been fairly stable with back and so has not had to see provider  Pt says he is now in need of seeing provider due to worsening back issue  LOV 2/17/2020, dx: radiculopathy lumbar region      Scheduled pt to see Haley today 6/13/2022 at 2:30 pm

## 2022-06-13 NOTE — PROGRESS NOTES
Bety Gutierrez is a 63 y o  male  Chief Complaint   Patient presents with    radiculopathy lumbar region       Assessment:  1  Radiculopathy, lumbar region    2  Mixed hyperlipidemia    3  Essential hypertension    4  Benign prostatic hyperplasia with nocturia        Plan:  Meloxicam 15 mg once a day p r n  Flexeril p r n  Referred to physical therapy  Avoid strenuous activity  Follow-up in 3-4 months  Discussion:  Patient has an acute flare-up of lupus prior low back pain he does have some degenerative disc disease and also has disc herniation and is status post lumbar epidural in the past, discussed with patient different options we will try physical therapy if any worsening of the symptoms that would have advised him to stop the physical therapy and will get an MRI scan of the lumbar spine also have given him a prescription for meloxicam 15 mg once a day p r n  and to avoid using ibuprofen and to take the Flexeril as needed, if needed we can have him see pain management in the future   he was advised to avoid strenuous activity like cutting wood and also advised him to avoid cutting the grass until his back pain resolves, also was advised to keep his blood pressure cholesterol and sugar under control, to go to the hospital if has any worsening symptoms; and call me otherwise to see me back in 3-4 months and follow-up with his other physicians    Subjective:    HPI   Patient is here for evaluation of his low back pain he was seeing my associate Dr Amanda Pulilam, he had loss in him in 2020 and tells me that he was doing good with this low back pain except taking Flexeril as needed basis about 2 3 weeks back he does not remember doing anything but since then has been having acute pain mostly in the lower back left side worse with activities it does radiate sometimes is back to about 6-9 on 10 consider DBS sitting down and lying down, he is a  but he does cut grass and summertime, he does have history of benign prostatic hyperplasia and has a follow-up with the urologist and try taking gabapentin and felt that he was having some urinary issues and hence he stopped using that he has some urinary hesitancy at baseline from his prostate, no focal weakness, sleep is not disturbed no other complaints      Vitals:    06/13/22 1435   BP: 128/88   BP Location: Right arm   Patient Position: Sitting   Cuff Size: Adult   Pulse: 85   Temp: 98 3 °F (36 8 °C)   TempSrc: Temporal   SpO2: 94%   Height: 5' 6" (1 676 m)       Current Medications    Current Outpatient Medications:     aspirin (ECOTRIN) 325 mg EC tablet, Take 325 mg by mouth every other day, Disp: , Rfl:     cyclobenzaprine (FLEXERIL) 10 mg tablet, Take 1 tablet (10 mg total) by mouth daily at bedtime as needed for muscle spasms, Disp: 30 tablet, Rfl: 3    diltiazem (CARDIZEM CD) 120 mg 24 hr capsule, TAKE 1 CAPSULE BY MOUTH EVERY DAY, Disp: 90 capsule, Rfl: 4    ibuprofen (MOTRIN) 600 mg tablet, Take 600 mg by mouth every 6 (six) hours as needed for mild pain  , Disp: , Rfl:     Omega-3 Fatty Acids (FISH OIL) 1000 MG CPDR, Take 1 capsule by mouth daily  , Disp: , Rfl:     omeprazole (PriLOSEC) 40 MG capsule, TAKE 1 CAPSULE BY MOUTH EVERY DAY, Disp: 90 capsule, Rfl: 1    sildenafil (VIAGRA) 100 mg tablet, Take 1 tablet (100 mg total) by mouth daily as needed for erectile dysfunction, Disp: 10 tablet, Rfl: 11    tamsulosin (FLOMAX) 0 4 mg, Take 2 capsules (0 8 mg total) by mouth daily with dinner, Disp: 180 capsule, Rfl: 3    EPINEPHrine (EPIPEN) 0 3 mg/0 3 mL SOAJ, Inject 0 3 mL (0 3 mg total) into a muscle once for 1 dose, Disp: 0 3 mL, Rfl: 1      Allergies  Other    Past Medical History  Past Medical History:   Diagnosis Date    Back disorder 02/26/2019    getting epidural shots, physical therapy    BPH (benign prostatic hyperplasia)     CAD (coronary artery disease)     Cardiac disease     Cardiomyopathy [facility] [address] [facility]     Coronary artery disease     Failed back syndrome     GERD (gastroesophageal reflux disease)     Heart attack ([facility] [address] [facility]     Mild MI 1996     Hypertension     Patient denies    Myocardial infarction ([facility] [address] )     Mild    Umbilical hernia     Last Assessed:3/25/2015         Past Surgical History:  Past Surgical History:   Procedure Laterality Date    BACK SURGERY      HERNIA REPAIR      Last Assessed:3/12/2014 ,lumbar    LUMBAR LAMINECTOMY      Last Assessed:3/12/2014    PROSTATE BIOPSY  09/10/2015    needle biopsy    SHOULDER SURGERY      TONSILLECTOMY           Family History:  Family History   Problem Relation Age of Onset    Hypertension Father     Diabetes Father     Pancreatic cancer Mother     Cancer Family     No Known Problems Sister     No Known Problems Brother     No Known Problems Brother        Social History:   reports that he has never smoked  He has never used smokeless tobacco  He reports current alcohol use  He reports that he does not use drugs  I have reviewed the past medical history, surgical history, social and family history, current medications, allergies vitals, review of systems, and updated this information as appropriate today  Objective:    Physical Exam    Neurological Exam    GENERAL:  Cooperative in no acute distress  Well-developed and well-nourished    HEAD and NECK   Head is atraumatic normocephalic with no lesions or masses  Neck is supple with full range of motion    CARDIOVASCULAR  Carotid Arteries-no carotid bruits  NEUROLOGIC:  Mental Status-the patient is awake alert and oriented without aphasia or apraxia  Cranial Nerves: Visual fields are full to confrontation  Extraocular movements are full without nystagmus  Pupils are 2-1/2 mm and reactive  Face is symmetrical to light touch  Movements of facial expression move symmetrically  Hearing is normal to finger rub bilaterally  Soft palate lifts symmetrically  Shoulder shrug is symmetrical  Tongue is midline without atrophy    Motor: No drift is noted on arm extension  Strength is full in the upper and lower extremities with normal bulk and tone  Sensory: Intact to temperature and vibratory sensation in the upper and lower extremities bilaterally  Cortical function is intact  Coordination: Finger to nose testing is performed accurately  Romberg is negative  Gait reveals a normal base with symmetrical arm swing  Reflexes:  2+ symmetrical   Paraspinal muscle tenderness in the lumbar area          ROS:  Review of Systems   Constitutional: Negative  Negative for appetite change and fever  HENT: Negative  Negative for hearing loss, tinnitus, trouble swallowing and voice change  Eyes: Negative  Negative for photophobia and pain  Respiratory: Negative  Negative for shortness of breath  Cardiovascular: Negative  Negative for palpitations  Gastrointestinal: Negative  Negative for nausea and vomiting  Endocrine: Negative  Negative for cold intolerance  Genitourinary: Positive for frequency and urgency  Negative for dysuria  Musculoskeletal: Negative  Negative for myalgias and neck pain  Skin: Negative  Negative for rash  Neurological: Negative  Negative for dizziness, tremors, seizures, syncope, facial asymmetry, speech difficulty, weakness, light-headedness, numbness and headaches  Hematological: Negative  Does not bruise/bleed easily  Psychiatric/Behavioral: Negative  Negative for confusion, hallucinations and sleep disturbance

## 2022-06-21 NOTE — PROGRESS NOTES
PT Evaluation     Today's date: 2022  Patient name: Elier Law  : 1960  MRN: [id number]  Referring provider: Bernardino Mcardle, MD  Dx:   Encounter Diagnosis     ICD-10-CM    1  Lumbar radiculopathy  M54 16    2  Chronic left-sided low back pain with sciatica, sciatica laterality unspecified  M54 40     G89 29                   Assessment  Assessment details: Elier Law is a 63 y o  male referred with primary diagnosis of Lumbar radiculopathy  (primary encounter diagnosis)  Chronic left-sided low back pain with sciatica, sciatica laterality unspecified  Radiculopathy, lumbar region   Patient presents with the following functional limitations: Pain with mowing the lawn, lifting heavy objects, and bringing left foot up to right thigh to tie shoe  Patient demonstrates normal lumbar ROM, but has some pain in left lumbar paraspinals and QL with right lumbar rotation  There is also decreased lumbar joint mobility at L2-3  Trial of lumbar joint mobilizations and IASTM to left lumbar paraspinals abolished discomfort in left lower back with right lumbar rotation  Treatment to include: Manual therapy techniques, extremity/core strengthening, neuromuscular control exercises, instruction in a comprehensive HEP, and modalities as needed  They will benefit from skilled PT services to address the above functional deficits and to decrease pain to promote a return to their premorbid level of function  Impairments: activity intolerance, lacks appropriate home exercise program, pain with function and poor posture   Functional limitations: Pain with mowing the lawn, lifting heavy objects, and bringing left foot up to right thigh to tie shoe  Understanding of Dx/Px/POC: good   Prognosis: good  Prognosis details: Chronic, recurrent LBP    Goals  STG (3 weeks)  1  Patient will report pain as a 3-4/10 at worst with cutting his lawn    2  Patient will report the ability to tie his shoe without back pain  LTG (6 weeks)  1  Patient will lift/carry objects with good body mechanics and no complaints of pain  2  Patient will resume cutting his lawn without pain in his left lower back  3  Patient will be independent and compliant with a HEP in order to maintain gains made with skilled PT services  4  Patient will report pain as a 0-1/10 with normal ADL's and recreational activities    Plan  Patient would benefit from: skilled physical therapy  Planned therapy interventions: abdominal trunk stabilization, joint mobilization, manual therapy, neuromuscular re-education, patient education, postural training, strengthening, stretching, therapeutic activities, therapeutic exercise and home exercise program  Frequency: 2x week  Duration in weeks: 6  Plan of Care beginning date: 2022  Plan of Care expiration date: 8/3/2022  Treatment plan discussed with: patient        Subjective Evaluation    History of Present Illness  Mechanism of injury: Patient has a history of chronic, recurrent LBP over the past 3 years, but symptoms typically subside in a day  Patient states about 3 weeks ago he had insidious onset of left lower back pain  Symptoms persisted, so he saw his neurologist   He was prescribed Meloxicam which he takes once a day  Symptoms are well controlled with medication  He wakes with pain, but is mostly symptom free unless he twists and bends to the right, or lifts his left foot up to put a shoe on  He is referred now to outpatient PT services  Recurrent probem    Quality of life: good    Pain  Current pain ratin  At best pain ratin  At worst pain ratin  Location: Left lower back pain  Quality: sharp and knife-like  Relieving factors: rest  Progression: improved      Diagnostic Tests  No diagnostic tests performed    FCE comments: (-) sleep disturbances related to back pain  (+) paresthesias entire left thigh to knee    Symptoms are very brief and patient denies exacerbating position or activity  (-) saddle anesthesia, LE weakness, bowel/bladder changes  Goes up/down stairs more slowly  Has been getting better over time  Feels 60% improved to date  Has not done any of his HEP from prior LBP  Treatments  Previous treatment: medication  Current treatment: medication  Patient Goals  Patient goals for therapy: decreased pain  Patient goal: If he doesn't feel better, get an MRI and injection  Objective     Concurrent Complaints  Positive for cardiac problem  Negative for night pain, disturbed sleep, bladder dysfunction, bowel dysfunction, saddle (S4) numbness, history of cancer and history of trauma    Static Posture     Lumbar Spine   Flattened  Postural Observations  Seated posture: fair  Standing posture: fair  Correction of posture: has no consistent effect        Palpation   Left   No palpable tenderness to the erector spinae, lumbar paraspinals and quadratus lumborum  Right   No palpable tenderness to the erector spinae, lumbar paraspinals and quadratus lumborum  Tenderness     Lumbar Spine  Tenderness in the spinous process (L2-3)  No tenderness in the facet joint, left transverse process and right transverse process  Left Hip   No tenderness in the ASIS and PSIS  Right Hip   No tenderness in the ASIS and PSIS  Neurological Testing     Sensation     Lumbar   Left   Intact: light touch    Right   Intact: light touch    Active Range of Motion     Lumbar   Flexion:  WFL  Extension:  WFL  Left rotation:  WFL  Right rotation:  WFL and with pain    Joint Play     Hypomobile: L2 and L3     Pain: L2 and L3   Mechanical Assessment    Cervical      Thoracic      Lumbar    Standing flexion: repeated movements   Pain location:no change  Standing extension: repeated movements  Pain location: no change    Strength/Myotome Testing     Lumbar   Left   Normal strength    Right   Normal strength    Tests   Cervical   Positive vertical compression      Lumbar   Positive prone instability  and vertical compression   Negative SIJ compression, sacroiliac distraction and sacral spring   Left   Negative passive SLR, quadrant and slump test      Right   Negative passive SLR, quadrant and slump test      Left Hip   Positive KHALIF  Additional Tests Details  Patient asymptomatic upon arrival today  Unable to provoke symptoms with repeated motion testing  Ambulation   Weight-Bearing Status   Weight-Bearing Status (Left): full weight bearing     Ambulation: Level Surfaces   Ambulation without assistive device: independent    Ambulation: Stairs   Ascend stairs: independent  Pattern: reciprocal  Railings: one rail  Descend stairs: independent  Pattern: reciprocal  Railings: one rail    Observational Gait   Gait: within functional limits              Precautions: MI, HTN, Failed back syndrome, CAD      Manuals 6/22            PA mobs L2-4 Grade IV JF            PA rocking mobs JF            IASTM left lumbar paraspinals JF                         Neuro Re-Ed             Pt education L-spine anatomy and pathology            Webslide rows M,L             [employer]             PPT with march             TA contraction             Bridges             HL T-band clamshells with TA             SL clamshells             Prone UE raise             Prone LE raise             Ther Ex             Nustep with UE             HS stretch alis               Seated T-ball stretch L/R/Fwd             Seated left QL stretch                                                                 Ther Activity                                       Gait Training                                       Modalities

## 2022-06-22 ENCOUNTER — EVALUATION (OUTPATIENT)
Dept: PHYSICAL THERAPY | Facility: CLINIC | Age: 62
End: 2022-06-22
Payer: COMMERCIAL

## 2022-06-22 DIAGNOSIS — M54.40 CHRONIC LEFT-SIDED LOW BACK PAIN WITH SCIATICA, SCIATICA LATERALITY UNSPECIFIED: ICD-10-CM

## 2022-06-22 DIAGNOSIS — M54.16 RADICULOPATHY, LUMBAR REGION: ICD-10-CM

## 2022-06-22 DIAGNOSIS — G89.29 CHRONIC LEFT-SIDED LOW BACK PAIN WITH SCIATICA, SCIATICA LATERALITY UNSPECIFIED: ICD-10-CM

## 2022-06-22 DIAGNOSIS — M54.16 LUMBAR RADICULOPATHY: Primary | ICD-10-CM

## 2022-06-22 PROCEDURE — 97161 PT EVAL LOW COMPLEX 20 MIN: CPT | Performed by: PHYSICAL THERAPIST

## 2022-06-22 PROCEDURE — 97140 MANUAL THERAPY 1/> REGIONS: CPT | Performed by: PHYSICAL THERAPIST

## 2022-06-22 PROCEDURE — 97112 NEUROMUSCULAR REEDUCATION: CPT | Performed by: PHYSICAL THERAPIST

## 2022-06-24 ENCOUNTER — OFFICE VISIT (OUTPATIENT)
Dept: PHYSICAL THERAPY | Facility: CLINIC | Age: 62
End: 2022-06-24
Payer: COMMERCIAL

## 2022-06-24 DIAGNOSIS — G89.29 CHRONIC LEFT-SIDED LOW BACK PAIN WITH SCIATICA, SCIATICA LATERALITY UNSPECIFIED: ICD-10-CM

## 2022-06-24 DIAGNOSIS — M54.16 RADICULOPATHY, LUMBAR REGION: ICD-10-CM

## 2022-06-24 DIAGNOSIS — M54.40 CHRONIC LEFT-SIDED LOW BACK PAIN WITH SCIATICA, SCIATICA LATERALITY UNSPECIFIED: ICD-10-CM

## 2022-06-24 DIAGNOSIS — M54.16 LUMBAR RADICULOPATHY: Primary | ICD-10-CM

## 2022-06-24 PROCEDURE — 97140 MANUAL THERAPY 1/> REGIONS: CPT | Performed by: PHYSICAL THERAPIST

## 2022-06-24 PROCEDURE — 97110 THERAPEUTIC EXERCISES: CPT | Performed by: PHYSICAL THERAPIST

## 2022-06-24 PROCEDURE — 97112 NEUROMUSCULAR REEDUCATION: CPT | Performed by: PHYSICAL THERAPIST

## 2022-06-24 NOTE — PROGRESS NOTES
Daily Note     Today's date: 2022  Patient name: Hung Ledesma  : 1960  MRN: [id number]  Referring provider: Mehreen Lugo MD  Dx:   Encounter Diagnosis     ICD-10-CM    1  Lumbar radiculopathy  M54 16    2  Chronic left-sided low back pain with sciatica, sciatica laterality unspecified  M54 40     G89 29    3  Radiculopathy, lumbar region  M54 16                   Subjective: Patient reports feeling tight this AM, but felt good yesterday  Objective: See treatment diary below      Assessment: Tolerated treatment well  Patient would benefit from continued PT  Provided t-band for home use  Felt good after session  Muscles feels less tight after manual therapy  Plan: Continue per plan of care  Precautions: MI, HTN, Failed back syndrome, CAD      Manuals            PA mobs L2-4 Grade IV JF Grade IV JF           PA rocking mobs JF JF           IASTM left lumbar paraspinals JF JF                        Neuro Re-Ed             Pt education L-spine anatomy and pathology            Webslide rows M,L  Black 3" 2x10           E  I  [employer] 10"x10           PPT with march             TA contraction  10"x10           Bridges  5"x20           HL T-band clamshells with TA  5" x20           SL clamshells  5"x20           Prone UE raise             Prone LE raise             Ther Ex             Nustep seat 8 with UE  L6x10'           HS stretch alis               Seated T-ball stretch L/R/Fwd             Seated left QL stretch                                                                 Ther Activity                                       Gait Training                                       Modalities

## 2022-06-28 NOTE — PROGRESS NOTES
Daily Note     Today's date: 2022  Patient name: Ralf Cunningham  : 1960  MRN: [id number]  Referring provider: Jessie Hernandez MD  Dx:   Encounter Diagnosis     ICD-10-CM    1  Lumbar radiculopathy  M54 16    2  Chronic left-sided low back pain with sciatica, sciatica laterality unspecified  M54 40     G89 29    3  Radiculopathy, lumbar region  M54 16                   Subjective: Patient states his back has been feeling much better  Only gets a little discomfort at the end of the night  No sleep disturbances  Able to perform all ADL's without pain  Is cautious about how he performs things  Objective: See treatment diary below      Assessment: Tolerated treatment well  Patient would benefit from continued PT  Pain levels greatly decreased  Good technique with TE today  Plan: Continue per plan of care  Precautions: MI, HTN, Failed back syndrome, CAD      Manuals           PA mobs L2-4 Grade IV JF Grade IV JF Grade IV JF          PA rocking mobs JF JF JF          IASTM left lumbar paraspinals JF JF JF                       Neuro Re-Ed             Pt education L-spine anatomy and pathology            Webslide rows M,L  Black 3" 2x10 Black 3" 2x15          [employer]  Black 10"x10 Black 10"x10          PPT with march             TA contraction  10"x10 HEP          Bridges  5"x20 5"x30          HL T-band clamshells with TA  5" x20 BTB 5"x20          SL clamshells  5"x20 5"x20          Prone UE raise   x20 NV          Prone LE raise   x20 NV          Ther Ex             Nustep seat 8 with UE  L6x10' L6x10'          HS stretch alis     3x30"          Seated T-ball stretch L/R/Fwd             Seated left QL stretch                                                                 Ther Activity                                       Gait Training                                       Modalities

## 2022-06-29 ENCOUNTER — OFFICE VISIT (OUTPATIENT)
Dept: PHYSICAL THERAPY | Facility: CLINIC | Age: 62
End: 2022-06-29
Payer: COMMERCIAL

## 2022-06-29 DIAGNOSIS — M54.40 CHRONIC LEFT-SIDED LOW BACK PAIN WITH SCIATICA, SCIATICA LATERALITY UNSPECIFIED: ICD-10-CM

## 2022-06-29 DIAGNOSIS — M54.16 LUMBAR RADICULOPATHY: Primary | ICD-10-CM

## 2022-06-29 DIAGNOSIS — M54.16 RADICULOPATHY, LUMBAR REGION: ICD-10-CM

## 2022-06-29 DIAGNOSIS — G89.29 CHRONIC LEFT-SIDED LOW BACK PAIN WITH SCIATICA, SCIATICA LATERALITY UNSPECIFIED: ICD-10-CM

## 2022-06-29 PROCEDURE — 97112 NEUROMUSCULAR REEDUCATION: CPT | Performed by: PHYSICAL THERAPIST

## 2022-06-29 PROCEDURE — 97140 MANUAL THERAPY 1/> REGIONS: CPT | Performed by: PHYSICAL THERAPIST

## 2022-06-29 PROCEDURE — 97110 THERAPEUTIC EXERCISES: CPT | Performed by: PHYSICAL THERAPIST

## 2022-07-01 ENCOUNTER — OFFICE VISIT (OUTPATIENT)
Dept: PHYSICAL THERAPY | Facility: CLINIC | Age: 62
End: 2022-07-01
Payer: COMMERCIAL

## 2022-07-01 DIAGNOSIS — M54.40 CHRONIC LEFT-SIDED LOW BACK PAIN WITH SCIATICA, SCIATICA LATERALITY UNSPECIFIED: ICD-10-CM

## 2022-07-01 DIAGNOSIS — G89.29 CHRONIC LEFT-SIDED LOW BACK PAIN WITH SCIATICA, SCIATICA LATERALITY UNSPECIFIED: ICD-10-CM

## 2022-07-01 DIAGNOSIS — M54.16 LUMBAR RADICULOPATHY: Primary | ICD-10-CM

## 2022-07-01 PROCEDURE — 97140 MANUAL THERAPY 1/> REGIONS: CPT

## 2022-07-01 PROCEDURE — 97112 NEUROMUSCULAR REEDUCATION: CPT

## 2022-07-01 PROCEDURE — 97110 THERAPEUTIC EXERCISES: CPT

## 2022-07-01 NOTE — PROGRESS NOTES
Daily Note     Today's date: 2022  Patient name: Carliss Closs  : 1960  MRN: [id number]  Referring provider: Melanie Pardo MD  Dx:   Encounter Diagnosis     ICD-10-CM    1  Lumbar radiculopathy  M54 16    2  Chronic left-sided low back pain with sciatica, sciatica laterality unspecified  M54 40     G89 29                   Subjective: Reports that his back has been feeling better recently  Is unsure if he is truly doing better with exercises and stretching or if the pain medication he is taking is masking his symptoms  States that he is going to try not taking his medication over the weekend and see how he does without it  Objective: See treatment diary below      Assessment: Tolerated treatment well  Added prone UE/LE raises this session to facilitate improved lumbar stabilization  Responds well to mobilizations and IASTM with decreased pain following  Patient demonstrated fatigue post treatment, exhibited good technique with therapeutic exercises and would benefit from continued PT      Plan: Continue per plan of care  Precautions: MI, HTN, Failed back syndrome, CAD      Manuals          PA mobs L2-4 Grade IV JF Grade IV JF Grade IV JF Grade IV BE         PA chandler mobs JF JF JF BE         IASTM left lumbar paraspinals JF JF JF BE                      Neuro Re-Ed             Pt education L-spine anatomy and pathology            Webslide rows M,L  Black 3" 2x10 Black 3" 2x15 Black 3" 2x15         [employer]  Black 10"x10 Black 10"x10 Black 10"x10          PPT with march             TA contraction  10"x10 HEP          Bridges  5"x20 5"x30          HL T-band clamshells with TA  5" x20 BTB 5"x20 Black 5"x20         SL clamshells  5"x20 5"x20          Prone UE raise   x20 NV 3" x20          Prone LE raise   x20 NV Alt 3" x20         Ther Ex             Nustep seat 8 with UE  L6x10' L6x10' L6x10'         HS stretch alis     3x30" 3x30"          Seated T-ball stretch L/R/Fwd Seated left QL stretch                                                                 Ther Activity                                       Gait Training                                       Modalities

## 2022-07-03 DIAGNOSIS — K21.9 GASTROESOPHAGEAL REFLUX DISEASE WITHOUT ESOPHAGITIS: ICD-10-CM

## 2022-07-05 RX ORDER — OMEPRAZOLE 40 MG/1
CAPSULE, DELAYED RELEASE ORAL
Qty: 90 CAPSULE | Refills: 1 | Status: ON HOLD | OUTPATIENT
Start: 2022-07-05

## 2022-07-06 ENCOUNTER — OFFICE VISIT (OUTPATIENT)
Dept: PHYSICAL THERAPY | Facility: CLINIC | Age: 62
End: 2022-07-06
Payer: COMMERCIAL

## 2022-07-06 DIAGNOSIS — M54.16 LUMBAR RADICULOPATHY: Primary | ICD-10-CM

## 2022-07-06 DIAGNOSIS — G89.29 CHRONIC LEFT-SIDED LOW BACK PAIN WITH SCIATICA, SCIATICA LATERALITY UNSPECIFIED: ICD-10-CM

## 2022-07-06 DIAGNOSIS — M54.16 RADICULOPATHY, LUMBAR REGION: ICD-10-CM

## 2022-07-06 DIAGNOSIS — M54.40 CHRONIC LEFT-SIDED LOW BACK PAIN WITH SCIATICA, SCIATICA LATERALITY UNSPECIFIED: ICD-10-CM

## 2022-07-06 PROCEDURE — 97112 NEUROMUSCULAR REEDUCATION: CPT

## 2022-07-06 PROCEDURE — 97140 MANUAL THERAPY 1/> REGIONS: CPT

## 2022-07-06 PROCEDURE — 97110 THERAPEUTIC EXERCISES: CPT

## 2022-07-06 NOTE — PROGRESS NOTES
Daily Note     Today's date: 2022  Patient name: Valentín Fierro  : 1960  MRN: [id number]  Referring provider: Margie Lim MD  Dx:   Encounter Diagnosis     ICD-10-CM    1  Lumbar radiculopathy  M54 16    2  Radiculopathy, lumbar region  M54 16    3  Chronic left-sided low back pain with sciatica, sciatica laterality unspecified  M54 40     G89 29        Start Time: 813  Stop Time: 904  Total time in clinic (min): 51 minutes    Subjective: Pt reports he thought the pain was getting better, but he feels slightly worse today  Objective: See treatment diary below      Assessment: Tolerated treatment well despite increase in pain  He reports still taking his pain medication, he woke up with this increased pain on Saturday  Good effort noted with all exercises, min cues needed for correct form and technique  No specific exercise caused increased pain today  Patient would benefit from continued PT      Plan: Continue per plan of care  Precautions: MI, HTN, Failed back syndrome, CAD      Manuals         PA mobs L2-4 Grade IV JF Grade IV JF Grade IV JF Grade IV BE BE        PA rocking mobs JF JF JF BE BE        IASTM left lumbar paraspinals JF JF JF BE KT                     Neuro Re-Ed             Pt education L-spine anatomy and pathology            Webslide rows M,L  Black 3" 2x10 Black 3" 2x15 Black 3" 2x15 Black 3' 2x15        [employer]  Black 10"x10 Black 10"x10 Black 10"x10  Black 10"x10        PPT with march             TA contraction  10"x10 HEP          Bridges  5"x20 5"x30  5"x30        HL T-band clamshells with TA  5" x20 BTB 5"x20 Black 5"x20 Black 5"x20        SL clamshells  5"x20 5"x20          Prone UE raise   x20 NV 3" x20  3" x 20        Prone LE raise   x20 NV Alt 3" x20 Alt 3" x20        Ther Ex             Nustep seat 8 with UE  L6x10' L6x10' L6x10' L6x10'        HS stretch alis     3x30" 3x30"  3x30"        Seated T-ball stretch L/R/Fwd Seated left QL stretch                                                                 Ther Activity                                       Gait Training                                       Modalities

## 2022-07-08 ENCOUNTER — OFFICE VISIT (OUTPATIENT)
Dept: PHYSICAL THERAPY | Facility: CLINIC | Age: 62
End: 2022-07-08
Payer: COMMERCIAL

## 2022-07-08 DIAGNOSIS — M54.16 LUMBAR RADICULOPATHY: Primary | ICD-10-CM

## 2022-07-08 DIAGNOSIS — M54.40 CHRONIC LEFT-SIDED LOW BACK PAIN WITH SCIATICA, SCIATICA LATERALITY UNSPECIFIED: ICD-10-CM

## 2022-07-08 DIAGNOSIS — G89.29 CHRONIC LEFT-SIDED LOW BACK PAIN WITH SCIATICA, SCIATICA LATERALITY UNSPECIFIED: ICD-10-CM

## 2022-07-08 DIAGNOSIS — M54.16 RADICULOPATHY, LUMBAR REGION: ICD-10-CM

## 2022-07-08 PROCEDURE — 97140 MANUAL THERAPY 1/> REGIONS: CPT

## 2022-07-08 PROCEDURE — 97110 THERAPEUTIC EXERCISES: CPT

## 2022-07-08 PROCEDURE — 97112 NEUROMUSCULAR REEDUCATION: CPT

## 2022-07-08 NOTE — PROGRESS NOTES
Daily Note     Today's date: 2022  Patient name: Papito Gooden  : 1960  MRN: [id number]  Referring provider: Donna Gooden MD  Dx:   Encounter Diagnosis     ICD-10-CM    1  Lumbar radiculopathy  M54 16    2  Radiculopathy, lumbar region  M54 16    3  Chronic left-sided low back pain with sciatica, sciatica laterality unspecified  M54 40     G89 29                   Subjective: States that his back feels like it may be moreso on his left side  Notes that the intensity of his pain is less, however it's still there  Objective: See treatment diary below      Assessment: Tolerated treatment well  Added seated stretches for the lumbar spine to decrease muscle tightness and pain levels  Palpable hypertonicity noted in L lumbar paraspinal musculature  Educated to complete seated lumbar stretches at home to facilitate improved muscle flexibility  Patient exhibited good technique with therapeutic exercises and would benefit from continued PT      Plan: Progress treatment as tolerated         Precautions: MI, HTN, Failed back syndrome, CAD      Manuals        PA mobs L2-4 Grade IV JF Grade IV JF Grade IV JF Grade IV BE BE BE       PA rocking mobs JF JF JF BE BE BE       IASTM left lumbar paraspinals JF JF JF BE KT BE                    Neuro Re-Ed             Pt education L-spine anatomy and pathology            Webslide rows M,L  Black 3" 2x10 Black 3" 2x15 Black 3" 2x15 Black 3' 2x15 Black 3" 2x15       [employer]  Black 10"x10 Black 10"x10 Black 10"x10  Black 10"x10 Black 10"x10       PPT with march             TA contraction  10"x10 HEP          Bridges  5"x20 5"x30  5"x30        HL T-band clamshells with TA  5" x20 BTB 5"x20 Black 5"x20 Black 5"x20        SL clamshells  5"x20 5"x20          Prone UE raise   x20 NV 3" x20  3" x 20 3"x20       Prone LE raise   x20 NV Alt 3" x20 Alt 3" x20 Alt 3"   x20       Supine alt UE/LE      3" x10        Ther Ex             Nustep seat 8 with UE  L6x10' L6x10' L6x10' L6x10' L6x10'       HS stretch alis     3x30" 3x30"  3x30"        Seated T-ball stretch L/R/Fwd      5"x10 ea dir       Seated left QL stretch      10"x10                                                           Ther Activity                                       Gait Training                                       Modalities

## 2022-07-13 ENCOUNTER — OFFICE VISIT (OUTPATIENT)
Dept: PHYSICAL THERAPY | Facility: CLINIC | Age: 62
End: 2022-07-13
Payer: COMMERCIAL

## 2022-07-13 DIAGNOSIS — G89.29 CHRONIC LEFT-SIDED LOW BACK PAIN WITH SCIATICA, SCIATICA LATERALITY UNSPECIFIED: ICD-10-CM

## 2022-07-13 DIAGNOSIS — M54.16 LUMBAR RADICULOPATHY: Primary | ICD-10-CM

## 2022-07-13 DIAGNOSIS — M54.40 CHRONIC LEFT-SIDED LOW BACK PAIN WITH SCIATICA, SCIATICA LATERALITY UNSPECIFIED: ICD-10-CM

## 2022-07-13 DIAGNOSIS — M54.16 RADICULOPATHY, LUMBAR REGION: ICD-10-CM

## 2022-07-13 PROCEDURE — 97110 THERAPEUTIC EXERCISES: CPT

## 2022-07-13 PROCEDURE — 97112 NEUROMUSCULAR REEDUCATION: CPT

## 2022-07-13 PROCEDURE — 97140 MANUAL THERAPY 1/> REGIONS: CPT | Performed by: PHYSICAL THERAPIST

## 2022-07-13 PROCEDURE — 97140 MANUAL THERAPY 1/> REGIONS: CPT

## 2022-07-13 NOTE — PROGRESS NOTES
Daily Note     Today's date: 2022  Patient name: Agapito Piper  : 1960  MRN: [id number]  Referring provider: Nancy Philippe MD  Dx:   Encounter Diagnosis     ICD-10-CM    1  Lumbar radiculopathy  M54 16    2  Radiculopathy, lumbar region  M54 16    3  Chronic left-sided low back pain with sciatica, sciatica laterality unspecified  M54 40     G89 29        Start Time: 730  Stop Time: 820  Total time in clinic (min): 50 minutes    Subjective: Pt noted that he has been having more pain in his L hip and lower back 3-4/10  No other changes since last treatment session  Pt noted tat his pain is worse in the morning, better t/o the day and has some discomfort at night and lays on his sides tossing and turning  Pt also noted he used to like sleeping on his belly but he cant anymore due to lumbar p!      Objective: See treatment diary below      Assessment: Continued with treatment session,  No progression within exercises this visit  (+) relief s/p manuals  Tolerated treatment fair  Patient exhibited good technique with therapeutic exercises and would benefit from continued PT S/P treatment session noted no pain in lumbar spine or L hip  Additional education with sleep positions and neutral spinal alignment supported with pillow between knees in S/L and pillows under pelvis with prone  DOMS reviewed and acknowledged by patient may have 24 to 48 hours of muscle soreness following treatment session       HEP at this time pt is completing on an every other day basis  Reviewed HEP importance  Plan: Continue per plan of care  Progress as patient is able        Precautions: MI, HTN, Failed back syndrome, CAD      Manuals       PA mobs L2-4 Grade IV JF Grade IV JF Grade IV JF Grade IV BE BE BE JF      PA rocking mobs JF JF JF BE BE BE JF      IASTM left lumbar paraspinals JF JF JF BE KT BE SC      Piriformis stretch        L only JF       Neuro Re-Ed             Pt education L-spine anatomy and pathology            Webslide rows M,L  Black 3" 2x10 Black 3" 2x15 Black 3" 2x15 Black 3' 2x15 Black 3" 2x15 Black 3" 2x 15       [employer]  Black 10"x10 Black 10"x10 Black 10"x10  Black 10"x10 Black 10"x10 black 10" x 10       PPT with march             TA contraction  10"x10 HEP          Bridges  5"x20 5"x30  5"x30        HL T-band clamshells with TA  5" x20 BTB 5"x20 Black 5"x20 Black 5"x20        SL clamshells  5"x20 5"x20          Prone UE raise   x20 NV 3" x20  3" x 20 3"x20 3" 20x       Prone LE raise   x20 NV Alt 3" x20 Alt 3" x20 Alt 3"   x20 Alt 3" 20x       Supine alt UE/LE      3" x10                                               Ther Ex             Nustep seat 8 with UE  L6x10' L6x10' L6x10' L6x10' L6x10' L6 10 min       HS stretch alis     3x30" 3x30"  3x30"        Seated T-ball stretch L/R/Fwd      5"x10 ea dir NV      Seated left QL stretch      10"x10 NV                                                          Ther Activity                                       Gait Training                                       Modalities

## 2022-07-15 ENCOUNTER — OFFICE VISIT (OUTPATIENT)
Dept: PHYSICAL THERAPY | Facility: CLINIC | Age: 62
End: 2022-07-15
Payer: COMMERCIAL

## 2022-07-15 DIAGNOSIS — M54.40 CHRONIC LEFT-SIDED LOW BACK PAIN WITH SCIATICA, SCIATICA LATERALITY UNSPECIFIED: ICD-10-CM

## 2022-07-15 DIAGNOSIS — M54.16 RADICULOPATHY, LUMBAR REGION: ICD-10-CM

## 2022-07-15 DIAGNOSIS — G89.29 CHRONIC LEFT-SIDED LOW BACK PAIN WITH SCIATICA, SCIATICA LATERALITY UNSPECIFIED: ICD-10-CM

## 2022-07-15 DIAGNOSIS — M54.16 LUMBAR RADICULOPATHY: Primary | ICD-10-CM

## 2022-07-15 PROCEDURE — 97112 NEUROMUSCULAR REEDUCATION: CPT

## 2022-07-15 PROCEDURE — 97110 THERAPEUTIC EXERCISES: CPT

## 2022-07-15 PROCEDURE — 97140 MANUAL THERAPY 1/> REGIONS: CPT

## 2022-07-15 NOTE — PROGRESS NOTES
Daily Note     Today's date: 7/15/2022  Patient name: Newton Allison  : 1960  MRN: [id number]  Referring provider: Leslye Urbina MD  Dx:   Encounter Diagnosis     ICD-10-CM    1  Lumbar radiculopathy  M54 16    2  Radiculopathy, lumbar region  M54 16    3  Chronic left-sided low back pain with sciatica, sciatica laterality unspecified  M54 40     G89 29                   Subjective: Pt noted that his pain is getting better having a dull p! In his lower L side of his back about a 4/10  Objective: See treatment diary below      Assessment: Continued with treatment session, no progressions added this visit  Pt feeling good with all exercises performed  Tolerated treatment fair  Patient exhibited good technique with therapeutic exercises and would benefit from continued PT  S/P treatment session,  No p! Noted in his back  Plan: Continue per plan of care  Precautions: MI, HTN, Failed back syndrome, CAD      Manuals 6/22 6/24 6/29 7/1 7/6 7/8 7/13 7/15     PA mobs L2-4 Grade IV JF Grade IV JF Grade IV JF Grade IV BE BE BE JF JF     PA rocking mobs JF JF JF BE BE BE JF JF     IASTM left lumbar paraspinals JF JF JF BE KT BE SC SC     Piriformis stretch        L only JF  NV     Neuro Re-Ed             Pt education L-spine anatomy and pathology            Webslide rows M,L  Black 3" 2x10 Black 3" 2x15 Black 3" 2x15 Black 3' 2x15 Black 3" 2x15 Black 3" 2x 15  Black 3" 2x 15      [employer]  Black 10"x10 Black 10"x10 Black 10"x10  Black 10"x10 Black 10"x10 black 10" x 10  blCK 10" X 10      PPT with march             TA contraction  10"x10 HEP          Bridges  5"x20 5"x30  5"x30        HL T-band clamshells with TA  5" x20 BTB 5"x20 Black 5"x20 Black 5"x20        SL clamshells  5"x20 5"x20          Prone UE raise   x20 NV 3" x20  3" x 20 3"x20 3" 20x  3" 20X EA        Prone LE raise   x20 NV Alt 3" x20 Alt 3" x20 Alt 3"   x20 Alt 3" 20x  3" 20x      Supine alt UE/LE      3" x10 Ther Ex             Nustep seat 8 with UE  L6x10' L6x10' L6x10' L6x10' L6x10' L6 10 min  L7 10 min      HS stretch alis     3x30" 3x30"  3x30"        Seated T-ball stretch L/R/Fwd      5"x10 ea dir NV      Seated left QL stretch      10"x10 NV                                                          Ther Activity                                       Gait Training                                       Modalities

## 2022-07-20 ENCOUNTER — OFFICE VISIT (OUTPATIENT)
Dept: PHYSICAL THERAPY | Facility: CLINIC | Age: 62
End: 2022-07-20
Payer: COMMERCIAL

## 2022-07-20 DIAGNOSIS — M54.16 RADICULOPATHY, LUMBAR REGION: ICD-10-CM

## 2022-07-20 DIAGNOSIS — M54.16 LUMBAR RADICULOPATHY: Primary | ICD-10-CM

## 2022-07-20 DIAGNOSIS — M54.40 CHRONIC LEFT-SIDED LOW BACK PAIN WITH SCIATICA, SCIATICA LATERALITY UNSPECIFIED: ICD-10-CM

## 2022-07-20 DIAGNOSIS — G89.29 CHRONIC LEFT-SIDED LOW BACK PAIN WITH SCIATICA, SCIATICA LATERALITY UNSPECIFIED: ICD-10-CM

## 2022-07-20 PROCEDURE — 97140 MANUAL THERAPY 1/> REGIONS: CPT | Performed by: PHYSICAL THERAPIST

## 2022-07-20 PROCEDURE — 97112 NEUROMUSCULAR REEDUCATION: CPT | Performed by: PHYSICAL THERAPIST

## 2022-07-20 PROCEDURE — 97110 THERAPEUTIC EXERCISES: CPT | Performed by: PHYSICAL THERAPIST

## 2022-07-20 NOTE — PROGRESS NOTES
Daily Note     Today's date: 2022  Patient name: Taylor Glaser  : 1960  MRN: [id number]  Referring provider: Steffi Hashimoto, MD  Dx:   Encounter Diagnosis     ICD-10-CM    1  Lumbar radiculopathy  M54 16    2  Radiculopathy, lumbar region  M54 16    3  Chronic left-sided low back pain with sciatica, sciatica laterality unspecified  M54 40     G89 29                   Subjective: Patient reports pain in lower back has returned to baseline levels  Radicular symptoms have resolved  Now only has discomfort at left lateral hip when first getting up and when lying on it at night     As he is active, this discomfort resolves  Objective: See treatment diary below      Assessment: Tolerated treatment well  Patient would benefit from continued PT   (+) TTP left GT, left Miguelina  (-) scouring test, FADIR and KHALIF  Symptoms consistent with GT bursitis  Instructed in ITB stretches  Patient requests DC of PT services as he has returned to his baseline levels  All goals met  Lumbar ROM and bilateral LE strength WNL  No TTP L-spine, paraspinals, or left piriformis  Plan: DC PT services per patient request      Precautions: MI, HTN, Failed back syndrome, CAD      Manuals  7/1 7/6 7/8 7/13 7/15 7/20    PA mobs L2-4 Grade IV JF Grade IV JF Grade IV JF Grade IV BE BE BE JF JF     PA rocking mobs JF JF JF BE BE BE JF JF     IASTM left lumbar paraspinals JF JF JF BE KT BE SC SC     Piriformis stretch        L only JF  NV     Neuro Re-Ed             Pt education L-spine anatomy and pathology        GT bursitis, hip anatomy      Webslide rows M,L  Black 3" 2x10 Black 3" 2x15 Black 3" 2x15 Black 3' 2x15 Black 3" 2x15 Black 3" 2x 15  Black 3" 2x 15      [employer]  Black 10"x10 Black 10"x10 Black 10"x10  Black 10"x10 Black 10"x10 black 10" x 10  blCK 10" X 10      PPT with march             TA contraction  10"x10 HEP          Bridges  5"x20 5"x30  5"x30        HL T-band clamshells with TA  5" x20 BTB 5"x20 Black 5"x20 Black 5"x20        SL clamshells  5"x20 5"x20          Prone UE raise   x20 NV 3" x20  3" x 20 3"x20 3" 20x  3" 20X EA  Prone LE raise   x20 NV Alt 3" x20 Alt 3" x20 Alt 3"   x20 Alt 3" 20x  3" 20x      Supine alt UE/LE      3" x10                                               Ther Ex             Nustep seat 8 with UE  L6x10' L6x10' L6x10' L6x10' L6x10' L6 10 min  L7 10 min  L7 10 min     HS stretch alis     3x30" 3x30"  3x30"        Seated T-ball stretch L/R/Fwd      5"x10 ea dir NV      Seated left QL stretch      10"x10 NV      Supine ITB stretch with strap         3x30"    SL ITB stretch         Instructed                              Ther Activity                                       Gait Training                                       Modalities

## 2022-07-22 ENCOUNTER — APPOINTMENT (OUTPATIENT)
Dept: PHYSICAL THERAPY | Facility: CLINIC | Age: 62
End: 2022-07-22
Payer: COMMERCIAL

## 2022-08-07 DIAGNOSIS — M54.16 RADICULOPATHY, LUMBAR REGION: ICD-10-CM

## 2022-08-08 RX ORDER — MELOXICAM 15 MG/1
TABLET ORAL
Qty: 30 TABLET | Refills: 1 | Status: SHIPPED | OUTPATIENT
Start: 2022-08-08 | End: 2022-10-10

## 2022-08-10 LAB
BUN SERPL-MCNC: 26 MG/DL (ref 8–27)
BUN/CREAT SERPL: 26 (ref 10–24)
CHLORIDE SERPL-SCNC: 106 MMOL/L (ref 96–106)
CO2 SERPL-SCNC: 23 MMOL/L (ref 20–29)
CREAT SERPL-MCNC: 1 MG/DL (ref 0.76–1.27)
EGFR: 86 ML/MIN/1.73
GLUCOSE SERPL-MCNC: 92 MG/DL (ref 65–99)
POTASSIUM SERPL-SCNC: 4.6 MMOL/L (ref 3.5–5.2)
PSA SERPL-MCNC: 7.9 NG/ML (ref 0–4)
SL AMB % FREE PSA: 13.2 %
SL AMB PSA, FREE: 1.04 NG/ML
SL AMB REFLEX CRITERIA: ABNORMAL
SODIUM SERPL-SCNC: 141 MMOL/L (ref 134–144)

## 2022-08-18 ENCOUNTER — OFFICE VISIT (OUTPATIENT)
Dept: UROLOGY | Facility: CLINIC | Age: 62
End: 2022-08-18
Payer: COMMERCIAL

## 2022-08-18 VITALS
HEART RATE: 85 BPM | WEIGHT: 197.8 LBS | HEIGHT: 66 IN | SYSTOLIC BLOOD PRESSURE: 120 MMHG | DIASTOLIC BLOOD PRESSURE: 80 MMHG | BODY MASS INDEX: 31.79 KG/M2 | OXYGEN SATURATION: 98 %

## 2022-08-18 DIAGNOSIS — R97.20 ELEVATED PSA: Primary | ICD-10-CM

## 2022-08-18 PROCEDURE — 3079F DIAST BP 80-89 MM HG: CPT | Performed by: PHYSICIAN ASSISTANT

## 2022-08-18 PROCEDURE — 99213 OFFICE O/P EST LOW 20 MIN: CPT | Performed by: PHYSICIAN ASSISTANT

## 2022-08-18 PROCEDURE — 3074F SYST BP LT 130 MM HG: CPT | Performed by: PHYSICIAN ASSISTANT

## 2022-08-18 NOTE — PROGRESS NOTES
UROLOGY PROGRESS NOTE   Patient Identifiers: Sheldon Massey (MRN [id number])  Date of Service: 8/18/2022    Subjective:    82-year-old man history of elevated PSA  He had 2 previous biopsies both benign  Multiparametric MRI showed PI-RADS category 2  His last PSA was 8 5  Current PSA 7 9  Prostate is 112 g  He has been on 0 8 mg of tamsulosin  Cystoscopy shows trilobar hypertrophy with median lobe  He is interested in TURP  Reason for visit:  Elevated PSA follow-up    Objective:     VITALS:    There were no vitals filed for this visit          LABS:  Lab Results   Component Value Date    HGB 15 4 04/04/2022    HCT 44 8 04/04/2022    WBC 5 0 04/04/2022     04/04/2022   ]    Lab Results   Component Value Date     08/07/2017    K 4 6 08/09/2022     08/09/2022    CO2 23 08/09/2022    BUN 26 08/09/2022    CREATININE 1 00 08/09/2022    CALCIUM 9 2 08/07/2017    GLUCOSE 92 08/07/2017   ]        INPATIENT MEDS:    Current Outpatient Medications:     aspirin (ECOTRIN) 325 mg EC tablet, Take 325 mg by mouth every other day, Disp: , Rfl:     cyclobenzaprine (FLEXERIL) 10 mg tablet, Take 1 tablet (10 mg total) by mouth daily at bedtime as needed for muscle spasms, Disp: 30 tablet, Rfl: 3    diltiazem (CARDIZEM CD) 120 mg 24 hr capsule, TAKE 1 CAPSULE BY MOUTH EVERY DAY, Disp: 90 capsule, Rfl: 4    EPINEPHrine (EPIPEN) 0 3 mg/0 3 mL SOAJ, Inject 0 3 mL (0 3 mg total) into a muscle once for 1 dose, Disp: 0 3 mL, Rfl: 1    meloxicam (MOBIC) 15 mg tablet, TAKE 1 TABLET BY MOUTH EVERY DAY AS NEEDED, Disp: 30 tablet, Rfl: 1    Omega-3 Fatty Acids (FISH OIL) 1000 MG CPDR, Take 1 capsule by mouth daily  , Disp: , Rfl:     omeprazole (PriLOSEC) 40 MG capsule, TAKE 1 CAPSULE BY MOUTH EVERY DAY, Disp: 90 capsule, Rfl: 1    sildenafil (VIAGRA) 100 mg tablet, Take 1 tablet (100 mg total) by mouth daily as needed for erectile dysfunction, Disp: 10 tablet, Rfl: 11    tamsulosin (FLOMAX) 0 4 mg, Take 2 capsules (0 8 mg total) by mouth daily with dinner, Disp: 180 capsule, Rfl: 3      Physical Exam:   There were no vitals taken for this visit  GEN: alert and oriented x 3    RESP: breathing comfortably with no accessory muscle use    ABD: soft, non-tender, non-distended   INCISION:    EXT: no significant peripheral edema     RADIOLOGY:   None     Assessment:   1  Elevated PSA  2   Obstructing BPH     Plan:   -he is interested in proceeding with TURP in the fall  -I reviewed the procedure hospitalization along with risks and complications  -consent is signed  -case request is entered and the  notified

## 2022-08-25 ENCOUNTER — PREP FOR PROCEDURE (OUTPATIENT)
Dept: UROLOGY | Facility: CLINIC | Age: 62
End: 2022-08-25

## 2022-08-25 DIAGNOSIS — N40.1 BENIGN PROSTATIC HYPERPLASIA WITH LOWER URINARY TRACT SYMPTOMS, SYMPTOM DETAILS UNSPECIFIED: ICD-10-CM

## 2022-08-25 DIAGNOSIS — Z01.818 PREOP EXAMINATION: ICD-10-CM

## 2022-08-25 DIAGNOSIS — Z01.810 PREOP CARDIOVASCULAR EXAM: ICD-10-CM

## 2022-08-25 DIAGNOSIS — Z01.812 PRE-PROCEDURE LAB EXAM: ICD-10-CM

## 2022-08-25 DIAGNOSIS — R39.89 SUSPECTED UTI: ICD-10-CM

## 2022-08-25 DIAGNOSIS — N32.0 BLADDER OUTLET OBSTRUCTION: Primary | ICD-10-CM

## 2022-08-25 DIAGNOSIS — Z79.01 LONG TERM (CURRENT) USE OF ANTICOAGULANTS: ICD-10-CM

## 2022-08-25 RX ORDER — SODIUM CHLORIDE, SODIUM LACTATE, POTASSIUM CHLORIDE, CALCIUM CHLORIDE 600; 310; 30; 20 MG/100ML; MG/100ML; MG/100ML; MG/100ML
125 INJECTION, SOLUTION INTRAVENOUS CONTINUOUS
Status: CANCELLED | OUTPATIENT
Start: 2022-08-25

## 2022-08-29 ENCOUNTER — TELEPHONE (OUTPATIENT)
Dept: UROLOGY | Facility: AMBULATORY SURGERY CENTER | Age: 62
End: 2022-08-29

## 2022-08-29 NOTE — TELEPHONE ENCOUNTER
CHARLIE Duval  Spoke to patient over the phone  Dewayne Faria expressed Dr Treadwell' review of the chart that robotic simple prostatectomy would be the best procedure   I explained some of the differences in he agrees to proceed  I called pt this afternoon to discuss scheduling his simple robotic prostatectomy procedure with Dr Tata Bustillos  I was able to speak with pt and offered to schedule him at the [facility] on 9/26/2022 but pt declined and asked for something at the end of October  I then offered pt 10/26/2022 at the [facility] and pt confirmed that this will work for him  I verbally went over all of pt 's pre op instructions and prep information with him  He is aware that all pre op testing his needed about 2-3 weeks prior to surgery  Pt is aware that he will need to stop and Aspirin and/or vitamins 1 week prior to surgery  I also went over pt 's bowel prep with him  Pt will make sure that he has a  on the day of surgery  Per pt 's request I will be mailing him a copy of his surgical packet and instructed him to call our office with any questions or concerns regarding this procedure

## 2022-08-30 NOTE — TELEPHONE ENCOUNTER
I called Dr Geoffrey Walsh office and changed pt 's appt on 9/28/22 into a cardiac clearance appt   Per pt 's request

## 2022-09-23 ENCOUNTER — OFFICE VISIT (OUTPATIENT)
Dept: CARDIOLOGY CLINIC | Facility: CLINIC | Age: 62
End: 2022-09-23
Payer: COMMERCIAL

## 2022-09-23 VITALS
HEIGHT: 66 IN | SYSTOLIC BLOOD PRESSURE: 116 MMHG | WEIGHT: 201 LBS | DIASTOLIC BLOOD PRESSURE: 82 MMHG | HEART RATE: 61 BPM | OXYGEN SATURATION: 97 % | BODY MASS INDEX: 32.3 KG/M2

## 2022-09-23 DIAGNOSIS — Z01.818 PREOP EXAMINATION: ICD-10-CM

## 2022-09-23 DIAGNOSIS — N32.0 BLADDER OUTLET OBSTRUCTION: ICD-10-CM

## 2022-09-23 DIAGNOSIS — I10 ESSENTIAL HYPERTENSION: ICD-10-CM

## 2022-09-23 DIAGNOSIS — I42.0 DILATED CARDIOMYOPATHY (HCC): Primary | ICD-10-CM

## 2022-09-23 DIAGNOSIS — N40.1 BENIGN PROSTATIC HYPERPLASIA WITH LOWER URINARY TRACT SYMPTOMS, SYMPTOM DETAILS UNSPECIFIED: ICD-10-CM

## 2022-09-23 PROCEDURE — 3079F DIAST BP 80-89 MM HG: CPT | Performed by: INTERNAL MEDICINE

## 2022-09-23 PROCEDURE — 99213 OFFICE O/P EST LOW 20 MIN: CPT | Performed by: INTERNAL MEDICINE

## 2022-09-23 PROCEDURE — 3074F SYST BP LT 130 MM HG: CPT | Performed by: INTERNAL MEDICINE

## 2022-09-23 PROCEDURE — 93000 ELECTROCARDIOGRAM COMPLETE: CPT | Performed by: INTERNAL MEDICINE

## 2022-09-23 NOTE — PROGRESS NOTES
Cardiology Follow Up    Philomena Mcbride  1960  [address] [facility] [id number] [phone]    1  Dilated cardiomyopathy ([address] )  Echo complete w/ contrast if indicated   2  Bladder outlet obstruction  Ambulatory referral to Cardiology   3  Benign prostatic hyperplasia with lower urinary tract symptoms, symptom details unspecified  Ambulatory referral to Cardiology   4  Preop examination  Ambulatory referral to Cardiology    POCT ECG    CANCELED: POCT ECG   5  Essential hypertension  Echo complete w/ contrast if indicated         Discussion/Summary: All of his assessed cardiac problems are stable  I have reviewed his medications and made no changes  He is scheduled for prostrate surgery  I feel his cardiac risk is low and no cardiac testing is needed prior to his surgery  He will get a repeat ECHO in the next several months  He may hold his ASA and fish oil for 7 days prior to surgery if needed  Interval History: He continues to do well from a cardiac standpoint  He is active and denies CP, SOB, palpitations, LE edema  He has a NICM with normalization of his EF by last echo in 2018  /82  ECG today - NSR, low QRS voltage        Patient Active Problem List   Diagnosis    Essential hypertension    Gastroesophageal reflux disease without esophagitis    Elevated PSA    Allergic rhinitis due to pollen    Failed back syndrome    HNP (herniated nucleus pulposus), lumbar    Intervertebral disc disorder with radiculopathy of lumbosacral region    Cardiomyopathy [facility]    Mixed hyperlipidemia    Vitamin D deficiency    Benign prostatic hyperplasia with nocturia    Erectile dysfunction    Radiculopathy, lumbar region     Past Medical History:   Diagnosis Date    Back disorder 02/26/2019    getting epidural shots, physical therapy    BPH (benign prostatic hyperplasia)     CAD (coronary artery disease)     Cardiac disease     Cardiomyopathy ([facility] )     Coronary artery disease     Failed back syndrome     GERD (gastroesophageal reflux disease)     Heart attack ([facility] )     Mild MI 1996     Hypertension     Patient denies    Myocardial infarction ([facility] )     Mild    Umbilical hernia     Last Assessed:3/25/2015     Social History     Socioeconomic History    Marital status: /Civil Union     Spouse name: Not on file    Number of children: Not on file    Years of education: Not on file    Highest education level: Not on file   Occupational History    Occupation:      Comment: full time   Tobacco Use    Smoking status: Never Smoker    Smokeless tobacco: Never Used   Vaping Use    Vaping Use: Never used   Substance and Sexual Activity    Alcohol use: Not Currently     Comment: occ    Drug use: No    Sexual activity: Not on file   Other Topics Concern    Not on file   Social History Narrative    Always uses seat belt    Caffeine use     Social Determinants of Health     Financial Resource Strain: Not on file   Food Insecurity: Not on file   Transportation Needs: Not on file   Physical Activity: Not on file   Stress: Not on file   Social Connections: Not on file   Intimate Partner Violence: Not on file   Housing Stability: Not on file      Family History   Problem Relation Age of Onset    Hypertension Father     Diabetes Father     Pancreatic cancer Mother     Cancer Family     No Known Problems Sister     No Known Problems Brother     No Known Problems Brother      Past Surgical History:   Procedure Laterality Date    BACK SURGERY      HERNIA REPAIR      Last Assessed:3/12/2014 ,lumbar    LUMBAR LAMINECTOMY      Last Assessed:3/12/2014    PROSTATE BIOPSY  09/10/2015    needle biopsy    SHOULDER SURGERY      TONSILLECTOMY         Current Outpatient Medications:     aspirin (ECOTRIN) 325 mg EC tablet, Take 325 mg by mouth every other day, Disp: , Rfl:     cyclobenzaprine (FLEXERIL) 10 mg tablet, Take 1 tablet (10 mg total) by mouth daily at bedtime as needed for muscle spasms, Disp: 30 tablet, Rfl: 3    diltiazem (CARDIZEM CD) 120 mg 24 hr capsule, TAKE 1 CAPSULE BY MOUTH EVERY DAY, Disp: 90 capsule, Rfl: 4    EPINEPHrine (EPIPEN) 0 3 mg/0 3 mL SOAJ, Inject 0 3 mL (0 3 mg total) into a muscle once for 1 dose, Disp: 0 3 mL, Rfl: 1    meloxicam (MOBIC) 15 mg tablet, TAKE 1 TABLET BY MOUTH EVERY DAY AS NEEDED, Disp: 30 tablet, Rfl: 1    Omega-3 Fatty Acids (FISH OIL) 1000 MG CPDR, Take 1 capsule by mouth daily  , Disp: , Rfl:     omeprazole (PriLOSEC) 40 MG capsule, TAKE 1 CAPSULE BY MOUTH EVERY DAY, Disp: 90 capsule, Rfl: 1    sildenafil (VIAGRA) 100 mg tablet, Take 1 tablet (100 mg total) by mouth daily as needed for erectile dysfunction, Disp: 10 tablet, Rfl: 11    tamsulosin (FLOMAX) 0 4 mg, Take 2 capsules (0 8 mg total) by mouth daily with dinner, Disp: 180 capsule, Rfl: 3  Allergies   Allergen Reactions    Other Anaphylaxis     Annotation - [id number]: YELLOW JACKETS     Vitals:    09/23/22 0823   BP: 116/82   BP Location: Left arm   Pulse: 61   SpO2: 97%   Weight: 91 2 kg (201 lb)   Height: 5' 6" (1 676 m)     Weight (last 2 days)     Date/Time Weight    09/23/22 0823 91 2 (201)         Blood pressure 116/82, pulse 61, height 5' 6" (1 676 m), weight 91 2 kg (201 lb), SpO2 97 %  , Body mass index is 32 44 kg/m²      Labs:  Orders Only on 08/09/2022   Component Date Value    Glucose, Random 08/09/2022 92     BUN 08/09/2022 26     Creatinine 08/09/2022 1 00     eGFR 08/09/2022 86     SL AMB BUN/CREATININE RA* 08/09/2022 26 (A)    Sodium 08/09/2022 141     Potassium 08/09/2022 4 6     Chloride 08/09/2022 106     CO2 08/09/2022 23     Prostate Specific Antige* 08/09/2022 7 9 (A)    Reflex Criteria 08/09/2022 Comment     PSA, Free 08/09/2022 1 04     % Free PSA 08/09/2022 13 2    Orders Only on 04/04/2022   Component Date Value    White Blood Cell Count 04/04/2022 5 0     Red Blood Cell Count 04/04/2022 5 30     Hemoglobin 04/04/2022 15 4     HCT 04/04/2022 44 8     MCV 04/04/2022 85     MCH 04/04/2022 29 1     MCHC 04/04/2022 34 4     RDW 04/04/2022 12 9     Platelet Count 86/43/5733 190     Neutrophils 04/04/2022 52     Lymphocytes 04/04/2022 29     Monocytes 04/04/2022 12     Eosinophils 04/04/2022 6     Basophils PCT 04/04/2022 1     Neutrophils (Absolute) 04/04/2022 2 6     Lymphocytes (Absolute) 04/04/2022 1 4     Monocytes (Absolute) 04/04/2022 0 6     Eosinophils (Absolute) 04/04/2022 0 3     Basophils ABS 04/04/2022 0 1     Immature Granulocytes 04/04/2022 0     Immature Granulocytes (A* 04/04/2022 0 0     Glucose, Random 04/04/2022 100 (A)    BUN 04/04/2022 20     Creatinine 04/04/2022 0 97     eGFR 04/04/2022 89     SL AMB BUN/CREATININE RA* 04/04/2022 21     Sodium 04/04/2022 141     Potassium 04/04/2022 4 5     Chloride 04/04/2022 104     CO2 04/04/2022 23     CALCIUM 04/04/2022 9 1     Protein, Total 04/04/2022 6 2     Albumin 04/04/2022 4 2     Globulin, Total 04/04/2022 2 0     Albumin/Globulin Ratio 04/04/2022 2 1     TOTAL BILIRUBIN 04/04/2022 0 4     Alk Phos Isoenzymes 04/04/2022 48     AST 04/04/2022 22     ALT 04/04/2022 28     Cholesterol, Total 04/04/2022 170     Triglycerides 04/04/2022 86     HDL 04/04/2022 43     VLDL Cholesterol Calcula* 04/04/2022 16     LDL Calculated 04/04/2022 111 (A)    TSH 04/04/2022 2 000      Imaging: No results found  Review of Systems:  Review of Systems   Constitutional: Negative for diaphoresis, fatigue, fever and unexpected weight change  HENT: Negative  Respiratory: Negative for cough, shortness of breath and wheezing  Cardiovascular: Negative for chest pain, palpitations and leg swelling  Gastrointestinal: Negative for abdominal pain, diarrhea and nausea  Musculoskeletal: Negative for gait problem and myalgias     Skin: Negative for rash    Neurological: Negative for dizziness and numbness  Psychiatric/Behavioral: Negative  Physical Exam:  Physical Exam  Constitutional:       Appearance: He is well-developed  HENT:      Head: Normocephalic and atraumatic  Eyes:      Pupils: Pupils are equal, round, and reactive to light  Neck:      Vascular: No JVD  Cardiovascular:      Rate and Rhythm: Regular rhythm  Pulses: Normal pulses  Carotid pulses are 2+ on the right side and 2+ on the left side  Heart sounds: S1 normal and S2 normal    Pulmonary:      Effort: Pulmonary effort is normal       Breath sounds: Normal breath sounds  No wheezing or rales  Abdominal:      General: Bowel sounds are normal       Palpations: Abdomen is soft  Tenderness: There is no abdominal tenderness  Musculoskeletal:         General: No tenderness  Normal range of motion  Cervical back: Normal range of motion and neck supple  Skin:     General: Skin is warm  Neurological:      Mental Status: He is alert and oriented to person, place, and time  Cranial Nerves: No cranial nerve deficit  Deep Tendon Reflexes: Reflexes are normal and symmetric

## 2022-09-27 ENCOUNTER — TELEPHONE (OUTPATIENT)
Dept: UROLOGY | Facility: CLINIC | Age: 62
End: 2022-09-27

## 2022-09-29 ENCOUNTER — OFFICE VISIT (OUTPATIENT)
Dept: NEUROLOGY | Facility: CLINIC | Age: 62
End: 2022-09-29
Payer: COMMERCIAL

## 2022-09-29 VITALS
HEIGHT: 68 IN | WEIGHT: 200 LBS | RESPIRATION RATE: 18 BRPM | OXYGEN SATURATION: 98 % | BODY MASS INDEX: 30.31 KG/M2 | DIASTOLIC BLOOD PRESSURE: 82 MMHG | SYSTOLIC BLOOD PRESSURE: 120 MMHG | HEART RATE: 77 BPM

## 2022-09-29 DIAGNOSIS — I10 ESSENTIAL HYPERTENSION: ICD-10-CM

## 2022-09-29 DIAGNOSIS — M54.16 RADICULOPATHY, LUMBAR REGION: Primary | ICD-10-CM

## 2022-09-29 DIAGNOSIS — E78.2 MIXED HYPERLIPIDEMIA: ICD-10-CM

## 2022-09-29 PROCEDURE — 99214 OFFICE O/P EST MOD 30 MIN: CPT | Performed by: PSYCHIATRY & NEUROLOGY

## 2022-09-29 NOTE — PROGRESS NOTES
Carliss Closs is a 63 y o  male  Chief Complaint   Patient presents with    Radiculopathy       Assessment:  1  Radiculopathy, lumbar region    2  Essential hypertension    3  Mixed hyperlipidemia        Plan:  Patient was advised to continue with home exercise program   Follow-up in 6 months  Discussion:  Patient is doing well and does not have any low back pain, she is currently off of anti-inflammatories and muscle relaxers, I have advised him to avoid strenuous activities and to take precautions, if he has any worsening flare-up in the future we could repeat an MRI scan, he is in follow up with the urologist regarding his prostate issues, to keep his blood pressure cholesterol and sugar under control to go to the hospital if has any worsening symptoms and call me otherwise to see me back in 6 months and follow up with the other physicians  Subjective:    HPI   Patient is here in follow-up for his low back pain, since his last visit he tells me that he went for physical therapy and since then his low back pain has resolved, he is currently not taking any anti-inflammatories or muscle relaxers and has no back pain or radiation into the legs, he has some prostate issues and is going for surgery with his urologist in October, no focal weakness, 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, no other complaints      Vitals:    09/29/22 1515   BP: 120/82   BP Location: Right arm   Patient Position: Sitting   Cuff Size: Standard   Pulse: 77   Resp: 18   SpO2: 98%   Weight: 90 7 kg (200 lb)   Height: 5' 8" (1 727 m)       Current Medications    Current Outpatient Medications:     aspirin (ECOTRIN) 325 mg EC tablet, Take 325 mg by mouth every other day, Disp: , Rfl:     cyclobenzaprine (FLEXERIL) 10 mg tablet, Take 1 tablet (10 mg total) by mouth daily at bedtime as needed for muscle spasms, Disp: 30 tablet, Rfl: 3    diltiazem (CARDIZEM CD) 120 mg 24 hr capsule, TAKE 1 CAPSULE BY MOUTH EVERY DAY, Disp: 90 capsule, Rfl: 4    meloxicam (MOBIC) 15 mg tablet, TAKE 1 TABLET BY MOUTH EVERY DAY AS NEEDED, Disp: 30 tablet, Rfl: 1    Omega-3 Fatty Acids (FISH OIL) 1000 MG CPDR, Take 1 capsule by mouth daily  , Disp: , Rfl:     omeprazole (PriLOSEC) 40 MG capsule, TAKE 1 CAPSULE BY MOUTH EVERY DAY, Disp: 90 capsule, Rfl: 1    sildenafil (VIAGRA) 100 mg tablet, Take 1 tablet (100 mg total) by mouth daily as needed for erectile dysfunction, Disp: 10 tablet, Rfl: 11    tamsulosin (FLOMAX) 0 4 mg, Take 2 capsules (0 8 mg total) by mouth daily with dinner, Disp: 180 capsule, Rfl: 3    EPINEPHrine (EPIPEN) 0 3 mg/0 3 mL SOAJ, Inject 0 3 mL (0 3 mg total) into a muscle once for 1 dose, Disp: 0 3 mL, Rfl: 1      Allergies  Other    Past Medical History  Past Medical History:   Diagnosis Date    Back disorder 02/26/2019    getting epidural shots, physical therapy    BPH (benign prostatic hyperplasia)     CAD (coronary artery disease)     Cardiac disease     Cardiomyopathy ([address]     Coronary artery disease     Failed back syndrome     GERD (gastroesophageal reflux disease)     Heart attack ([address] )     Mild MI 1996     Hypertension     Patient denies    Myocardial infarction ([facility] [address] )     Mild    Umbilical hernia     Last Assessed:3/25/2015         Past Surgical History:  Past Surgical History:   Procedure Laterality Date    BACK SURGERY      HERNIA REPAIR      Last Assessed:3/12/2014 ,lumbar    LUMBAR LAMINECTOMY      Last Assessed:3/12/2014    PROSTATE BIOPSY  09/10/2015    needle biopsy    SHOULDER SURGERY      TONSILLECTOMY           Family History:  Family History   Problem Relation Age of Onset    Hypertension Father     Diabetes Father     Pancreatic cancer Mother     Cancer Family     No Known Problems Sister     No Known Problems Brother     No Known Problems Brother        Social History:   reports that he has never smoked  He has never used smokeless tobacco  He reports previous alcohol use   He reports that he does not use drugs  I have reviewed the past medical history, surgical history, social and family history, current medications, allergies vitals, review of systems, and updated this information as appropriate today  Objective:    Physical Exam    Neurological Exam    GENERAL:  Cooperative in no acute distress  Well-developed and well-nourished    HEAD and NECK   Head is atraumatic normocephalic with no lesions or masses  Neck is supple with full range of motion    CARDIOVASCULAR  Carotid Arteries-no carotid bruits  NEUROLOGIC:  Mental Status-the patient is awake alert and oriented without aphasia or apraxia  Cranial Nerves: Visual fields are full to confrontation  Extraocular movements are full without nystagmus  Pupils are 2-1/2 mm and reactive  Face is symmetrical to light touch  Movements of facial expression move symmetrically  Hearing is normal to finger rub bilaterally  Soft palate lifts symmetrically  Shoulder shrug is symmetrical  Tongue is midline without atrophy  Motor: No drift is noted on arm extension  Strength is full in the upper and lower extremities with normal bulk and tone  Gait is unremarkable  No spine tenderness  ROS:  Review of Systems   Constitutional: Negative  Negative for appetite change and fever  HENT: Negative  Negative for hearing loss, tinnitus, trouble swallowing and voice change  Eyes: Negative  Negative for photophobia and pain  Respiratory: Negative  Negative for shortness of breath  Cardiovascular: Negative  Negative for palpitations  Gastrointestinal: Negative  Negative for nausea and vomiting  Endocrine: Negative  Negative for cold intolerance  Genitourinary: Negative  Negative for dysuria, frequency and urgency  Musculoskeletal: Positive for back pain (Lower back )  Negative for myalgias and neck pain  Skin: Negative  Negative for rash  Neurological: Negative    Negative for dizziness, tremors, seizures, syncope, facial asymmetry, speech difficulty, weakness, light-headedness, numbness and headaches  Hematological: Negative  Does not bruise/bleed easily  Psychiatric/Behavioral: Negative  Negative for confusion, hallucinations and sleep disturbance

## 2022-09-30 LAB
ABO GROUP BLD: NORMAL
APTT PPP: 27 SEC (ref 24–33)
BACTERIA UR CULT: NORMAL
BUN SERPL-MCNC: 23 MG/DL (ref 8–27)
BUN/CREAT SERPL: 23 (ref 10–24)
CHLORIDE SERPL-SCNC: 103 MMOL/L (ref 96–106)
CO2 SERPL-SCNC: 25 MMOL/L (ref 20–29)
CREAT SERPL-MCNC: 0.99 MG/DL (ref 0.76–1.27)
EGFR: 87 ML/MIN/1.73
GLUCOSE SERPL-MCNC: 92 MG/DL (ref 70–99)
INR PPP: 1 (ref 0.9–1.2)
Lab: NO GROWTH
POTASSIUM SERPL-SCNC: 4.5 MMOL/L (ref 3.5–5.2)
PROTHROMBIN TIME: 10.6 SEC (ref 9.1–12)
RH BLD: POSITIVE
SODIUM SERPL-SCNC: 141 MMOL/L (ref 134–144)

## 2022-10-03 ENCOUNTER — ANESTHESIA EVENT (OUTPATIENT)
Dept: PERIOP | Facility: HOSPITAL | Age: 62
End: 2022-10-03
Payer: COMMERCIAL

## 2022-10-06 ENCOUNTER — TELEPHONE (OUTPATIENT)
Dept: UROLOGY | Facility: MEDICAL CENTER | Age: 62
End: 2022-10-06

## 2022-10-06 NOTE — H&P (VIEW-ONLY)
Problem List Items Addressed This Visit        Cardiovascular and Mediastinum    Cardiomyopathy ([facility] [address] ) - Primary       Other    Elevated PSA    Benign prostatic hyperplasia with nocturia    Relevant Orders    Urine culture    Encounter to discuss test results            Discussion:    Melvin Garcia and his wife and I had a productive consultation today during which we discussed the major urologic surgery with risk of robot assisted simple prostatectomy with all indicated procedures  He has failed medication management at this time and has been having dribbling and some incontinence including some nocturnal enuresis  He does have some sleep apnea, he does not routinely use his breathing device but I did describe to him the relationship of sleep disordered breathing and nocturnal over production of urine and he seem to appreciate this counseling  I did tell him it will be important that he use this device going forward  I counseled him on the pre, yinka, and postoperative care for robot assisted simple prostatectomy  He is aware that he will need a catheter for 10-14 days after surgery and that he may have sexual or ejaculatory changes after this operation  Bleeding and urgency of urination and some stress incontinence may occur as he recovers from surgery  We talked about the chance of the need for potential additional therapies to treat storage symptoms if these persist in the long-term  We discussed the chance of damage to surrounding structures as well as risks of anesthesia and positioning complications and risk of deep venous thrombosis and venous thromboembolism  All questions and concerns answered and addressed  They have participated in the shared/informed decision-making model and he wishes to proceed  He has undergone cardiac clearance for his history of cardiomyopathy  His performance status is currently quite good          Assessment and plan:       Please see problem oriented charting for the assessment plan of today's urological complaints    Lori Frankel Verges      Chief Complaint     As above      History of Present Illness     Ralf Cunningham is a 63 y o  man with uncontrolled lower urinary tract symptoms and enlarged prostate, he has failed medical management  He does have an elevated PSA, we will send his tissue for assessment after this is removed, prostate adenoma  His MRI is not concerning for clinically significant prostate cancer  Extensive counseling today as above regarding major urologic surgery with risk  No new urologic complaints, he is motivated for surgery  He will stop his anti-platelet agents in preparation for surgery  He is following with cardiology  The following portions of the patient's history were reviewed and updated as appropriate: allergies, current medications, past family history, past medical history, past social history, past surgical history and problem list     Detailed Urologic History     - please refer to HPI    Review of Systems     Review of Systems   Constitutional: Negative  HENT: Negative  Eyes: Negative  Respiratory: Negative  Cardiovascular: Negative  Gastrointestinal: Negative  Genitourinary: Positive for difficulty urinating, frequency and urgency  Musculoskeletal: Negative  Skin: Negative  Allergic/Immunologic: Negative  Neurological: Negative  Hematological: Negative  Psychiatric/Behavioral: Negative          AUA SYMPTOM SCORE    Flowsheet Row Most Recent Value   AUA SYMPTOM SCORE    How often have you had a sensation of not emptying your bladder completely after you finished urinating? 5 (P)     How often have you had to urinate again less than two hours after you finished urinating? 5 (P)     How often have you found you stopped and started again several times when you urinate? 4 (P)     How often have you found it difficult to postpone urination? 3 (P)     How often have you had a weak urinary stream? 4 (P) How often have you had to push or strain to begin urination? 3 (P)     How many times did you most typically get up to urinate from the time you went to bed at night until the time you got up in the morning? 4 (P)     Quality of Life: If you were to spend the rest of your life with your urinary condition just the way it is now, how would you feel about that? 4 (P)     AUA SYMPTOM SCORE 28 (P)             Allergies     Allergies   Allergen Reactions   • Other Anaphylaxis     [place] [id number]: YELLOW JACKETS       Physical Exam     Physical Exam  Vitals reviewed  Constitutional:       General: He is not in acute distress  Appearance: Normal appearance  He is not ill-appearing, toxic-appearing or diaphoretic  HENT:      Head: Normocephalic and atraumatic  Eyes:      General: No scleral icterus  Right eye: No discharge  Left eye: No discharge  Cardiovascular:      Pulses: Normal pulses  Pulmonary:      Effort: Pulmonary effort is normal    Abdominal:      General: There is no distension  Palpations: There is no mass  Musculoskeletal:         General: No swelling  Skin:     General: Skin is warm  Neurological:      General: No focal deficit present  Mental Status: He is alert and oriented to person, place, and time  Cranial Nerves: No cranial nerve deficit  Psychiatric:         Mood and Affect: Mood normal          Behavior: Behavior normal          Thought Content:  Thought content normal          Judgment: Judgment normal              Vital Signs  Vitals:    10/07/22 1454   BP: 118/68   BP Location: Left arm   Patient Position: Sitting   Cuff Size: Adult   Pulse: 65   Resp: 18   SpO2: 98%   Weight: 90 7 kg (200 lb)   Height: 5' 8" (1 727 m)         Current Medications       Current Outpatient Medications:   •  aspirin (ECOTRIN) 325 mg EC tablet, Take 325 mg by mouth every other day, Disp: , Rfl:   •  cyclobenzaprine (FLEXERIL) 10 mg tablet, Take 1 tablet (10 mg total) by mouth daily at bedtime as needed for muscle spasms, Disp: 30 tablet, Rfl: 3  •  diltiazem (CARDIZEM CD) 120 mg 24 hr capsule, TAKE 1 CAPSULE BY MOUTH EVERY DAY, Disp: 90 capsule, Rfl: 4  •  meloxicam (MOBIC) 15 mg tablet, TAKE 1 TABLET BY MOUTH EVERY DAY AS NEEDED, Disp: 30 tablet, Rfl: 1  •  Omega-3 Fatty Acids (FISH OIL) 1000 MG CPDR, Take 1 capsule by mouth daily  , Disp: , Rfl:   •  omeprazole (PriLOSEC) 40 MG capsule, TAKE 1 CAPSULE BY MOUTH EVERY DAY, Disp: 90 capsule, Rfl: 1  •  sildenafil (VIAGRA) 100 mg tablet, Take 1 tablet (100 mg total) by mouth daily as needed for erectile dysfunction, Disp: 10 tablet, Rfl: 11  •  tamsulosin (FLOMAX) 0 4 mg, Take 2 capsules (0 8 mg total) by mouth daily with dinner, Disp: 180 capsule, Rfl: 3  •  EPINEPHrine (EPIPEN) 0 3 mg/0 3 mL SOAJ, Inject 0 3 mL (0 3 mg total) into a muscle once for 1 dose, Disp: 0 3 mL, Rfl: 1      Active Problems     Patient Active Problem List   Diagnosis   • Essential hypertension   • Gastroesophageal reflux disease without esophagitis   • Elevated PSA   • Allergic rhinitis due to pollen   • Failed back syndrome   • HNP (herniated nucleus pulposus), lumbar   • Intervertebral disc disorder with radiculopathy of lumbosacral region   • Cardiomyopathy ([facility] [address] )   • Mixed hyperlipidemia   • Vitamin D deficiency   • Benign prostatic hyperplasia with nocturia   • Erectile dysfunction   • Radiculopathy, lumbar region   • CAD (coronary artery disease)   • Encounter to discuss test results         Past Medical History     Past Medical History:   Diagnosis Date   • Back disorder 02/26/2019    getting epidural shots, physical therapy   • BPH (benign prostatic hyperplasia)    • CAD (coronary artery disease)    • Cardiac disease    • Cardiomyopathy ([address] )    • Coronary artery disease    • Failed back syndrome    • GERD (gastroesophageal reflux disease)    • Heart attack ([facility] [address] )     Mild MI 1996    • Hypertension     Patient denies   • Myocardial infarction ([facility] )     Mild   • Umbilical hernia     Last Assessed:3/25/2015         Surgical History     Past Surgical History:   Procedure Laterality Date   • BACK SURGERY     • HERNIA REPAIR      Last Assessed:3/12/2014 ,lumbar   • LUMBAR LAMINECTOMY      Last Assessed:3/12/2014   • PROSTATE BIOPSY  09/10/2015    needle biopsy   • SHOULDER SURGERY     • TONSILLECTOMY           Family History     Family History   Problem Relation Age of Onset   • Hypertension Father    • Diabetes Father    • Pancreatic cancer Mother    • Cancer Family    • No Known Problems Sister    • No Known Problems Brother    • No Known Problems Brother          Social History     Social History     Social History     Tobacco Use   Smoking Status Never Smoker   Smokeless Tobacco Never Used         Pertinent Lab Values     Lab Results   Component Value Date    CREATININE 0 99 09/29/2022       Lab Results   Component Value Date    PSA 7 9 (H) 08/09/2022    PSA 8 5 (H) 02/16/2022    PSA 9 3 (H) 09/23/2021             Pertinent Imaging      Imaging reviewed

## 2022-10-06 NOTE — PROGRESS NOTES
Problem List Items Addressed This Visit        Cardiovascular and Mediastinum    Cardiomyopathy ([facility] [address] ) - Primary       Other    Elevated PSA    Benign prostatic hyperplasia with nocturia    Relevant Orders    Urine culture    Encounter to discuss test results            Discussion:    Jyotsna Bennett and his wife and I had a productive consultation today during which we discussed the major urologic surgery with risk of robot assisted simple prostatectomy with all indicated procedures  He has failed medication management at this time and has been having dribbling and some incontinence including some nocturnal enuresis  He does have some sleep apnea, he does not routinely use his breathing device but I did describe to him the relationship of sleep disordered breathing and nocturnal over production of urine and he seem to appreciate this counseling  I did tell him it will be important that he use this device going forward  I counseled him on the pre, yinka, and postoperative care for robot assisted simple prostatectomy  He is aware that he will need a catheter for 10-14 days after surgery and that he may have sexual or ejaculatory changes after this operation  Bleeding and urgency of urination and some stress incontinence may occur as he recovers from surgery  We talked about the chance of the need for potential additional therapies to treat storage symptoms if these persist in the long-term  We discussed the chance of damage to surrounding structures as well as risks of anesthesia and positioning complications and risk of deep venous thrombosis and venous thromboembolism  All questions and concerns answered and addressed  They have participated in the shared/informed decision-making model and he wishes to proceed  He has undergone cardiac clearance for his history of cardiomyopathy  His performance status is currently quite good          Assessment and plan:       Please see problem oriented charting for the assessment plan of today's urological complaints    Diaz Megha Treadwell      Chief Complaint     As above      History of Present Illness     Jamila Valentin is a 63 y o  man with uncontrolled lower urinary tract symptoms and enlarged prostate, he has failed medical management  He does have an elevated PSA, we will send his tissue for assessment after this is removed, prostate adenoma  His MRI is not concerning for clinically significant prostate cancer  Extensive counseling today as above regarding major urologic surgery with risk  No new urologic complaints, he is motivated for surgery  He will stop his anti-platelet agents in preparation for surgery  He is following with cardiology  The following portions of the patient's history were reviewed and updated as appropriate: allergies, current medications, past family history, past medical history, past social history, past surgical history and problem list     Detailed Urologic History     - please refer to HPI    Review of Systems     Review of Systems   Constitutional: Negative  HENT: Negative  Eyes: Negative  Respiratory: Negative  Cardiovascular: Negative  Gastrointestinal: Negative  Genitourinary: Positive for difficulty urinating, frequency and urgency  Musculoskeletal: Negative  Skin: Negative  Allergic/Immunologic: Negative  Neurological: Negative  Hematological: Negative  Psychiatric/Behavioral: Negative          AUA SYMPTOM SCORE    Flowsheet Row Most Recent Value   AUA SYMPTOM SCORE    How often have you had a sensation of not emptying your bladder completely after you finished urinating? 5 (P)     How often have you had to urinate again less than two hours after you finished urinating? 5 (P)     How often have you found you stopped and started again several times when you urinate? 4 (P)     How often have you found it difficult to postpone urination? 3 (P)     How often have you had a weak urinary stream? 4 (P) How often have you had to push or strain to begin urination? 3 (P)     How many times did you most typically get up to urinate from the time you went to bed at night until the time you got up in the morning? 4 (P)     Quality of Life: If you were to spend the rest of your life with your urinary condition just the way it is now, how would you feel about that? 4 (P)     AUA SYMPTOM SCORE 28 (P)             Allergies     Allergies   Allergen Reactions    Other Anaphylaxis     [place] [id number]: YELLOW JACKMARJORIE       Physical Exam     Physical Exam  Vitals reviewed  Constitutional:       General: He is not in acute distress  Appearance: Normal appearance  He is not ill-appearing, toxic-appearing or diaphoretic  HENT:      Head: Normocephalic and atraumatic  Eyes:      General: No scleral icterus  Right eye: No discharge  Left eye: No discharge  Cardiovascular:      Pulses: Normal pulses  Pulmonary:      Effort: Pulmonary effort is normal    Abdominal:      General: There is no distension  Palpations: There is no mass  Musculoskeletal:         General: No swelling  Skin:     General: Skin is warm  Neurological:      General: No focal deficit present  Mental Status: He is alert and oriented to person, place, and time  Cranial Nerves: No cranial nerve deficit  Psychiatric:         Mood and Affect: Mood normal          Behavior: Behavior normal          Thought Content:  Thought content normal          Judgment: Judgment normal              Vital Signs  Vitals:    10/07/22 1454   BP: 118/68   BP Location: Left arm   Patient Position: Sitting   Cuff Size: Adult   Pulse: 65   Resp: 18   SpO2: 98%   Weight: 90 7 kg (200 lb)   Height: 5' 8" (1 727 m)         Current Medications       Current Outpatient Medications:     aspirin (ECOTRIN) 325 mg EC tablet, Take 325 mg by mouth every other day, Disp: , Rfl:     cyclobenzaprine (FLEXERIL) 10 mg tablet, Take 1 tablet (10 mg total) by mouth daily at bedtime as needed for muscle spasms, Disp: 30 tablet, Rfl: 3    diltiazem (CARDIZEM CD) 120 mg 24 hr capsule, TAKE 1 CAPSULE BY MOUTH EVERY DAY, Disp: 90 capsule, Rfl: 4    meloxicam (MOBIC) 15 mg tablet, TAKE 1 TABLET BY MOUTH EVERY DAY AS NEEDED, Disp: 30 tablet, Rfl: 1    Omega-3 Fatty Acids (FISH OIL) 1000 MG CPDR, Take 1 capsule by mouth daily  , Disp: , Rfl:     omeprazole (PriLOSEC) 40 MG capsule, TAKE 1 CAPSULE BY MOUTH EVERY DAY, Disp: 90 capsule, Rfl: 1    sildenafil (VIAGRA) 100 mg tablet, Take 1 tablet (100 mg total) by mouth daily as needed for erectile dysfunction, Disp: 10 tablet, Rfl: 11    tamsulosin (FLOMAX) 0 4 mg, Take 2 capsules (0 8 mg total) by mouth daily with dinner, Disp: 180 capsule, Rfl: 3    EPINEPHrine (EPIPEN) 0 3 mg/0 3 mL SOAJ, Inject 0 3 mL (0 3 mg total) into a muscle once for 1 dose, Disp: 0 3 mL, Rfl: 1      Active Problems     Patient Active Problem List   Diagnosis    Essential hypertension    Gastroesophageal reflux disease without esophagitis    Elevated PSA    Allergic rhinitis due to pollen    Failed back syndrome    HNP (herniated nucleus pulposus), lumbar    Intervertebral disc disorder with radiculopathy of lumbosacral region    Cardiomyopathy ([address] )    Mixed hyperlipidemia    Vitamin D deficiency    Benign prostatic hyperplasia with nocturia    Erectile dysfunction    Radiculopathy, lumbar region    CAD (coronary artery disease)    Encounter to discuss test results         Past Medical History     Past Medical History:   Diagnosis Date    Back disorder 02/26/2019    getting epidural shots, physical therapy    BPH (benign prostatic hyperplasia)     CAD (coronary artery disease)     Cardiac disease     Cardiomyopathy ([address] )     Coronary artery disease     Failed back syndrome     GERD (gastroesophageal reflux disease)     Heart attack ([address] )     Mild MI 1996     Hypertension     Patient denies    Myocardial infarction ([facility] [address] )     Mild    Umbilical hernia     Last Assessed:3/25/2015         Surgical History     Past Surgical History:   Procedure Laterality Date    BACK SURGERY      HERNIA REPAIR      Last Assessed:3/12/2014 ,lumbar    LUMBAR LAMINECTOMY      Last Assessed:3/12/2014    PROSTATE BIOPSY  09/10/2015    needle biopsy    SHOULDER SURGERY      TONSILLECTOMY           Family History     Family History   Problem Relation Age of Onset    Hypertension Father     Diabetes Father     Pancreatic cancer Mother     Cancer Family     No Known Problems Sister     No Known Problems Brother     No Known Problems Brother          Social History     Social History     Social History     Tobacco Use   Smoking Status Never Smoker   Smokeless Tobacco Never Used         Pertinent Lab Values     Lab Results   Component Value Date    CREATININE 0 99 09/29/2022       Lab Results   Component Value Date    PSA 7 9 (H) 08/09/2022    PSA 8 5 (H) 02/16/2022    PSA 9 3 (H) 09/23/2021             Pertinent Imaging      Imaging reviewed

## 2022-10-07 ENCOUNTER — OFFICE VISIT (OUTPATIENT)
Dept: UROLOGY | Facility: CLINIC | Age: 62
End: 2022-10-07
Payer: COMMERCIAL

## 2022-10-07 VITALS
OXYGEN SATURATION: 98 % | HEART RATE: 65 BPM | HEIGHT: 68 IN | DIASTOLIC BLOOD PRESSURE: 68 MMHG | SYSTOLIC BLOOD PRESSURE: 118 MMHG | RESPIRATION RATE: 18 BRPM | WEIGHT: 200 LBS | BODY MASS INDEX: 30.31 KG/M2

## 2022-10-07 DIAGNOSIS — N40.1 BENIGN PROSTATIC HYPERPLASIA WITH NOCTURIA: ICD-10-CM

## 2022-10-07 DIAGNOSIS — I42.0 DILATED CARDIOMYOPATHY (HCC): Primary | ICD-10-CM

## 2022-10-07 DIAGNOSIS — Z71.2 ENCOUNTER TO DISCUSS TEST RESULTS: ICD-10-CM

## 2022-10-07 DIAGNOSIS — R35.1 BENIGN PROSTATIC HYPERPLASIA WITH NOCTURIA: ICD-10-CM

## 2022-10-07 DIAGNOSIS — R97.20 ELEVATED PSA: ICD-10-CM

## 2022-10-07 PROCEDURE — 99215 OFFICE O/P EST HI 40 MIN: CPT | Performed by: UROLOGY

## 2022-10-07 PROCEDURE — 87086 URINE CULTURE/COLONY COUNT: CPT | Performed by: UROLOGY

## 2022-10-08 LAB — BACTERIA UR CULT: NORMAL

## 2022-10-09 DIAGNOSIS — M54.16 RADICULOPATHY, LUMBAR REGION: ICD-10-CM

## 2022-10-10 RX ORDER — MELOXICAM 15 MG/1
TABLET ORAL
Qty: 30 TABLET | Refills: 1 | Status: ON HOLD | OUTPATIENT
Start: 2022-10-10

## 2022-10-12 NOTE — PRE-PROCEDURE INSTRUCTIONS
Pre-Surgery Instructions:   Medication Instructions   • aspirin (ECOTRIN) 325 mg EC tablet Instructions provided by MD   • cyclobenzaprine (FLEXERIL) 10 mg tablet Take per normal schedule    • diltiazem (CARDIZEM CD) 120 mg 24 hr capsule Instructed to take per normal schedule including DOS with sips water   • meloxicam (MOBIC) 15 mg tablet Instructed to avoid NSAIDs 3 days prior to surgery   • Omega-3 Fatty Acids (FISH OIL) 1000 MG CPDR Instructed to stop all Vitamins and Supplements over the counter from now until after surgery   • omeprazole (PriLOSEC) 40 MG capsule Instructed to take per normal schedule including DOS with sips water   • tamsulosin (FLOMAX) 0 4 mg Take per normal schedule    Reviewed with patient, in detail, instructions from "My Surgical Experience"  Prostate class patient education reviewed with all questions answered  Pre op instructions, medication instructions per surgeon/anesthesia guidelines and showering instructions reviewed  Carb drink instructions reviewed  Incentive spirometer use reviewed  Edwards catheter and post op care reviewed  Bowel prep reviewed  Patient verbalized understanding of current visitor restrictions due to Covid and will clarify with nurse DOS  Instructed to avoid all ASA and OTC Vit/Supp 1 week prior to surgery and to avoid NSAIDs 3 days prior to surgery per anesthesia guidelines  Tylenol ok to take prn  No alcohol 24 hours prior to surgery  Instructed to call surgeon's office in meantime with any new illness  Patient verbalized an understanding of all instructions reviewed and offers no concerns at this time

## 2022-10-25 ENCOUNTER — TELEPHONE (OUTPATIENT)
Dept: UROLOGY | Facility: CLINIC | Age: 62
End: 2022-10-25

## 2022-10-25 PROBLEM — N40.1 BENIGN LOCALIZED PROSTATIC HYPERPLASIA WITH LOWER URINARY TRACT SYMPTOMS (LUTS): Status: ACTIVE | Noted: 2022-10-25

## 2022-10-25 NOTE — TELEPHONE ENCOUNTER
Received call from charge nurse in [facility] preparing for patients surgery tomorrow, patient reports having not received their ensure carb drinks  Asked her to ask him if he would be willing to  from one of our offices  He lives closer to [facility]  She TT me back and advised patient will go to [address] office  Reached out to [place] and she will place 3 drink at  for patient

## 2022-10-25 NOTE — PROGRESS NOTES
Patient had questions regarding the Carb drink  He did not receive the carb drinks from the office  He stated he was told to drink Gatorade instead of the carb drink  Called the urology office and spoke with the nurse Isabell Warren RN regarding issue  Patient will drive to [place] to  the 3 carb drinks  Instructed on how to take them  Patient did not have any further questions

## 2022-10-25 NOTE — DISCHARGE INSTRUCTIONS
Tyson Ahumada,    Today you had a robot assisted simple prostatectomy in which we removed the blocking portion of your prostate, the central and transition zone  We then performed a bladder neck reconstruction and bladder closure  The surgery was a success and I am hopeful that your stream will be much improved in your symptoms will also be much improved (this is the likely outcome)  We will remove your catheter in the office in roughly 10-14 days, give or take a few days  Urgency and frequency of urination along with a squeezing sensation in the bladder and pain at the tip of the penis along with pressure in the rectum may occur after this operation  We have given you medications to make your recovery more tolerable  You may experience some urinary leakage as you recover, this will improve with the passing of time  You will see us back in a couple of weeks to review your pathology and ensure that there is no cancer within the specimen that we removed today  Please wait to restart your aspirin for 1 week, when you restart, you can take 81 milligrams of aspirin daily, you can then workup to your regular dose of aspirin after 2 weeks  Please do not restart your Omega 3 fatty acid or fish oil supplements for 2 weeks    If you have questions or concerns in the postoperative time frame, please let us know  Thank you for allowing us to take care of you today    We wish you a rapid recovery,  Dr Nikkie Samayoa

## 2022-10-26 ENCOUNTER — ANESTHESIA (OUTPATIENT)
Dept: PERIOP | Facility: HOSPITAL | Age: 62
End: 2022-10-26
Payer: COMMERCIAL

## 2022-10-26 ENCOUNTER — TELEPHONE (OUTPATIENT)
Dept: UROLOGY | Facility: CLINIC | Age: 62
End: 2022-10-26

## 2022-10-26 ENCOUNTER — HOSPITAL ENCOUNTER (INPATIENT)
Facility: HOSPITAL | Age: 62
End: 2022-10-26
Attending: UROLOGY | Admitting: UROLOGY
Payer: COMMERCIAL

## 2022-10-26 DIAGNOSIS — K56.7 ILEUS (HCC): ICD-10-CM

## 2022-10-26 DIAGNOSIS — N40.1 BENIGN LOCALIZED PROSTATIC HYPERPLASIA WITH LOWER URINARY TRACT SYMPTOMS (LUTS): Primary | ICD-10-CM

## 2022-10-26 DIAGNOSIS — N32.0 BLADDER OUTLET OBSTRUCTION: ICD-10-CM

## 2022-10-26 LAB
ABO GROUP BLD: NORMAL
ANION GAP SERPL CALCULATED.3IONS-SCNC: 6 MMOL/L (ref 4–13)
ATRIAL RATE: 70 BPM
BLD GP AB SCN SERPL QL: NEGATIVE
BUN SERPL-MCNC: 16 MG/DL (ref 5–25)
CALCIUM SERPL-MCNC: 8.7 MG/DL (ref 8.3–10.1)
CHLORIDE SERPL-SCNC: 111 MMOL/L (ref 96–108)
CO2 SERPL-SCNC: 23 MMOL/L (ref 21–32)
CREAT SERPL-MCNC: 1.1 MG/DL (ref 0.6–1.3)
ERYTHROCYTE [DISTWIDTH] IN BLOOD BY AUTOMATED COUNT: 12.6 % (ref 11.6–15.1)
ERYTHROCYTE [DISTWIDTH] IN BLOOD BY AUTOMATED COUNT: 12.7 % (ref 11.6–15.1)
GFR SERPL CREATININE-BSD FRML MDRD: 72 ML/MIN/1.73SQ M
GLUCOSE P FAST SERPL-MCNC: 137 MG/DL (ref 65–99)
GLUCOSE SERPL-MCNC: 137 MG/DL (ref 65–140)
GLUCOSE SERPL-MCNC: 92 MG/DL (ref 65–140)
HCT VFR BLD AUTO: 41.9 % (ref 36.5–49.3)
HCT VFR BLD AUTO: 45.6 % (ref 36.5–49.3)
HGB BLD-MCNC: 14 G/DL (ref 12–17)
HGB BLD-MCNC: 14.8 G/DL (ref 12–17)
MCH RBC QN AUTO: 28.7 PG (ref 26.8–34.3)
MCH RBC QN AUTO: 29.1 PG (ref 26.8–34.3)
MCHC RBC AUTO-ENTMCNC: 32.5 G/DL (ref 31.4–37.4)
MCHC RBC AUTO-ENTMCNC: 33.4 G/DL (ref 31.4–37.4)
MCV RBC AUTO: 87 FL (ref 82–98)
MCV RBC AUTO: 89 FL (ref 82–98)
P AXIS: 16 DEGREES
PLATELET # BLD AUTO: 223 THOUSANDS/UL (ref 149–390)
PLATELET # BLD AUTO: 226 THOUSANDS/UL (ref 149–390)
PMV BLD AUTO: 10.3 FL (ref 8.9–12.7)
PMV BLD AUTO: 10.3 FL (ref 8.9–12.7)
POTASSIUM SERPL-SCNC: 4.2 MMOL/L (ref 3.5–5.3)
PR INTERVAL: 188 MS
QRS AXIS: -5 DEGREES
QRSD INTERVAL: 110 MS
QT INTERVAL: 388 MS
QTC INTERVAL: 419 MS
RBC # BLD AUTO: 4.81 MILLION/UL (ref 3.88–5.62)
RBC # BLD AUTO: 5.15 MILLION/UL (ref 3.88–5.62)
RH BLD: POSITIVE
SODIUM SERPL-SCNC: 140 MMOL/L (ref 135–147)
SPECIMEN EXPIRATION DATE: NORMAL
T WAVE AXIS: 20 DEGREES
VENTRICULAR RATE: 70 BPM
WBC # BLD AUTO: 12.06 THOUSAND/UL (ref 4.31–10.16)
WBC # BLD AUTO: 5.14 THOUSAND/UL (ref 4.31–10.16)

## 2022-10-26 PROCEDURE — 80048 BASIC METABOLIC PNL TOTAL CA: CPT | Performed by: UROLOGY

## 2022-10-26 PROCEDURE — 85027 COMPLETE CBC AUTOMATED: CPT | Performed by: STUDENT IN AN ORGANIZED HEALTH CARE EDUCATION/TRAINING PROGRAM

## 2022-10-26 PROCEDURE — 86923 COMPATIBILITY TEST ELECTRIC: CPT

## 2022-10-26 PROCEDURE — 93005 ELECTROCARDIOGRAM TRACING: CPT

## 2022-10-26 PROCEDURE — 85027 COMPLETE CBC AUTOMATED: CPT | Performed by: UROLOGY

## 2022-10-26 PROCEDURE — 93010 ELECTROCARDIOGRAM REPORT: CPT | Performed by: INTERNAL MEDICINE

## 2022-10-26 PROCEDURE — 86901 BLOOD TYPING SEROLOGIC RH(D): CPT | Performed by: UROLOGY

## 2022-10-26 PROCEDURE — 0VT04ZZ RESECTION OF PROSTATE, PERCUTANEOUS ENDOSCOPIC APPROACH: ICD-10-PCS | Performed by: UROLOGY

## 2022-10-26 PROCEDURE — 0TQC4ZZ REPAIR BLADDER NECK, PERCUTANEOUS ENDOSCOPIC APPROACH: ICD-10-PCS | Performed by: UROLOGY

## 2022-10-26 PROCEDURE — 8E0W4CZ ROBOTIC ASSISTED PROCEDURE OF TRUNK REGION, PERCUTANEOUS ENDOSCOPIC APPROACH: ICD-10-PCS | Performed by: UROLOGY

## 2022-10-26 PROCEDURE — 86900 BLOOD TYPING SEROLOGIC ABO: CPT | Performed by: UROLOGY

## 2022-10-26 PROCEDURE — 82948 REAGENT STRIP/BLOOD GLUCOSE: CPT

## 2022-10-26 PROCEDURE — 86850 RBC ANTIBODY SCREEN: CPT | Performed by: UROLOGY

## 2022-10-26 RX ORDER — CEFAZOLIN SODIUM 2 G/50ML
2000 SOLUTION INTRAVENOUS EVERY 8 HOURS
Status: COMPLETED | OUTPATIENT
Start: 2022-10-26 | End: 2022-10-27

## 2022-10-26 RX ORDER — PHENAZOPYRIDINE HYDROCHLORIDE 100 MG/1
200 TABLET, FILM COATED ORAL
Status: DISCONTINUED | OUTPATIENT
Start: 2022-10-26 | End: 2022-11-01

## 2022-10-26 RX ORDER — FENTANYL CITRATE 50 UG/ML
INJECTION, SOLUTION INTRAMUSCULAR; INTRAVENOUS AS NEEDED
Status: DISCONTINUED | OUTPATIENT
Start: 2022-10-26 | End: 2022-10-26

## 2022-10-26 RX ORDER — ACETAMINOPHEN 325 MG/1
650 TABLET ORAL EVERY 6 HOURS SCHEDULED
Status: DISCONTINUED | OUTPATIENT
Start: 2022-10-26 | End: 2022-11-05 | Stop reason: HOSPADM

## 2022-10-26 RX ORDER — SODIUM CHLORIDE 9 MG/ML
INJECTION, SOLUTION INTRAVENOUS CONTINUOUS PRN
Status: DISCONTINUED | OUTPATIENT
Start: 2022-10-26 | End: 2022-10-26

## 2022-10-26 RX ORDER — SENNOSIDES 8.6 MG
8.6 TABLET ORAL
Qty: 5 TABLET | Refills: 0 | Status: SHIPPED | OUTPATIENT
Start: 2022-10-26 | End: 2022-10-31

## 2022-10-26 RX ORDER — DIPHENHYDRAMINE HYDROCHLORIDE 50 MG/ML
12.5 INJECTION INTRAMUSCULAR; INTRAVENOUS ONCE
Status: DISCONTINUED | OUTPATIENT
Start: 2022-10-26 | End: 2022-10-26 | Stop reason: HOSPADM

## 2022-10-26 RX ORDER — DIPHENHYDRAMINE HCL 25 MG
12.5 TABLET ORAL EVERY 6 HOURS PRN
Status: DISCONTINUED | OUTPATIENT
Start: 2022-10-26 | End: 2022-11-01

## 2022-10-26 RX ORDER — MAGNESIUM HYDROXIDE 1200 MG/15ML
LIQUID ORAL AS NEEDED
Status: DISCONTINUED | OUTPATIENT
Start: 2022-10-26 | End: 2022-10-26 | Stop reason: HOSPADM

## 2022-10-26 RX ORDER — ROCURONIUM BROMIDE 10 MG/ML
INJECTION, SOLUTION INTRAVENOUS AS NEEDED
Status: DISCONTINUED | OUTPATIENT
Start: 2022-10-26 | End: 2022-10-26

## 2022-10-26 RX ORDER — DOCUSATE SODIUM 100 MG/1
100 CAPSULE, LIQUID FILLED ORAL 2 TIMES DAILY
Status: DISCONTINUED | OUTPATIENT
Start: 2022-10-26 | End: 2022-11-05 | Stop reason: HOSPADM

## 2022-10-26 RX ORDER — LIDOCAINE HYDROCHLORIDE 10 MG/ML
INJECTION, SOLUTION EPIDURAL; INFILTRATION; INTRACAUDAL; PERINEURAL AS NEEDED
Status: DISCONTINUED | OUTPATIENT
Start: 2022-10-26 | End: 2022-10-26

## 2022-10-26 RX ORDER — SULFAMETHOXAZOLE AND TRIMETHOPRIM 800; 160 MG/1; MG/1
1 TABLET ORAL EVERY 12 HOURS SCHEDULED
Qty: 6 TABLET | Refills: 0 | Status: SHIPPED | OUTPATIENT
Start: 2022-10-26 | End: 2022-11-09

## 2022-10-26 RX ORDER — OXYCODONE HYDROCHLORIDE 10 MG/1
10 TABLET ORAL EVERY 4 HOURS PRN
Status: DISCONTINUED | OUTPATIENT
Start: 2022-10-26 | End: 2022-10-28

## 2022-10-26 RX ORDER — ATROPA BELLADONNA AND OPIUM 16.2; 3 MG/1; MG/1
SUPPOSITORY RECTAL AS NEEDED
Status: DISCONTINUED | OUTPATIENT
Start: 2022-10-26 | End: 2022-10-26 | Stop reason: HOSPADM

## 2022-10-26 RX ORDER — PROPOFOL 10 MG/ML
INJECTION, EMULSION INTRAVENOUS AS NEEDED
Status: DISCONTINUED | OUTPATIENT
Start: 2022-10-26 | End: 2022-10-26

## 2022-10-26 RX ORDER — DILTIAZEM HYDROCHLORIDE 120 MG/1
120 CAPSULE, COATED, EXTENDED RELEASE ORAL DAILY
Status: DISCONTINUED | OUTPATIENT
Start: 2022-10-27 | End: 2022-11-05 | Stop reason: HOSPADM

## 2022-10-26 RX ORDER — BACITRACIN, NEOMYCIN, POLYMYXIN B 400; 3.5; 5 [USP'U]/G; MG/G; [USP'U]/G
1 OINTMENT TOPICAL 2 TIMES DAILY
Status: DISCONTINUED | OUTPATIENT
Start: 2022-10-26 | End: 2022-11-05 | Stop reason: HOSPADM

## 2022-10-26 RX ORDER — SODIUM CHLORIDE, SODIUM LACTATE, POTASSIUM CHLORIDE, CALCIUM CHLORIDE 600; 310; 30; 20 MG/100ML; MG/100ML; MG/100ML; MG/100ML
50 INJECTION, SOLUTION INTRAVENOUS CONTINUOUS
Status: DISCONTINUED | OUTPATIENT
Start: 2022-10-26 | End: 2022-10-26

## 2022-10-26 RX ORDER — SODIUM CHLORIDE, SODIUM LACTATE, POTASSIUM CHLORIDE, CALCIUM CHLORIDE 600; 310; 30; 20 MG/100ML; MG/100ML; MG/100ML; MG/100ML
100 INJECTION, SOLUTION INTRAVENOUS CONTINUOUS
Status: DISCONTINUED | OUTPATIENT
Start: 2022-10-26 | End: 2022-10-27 | Stop reason: ALTCHOICE

## 2022-10-26 RX ORDER — MAGNESIUM HYDROXIDE 1200 MG/15ML
3000 LIQUID ORAL CONTINUOUS
Status: DISCONTINUED | OUTPATIENT
Start: 2022-10-26 | End: 2022-10-27 | Stop reason: ALTCHOICE

## 2022-10-26 RX ORDER — HEPARIN SODIUM 5000 [USP'U]/ML
5000 INJECTION, SOLUTION INTRAVENOUS; SUBCUTANEOUS EVERY 8 HOURS SCHEDULED
Status: DISCONTINUED | OUTPATIENT
Start: 2022-10-26 | End: 2022-10-28

## 2022-10-26 RX ORDER — ONDANSETRON 2 MG/ML
4 INJECTION INTRAMUSCULAR; INTRAVENOUS EVERY 6 HOURS PRN
Status: DISCONTINUED | OUTPATIENT
Start: 2022-10-26 | End: 2022-10-28

## 2022-10-26 RX ORDER — HYDROMORPHONE HCL/PF 1 MG/ML
0.5 SYRINGE (ML) INJECTION EVERY 2 HOUR PRN
Status: DISPENSED | OUTPATIENT
Start: 2022-10-26 | End: 2022-10-28

## 2022-10-26 RX ORDER — SENNOSIDES 8.6 MG
1 TABLET ORAL DAILY
Status: DISCONTINUED | OUTPATIENT
Start: 2022-10-27 | End: 2022-11-05 | Stop reason: HOSPADM

## 2022-10-26 RX ORDER — ALBUTEROL SULFATE 2.5 MG/3ML
2.5 SOLUTION RESPIRATORY (INHALATION) ONCE AS NEEDED
Status: DISCONTINUED | OUTPATIENT
Start: 2022-10-26 | End: 2022-10-26 | Stop reason: HOSPADM

## 2022-10-26 RX ORDER — DEXAMETHASONE SODIUM PHOSPHATE 10 MG/ML
INJECTION, SOLUTION INTRAMUSCULAR; INTRAVENOUS AS NEEDED
Status: DISCONTINUED | OUTPATIENT
Start: 2022-10-26 | End: 2022-10-26

## 2022-10-26 RX ORDER — MAGNESIUM HYDROXIDE/ALUMINUM HYDROXICE/SIMETHICONE 120; 1200; 1200 MG/30ML; MG/30ML; MG/30ML
30 SUSPENSION ORAL EVERY 6 HOURS PRN
Status: DISCONTINUED | OUTPATIENT
Start: 2022-10-26 | End: 2022-10-28

## 2022-10-26 RX ORDER — OXYCODONE HYDROCHLORIDE 5 MG/1
5 TABLET ORAL EVERY 4 HOURS PRN
Status: DISCONTINUED | OUTPATIENT
Start: 2022-10-26 | End: 2022-10-28

## 2022-10-26 RX ORDER — HYDROMORPHONE HCL/PF 1 MG/ML
SYRINGE (ML) INJECTION AS NEEDED
Status: DISCONTINUED | OUTPATIENT
Start: 2022-10-26 | End: 2022-10-26

## 2022-10-26 RX ORDER — LIDOCAINE HYDROCHLORIDE 10 MG/ML
0.5 INJECTION, SOLUTION EPIDURAL; INFILTRATION; INTRACAUDAL; PERINEURAL ONCE AS NEEDED
Status: DISCONTINUED | OUTPATIENT
Start: 2022-10-26 | End: 2022-10-26 | Stop reason: HOSPADM

## 2022-10-26 RX ORDER — DICLOFENAC POTASSIUM 50 MG/1
50 TABLET, FILM COATED ORAL 2 TIMES DAILY
Qty: 10 TABLET | Refills: 0 | Status: SHIPPED | OUTPATIENT
Start: 2022-10-26 | End: 2022-11-15

## 2022-10-26 RX ORDER — CYCLOBENZAPRINE HCL 10 MG
10 TABLET ORAL
Status: DISCONTINUED | OUTPATIENT
Start: 2022-10-26 | End: 2022-11-01

## 2022-10-26 RX ORDER — EPHEDRINE SULFATE 50 MG/ML
INJECTION INTRAVENOUS AS NEEDED
Status: DISCONTINUED | OUTPATIENT
Start: 2022-10-26 | End: 2022-10-26

## 2022-10-26 RX ORDER — ONDANSETRON 2 MG/ML
4 INJECTION INTRAMUSCULAR; INTRAVENOUS ONCE AS NEEDED
Status: DISCONTINUED | OUTPATIENT
Start: 2022-10-26 | End: 2022-10-26 | Stop reason: HOSPADM

## 2022-10-26 RX ORDER — SODIUM CHLORIDE, SODIUM LACTATE, POTASSIUM CHLORIDE, CALCIUM CHLORIDE 600; 310; 30; 20 MG/100ML; MG/100ML; MG/100ML; MG/100ML
125 INJECTION, SOLUTION INTRAVENOUS CONTINUOUS
Status: DISCONTINUED | OUTPATIENT
Start: 2022-10-26 | End: 2022-10-26

## 2022-10-26 RX ORDER — HYDROMORPHONE HCL IN WATER/PF 6 MG/30 ML
0.2 PATIENT CONTROLLED ANALGESIA SYRINGE INTRAVENOUS
Status: DISCONTINUED | OUTPATIENT
Start: 2022-10-26 | End: 2022-10-26 | Stop reason: HOSPADM

## 2022-10-26 RX ORDER — PANTOPRAZOLE SODIUM 40 MG/1
40 TABLET, DELAYED RELEASE ORAL
Status: DISCONTINUED | OUTPATIENT
Start: 2022-10-27 | End: 2022-11-05 | Stop reason: HOSPADM

## 2022-10-26 RX ORDER — FENTANYL CITRATE/PF 50 MCG/ML
25 SYRINGE (ML) INJECTION
Status: DISCONTINUED | OUTPATIENT
Start: 2022-10-26 | End: 2022-10-26 | Stop reason: HOSPADM

## 2022-10-26 RX ORDER — ACETAMINOPHEN 500 MG
500 TABLET ORAL EVERY 6 HOURS
Qty: 20 TABLET | Refills: 0 | Status: SHIPPED | OUTPATIENT
Start: 2022-10-26 | End: 2022-10-31

## 2022-10-26 RX ORDER — MIDAZOLAM HYDROCHLORIDE 2 MG/2ML
INJECTION, SOLUTION INTRAMUSCULAR; INTRAVENOUS AS NEEDED
Status: DISCONTINUED | OUTPATIENT
Start: 2022-10-26 | End: 2022-10-26

## 2022-10-26 RX ORDER — METRONIDAZOLE 500 MG/100ML
500 INJECTION, SOLUTION INTRAVENOUS
Status: COMPLETED | OUTPATIENT
Start: 2022-10-26 | End: 2022-10-26

## 2022-10-26 RX ORDER — CEFAZOLIN SODIUM 2 G/50ML
2000 SOLUTION INTRAVENOUS
Status: COMPLETED | OUTPATIENT
Start: 2022-10-26 | End: 2022-10-26

## 2022-10-26 RX ORDER — ONDANSETRON 2 MG/ML
INJECTION INTRAMUSCULAR; INTRAVENOUS AS NEEDED
Status: DISCONTINUED | OUTPATIENT
Start: 2022-10-26 | End: 2022-10-26

## 2022-10-26 RX ORDER — BUPIVACAINE HYDROCHLORIDE 2.5 MG/ML
INJECTION, SOLUTION EPIDURAL; INFILTRATION; INTRACAUDAL AS NEEDED
Status: DISCONTINUED | OUTPATIENT
Start: 2022-10-26 | End: 2022-10-26 | Stop reason: HOSPADM

## 2022-10-26 RX ADMIN — CEFAZOLIN SODIUM 2000 MG: 2 SOLUTION INTRAVENOUS at 08:39

## 2022-10-26 RX ADMIN — OXYCODONE HYDROCHLORIDE 5 MG: 5 TABLET ORAL at 16:07

## 2022-10-26 RX ADMIN — ONDANSETRON 4 MG: 2 INJECTION INTRAMUSCULAR; INTRAVENOUS at 11:06

## 2022-10-26 RX ADMIN — EPHEDRINE SULFATE 5 MG: 50 INJECTION INTRAVENOUS at 08:40

## 2022-10-26 RX ADMIN — ROCURONIUM BROMIDE 50 MG: 50 INJECTION INTRAVENOUS at 08:24

## 2022-10-26 RX ADMIN — Medication 25 MCG: at 14:01

## 2022-10-26 RX ADMIN — SODIUM CHLORIDE, SODIUM LACTATE, POTASSIUM CHLORIDE, AND CALCIUM CHLORIDE: .6; .31; .03; .02 INJECTION, SOLUTION INTRAVENOUS at 08:16

## 2022-10-26 RX ADMIN — SODIUM CHLORIDE, SODIUM LACTATE, POTASSIUM CHLORIDE, AND CALCIUM CHLORIDE: .6; .31; .03; .02 INJECTION, SOLUTION INTRAVENOUS at 11:06

## 2022-10-26 RX ADMIN — EPHEDRINE SULFATE 5 MG: 50 INJECTION INTRAVENOUS at 08:53

## 2022-10-26 RX ADMIN — SUGAMMADEX 200 MG: 100 INJECTION, SOLUTION INTRAVENOUS at 11:16

## 2022-10-26 RX ADMIN — EPHEDRINE SULFATE 5 MG: 50 INJECTION INTRAVENOUS at 10:54

## 2022-10-26 RX ADMIN — PHENYLEPHRINE HYDROCHLORIDE 20 MCG/MIN: 10 INJECTION INTRAVENOUS at 08:31

## 2022-10-26 RX ADMIN — Medication 25 MCG: at 13:57

## 2022-10-26 RX ADMIN — BACITRACIN ZINC, NEOMYCIN SULFATE, AND POLYMYXIN B SULFATE 1 LARGE APPLICATION: 400; 3.5; 5 OINTMENT TOPICAL at 18:07

## 2022-10-26 RX ADMIN — METRONIDAZOLE: 500 INJECTION, SOLUTION INTRAVENOUS at 08:42

## 2022-10-26 RX ADMIN — SODIUM CHLORIDE FOR IRRIGATION 3000 ML: 0.9 SOLUTION IRRIGATION at 14:45

## 2022-10-26 RX ADMIN — HYDROMORPHONE HYDROCHLORIDE 0.5 MG: 1 INJECTION, SOLUTION INTRAMUSCULAR; INTRAVENOUS; SUBCUTANEOUS at 10:21

## 2022-10-26 RX ADMIN — LIDOCAINE HYDROCHLORIDE 100 MG: 10 INJECTION, SOLUTION EPIDURAL; INFILTRATION; INTRACAUDAL; PERINEURAL at 08:23

## 2022-10-26 RX ADMIN — ACETAMINOPHEN 650 MG: 325 TABLET, FILM COATED ORAL at 18:05

## 2022-10-26 RX ADMIN — FENTANYL CITRATE 100 MCG: 50 INJECTION INTRAMUSCULAR; INTRAVENOUS at 08:23

## 2022-10-26 RX ADMIN — SODIUM CHLORIDE: 0.9 INJECTION, SOLUTION INTRAVENOUS at 08:34

## 2022-10-26 RX ADMIN — ROCURONIUM BROMIDE 20 MG: 50 INJECTION INTRAVENOUS at 08:42

## 2022-10-26 RX ADMIN — FENTANYL CITRATE 50 MCG: 50 INJECTION INTRAMUSCULAR; INTRAVENOUS at 10:52

## 2022-10-26 RX ADMIN — PHENAZOPYRIDINE 200 MG: 100 TABLET ORAL at 18:06

## 2022-10-26 RX ADMIN — ROCURONIUM BROMIDE 20 MG: 50 INJECTION INTRAVENOUS at 09:23

## 2022-10-26 RX ADMIN — CEFAZOLIN SODIUM 2000 MG: 2 SOLUTION INTRAVENOUS at 15:49

## 2022-10-26 RX ADMIN — PROPOFOL 150 MG: 10 INJECTION, EMULSION INTRAVENOUS at 08:23

## 2022-10-26 RX ADMIN — OXYCODONE HYDROCHLORIDE 10 MG: 10 TABLET ORAL at 21:16

## 2022-10-26 RX ADMIN — DOCUSATE SODIUM 100 MG: 100 CAPSULE, LIQUID FILLED ORAL at 18:04

## 2022-10-26 RX ADMIN — MIDAZOLAM 2 MG: 1 INJECTION INTRAMUSCULAR; INTRAVENOUS at 08:14

## 2022-10-26 RX ADMIN — HEPARIN SODIUM 5000 UNITS: 5000 INJECTION INTRAVENOUS; SUBCUTANEOUS at 21:15

## 2022-10-26 RX ADMIN — DEXAMETHASONE SODIUM PHOSPHATE 10 MG: 10 INJECTION, SOLUTION INTRAMUSCULAR; INTRAVENOUS at 09:00

## 2022-10-26 NOTE — LETTER
[address] [facility] [address] [id number]  Dept: [phone]    November 4th 2022       Patient: Maria Fernanda Welch   YOB: 1960   Date of Visit: 8/29/2022       To Whom it May Concern:    Cristino Lindsay is under my professional care  He was seen in the hospital from 10/26/22 to 11/5/22  An additional work note will be provided as to when he will be able to return to work after follow-up with Dr Latonya Marie  If you have any questions or concerns, please don't hesitate to call           Sincerely,          Armand Parks PA-C

## 2022-10-26 NOTE — ANESTHESIA POSTPROCEDURE EVALUATION
Post-Op Assessment Note    CV Status:  Stable  Pain Score: 0    Pain management: adequate     Mental Status:  Awake   Hydration Status:  Stable   PONV Controlled:  None   Airway Patency:  Patent      Post Op Vitals Reviewed: Yes      Staff: CRNA         No complications documented      BP   122/84   Temp   97 3F   Pulse  77   Resp   9   SpO2   99% 6 L FM

## 2022-10-26 NOTE — INTERVAL H&P NOTE
H&P reviewed  After examining the patient I find no changes in the patients condition since the H&P had been written  Vitals:    10/26/22 0714   BP: 108/52   Pulse: 66   Resp: 16   Temp: 97 7 °F (36 5 °C)   SpO2: 95%     Farooq Collado is here today for a simple/suprapubic robot assisted prostatectomy for treatment of bladder outlet obstruction  He is ready for surgery today  Informed consent has been previously signed  His heart is clear within normal S1-S2, no murmurs, rubs, or gallops, his breath sounds are normal in all fields  Requires no marking      Proceed to surgery as planned

## 2022-10-26 NOTE — OP NOTE
OPERATIVE REPORT  PATIENT NAME: Mercy Mendez    :  1960  MRN: [id number]  Pt Location: [facility] OR ROOM 14    SURGERY DATE: 10/26/2022    Surgeon(s) and Role:     * Ana M Herrera MD - Primary     * Kaylee Bustos PA-C - Assisting    Please note that the physician assistant was necessary for changing robotic instruments and exposing the appropriate anatomy    Preop Diagnosis:  Bladder outlet obstruction [N32 0]    Post-Op Diagnosis Codes: * Bladder outlet obstruction [N32 0]    Procedure(s) (LRB):  ROBOTIC ASSISTED LAPAROSCOPIC SIMPLE PROSTATECTOMY, BLADDER NECK RECONSTRUCTION (N/A)    Specimen(s):  ID Type Source Tests Collected by Time Destination   1 : PROSTATE ADENOMA Tissue Prostate TISSUE EXAM Ana M Herrera MD 10/26/2022 1004        Estimated Blood Loss:   150 mL    Drains:  Closed/Suction Drain Left LLQ Bulb 19 Fr  (Active)   Number of days: 0       NG/OG/Enteral Tube Orogastric 18 Fr Center mouth (Active)   Number of days: 0       Continuous Bladder Irrigation Three-way (Active)   Number of days: 0       [REMOVED] Urethral Catheter Latex; Double-lumen 18 Fr  (Removed)   Number of days: 0       Anesthesia Type:   General    Operative Indications:  Bladder outlet obstruction [N32 0]  Uncontrolled lower urinary tract symptoms  Nocturia    Operative Findings:  No adhesions from previous umbilical hernia repair  Successful simple prostatectomy with removal of a large adenoma from the central and transition zone the prostate  The prostatic capsule and peripheral zone were left in-situ  Estimated blood loss of 150 milliliters  Mucosal advancement and bladder neck reconstruction was performed  A matrix of gelatin and thrombin was placed at the resection fossa  The final Edwards catheter is seen to irrigate well  During and after bladder neck reconstruction clear yellow urine is seen to exit from both ureteral orifices  Watertight cystorrhaphy performed  Closed suction drain placed      Complications: None    Procedure and Technique:  Jesus Wetzel was seen in the outpatient setting with a prostate measuring 112 grams and severe, uncontrolled lower urinary tract symptoms  He was counseled on his options for management and opted for a robot assisted simple prostatectomy after undergoing the shared/informed decision-making process  He was prepared for surgery, he underwent the appropriate optimization and was also seen by the perioperative nursing and anesthesia staff on the day of surgery  He was brought to the operating room and placed on top the pink pad positioning system taking care to pad all pressure points  A endotracheal tube was placed, preoperative antibiotics were given  Appropriate intravenous access was obtained by our anesthesia colleagues  He was then secured to the bed with the provided straps on top of the pink pad positioning system  A tilt test was performed showing no patient movement and no undue pressure on any bony prominence or muscle bellies  He was prepared and draped usual fashion after which a time-out confirmed the proper patient position and procedure  We established pneumoperitoneum in the left upper quadrant using a Veress needle device to 15 millimeters of mercury  A 8 millimeter robotic port was placed in this location and diagnostic laparoscopy was performed  No adhesions were noted from the previous umbilical hernia repair  The remainder of the ports were then placed as is customary for robotic pelvic surgery with a total of 4 8 millimeter ports as well as assistant ports, 12 millimeter and 5 millimeter  The robotic system was then engaged  We used a ProGrasp forceps, a fenestrated bipolar forceps, and a robotic scissor for our working today  The bladder was filled with 350 milliliters of irrigant, this was then opened vertically in the midline    Traction sutures using Ronette Brenda needles were then placed to the abdominal wall to hold the right and left lateral walls open  Five Western Priscilla feeding tubes were then placed into the ureteral orifices with a Weck clip to prevent proximal migration  This was to delineate the posterior bladder neck  A stay suture of 0 Vicryl was then placed through the trilobar hypertrophy of the prostate to allow for traction during today's operation  An incision was made 1 centimeter distal to the ureteral orifices to develop the posterior bladder neck  This was extended onto the prostate adenoma  The plane of the prostate adenoma was then established with use of the fenestrated bipolar device  Pinpoint bipolar electrocautery was used for the arterial supply to the prostatic adenoma at the 05:00 o'clock and 7 o'clock position  Care was taken to hug the prostate adenoma anteriorly and we were able to locate the shoulders of the adenoma on the right and the left  The Edwards catheter was then encountered and the urethra was divided anteriorly than posteriorly  We dissected along the plane between the central and transition zone and the peripheral zone the prostate to remove the large prostate adenoma which was then placed into an Endo-Catch bag  Further bipolar cautery was used at this point to control some small arterial bleeders  A bladder neck reconstruction was then performed with a running V lock suture technique starting posteriorly at the bladder neck and running clockwise and counter-clockwise to advance the bladder neck to the urethral stump  Bites were taken of the prostatic capsule to imbricate this during this closure  A matrix of gelatin particles and thrombin was placed into the resection fossa prior to tensioning the sutures and the final Edwadrs catheter, this being a [address] Prisc[address] 3 way catheter was then placed into the lumen of the bladder with 20 milliliters in the balloon and placed on some gentle traction during bladder closure      Bilateral efflux of clear yellow urine was noted from the ureteral orifices following bladder neck reconstruction  A running technique was used anteriorly and posteriorly to meet in the middle closing the serosal, detrusor, and mucosal layers of the bladder with V lock sutures  Following this the bladder was filled and seen to be water fast   A closed suction drain was then placed through the 3rd arm robotic port and placed posteriorly to the bladder    The pneumoperitoneum was decreased and the patient's urine remained light pink and no bleeding was noted within the robotic field  The Endo-Catch bag was delivered through the umbilical port  This was then extended and the specimen was removed  Prior to removal of the ports these were looked out and no bleeding was noted from the inferior epigastric vessels  The fascia was then closed with a running PDS suture starting laterally and meeting in the middle  Fingers tubes during closure ensure that no viscera were incorporated into the closure  The skin was closed with 4 Monocryl and the incisions were further infiltrated with local anesthesia  The patient's continuous bladder irrigation was running at a moderate to slow rate with clear fluent  A belladonna and opiate suppository was placed per rectum to help with postoperative bladder spasms  The patient was awakened from anesthesia and brought to the recovery room  I was present for the entire procedure and A qualified resident physician was not available    Patient Disposition:  PACU      Plan:  The patient is admitted to the urology service overnight for continuous bladder irrigation and monitoring for acute kidney injury and acute blood-loss anemia status post his simple prostatectomy performed a robot assisted laparoscopic fashion along with a robotic bladder neck reconstruction  Pending low drain output is drain will be removed tomorrow  He will need his Edwards catheter for the next 10-14 days    We will see him back in the office in some weeks to review the final pathology          SIGNATURE: Yohannes Alexander MD  DATE: October 26, 2022  TIME: 11:24 AM

## 2022-10-26 NOTE — ANESTHESIA PREPROCEDURE EVALUATION
Procedure:  robotic simple prostatectomy (N/A Abdomen)    Relevant Problems   CARDIO   (+) CAD (coronary artery disease)   (+) Essential hypertension   (+) Mixed hyperlipidemia      GI/HEPATIC   (+) Gastroesophageal reflux disease without esophagitis      /RENAL   (+) Benign localized prostatic hyperplasia with lower urinary tract symptoms (LUTS)      MUSCULOSKELETAL   (+) Failed back syndrome        Physical Exam    Airway    Mallampati score: III  TM Distance: >3 FB  Neck ROM: full     Dental   No notable dental hx     Cardiovascular  Cardiovascular exam normal    Pulmonary  Pulmonary exam normal     Other Findings    Cardiac stent '94  Controlled GERD  2016 echo normal      Anesthesia Plan  ASA Score- 3     Anesthesia Type- general with ASA Monitors  Additional Monitors:   Airway Plan: ETT  Comment: Pre-op EKG   Plan Factors-Exercise tolerance (METS): >4 METS  Chart reviewed  EKG reviewed  Existing labs reviewed  Patient summary reviewed  Patient is not a current smoker  Induction- intravenous  Postoperative Plan- Plan for postoperative opioid use  Informed Consent- Anesthetic plan and risks discussed with patient  I personally reviewed this patient with the CRNA  Discussed and agreed on the Anesthesia Plan with the CRNA  Kg Long

## 2022-10-26 NOTE — TELEPHONE ENCOUNTER
Please arrange for Edwards catheter removal and trial of void in 10-14 days  Please arrange for pathology review in 2-3 weeks    The patient needs a follow-up visit with me in 4-6 months please as well

## 2022-10-26 NOTE — PROGRESS NOTES
Pt from pacu awake oriented but sleepy vss CBI intact cano draining pink colored urine no blood clots noted pt resting in bed call bell in reach instructed to call for assist understands pt no complaints

## 2022-10-27 LAB
ANION GAP SERPL CALCULATED.3IONS-SCNC: 8 MMOL/L (ref 4–13)
BUN SERPL-MCNC: 20 MG/DL (ref 5–25)
CALCIUM SERPL-MCNC: 8.7 MG/DL (ref 8.3–10.1)
CHLORIDE SERPL-SCNC: 108 MMOL/L (ref 96–108)
CO2 SERPL-SCNC: 23 MMOL/L (ref 21–32)
CREAT SERPL-MCNC: 1.02 MG/DL (ref 0.6–1.3)
ERYTHROCYTE [DISTWIDTH] IN BLOOD BY AUTOMATED COUNT: 12.6 % (ref 11.6–15.1)
GFR SERPL CREATININE-BSD FRML MDRD: 79 ML/MIN/1.73SQ M
GLUCOSE SERPL-MCNC: 126 MG/DL (ref 65–140)
HCT VFR BLD AUTO: 42.7 % (ref 36.5–49.3)
HGB BLD-MCNC: 14 G/DL (ref 12–17)
MCH RBC QN AUTO: 28.6 PG (ref 26.8–34.3)
MCHC RBC AUTO-ENTMCNC: 32.8 G/DL (ref 31.4–37.4)
MCV RBC AUTO: 87 FL (ref 82–98)
PLATELET # BLD AUTO: 229 THOUSANDS/UL (ref 149–390)
PMV BLD AUTO: 11.1 FL (ref 8.9–12.7)
POTASSIUM SERPL-SCNC: 4.4 MMOL/L (ref 3.5–5.3)
RBC # BLD AUTO: 4.89 MILLION/UL (ref 3.88–5.62)
SODIUM SERPL-SCNC: 139 MMOL/L (ref 135–147)
WBC # BLD AUTO: 11.24 THOUSAND/UL (ref 4.31–10.16)

## 2022-10-27 PROCEDURE — 85027 COMPLETE CBC AUTOMATED: CPT | Performed by: UROLOGY

## 2022-10-27 PROCEDURE — 80048 BASIC METABOLIC PNL TOTAL CA: CPT | Performed by: UROLOGY

## 2022-10-27 RX ORDER — POLYETHYLENE GLYCOL 3350 17 G/17G
17 POWDER, FOR SOLUTION ORAL DAILY PRN
Status: DISCONTINUED | OUTPATIENT
Start: 2022-10-27 | End: 2022-11-05 | Stop reason: HOSPADM

## 2022-10-27 RX ADMIN — ACETAMINOPHEN 650 MG: 325 TABLET, FILM COATED ORAL at 08:58

## 2022-10-27 RX ADMIN — ACETAMINOPHEN 650 MG: 325 TABLET, FILM COATED ORAL at 01:11

## 2022-10-27 RX ADMIN — HEPARIN SODIUM 5000 UNITS: 5000 INJECTION INTRAVENOUS; SUBCUTANEOUS at 20:46

## 2022-10-27 RX ADMIN — PHENAZOPYRIDINE 200 MG: 100 TABLET ORAL at 08:58

## 2022-10-27 RX ADMIN — CEFAZOLIN SODIUM 2000 MG: 2 SOLUTION INTRAVENOUS at 01:11

## 2022-10-27 RX ADMIN — ACETAMINOPHEN 650 MG: 325 TABLET, FILM COATED ORAL at 17:47

## 2022-10-27 RX ADMIN — HEPARIN SODIUM 5000 UNITS: 5000 INJECTION INTRAVENOUS; SUBCUTANEOUS at 13:26

## 2022-10-27 RX ADMIN — PHENAZOPYRIDINE 200 MG: 100 TABLET ORAL at 17:47

## 2022-10-27 RX ADMIN — OXYCODONE HYDROCHLORIDE 5 MG: 5 TABLET ORAL at 13:58

## 2022-10-27 RX ADMIN — DOCUSATE SODIUM 100 MG: 100 CAPSULE, LIQUID FILLED ORAL at 08:58

## 2022-10-27 RX ADMIN — OXYCODONE HYDROCHLORIDE 10 MG: 10 TABLET ORAL at 20:45

## 2022-10-27 RX ADMIN — DOCUSATE SODIUM 100 MG: 100 CAPSULE, LIQUID FILLED ORAL at 17:47

## 2022-10-27 RX ADMIN — BACITRACIN ZINC, NEOMYCIN SULFATE, AND POLYMYXIN B SULFATE 1 LARGE APPLICATION: 400; 3.5; 5 OINTMENT TOPICAL at 20:46

## 2022-10-27 RX ADMIN — HYDROMORPHONE HYDROCHLORIDE 0.5 MG: 1 INJECTION, SOLUTION INTRAMUSCULAR; INTRAVENOUS; SUBCUTANEOUS at 22:30

## 2022-10-27 RX ADMIN — PANTOPRAZOLE SODIUM 40 MG: 40 TABLET, DELAYED RELEASE ORAL at 05:07

## 2022-10-27 RX ADMIN — SENNOSIDES 8.6 MG: 8.6 TABLET, FILM COATED ORAL at 08:58

## 2022-10-27 RX ADMIN — BACITRACIN ZINC, NEOMYCIN SULFATE, AND POLYMYXIN B SULFATE 1 LARGE APPLICATION: 400; 3.5; 5 OINTMENT TOPICAL at 13:26

## 2022-10-27 RX ADMIN — OXYCODONE HYDROCHLORIDE 10 MG: 10 TABLET ORAL at 08:57

## 2022-10-27 RX ADMIN — DILTIAZEM HYDROCHLORIDE 120 MG: 120 CAPSULE, COATED, EXTENDED RELEASE ORAL at 08:58

## 2022-10-27 RX ADMIN — ONDANSETRON 4 MG: 2 INJECTION INTRAMUSCULAR; INTRAVENOUS at 22:28

## 2022-10-27 RX ADMIN — HEPARIN SODIUM 5000 UNITS: 5000 INJECTION INTRAVENOUS; SUBCUTANEOUS at 05:07

## 2022-10-27 RX ADMIN — PHENAZOPYRIDINE 200 MG: 100 TABLET ORAL at 12:45

## 2022-10-27 NOTE — PROGRESS NOTES
Progress Note - urology  Bety Gutierrez 63 y o  male MRN: [id number]  Unit/Bed#: [facility] 319-01 Encounter: [id number]    Assessment & Plan:    Benign prostatic hypertrophy with bladder outlet obstruction:  -postop day 1 robotic assisted laparoscopic simple prostatectomy with bladder neck reconstruction with Dr Rita Meneses, progressing well  -patient currently tolerating advanced diet, denies any nausea or vomiting  -creatinine 1 02  -leukocytosis of 11  -hemoglobin stable at 14 0  -TINA drain with 20 cc for 24 hours, will remove prior to discharge  -IV fluids discontinued  -additional vital stable  -encourage ambulation  -provided good bowel regimen    Plan for discharge later this afternoon      Subjective/Objective   Chief Complaint:  None    Subjective:   Patient sitting comfortably in bed no acute distress  He currently denies any nausea or vomiting, reports he is tolerating advanced diet, he has not been up and ambulating since surgery, denies any passing of flatus  He reports that he has pain but does improve with oral pain medication  Otherwise he denies any additional complaints  Objective:     Blood pressure 139/88, pulse 60, temperature 98 °F (36 7 °C), temperature source Oral, resp  rate 18, height 5' 8" (1 727 m), weight 88 5 kg (195 lb), SpO2 98 %  ,Body mass index is 29 65 kg/m²  Intake/Output Summary (Last 24 hours) at 10/27/2022 1310  Last data filed at 10/27/2022 0915  Gross per 24 hour   Intake 2129 7 ml   Output -2415 ml   Net 4544 7 ml       Invasive Devices  Report    Peripheral Intravenous Line  Duration           Peripheral IV 10/26/22 Left Hand 1 day    Peripheral IV 10/26/22 Right Hand 1 day          Drain  Duration           Closed/Suction Drain Left LLQ Bulb 19 Fr  1 day    Continuous Bladder Irrigation Three-way 1 day                Physical Exam  Constitutional:       General: He is not in acute distress  Appearance: He is normal weight   He is not ill-appearing, toxic-appearing or diaphoretic  HENT:      Head: Normocephalic and atraumatic  Right Ear: External ear normal       Left Ear: External ear normal       Nose: Nose normal       Mouth/Throat:      Pharynx: Oropharynx is clear  Eyes:      General: No scleral icterus  Conjunctiva/sclera: Conjunctivae normal    Cardiovascular:      Rate and Rhythm: Normal rate and regular rhythm  Pulses: Normal pulses  Heart sounds: No murmur heard  No friction rub  No gallop  Pulmonary:      Effort: Pulmonary effort is normal  No respiratory distress  Breath sounds: No wheezing, rhonchi or rales  Abdominal:      General: Bowel sounds are normal  There is no distension  Palpations: Abdomen is soft  Tenderness: There is abdominal tenderness  Comments: Surgical incisions intact, dry, no sign of wound dehiscence or underlying infection, abdomen mildly distended in the upper quadrants, tympanic to percussion in the upper quadrants, lower quadrants are soft, nontender, appropriate incisional tenderness  TINA drain in place with serosanguineous fluid in low output   Genitourinary:     Comments: Urethral Edwards catheter in place draining clear yellow urine  Musculoskeletal:         General: Normal range of motion  Cervical back: Normal range of motion  Skin:     General: Skin is warm and dry  Neurological:      General: No focal deficit present  Mental Status: He is alert and oriented to person, place, and time  Psychiatric:         Mood and Affect: Mood normal          Behavior: Behavior normal          Thought Content: Thought content normal          Judgment: Judgment normal            Lab, Imaging and other studies:I have personally reviewed pertinent lab results      Lab Results   Component Value Date    WBC 11 24 (H) 10/27/2022    HGB 14 0 10/27/2022    HCT 42 7 10/27/2022    MCV 87 10/27/2022     10/27/2022     Lab Results   Component Value Date    SODIUM 139 10/27/2022    K 4 4 10/27/2022     10/27/2022    CO2 23 10/27/2022    BUN 20 10/27/2022    CREATININE 1 02 10/27/2022    GLUC 126 10/27/2022    CALCIUM 8 7 10/27/2022       VTE Pharmacologic Prophylaxis: Heparin  VTE Mechanical Prophylaxis: sequential compression device      Ana DURAN

## 2022-10-27 NOTE — PLAN OF CARE
Problem: PAIN - ADULT  Goal: Verbalizes/displays adequate comfort level or baseline comfort level  Description: Interventions:  - Encourage patient to monitor pain and request assistance  - Assess pain using appropriate pain scale  - Administer analgesics based on type and severity of pain and evaluate response  - Implement non-pharmacological measures as appropriate and evaluate response  - Consider cultural and social influences on pain and pain management  - Notify physician/advanced practitioner if interventions unsuccessful or patient reports new pain  Outcome: Progressing     Problem: SAFETY ADULT  Goal: Patient will remain free of falls  Description: INTERVENTIONS:  - Educate patient/family on patient safety including physical limitations  - Instruct patient to call for assistance with activity   - Consult OT/PT to assist with strengthening/mobility   - Keep Call bell within reach  - Keep bed low and locked with side rails adjusted as appropriate  - Keep care items and personal belongings within reach  - Initiate and maintain comfort rounds  - Make Fall Risk Sign visible to staff  - Offer Toileting every  Hours, in advance of need  - Initiate/Maintain alarm  - Obtain necessary fall risk management equipment:   - Apply yellow socks and bracelet for high fall risk patients  - Consider moving patient to room near nurses station  Outcome: Progressing     Problem: Knowledge Deficit  Goal: Patient/family/caregiver demonstrates understanding of disease process, treatment plan, medications, and discharge instructions  Description: Complete learning assessment and assess knowledge base    Interventions:  - Provide teaching at level of understanding  - Provide teaching via preferred learning methods  Outcome: Progressing

## 2022-10-27 NOTE — DISCHARGE SUMMARY
Discharge Summary - Edmund Aranda 63 y o  male MRN: [id number]    Unit/Bed#: [facility] 391-22 Encounter: [id number]    Admission Date:  10/26/2022    Admitting Diagnosis: Bladder outlet obstruction [N32 0]    HPI:   Edmund Aranda is a 29-year-old male with uncontrolled lower urinary tract symptoms and enlarged prostate  He failed medical management  He has a history of elevated PSA had a MRI which was not concerning for clinically significant prostate cancer  Patient discussed surgical options with  attending and patient discussed move forward with simple prostatectomy for management of his lower urinary tract symptoms not currently managed with medical therapy  Procedures Performed:  Robotic assisted laparoscopic prostatectomy simple, bladder neck reconstruction with Dr Mota  of Hospital Course:   Patient hospitalized from 10/26/2022 -11/05/2022  During his hospital course he underwent his scheduled surgical procedure  Postoperatively was taken to medical-surgical unit for observation  His diet was advanced and tolerating initially, ambulating independently and pain well controlled  Patient kept an additional day for observation for pain control  On the night of postop day 1 patient began to experience significant emesis and was evaluated on postop day 2 for postoperative ileus  This was confirmed  This continued for several days  NG tube was placed, general surgery consulted  Repeat KUB is continue to report revealed persistent postoperative ileus  During this time patient continued to be hemodynamically stable and vitals and labs were stable  His TINA drain was removed believing that this would assist in resolving postoperative ileus  A TINA creatinine was performed prior to this and was negative  CT scan was also obtained for further evaluation of obstruction  No acute abdominal issues were notified  Only revealed postoperative ileus and postsurgical changes    Patient ultimately required supplemental nutrition and insertion of a PICC line to provide TPN  On 11/03/2022 patient underwent additional KUB with Gastrografin which revealed contrast going down to the rectum  He was started on a clear liquid diet and re-evaluate in the a m  Continued to do well and diet was advanced to regular/surgical soft  Patient continued to do well on 11/04/2022  He was kept an additional night to assure that he was tolerating advancement of diet  On 11/05/2022 patient was hemodynamically stable, vitals and labs were stable, hemoglobin stable, pain well controlled and resolved, and patient was tolerating a diet with no signs of nausea and vomiting  Urethral Edwards catheter good urinary output, he was discharged urethral Edwards catheter for outpatient follow-up with his primary surgeon following Monday of discharge  Significant Findings, Care, Treatment and Services Provided:  None    Complications:  None    Discharge Diagnosis:  Bladder outlet obstruction    Medical problems resolved: To be determined    Condition at Discharge: stable         Discharge instructions/Information to patient and family:   See after visit summary for information provided to patient and family  Provisions for Follow-Up Care:  See after visit summary for information related to follow-up care and any pertinent home health orders  PCP: Glendon Duane, MD    Disposition: Home    Planned Readmission: No      Discharge Statement   I spent 20 minutes discharging the patient  This time was spent on the day of discharge  I had direct contact with the patient on the day of discharge  Additional documentation is required if more than 30 minutes were spent on discharge  Discharge Medications:  See after visit summary for reconciled discharge medications provided to patient and family         Padma Espino

## 2022-10-28 ENCOUNTER — APPOINTMENT (OUTPATIENT)
Dept: RADIOLOGY | Facility: HOSPITAL | Age: 62
End: 2022-10-28
Payer: COMMERCIAL

## 2022-10-28 LAB
ANION GAP SERPL CALCULATED.3IONS-SCNC: 6 MMOL/L (ref 4–13)
BUN SERPL-MCNC: 28 MG/DL (ref 5–25)
CALCIUM SERPL-MCNC: 9.4 MG/DL (ref 8.3–10.1)
CHLORIDE SERPL-SCNC: 105 MMOL/L (ref 96–108)
CO2 SERPL-SCNC: 27 MMOL/L (ref 21–32)
CREAT FLD-MCNC: 0.79 MG/DL
CREAT SERPL-MCNC: 1.06 MG/DL (ref 0.6–1.3)
ERYTHROCYTE [DISTWIDTH] IN BLOOD BY AUTOMATED COUNT: 12.7 % (ref 11.6–15.1)
GFR SERPL CREATININE-BSD FRML MDRD: 75 ML/MIN/1.73SQ M
GLUCOSE SERPL-MCNC: 135 MG/DL (ref 65–140)
HCT VFR BLD AUTO: 48.7 % (ref 36.5–49.3)
HGB BLD-MCNC: 16.7 G/DL (ref 12–17)
MCH RBC QN AUTO: 29.6 PG (ref 26.8–34.3)
MCHC RBC AUTO-ENTMCNC: 34.3 G/DL (ref 31.4–37.4)
MCV RBC AUTO: 86 FL (ref 82–98)
PLATELET # BLD AUTO: 253 THOUSANDS/UL (ref 149–390)
PMV BLD AUTO: 10.5 FL (ref 8.9–12.7)
POTASSIUM SERPL-SCNC: 3.8 MMOL/L (ref 3.5–5.3)
RBC # BLD AUTO: 5.64 MILLION/UL (ref 3.88–5.62)
SODIUM SERPL-SCNC: 138 MMOL/L (ref 135–147)
WBC # BLD AUTO: 12.54 THOUSAND/UL (ref 4.31–10.16)

## 2022-10-28 PROCEDURE — 85027 COMPLETE CBC AUTOMATED: CPT | Performed by: PHYSICIAN ASSISTANT

## 2022-10-28 PROCEDURE — 82570 ASSAY OF URINE CREATININE: CPT | Performed by: PHYSICIAN ASSISTANT

## 2022-10-28 PROCEDURE — 74022 RADEX COMPL AQT ABD SERIES: CPT

## 2022-10-28 PROCEDURE — 80048 BASIC METABOLIC PNL TOTAL CA: CPT | Performed by: PHYSICIAN ASSISTANT

## 2022-10-28 RX ORDER — METOCLOPRAMIDE HYDROCHLORIDE 5 MG/ML
10 INJECTION INTRAMUSCULAR; INTRAVENOUS EVERY 6 HOURS PRN
Status: DISCONTINUED | OUTPATIENT
Start: 2022-10-28 | End: 2022-11-05 | Stop reason: HOSPADM

## 2022-10-28 RX ORDER — SODIUM CHLORIDE, SODIUM LACTATE, POTASSIUM CHLORIDE, CALCIUM CHLORIDE 600; 310; 30; 20 MG/100ML; MG/100ML; MG/100ML; MG/100ML
125 INJECTION, SOLUTION INTRAVENOUS CONTINUOUS
Status: DISCONTINUED | OUTPATIENT
Start: 2022-10-28 | End: 2022-10-29

## 2022-10-28 RX ORDER — METOCLOPRAMIDE HYDROCHLORIDE 5 MG/ML
10 INJECTION INTRAMUSCULAR; INTRAVENOUS EVERY 6 HOURS PRN
Status: DISCONTINUED | OUTPATIENT
Start: 2022-10-28 | End: 2022-10-28

## 2022-10-28 RX ORDER — KETOROLAC TROMETHAMINE 30 MG/ML
15 INJECTION, SOLUTION INTRAMUSCULAR; INTRAVENOUS EVERY 6 HOURS
Status: DISPENSED | OUTPATIENT
Start: 2022-10-28 | End: 2022-10-30

## 2022-10-28 RX ORDER — SIMETHICONE 80 MG
80 TABLET,CHEWABLE ORAL EVERY 6 HOURS PRN
Status: DISCONTINUED | OUTPATIENT
Start: 2022-10-28 | End: 2022-11-05 | Stop reason: HOSPADM

## 2022-10-28 RX ORDER — SODIUM CHLORIDE 9 MG/ML
100 INJECTION, SOLUTION INTRAVENOUS CONTINUOUS
Status: DISCONTINUED | OUTPATIENT
Start: 2022-10-28 | End: 2022-10-28

## 2022-10-28 RX ADMIN — METOCLOPRAMIDE 10 MG: 5 INJECTION, SOLUTION INTRAMUSCULAR; INTRAVENOUS at 16:27

## 2022-10-28 RX ADMIN — SODIUM CHLORIDE 1000 ML: 0.9 INJECTION, SOLUTION INTRAVENOUS at 12:31

## 2022-10-28 RX ADMIN — SODIUM CHLORIDE, SODIUM LACTATE, POTASSIUM CHLORIDE, AND CALCIUM CHLORIDE 125 ML/HR: .6; .31; .03; .02 INJECTION, SOLUTION INTRAVENOUS at 15:05

## 2022-10-28 RX ADMIN — ACETAMINOPHEN 650 MG: 325 TABLET, FILM COATED ORAL at 01:25

## 2022-10-28 RX ADMIN — HEPARIN SODIUM 5000 UNITS: 5000 INJECTION INTRAVENOUS; SUBCUTANEOUS at 08:25

## 2022-10-28 RX ADMIN — ONDANSETRON 4 MG: 2 INJECTION INTRAMUSCULAR; INTRAVENOUS at 06:59

## 2022-10-28 RX ADMIN — KETOROLAC TROMETHAMINE 15 MG: 30 INJECTION, SOLUTION INTRAMUSCULAR; INTRAVENOUS at 21:49

## 2022-10-28 RX ADMIN — METOCLOPRAMIDE 10 MG: 5 INJECTION, SOLUTION INTRAMUSCULAR; INTRAVENOUS at 02:19

## 2022-10-28 RX ADMIN — SODIUM CHLORIDE, SODIUM LACTATE, POTASSIUM CHLORIDE, AND CALCIUM CHLORIDE 125 ML/HR: .6; .31; .03; .02 INJECTION, SOLUTION INTRAVENOUS at 23:17

## 2022-10-28 RX ADMIN — DOCUSATE SODIUM 100 MG: 100 CAPSULE, LIQUID FILLED ORAL at 19:04

## 2022-10-28 RX ADMIN — BACITRACIN ZINC, NEOMYCIN SULFATE, AND POLYMYXIN B SULFATE 1 LARGE APPLICATION: 400; 3.5; 5 OINTMENT TOPICAL at 19:04

## 2022-10-28 RX ADMIN — METOCLOPRAMIDE 10 MG: 5 INJECTION, SOLUTION INTRAMUSCULAR; INTRAVENOUS at 08:25

## 2022-10-28 RX ADMIN — BACITRACIN ZINC, NEOMYCIN SULFATE, AND POLYMYXIN B SULFATE 1 LARGE APPLICATION: 400; 3.5; 5 OINTMENT TOPICAL at 08:32

## 2022-10-28 NOTE — PROGRESS NOTES
Progress Note - urology  Hermann Mireles 63 y o  male MRN: [id number]  Unit/Bed#: [facility] 319-01 Encounter: [id number]    Assessment & Plan:    Benign prostatic hypertrophy with bladder outlet obstruction:  -postop day 2 robotic assisted laparoscopic simple prostatectomy with bladder neck reconstruction with Dr Chi De, initially progressing well, now with nausea and vomiting  -patient kept an additional night for pain control and disposition planning   -patient initially tolerating advanced diet yesterday with no nausea or vomiting  Developed nausea and vomiting overnight, patient made NPO with sips of clears  -IV fluid bolus ordered  -repeat labs currently pending, creatinine 1 02, leukocytosis of 11, hemoglobin stable at 14 0  -TINA drain with 20 cc out on postop day 1 with an increase of 170 cc own 24 hours, will obtain TINA creatinine, fluid in TINA drain noted to be more serous in nature and orange  -vitals continued to be stable at this time  -encourage ambulation  -provided good bowel regimen  -patient did have an episode of diarrhea last night   -obstructive series ordered for further evaluation postoperative ileus  -anticipate patient's to stay an additional day until workup is completed and patient tolerating advanced diet    Subjective/Objective   Chief Complaint:  Nauseous    Subjective:   Patient currently lying in bed reporting that he feels nauseous and worse compared to yesterday  Initially yesterday felt as though he could possibly go home, today feels worse and does not think that he is able to go home and experiencing nausea and vomiting  Objective:     Blood pressure 155/98, pulse 101, temperature 97 6 °F (36 4 °C), temperature source Oral, resp  rate 18, height 5' 8" (1 727 m), weight 88 5 kg (195 lb), SpO2 96 %  ,Body mass index is 29 65 kg/m²        Intake/Output Summary (Last 24 hours) at 10/28/2022 1052  Last data filed at 10/28/2022 0201  Gross per 24 hour   Intake 100 ml   Output 700 ml   Net -600 ml       Invasive Devices  Report    Peripheral Intravenous Line  Duration           Peripheral IV 10/26/22 Left Hand 2 days    Peripheral IV 10/26/22 Right Hand 2 days          Drain  Duration           Closed/Suction Drain Left LLQ Bulb 19 Fr  2 days    Continuous Bladder Irrigation Three-way 2 days                Physical Exam  Constitutional:       General: He is not in acute distress  Appearance: He is normal weight  He is not ill-appearing, toxic-appearing or diaphoretic  Comments: Patient looking noticeably uncomfortable, mildly diaphoretic, does not look as well as did yesterday  HENT:      Head: Normocephalic and atraumatic  Right Ear: External ear normal       Left Ear: External ear normal       Nose: Nose normal       Mouth/Throat:      Pharynx: Oropharynx is clear  Eyes:      General: No scleral icterus  Conjunctiva/sclera: Conjunctivae normal    Cardiovascular:      Rate and Rhythm: Normal rate and regular rhythm  Pulses: Normal pulses  Heart sounds: No murmur heard  No friction rub  No gallop  Pulmonary:      Effort: Pulmonary effort is normal  No respiratory distress  Breath sounds: No wheezing, rhonchi or rales  Abdominal:      General: Bowel sounds are normal  There is no distension  Tenderness: There is no abdominal tenderness  Comments: Surgical incision intact, dry, no sign of wound dehiscence or underlying infection  Abdomen nondistended, good bowel sounds are present throughout in all quadrants, abdomen nontender to palpation  TINA drain in place with serous fluid and orange tinged  Genitourinary:     Comments: Urethral Edwards catheter placed with orange-colored urine due to Pyridium  Musculoskeletal:         General: Normal range of motion  Cervical back: Normal range of motion  Skin:     General: Skin is warm and dry  Neurological:      General: No focal deficit present        Mental Status: He is alert and oriented to person, place, and time  Psychiatric:         Mood and Affect: Mood normal          Behavior: Behavior normal          Thought Content: Thought content normal          Judgment: Judgment normal            Lab, Imaging and other studies:I have personally reviewed pertinent lab results    Lab Results   Component Value Date    WBC 11 24 (H) 10/27/2022    HGB 14 0 10/27/2022    HCT 42 7 10/27/2022    MCV 87 10/27/2022     10/27/2022     Lab Results   Component Value Date    SODIUM 139 10/27/2022    K 4 4 10/27/2022     10/27/2022    CO2 23 10/27/2022    BUN 20 10/27/2022    CREATININE 1 02 10/27/2022    GLUC 126 10/27/2022    CALCIUM 8 7 10/27/2022         VTE Pharmacologic Prophylaxis: Heparin  VTE Mechanical Prophylaxis: sequential compression device      Golden DURAN

## 2022-10-28 NOTE — QUICK NOTE
Called to evaluate patient by Urology AP  Patient is postop day 2 from a robotic assisted laparoscopic simple prostatectomy and bladder neck reconstruction for bladder outlet obstruction  He states he was doing well until approximately 10:00 p m  last night when he developed nausea  He had eaten a sandwich for lunch and crackers for dinner  He was given Zofran and then later given Reglan  He states hours later he developed vomiting which was more of a dry heaving  He also complains of excessive belching  Just prior to my arrival patient had a watery brown diarrhea  He denies having any contact with anyone with gastrointestinal symptoms  He states after he started vomiting he developed some lower abdominal pain, which she describes as mild  He denies chest pain, shortness of breath, fever, and chills  Patient has had good urine output, and his urine and is pink in color  On examination patient is in no acute distress  His vital signs are stable  /100 (BP Location: Right arm)   Pulse 84   Temp 97 5 °F (36 4 °C) (Oral)   Resp 18   Ht 5' 8" (1 727 m)   Wt 88 5 kg (195 lb)   SpO2 93%   BMI 29 65 kg/m²  Chest:  Clear to auscultation throughout  Heart:  Regular rate and rhythm 84  Abdomen:  Soft, mild tenderness in the right lower quadrant and suprapubic area  Bowel sounds are present  Abdomen is not distended    There is no rebound or guarding  Extremities:  No edema    Recent Results (from the past 36 hour(s))   Basic Metabolic Panel    Collection Time: 10/27/22  5:14 AM   Result Value Ref Range    Sodium 139 135 - 147 mmol/L    Potassium 4 4 3 5 - 5 3 mmol/L    Chloride 108 96 - 108 mmol/L    CO2 23 21 - 32 mmol/L    ANION GAP 8 4 - 13 mmol/L    BUN 20 5 - 25 mg/dL    Creatinine 1 02 0 60 - 1 30 mg/dL    Glucose 126 65 - 140 mg/dL    Calcium 8 7 8 3 - 10 1 mg/dL    eGFR 79 ml/min/1 73sq m   CBC and Platelet    Collection Time: 10/27/22  5:14 AM   Result Value Ref Range    WBC 11 24 (H) 4 31 - 10 16 Thousand/uL    RBC 4 89 3 88 - 5 62 Million/uL    Hemoglobin 14 0 12 0 - 17 0 g/dL    Hematocrit 42 7 36 5 - 49 3 %    MCV 87 82 - 98 fL    MCH 28 6 26 8 - 34 3 pg    MCHC 32 8 31 4 - 37 4 g/dL    RDW 12 6 11 6 - 15 1 %    Platelets 995 376 - 438 Thousands/uL    MPV 11 1 8 9 - 12 7 fL       Impression:  27-year-old male postop day 2 from a robotic assisted laparoscopic simple prostatectomy and bladder neck reconstruction with nausea and vomiting starting several hours ago  Will make patient NPO for now until evaluated by Urology in the morning  Continue Zofran, Reglan and start simethicone  I do not feel imaging is necessary at this point but may be if his symptoms worsen or do not improve  Leon Yarbrough     This text is generated with voice recognition software  There may be translation, syntax,  or grammatical errors  If you have any questions, please contact the dictating provider

## 2022-10-28 NOTE — PLAN OF CARE
Problem: PAIN - ADULT  Goal: Verbalizes/displays adequate comfort level or baseline comfort level  Description: Interventions:  - Encourage patient to monitor pain and request assistance  - Assess pain using appropriate pain scale  - Administer analgesics based on type and severity of pain and evaluate response  - Implement non-pharmacological measures as appropriate and evaluate response  - Consider cultural and social influences on pain and pain management  - Notify physician/advanced practitioner if interventions unsuccessful or patient reports new pain  Outcome: Not Progressing     Problem: INFECTION - ADULT  Goal: Absence or prevention of progression during hospitalization  Description: INTERVENTIONS:  - Assess and monitor for signs and symptoms of infection  - Monitor lab/diagnostic results  - Monitor all insertion sites, i e  indwelling lines, tubes, and drains  - Monitor endotracheal if appropriate and nasal secretions for changes in amount and color  - [place] appropriate cooling/warming therapies per order  - Administer medications as ordered  - Instruct and encourage patient and family to use good hand hygiene technique  - Identify and instruct in appropriate isolation precautions for identified infection/condition  Outcome: Not Progressing     Problem: INFECTION - ADULT  Goal: Absence of fever/infection during neutropenic period  Description: INTERVENTIONS:  - Monitor WBC    Outcome: Not Progressing     Problem: DISCHARGE PLANNING  Goal: Discharge to home or other facility with appropriate resources  Description: INTERVENTIONS:  - Identify barriers to discharge w/patient and caregiver  - Arrange for needed discharge resources and transportation as appropriate  - Identify discharge learning needs (meds, wound care, etc )  - Arrange for interpretive services to assist at discharge as needed  - Refer to Case Management Department for coordinating discharge planning if the patient needs post-hospital services based on physician/advanced practitioner order or complex needs related to functional status, cognitive ability, or social support system  Outcome: Not Progressing

## 2022-10-28 NOTE — CASE MANAGEMENT
Case Management Assessment & Discharge Planning Note    Patient name Anya Porter  [facility] [id number]  : 1960 Date 10/28/2022       Current Admission Date: 10/26/2022  Current Admission Diagnosis:Benign localized prostatic hyperplasia with lower urinary tract symptoms (LUTS)   Patient Active Problem List    Diagnosis Date Noted   • Benign localized prostatic hyperplasia with lower urinary tract symptoms (LUTS) 10/25/2022   • CAD (coronary artery disease)    • Encounter to discuss test results    • Radiculopathy, lumbar region 2022   • Benign prostatic hyperplasia with nocturia 2022   • Erectile dysfunction 2022   • Mixed hyperlipidemia 2020   • Vitamin D deficiency 2020   • Cardiomyopathy ([address] ) 2020   • Intervertebral disc disorder with radiculopathy of lumbosacral region    • HNP (herniated nucleus pulposus), lumbar 2019   • Failed back syndrome 10/26/2018   • Gastroesophageal reflux disease without esophagitis 2015   • Elevated PSA 2015   • Allergic rhinitis due to pollen 2014   • Essential hypertension 2013      LOS (days): 1  Geometric Mean LOS (GMLOS) (days):   Days to GMLOS:     OBJECTIVE:    Risk of Unplanned Readmission Score: 5 54         Current admission status: Inpatient       Preferred Pharmacy:   [facility]/pharmacy #[id number]- [place], [place] - [address]  [address]  [facility] [place] [address], [address] [place] [address]  Phone: [phone] Fax: [phone]    [address] [place] [address]  Phone: [phone] Fax: [phone]    Primary Care Provider: Criselda Velasco MD    Primary Insurance: Adela Palacios  Secondary Insurance:     ASSESSMENT:  Lisandro Patel Proxies    There are no active Health Care Proxies on file                        Patient Information  Admitted from[de-identified] Home  Mental Status: Alert  During Assessment patient was accompanied by: Not accompanied during assessment  Assessment information provided by[de-identified] Patient  Primary Caregiver: Self  Support Systems: Spouse/significant other, Self  What city do you live in?: [address] entry access options   Select all that apply : Stairs  Number of steps to enter home : 7  Do the steps have railings?: Yes  Type of Current Residence: Bi-level  Upon entering residence, is there a bedroom on the main floor (no further steps)?: No  A bedroom is located on the following floor levels of residence (select all that apply):: 2nd Floor  Upon entering residence, is there a bathroom on the main floor (no further steps)?: No  Indicate which floors of current residence have a bathroom (select all the apply):: 2nd Floor  Number of steps to 2nd floor from main floor: 7  In the last 12 months, was there a time when you were not able to pay the mortgage or rent on time?: No  In the last 12 months, how many places have you lived?: 1  In the last 12 months, was there a time when you did not have a steady place to sleep or slept in a shelter (including now)?: No  Living Arrangements: Lives w/ Spouse/significant other    Activities of Daily Living Prior to Admission  Functional Status: Independent  Completes ADLs independently?: Yes  Ambulates independently?: Yes  Does patient use assisted devices?: No  Does patient currently own DME?: No  Does patient have a history of Outpatient Therapy (PT/OT)?: Yes  Does the patient have a history of Short-Term Rehab?: No  Does patient have a history of HHC?: No  Does patient currently have [facility]?: No         Patient Information Continued  Income Source: Employed  Within the past 12 months, you worried that your food would run out before you got the money to buy more : Never true  Within the past 12 months, the food you bought just didn't last and you didn't have money to get more : Never true  Does patient receive dialysis treatments?: No  Does patient have a history of substance abuse?: No  Does patient have a history of Mental Health Diagnosis?: No         Means of Transportation  Means of Transport to Appts[de-identified] Drives Self  In the past 12 months, has lack of transportation kept you from medical appointments or from getting medications?: No  In the past 12 months, has lack of transportation kept you from meetings, work, or from getting things needed for daily living?: No        DISCHARGE DETAILS:    Discharge planning discussed with[de-identified] Patient  Freedom of Choice: Yes     CM contacted family/caregiver?: No- see comments  Were Treatment Team discharge recommendations reviewed with patient/caregiver?: Yes  Did patient/caregiver verbalize understanding of patient care needs?: Yes  Were patient/caregiver advised of the risks associated with not following Treatment Team discharge recommendations?: Yes         Other Referral/Resources/Interventions Provided:  Referral Comments: awaiting recommendations          Additional Comments: Patient confirms at this time that he has only received 1 COVID vaccine - states it was Dmitriy/Dmitriy  Patient states that he was supposed to be discharged yesterday but began throwing up  CM reviewed d/c planning process including the following: identifying help at home, patient preference for d/c planning needs, Discharge Lounge, [facility] Meds to Bed program, availability of treatment team to discuss questions or concerns patient and/or family may have regarding understanding medications and recognizing signs and symptoms once discharged  CM also encouraged patient to follow up with all recommended appointments after discharge  Patient advised of importance for patient and family to participate in managing patient’s medical well being  Patient/caregiver received discharge checklist   Content reviewed  Patient/caregiver encouraged to participate in discharge plan of care prior to discharge home

## 2022-10-28 NOTE — UTILIZATION REVIEW
Initial Clinical Review    Elective outpatient procedure, converted to inpatient admission due to ongoing pain management  Age/Sex: 63 y o  male  Surgery Date: 10/26/2022  Procedure: ROBOTIC ASSISTED LAPAROSCOPIC SIMPLE PROSTATECTOMY, BLADDER NECK RECONSTRUCTION (N/A)  Anesthesia: General  Operative Findings:   No adhesions from previous umbilical hernia repair  Successful simple prostatectomy with removal of a large adenoma from the central and transition zone the prostate  The prostatic capsule and peripheral zone were left in-situ  Estimated blood loss of 150 milliliters  Mucosal advancement and bladder neck reconstruction was performed  A matrix of gelatin and thrombin was placed at the resection fossa  The final Edwards catheter is seen to irrigate well  During and after bladder neck reconstruction clear yellow urine is seen to exit from both ureteral orifices  Watertight cystorrhaphy performed  Closed suction drain placed  POD#1 Progress Note: Pt s/p robotic assisted laparoscopic simple prostatectomy with bladder neck reconstruction  Progressing well  Tolerating advanced diet, denies nausea or vomiting  Denies passing flatus  Reported pain not improved w/ oral pain meds  Has not been up and ambulating yet  TINA drain with 20 cc for 24 hours, will remove prior to discharge  IV fluids discontinued  encourage ambulation  provided good bowel regimen    Date 10/28 Day 2 IP:  Progress Notes: Pt POD #2 s/p robotic assisted laparoscopic simple prostatectomy with bladder neck reconstruction  He developed nausea last night, given Zofran and reglan  Had developed vomiting, more of dry heaving hrs later and developed mild abdominal pain  Complained of excessive belching  Had watery brown diarrhea  This am, c/o worsening nausea w/ vomiting    TINA drain w/ inc output- 170 cc own 24 hours, fluid in TINA drain noted to be more serous in nature and orange  Obtain TINA creatinine  NPO  IV bolus ordered   encourage ambulation  provided good bowel regimen  Cont zofran, reglan  obstructive series ordered for further evaluation postoperative ileus  Admission Orders: Date/Time/Statement:   Admission Orders (From admission, onward)     Ordered        10/27/22 2321  Inpatient Admission  Once                      Orders Placed This Encounter   Procedures   • Inpatient Admission     Standing Status:   Standing     Number of Occurrences:   1     Order Specific Question:   Level of Care     Answer:   Med Surg [16]     Order Specific Question:   Estimated length of stay     Answer:   More than 2 Midnights     Order Specific Question:   Certification     Answer:   I certify that inpatient services are medically necessary for this patient for a duration of greater than two midnights  See H&P and MD Progress Notes for additional information about the patient's course of treatment  Vital Signs: /98 (BP Location: Left arm)   Pulse 101   Temp 97 6 °F (36 4 °C) (Oral)   Resp 18   Ht 5' 8" (1 727 m)   Wt 88 5 kg (195 lb)   SpO2 96%   BMI 29 65 kg/m²     Pertinent Labs/Diagnostic Test Results:   XR abdomen obstruction series   Final Result by Gonsalo Randall MD (10/28 1112)      Small bowel distention in keeping with ileus or obstruction        Workstation performed: [id number]               Results from last 7 days   Lab Units 10/28/22  1203 10/27/22  0514 10/26/22  1143 10/26/22  0734   WBC Thousand/uL 12 54* 11 24* 12 06* 5 14   HEMOGLOBIN g/dL 16 7 14 0 14 8 14 0   HEMATOCRIT % 48 7 42 7 45 6 41 9   PLATELETS Thousands/uL 253 229 223 226         Results from last 7 days   Lab Units 10/28/22  1203 10/27/22  0514 10/26/22  1143   SODIUM mmol/L 138 139 140   POTASSIUM mmol/L 3 8 4 4 4 2   CHLORIDE mmol/L 105 108 111*   CO2 mmol/L 27 23 23   ANION GAP mmol/L 6 8 6   BUN mg/dL 28* 20 16   CREATININE mg/dL 1 06 1 02 1 10   EGFR ml/min/1 73sq m 75 79 72   CALCIUM mg/dL 9 4 8 7 8 7         Results from last 7 days   Lab Units 10/26/22  0746   POC GLUCOSE mg/dl 92     Results from last 7 days   Lab Units 10/28/22  1203 10/27/22  0514 10/26/22  1143   GLUCOSE RANDOM mg/dL 135 126 137     Diet: NPO; sips of clear liq  Mobility: Ambulate  DVT Prophylaxis: SCD    Medications/Pain Control:   Scheduled Medications:  acetaminophen, 650 mg, Oral, Q6H ANDREA  diltiazem, 120 mg, Oral, Daily  docusate sodium, 100 mg, Oral, BID  ketorolac, 15 mg, Intravenous, Q6H  neomycin-bacitracin-polymyxin b, 1 large application, Topical, BID  pantoprazole, 40 mg, Oral, Early Morning  phenazopyridine, 200 mg, Oral, TID With Meals  senna, 1 tablet, Oral, Daily  sodium chloride, 1,000 mL, Intravenous, Once      Continuous IV Infusions:  lactated ringers infusion  Rate: 100 mL/hr Dose: 100 mL/hr  Freq: Continuous Route: IV  Indications of Use: IV Hydration  Last Dose: Stopped (10/27/22 0915)  Start: 10/26/22 1145 End: 10/27/22 0855      PRN Meds:  cyclobenzaprine, 10 mg, Oral, HS PRN  diphenhydrAMINE, 12 5 mg, Oral, Q6H PRN  HYDROmorphone, 0 5 mg, Intravenous, Q2H PRN 10/27 x 1  metoclopramide, 10 mg, Intravenous, Q6H PRN 10/28 x 2  ondansetron, 8 mg, Intravenous, Q4H PRN  polyethylene glycol, 17 g, Oral, Daily PRN  simethicone, 80 mg, Oral, Q6H PRN  ondansetron (ZOFRAN) injection 4 mg q6h IV prn 10/27 x 1, 10/28 x 1  oxyCODONE (ROXICODONE) IR tablet 5 mg q4h po prn 10/26 x 1, 10/27 x 1  oxyCODONE (ROXICODONE) immediate release tablet 10 mg q4h po prn 10/26 x 1, 10/27 x 2      Network Utilization Review Department  ATTENTION: Please call with any questions or concerns to [phone] and carefully listen to the prompts so that you are directed to the right person  All voicemails are confidential   Jaky Ingles all requests for admission clinical reviews, approved or denied determinations and any other requests to dedicated fax number below belonging to the campus where the patient is receiving treatment   List of dedicated fax numbers for the Facilities:  [address] FAX NUMBER   ADMISSION DENIALS (Administrative/Medical Necessity) [phone]   [address] (Maternity/NICU/Pediatrics) Radha Varghese [address] Sera Fisher  [phone]   [address] [place] [address] [place] [facility] [address] [facility] [place] 134 815 [facility] [phone]

## 2022-10-28 NOTE — PLAN OF CARE
Problem: PAIN - ADULT  Goal: Verbalizes/displays adequate comfort level or baseline comfort level  Description: Interventions:  - Encourage patient to monitor pain and request assistance  - Assess pain using appropriate pain scale  - Administer analgesics based on type and severity of pain and evaluate response  - Implement non-pharmacological measures as appropriate and evaluate response  - Consider cultural and social influences on pain and pain management  - Notify physician/advanced practitioner if interventions unsuccessful or patient reports new pain  Outcome: Progressing     Problem: INFECTION - ADULT  Goal: Absence or prevention of progression during hospitalization  Description: INTERVENTIONS:  - Assess and monitor for signs and symptoms of infection  - Monitor lab/diagnostic results  - Monitor all insertion sites, i e  indwelling lines, tubes, and drains  - Monitor endotracheal if appropriate and nasal secretions for changes in amount and color  - [place] appropriate cooling/warming therapies per order  - Administer medications as ordered  - Instruct and encourage patient and family to use good hand hygiene technique  - Identify and instruct in appropriate isolation precautions for identified infection/condition  Outcome: Progressing  Goal: Absence of fever/infection during neutropenic period  Description: INTERVENTIONS:  - Monitor WBC    Outcome: Progressing     Problem: SAFETY ADULT  Goal: Patient will remain free of falls  Description: INTERVENTIONS:  - Educate patient/family on patient safety including physical limitations  - Instruct patient to call for assistance with activity   - Consult OT/PT to assist with strengthening/mobility   - Keep Call bell within reach  - Keep bed low and locked with side rails adjusted as appropriate  - Keep care items and personal belongings within reach  - Initiate and maintain comfort rounds  - Make Fall Risk Sign visible to staff  - Apply yellow socks and bracelet for high fall risk patients  - Consider moving patient to room near nurses station  Outcome: Progressing  Goal: Maintain or return to baseline ADL function  Description: INTERVENTIONS:  -  Assess patient's ability to carry out ADLs; assess patient's baseline for ADL function and identify physical deficits which impact ability to perform ADLs (bathing, care of mouth/teeth, toileting, grooming, dressing, etc )  - Assess/evaluate cause of self-care deficits   - Assess range of motion  - Assess patient's mobility; develop plan if impaired  - Assess patient's need for assistive devices and provide as appropriate  - Encourage maximum independence but intervene and supervise when necessary  - Involve family in performance of ADLs  - Assess for home care needs following discharge   - Consider OT consult to assist with ADL evaluation and planning for discharge  - Provide patient education as appropriate  Outcome: Progressing  Goal: Maintains/Returns to pre admission functional level  Description: INTERVENTIONS:  - Perform BMAT or MOVE assessment daily    - Set and communicate daily mobility goal to care team and patient/family/caregiver     - Collaborate with rehabilitation services on mobility goals if consulted  - Out of bed for toileting  - Record patient progress and toleration of activity level   Outcome: Progressing     Problem: DISCHARGE PLANNING  Goal: Discharge to home or other facility with appropriate resources  Description: INTERVENTIONS:  - Identify barriers to discharge w/patient and caregiver  - Arrange for needed discharge resources and transportation as appropriate  - Identify discharge learning needs (meds, wound care, etc )  - Arrange for interpretive services to assist at discharge as needed  - Refer to Case Management Department for coordinating discharge planning if the patient needs post-hospital services based on physician/advanced practitioner order or complex needs related to functional status, cognitive ability, or social support system  Outcome: Progressing     Problem: Knowledge Deficit  Goal: Patient/family/caregiver demonstrates understanding of disease process, treatment plan, medications, and discharge instructions  Description: Complete learning assessment and assess knowledge base    Interventions:  - Provide teaching at level of understanding  - Provide teaching via preferred learning methods  Outcome: Progressing     Problem: Potential for Falls  Goal: Patient will remain free of falls  Description: INTERVENTIONS:  - Educate patient/family on patient safety including physical limitations  - Instruct patient to call for assistance with activity   - Consult OT/PT to assist with strengthening/mobility   - Keep Call bell within reach  - Keep bed low and locked with side rails adjusted as appropriate  - Keep care items and personal belongings within reach  - Initiate and maintain comfort rounds  - Make Fall Risk Sign visible to staff  - Apply yellow socks and bracelet for high fall risk patients  - Consider moving patient to room near nurses station  Outcome: Progressing

## 2022-10-29 ENCOUNTER — APPOINTMENT (OUTPATIENT)
Dept: RADIOLOGY | Facility: HOSPITAL | Age: 62
End: 2022-10-29
Payer: COMMERCIAL

## 2022-10-29 LAB
ABO GROUP BLD BPU: NORMAL
ABO GROUP BLD BPU: NORMAL
ANION GAP SERPL CALCULATED.3IONS-SCNC: 2 MMOL/L (ref 4–13)
BPU ID: NORMAL
BPU ID: NORMAL
BUN SERPL-MCNC: 30 MG/DL (ref 5–25)
CALCIUM SERPL-MCNC: 8.8 MG/DL (ref 8.3–10.1)
CHLORIDE SERPL-SCNC: 108 MMOL/L (ref 96–108)
CO2 SERPL-SCNC: 29 MMOL/L (ref 21–32)
CREAT SERPL-MCNC: 0.96 MG/DL (ref 0.6–1.3)
CROSSMATCH: NORMAL
CROSSMATCH: NORMAL
ERYTHROCYTE [DISTWIDTH] IN BLOOD BY AUTOMATED COUNT: 12.6 % (ref 11.6–15.1)
GFR SERPL CREATININE-BSD FRML MDRD: 85 ML/MIN/1.73SQ M
GLUCOSE SERPL-MCNC: 136 MG/DL (ref 65–140)
HCT VFR BLD AUTO: 46.2 % (ref 36.5–49.3)
HGB BLD-MCNC: 15.3 G/DL (ref 12–17)
MCH RBC QN AUTO: 28.4 PG (ref 26.8–34.3)
MCHC RBC AUTO-ENTMCNC: 33.1 G/DL (ref 31.4–37.4)
MCV RBC AUTO: 86 FL (ref 82–98)
PLATELET # BLD AUTO: 238 THOUSANDS/UL (ref 149–390)
PMV BLD AUTO: 10.4 FL (ref 8.9–12.7)
POTASSIUM SERPL-SCNC: 4 MMOL/L (ref 3.5–5.3)
RBC # BLD AUTO: 5.39 MILLION/UL (ref 3.88–5.62)
SODIUM SERPL-SCNC: 139 MMOL/L (ref 135–147)
UNIT DISPENSE STATUS: NORMAL
UNIT DISPENSE STATUS: NORMAL
UNIT PRODUCT CODE: NORMAL
UNIT PRODUCT CODE: NORMAL
UNIT PRODUCT VOLUME: 350 ML
UNIT PRODUCT VOLUME: 350 ML
UNIT RH: NORMAL
UNIT RH: NORMAL
WBC # BLD AUTO: 15.5 THOUSAND/UL (ref 4.31–10.16)

## 2022-10-29 PROCEDURE — 80048 BASIC METABOLIC PNL TOTAL CA: CPT | Performed by: PHYSICIAN ASSISTANT

## 2022-10-29 PROCEDURE — 85027 COMPLETE CBC AUTOMATED: CPT | Performed by: PHYSICIAN ASSISTANT

## 2022-10-29 PROCEDURE — 74022 RADEX COMPL AQT ABD SERIES: CPT

## 2022-10-29 RX ORDER — HYDROMORPHONE HCL/PF 1 MG/ML
0.5 SYRINGE (ML) INJECTION EVERY 4 HOURS PRN
Status: DISCONTINUED | OUTPATIENT
Start: 2022-10-29 | End: 2022-11-01

## 2022-10-29 RX ORDER — SODIUM CHLORIDE 9 MG/ML
125 INJECTION, SOLUTION INTRAVENOUS CONTINUOUS
Status: DISPENSED | OUTPATIENT
Start: 2022-10-29 | End: 2022-11-02

## 2022-10-29 RX ADMIN — KETOROLAC TROMETHAMINE 15 MG: 30 INJECTION, SOLUTION INTRAMUSCULAR; INTRAVENOUS at 04:27

## 2022-10-29 RX ADMIN — VANCOMYCIN HYDROCHLORIDE 125 MG: 500 INJECTION, POWDER, LYOPHILIZED, FOR SOLUTION INTRAVENOUS at 23:25

## 2022-10-29 RX ADMIN — KETOROLAC TROMETHAMINE 15 MG: 30 INJECTION, SOLUTION INTRAMUSCULAR; INTRAVENOUS at 17:09

## 2022-10-29 RX ADMIN — BACITRACIN ZINC, NEOMYCIN SULFATE, AND POLYMYXIN B SULFATE 1 LARGE APPLICATION: 400; 3.5; 5 OINTMENT TOPICAL at 17:09

## 2022-10-29 RX ADMIN — DILTIAZEM HYDROCHLORIDE 120 MG: 120 CAPSULE, COATED, EXTENDED RELEASE ORAL at 09:05

## 2022-10-29 RX ADMIN — KETOROLAC TROMETHAMINE 15 MG: 30 INJECTION, SOLUTION INTRAMUSCULAR; INTRAVENOUS at 09:06

## 2022-10-29 RX ADMIN — KETOROLAC TROMETHAMINE 15 MG: 30 INJECTION, SOLUTION INTRAMUSCULAR; INTRAVENOUS at 23:24

## 2022-10-29 RX ADMIN — SENNOSIDES 8.6 MG: 8.6 TABLET, FILM COATED ORAL at 09:05

## 2022-10-29 RX ADMIN — SODIUM CHLORIDE 75 ML/HR: 0.9 INJECTION, SOLUTION INTRAVENOUS at 16:01

## 2022-10-29 RX ADMIN — PHENAZOPYRIDINE 200 MG: 100 TABLET ORAL at 17:09

## 2022-10-29 RX ADMIN — DOCUSATE SODIUM 100 MG: 100 CAPSULE, LIQUID FILLED ORAL at 09:05

## 2022-10-29 RX ADMIN — BACITRACIN ZINC, NEOMYCIN SULFATE, AND POLYMYXIN B SULFATE 1 LARGE APPLICATION: 400; 3.5; 5 OINTMENT TOPICAL at 09:04

## 2022-10-29 RX ADMIN — VANCOMYCIN HYDROCHLORIDE 125 MG: 500 INJECTION, POWDER, LYOPHILIZED, FOR SOLUTION INTRAVENOUS at 17:09

## 2022-10-29 RX ADMIN — ACETAMINOPHEN 650 MG: 325 TABLET, FILM COATED ORAL at 11:07

## 2022-10-29 RX ADMIN — PHENAZOPYRIDINE 200 MG: 100 TABLET ORAL at 11:07

## 2022-10-29 RX ADMIN — ONDANSETRON 8 MG: 2 INJECTION INTRAMUSCULAR; INTRAVENOUS at 11:07

## 2022-10-29 RX ADMIN — CEFTRIAXONE 2000 MG: 2 INJECTION, POWDER, FOR SOLUTION INTRAMUSCULAR; INTRAVENOUS at 12:57

## 2022-10-29 RX ADMIN — ACETAMINOPHEN 650 MG: 325 TABLET, FILM COATED ORAL at 23:26

## 2022-10-29 RX ADMIN — PHENAZOPYRIDINE 200 MG: 100 TABLET ORAL at 09:05

## 2022-10-29 RX ADMIN — VANCOMYCIN HYDROCHLORIDE 125 MG: 500 INJECTION, POWDER, LYOPHILIZED, FOR SOLUTION INTRAVENOUS at 13:58

## 2022-10-29 NOTE — QUICK NOTE
Case discussed with Dr Tj Altamirano (urologist on call)  I have been following the patient remotely as well  No concern for leak based on TINA creatinine, increased drain output is likely due to third spaced fluids  We have been limiting opiates as much as possible to decrease contribution to decreased enteric function/mobility  I have started ceftriaxone as empiric antibiosis and oral vancomycin as C diff prophylaxis  I spoke with Dr Tj Altamirano about considering lasix diuresis as well given positive fluid balance as this can contribute to ileus as well  Labs ordered for tomorrow as well including CBC, BMP, Mg, and phosphorus levels  Increased ambulation and mobilization should also be helpful here

## 2022-10-29 NOTE — PLAN OF CARE
Problem: PAIN - ADULT  Goal: Verbalizes/displays adequate comfort level or baseline comfort level  Description: Interventions:  - Encourage patient to monitor pain and request assistance  - Assess pain using appropriate pain scale  - Administer analgesics based on type and severity of pain and evaluate response  - Implement non-pharmacological measures as appropriate and evaluate response  - Consider cultural and social influences on pain and pain management  - Notify physician/advanced practitioner if interventions unsuccessful or patient reports new pain  Outcome: Progressing     Problem: INFECTION - ADULT  Goal: Absence or prevention of progression during hospitalization  Description: INTERVENTIONS:  - Assess and monitor for signs and symptoms of infection  - Monitor lab/diagnostic results  - Monitor all insertion sites, i e  indwelling lines, tubes, and drains  - Monitor endotracheal if appropriate and nasal secretions for changes in amount and color  - [place] appropriate cooling/warming therapies per order  - Administer medications as ordered  - Instruct and encourage patient and family to use good hand hygiene technique  - Identify and instruct in appropriate isolation precautions for identified infection/condition  Outcome: Progressing  Goal: Absence of fever/infection during neutropenic period  Description: INTERVENTIONS:  - Monitor WBC    Outcome: Progressing     Problem: SAFETY ADULT  Goal: Patient will remain free of falls  Description: INTERVENTIONS:  - Educate patient/family on patient safety including physical limitations  - Instruct patient to call for assistance with activity   - Consult OT/PT to assist with strengthening/mobility   - Keep Call bell within reach  - Keep bed low and locked with side rails adjusted as appropriate  - Keep care items and personal belongings within reach  - Initiate and maintain comfort rounds  - Make Fall Risk Sign visible to staff  - Offer Toileting every 2 Hours, in advance of need  - Apply yellow socks and bracelet for high fall risk patients  - Consider moving patient to room near nurses station  Outcome: Progressing  Goal: Maintain or return to baseline ADL function  Description: INTERVENTIONS:  -  Assess patient's ability to carry out ADLs; assess patient's baseline for ADL function and identify physical deficits which impact ability to perform ADLs (bathing, care of mouth/teeth, toileting, grooming, dressing, etc )  - Assess/evaluate cause of self-care deficits   - Assess range of motion  - Assess patient's mobility; develop plan if impaired  - Assess patient's need for assistive devices and provide as appropriate  - Encourage maximum independence but intervene and supervise when necessary  - Involve family in performance of ADLs  - Assess for home care needs following discharge   - Consider OT consult to assist with ADL evaluation and planning for discharge  - Provide patient education as appropriate  Outcome: Progressing  Goal: Maintains/Returns to pre admission functional level  Description: INTERVENTIONS:  - Perform BMAT or MOVE assessment daily    - Set and communicate daily mobility goal to care team and patient/family/caregiver  - Collaborate with rehabilitation services on mobility goals if consulted  - Perform Range of Motion 4 times a day  - Reposition patient every 2 hours    - Dangle patient 4 times a day  - Stand patient 4 times a day  - Ambulate patient 4 times a day  - Out of bed to chair 4 times a day   - Out of bed for meals 4 times a day  - Out of bed for toileting  - Record patient progress and toleration of activity level   Outcome: Progressing

## 2022-10-29 NOTE — PROGRESS NOTES
UROLOGY PROGRESS NOTE   Patient Identifiers: Emeka Saul (MRN [id number])  Date of Service: 10/29/2022        Assessment:     1  Symptomatic BPH  - postoperative day number 3 status post robotic suprapubic prostatectomy (Dr Kemar Chan)    2 ileus    Patient has persistent abdominal distention  He is passing flatus  He has not had a bowel movement 24 hours  He had a small amount of emesis this morning  KUB yesterday demonstrates dilated loops of small bowels however no significant gastric dilatation  Today CBC is remarkable for leukocytosis with a white blood cell count of 15  Patient is nontoxic appearing  I do not palpate any evidence of port site herniation  Yesterday's TINA creatinine was negative for signs of urinoma        Plan:   - repeat obstruction series today  -will discontinue IV fluids today  Urine output over the past 24 hours has been acceptable  Patient is 4 L net positive for the admission  -continue NPO, out of bed, ambulate, aggressive pulmonary toilet  - repeat labs tomorrow  -will consider CT scan if the patient's symptoms do not resolve another 24 hours  - plan of care reviewed with the patient, encouragement provided    Plan of care reviewed with his nurse        Subjective:     24 HR EVENTS:   One episode emesis overnight  No bowel movement      Patient has  complaints of Bloating         Objective:     VITALS:    Vitals:    10/29/22 0800   BP: 135/92   Pulse: 97   Resp: 18   Temp: 97 8 °F (36 6 °C)   SpO2: 96%       INS & OUTS:  [unfilled]    LABS:  Lab Results   Component Value Date    HGB 15 3 10/29/2022    HCT 46 2 10/29/2022    WBC 15 50 (H) 10/29/2022     10/29/2022   ]    Lab Results   Component Value Date     08/07/2017    K 4 0 10/29/2022     10/29/2022    CO2 29 10/29/2022    BUN 30 (H) 10/29/2022    CREATININE 0 96 10/29/2022    CALCIUM 8 8 10/29/2022    GLUCOSE 92 08/07/2017   ]    INPATIENT MEDS:    Current Facility-Administered Medications: •  acetaminophen (TYLENOL) tablet 650 mg, 650 mg, Oral, Q6H [address], Jethro Pollock MD, 650 mg at 10/28/22 0125  •  cyclobenzaprine (FLEXERIL) tablet 10 mg, 10 mg, Oral, HS PRN, Jethro Pollock MD  •  diltiazem (CARDIZEM CD) 24 hr capsule 120 mg, 120 mg, Oral, Daily, Jethro Pollock MD, 120 mg at 10/29/22 8932  •  diphenhydrAMINE (BENADRYL) tablet 12 5 mg, 12 5 mg, Oral, Q6H PRN, Jethro Pollock MD  •  docusate sodium (COLACE) capsule 100 mg, 100 mg, Oral, BID, Jethro Pollock MD, 100 mg at 10/29/22 8435  •  HYDROmorphone (DILAUDID) injection 0 5 mg, 0 5 mg, Intravenous, Q4H PRN, Víctor Lubin PA-C  •  ketorolac (TORADOL) injection 15 mg, 15 mg, Intravenous, Q6H, Jethro Pollock MD, 15 mg at 10/29/22 5327  •  lactated ringers infusion, 125 mL/hr, Intravenous, Continuous, Jethro Pollock MD, Last Rate: 125 mL/hr at 10/28/22 2317, 125 mL/hr at 10/28/22 2317  •  metoclopramide (REGLAN) injection 10 mg, 10 mg, Intravenous, Q6H PRN, Ranjeet Santos PA-C, 10 mg at 10/28/22 1627  •  neomycin-bacitracin-polymyxin b (NEOSPORIN) ointment 1 large application, 1 large application, Topical, BID, Jethro Pollock MD, 1 large application at 01/53/35 0904  •  ondansetron (ZOFRAN) 8 mg in sodium chloride 0 9 % 50 mL IVPB, 8 mg, Intravenous, Q4H PRN, Jani Cesar PA-C  •  pantoprazole (PROTONIX) EC tablet 40 mg, 40 mg, Oral, Early Morning, Jethro Pollock MD, 40 mg at 10/27/22 0507  •  phenazopyridine (PYRIDIUM) tablet 200 mg, 200 mg, Oral, TID With Meals, Jethro Pollock MD, 200 mg at 10/29/22 7842  •  polyethylene glycol (MIRALAX) packet 17 g, 17 g, Oral, Daily PRN, Víctor Lubin PA-C  •  senna (SENOKOT) tablet 8 6 mg, 1 tablet, Oral, Daily, Jethro Pollock MD, 8 6 mg at 10/29/22 7199  •  simethicone (MYLICON) chewable tablet 80 mg, 80 mg, Oral, Q6H PRN, Liam Marsh PA-C      Physical Exam:   /92   Pulse 97   Temp 97 8 °F (36 6 °C) (Oral)   Resp 18   Ht 5' 8" (1 727 m)   Wt 88 5 kg (195 lb)   SpO2 96%   BMI 29 65 kg/m²   GEN: alert and oriented x3  RESP: breathing comfortably with no accessory muscle use  CV: no significant peripheral edema  ABD: distended  Nontender  All incisions are well healed with no palpable hernia noted  INCISION: clean dry and intact  DRAINS: serosanguineous  BENNETT: draining pink clear urine    RADIOLOGY:       FINDINGS:     Right lower quadrant drainage tube      Prominent diffuse small bowel dilation with multiple air-fluid levels and paucity of gas throughout the colon in keeping with small bowel ileus or obstruction      No free air beneath the hemidiaphragms       No pathologic calcifications or soft tissue masses evident       Osseous structures are unremarkable      Examination of the chest reveals a normal cardiomediastinal silhouette   Lungs are clear      IMPRESSION:     Small bowel distention in keeping with ileus or obstruction

## 2022-10-29 NOTE — PLAN OF CARE
Problem: PAIN - ADULT  Goal: Verbalizes/displays adequate comfort level or baseline comfort level  Description: Interventions:  - Encourage patient to monitor pain and request assistance  - Assess pain using appropriate pain scale  - Administer analgesics based on type and severity of pain and evaluate response  - Implement non-pharmacological measures as appropriate and evaluate response  - Consider cultural and social influences on pain and pain management  - Notify physician/advanced practitioner if interventions unsuccessful or patient reports new pain  Outcome: Progressing     Problem: INFECTION - ADULT  Goal: Absence or prevention of progression during hospitalization  Description: INTERVENTIONS:  - Assess and monitor for signs and symptoms of infection  - Monitor lab/diagnostic results  - Monitor all insertion sites, i e  indwelling lines, tubes, and drains  - Monitor endotracheal if appropriate and nasal secretions for changes in amount and color  - [place] appropriate cooling/warming therapies per order  - Administer medications as ordered  - Instruct and encourage patient and family to use good hand hygiene technique  - Identify and instruct in appropriate isolation precautions for identified infection/condition  Outcome: Progressing  Goal: Absence of fever/infection during neutropenic period  Description: INTERVENTIONS:  - Monitor WBC    Outcome: Progressing     Problem: SAFETY ADULT  Goal: Patient will remain free of falls  Description: INTERVENTIONS:  - Educate patient/family on patient safety including physical limitations  - Instruct patient to call for assistance with activity   - Consult OT/PT to assist with strengthening/mobility   - Keep Call bell within reach  - Keep bed low and locked with side rails adjusted as appropriate  - Keep care items and personal belongings within reach  - Initiate and maintain comfort rounds  - Make Fall Risk Sign visible to staff  - Offer Toileting every  Hours, in advance of need  - Initiate/Maintain alarm  - Obtain necessary fall risk management equipment:   - Apply yellow socks and bracelet for high fall risk patients  - Consider moving patient to room near nurses station  Outcome: Progressing  Goal: Maintain or return to baseline ADL function  Description: INTERVENTIONS:  -  Assess patient's ability to carry out ADLs; assess patient's baseline for ADL function and identify physical deficits which impact ability to perform ADLs (bathing, care of mouth/teeth, toileting, grooming, dressing, etc )  - Assess/evaluate cause of self-care deficits   - Assess range of motion  - Assess patient's mobility; develop plan if impaired  - Assess patient's need for assistive devices and provide as appropriate  - Encourage maximum independence but intervene and supervise when necessary  - Involve family in performance of ADLs  - Assess for home care needs following discharge   - Consider OT consult to assist with ADL evaluation and planning for discharge  - Provide patient education as appropriate  Outcome: Progressing  Goal: Maintains/Returns to pre admission functional level  Description: INTERVENTIONS:  - Perform BMAT or MOVE assessment daily    - Set and communicate daily mobility goal to care team and patient/family/caregiver  - Collaborate with rehabilitation services on mobility goals if consulted  - Perform Range of Motion  times a day  - Reposition patient every  hours    - Dangle patient  times a day  - Stand patient  times a day  - Ambulate patient times a day  - Out of bed to chair  times a day   - Out of bed for meals  times a day  - Out of bed for toileting  - Record patient progress and toleration of activity level   Outcome: Progressing     Problem: DISCHARGE PLANNING  Goal: Discharge to home or other facility with appropriate resources  Description: INTERVENTIONS:  - Identify barriers to discharge w/patient and caregiver  - Arrange for needed discharge resources and transportation as appropriate  - Identify discharge learning needs (meds, wound care, etc )  - Arrange for interpretive services to assist at discharge as needed  - Refer to Case Management Department for coordinating discharge planning if the patient needs post-hospital services based on physician/advanced practitioner order or complex needs related to functional status, cognitive ability, or social support system  Outcome: Progressing     Problem: Knowledge Deficit  Goal: Patient/family/caregiver demonstrates understanding of disease process, treatment plan, medications, and discharge instructions  Description: Complete learning assessment and assess knowledge base    Interventions:  - Provide teaching at level of understanding  - Provide teaching via preferred learning methods  Outcome: Progressing     Problem: Potential for Falls  Goal: Patient will remain free of falls  Description: INTERVENTIONS:  - Educate patient/family on patient safety including physical limitations  - Instruct patient to call for assistance with activity   - Consult OT/PT to assist with strengthening/mobility   - Keep Call bell within reach  - Keep bed low and locked with side rails adjusted as appropriate  - Keep care items and personal belongings within reach  - Initiate and maintain comfort rounds  - Make Fall Risk Sign visible to staff  - Offer Toileting every  Hours, in advance of need  - Initiate/Maintain alarm  - Obtain necessary fall risk management equipment:   - Apply yellow socks and bracelet for high fall risk patients  - Consider moving patient to room near nurses station  Outcome: Progressing

## 2022-10-30 ENCOUNTER — APPOINTMENT (INPATIENT)
Dept: RADIOLOGY | Facility: HOSPITAL | Age: 62
End: 2022-10-30
Payer: COMMERCIAL

## 2022-10-30 VITALS
HEART RATE: 78 BPM | RESPIRATION RATE: 16 BRPM | DIASTOLIC BLOOD PRESSURE: 86 MMHG | BODY MASS INDEX: 29.55 KG/M2 | WEIGHT: 195 LBS | TEMPERATURE: 98.8 F | SYSTOLIC BLOOD PRESSURE: 164 MMHG | HEIGHT: 68 IN | OXYGEN SATURATION: 95 %

## 2022-10-30 LAB
ANION GAP SERPL CALCULATED.3IONS-SCNC: 5 MMOL/L (ref 4–13)
BUN SERPL-MCNC: 42 MG/DL (ref 5–25)
CALCIUM SERPL-MCNC: 8.3 MG/DL (ref 8.3–10.1)
CHLORIDE SERPL-SCNC: 108 MMOL/L (ref 96–108)
CO2 SERPL-SCNC: 29 MMOL/L (ref 21–32)
CREAT SERPL-MCNC: 0.99 MG/DL (ref 0.6–1.3)
ERYTHROCYTE [DISTWIDTH] IN BLOOD BY AUTOMATED COUNT: 12.7 % (ref 11.6–15.1)
GFR SERPL CREATININE-BSD FRML MDRD: 82 ML/MIN/1.73SQ M
GLUCOSE SERPL-MCNC: 121 MG/DL (ref 65–140)
HCT VFR BLD AUTO: 44.4 % (ref 36.5–49.3)
HGB BLD-MCNC: 14.6 G/DL (ref 12–17)
MAGNESIUM SERPL-MCNC: 2.3 MG/DL (ref 1.6–2.6)
MCH RBC QN AUTO: 28.6 PG (ref 26.8–34.3)
MCHC RBC AUTO-ENTMCNC: 32.9 G/DL (ref 31.4–37.4)
MCV RBC AUTO: 87 FL (ref 82–98)
PHOSPHATE SERPL-MCNC: 3.6 MG/DL (ref 2.3–4.1)
PLATELET # BLD AUTO: 234 THOUSANDS/UL (ref 149–390)
PMV BLD AUTO: 11 FL (ref 8.9–12.7)
POTASSIUM SERPL-SCNC: 3.6 MMOL/L (ref 3.5–5.3)
RBC # BLD AUTO: 5.1 MILLION/UL (ref 3.88–5.62)
SODIUM SERPL-SCNC: 142 MMOL/L (ref 135–147)
WBC # BLD AUTO: 5.8 THOUSAND/UL (ref 4.31–10.16)

## 2022-10-30 PROCEDURE — 74177 CT ABD & PELVIS W/CONTRAST: CPT

## 2022-10-30 PROCEDURE — G1004 CDSM NDSC: HCPCS

## 2022-10-30 PROCEDURE — 85027 COMPLETE CBC AUTOMATED: CPT | Performed by: PHYSICIAN ASSISTANT

## 2022-10-30 PROCEDURE — 83735 ASSAY OF MAGNESIUM: CPT | Performed by: UROLOGY

## 2022-10-30 PROCEDURE — 80048 BASIC METABOLIC PNL TOTAL CA: CPT | Performed by: PHYSICIAN ASSISTANT

## 2022-10-30 PROCEDURE — 84100 ASSAY OF PHOSPHORUS: CPT | Performed by: UROLOGY

## 2022-10-30 RX ADMIN — VANCOMYCIN HYDROCHLORIDE 125 MG: 500 INJECTION, POWDER, LYOPHILIZED, FOR SOLUTION INTRAVENOUS at 15:11

## 2022-10-30 RX ADMIN — KETOROLAC TROMETHAMINE 15 MG: 30 INJECTION, SOLUTION INTRAMUSCULAR; INTRAVENOUS at 05:22

## 2022-10-30 RX ADMIN — SODIUM CHLORIDE 75 ML/HR: 0.9 INJECTION, SOLUTION INTRAVENOUS at 03:37

## 2022-10-30 RX ADMIN — DOCUSATE SODIUM 100 MG: 100 CAPSULE, LIQUID FILLED ORAL at 22:21

## 2022-10-30 RX ADMIN — ACETAMINOPHEN 650 MG: 325 TABLET, FILM COATED ORAL at 05:22

## 2022-10-30 RX ADMIN — CEFTRIAXONE 2000 MG: 2 INJECTION, POWDER, FOR SOLUTION INTRAMUSCULAR; INTRAVENOUS at 13:09

## 2022-10-30 RX ADMIN — DILTIAZEM HYDROCHLORIDE 120 MG: 120 CAPSULE, COATED, EXTENDED RELEASE ORAL at 08:05

## 2022-10-30 RX ADMIN — IOHEXOL 100 ML: 350 INJECTION, SOLUTION INTRAVENOUS at 19:26

## 2022-10-30 RX ADMIN — SENNOSIDES 8.6 MG: 8.6 TABLET, FILM COATED ORAL at 08:03

## 2022-10-30 RX ADMIN — VANCOMYCIN HYDROCHLORIDE 125 MG: 500 INJECTION, POWDER, LYOPHILIZED, FOR SOLUTION INTRAVENOUS at 05:22

## 2022-10-30 RX ADMIN — DOCUSATE SODIUM 100 MG: 100 CAPSULE, LIQUID FILLED ORAL at 08:03

## 2022-10-30 RX ADMIN — BACITRACIN ZINC, NEOMYCIN SULFATE, AND POLYMYXIN B SULFATE 1 LARGE APPLICATION: 400; 3.5; 5 OINTMENT TOPICAL at 08:04

## 2022-10-30 RX ADMIN — BACITRACIN ZINC, NEOMYCIN SULFATE, AND POLYMYXIN B SULFATE 1 LARGE APPLICATION: 400; 3.5; 5 OINTMENT TOPICAL at 22:55

## 2022-10-30 RX ADMIN — IOHEXOL 50 ML: 240 INJECTION, SOLUTION INTRATHECAL; INTRAVASCULAR; INTRAVENOUS; ORAL at 19:26

## 2022-10-30 RX ADMIN — ACETAMINOPHEN 650 MG: 325 TABLET, FILM COATED ORAL at 13:09

## 2022-10-30 RX ADMIN — PHENAZOPYRIDINE 200 MG: 100 TABLET ORAL at 08:05

## 2022-10-30 RX ADMIN — PHENAZOPYRIDINE 200 MG: 100 TABLET ORAL at 13:09

## 2022-10-30 RX ADMIN — VANCOMYCIN HYDROCHLORIDE 125 MG: 500 INJECTION, POWDER, LYOPHILIZED, FOR SOLUTION INTRAVENOUS at 22:42

## 2022-10-30 RX ADMIN — PANTOPRAZOLE SODIUM 40 MG: 40 TABLET, DELAYED RELEASE ORAL at 05:22

## 2022-10-30 NOTE — PLAN OF CARE
Problem: INFECTION - ADULT  Goal: Absence or prevention of progression during hospitalization  Description: INTERVENTIONS:  - Assess and monitor for signs and symptoms of infection  - Monitor lab/diagnostic results  - Monitor all insertion sites, i e  indwelling lines, tubes, and drains  - Monitor endotracheal if appropriate and nasal secretions for changes in amount and color  - [place] appropriate cooling/warming therapies per order  - Administer medications as ordered  - Instruct and encourage patient and family to use good hand hygiene technique  - Identify and instruct in appropriate isolation precautions for identified infection/condition  Outcome: Progressing  Goal: Absence of fever/infection during neutropenic period  Description: INTERVENTIONS:  - Monitor WBC    Outcome: Progressing     Problem: PAIN - ADULT  Goal: Verbalizes/displays adequate comfort level or baseline comfort level  Description: Interventions:  - Encourage patient to monitor pain and request assistance  - Assess pain using appropriate pain scale  - Administer analgesics based on type and severity of pain and evaluate response  - Implement non-pharmacological measures as appropriate and evaluate response  - Consider cultural and social influences on pain and pain management  - Notify physician/advanced practitioner if interventions unsuccessful or patient reports new pain  Outcome: Progressing

## 2022-10-30 NOTE — PLAN OF CARE
Problem: PAIN - ADULT  Goal: Verbalizes/displays adequate comfort level or baseline comfort level  Description: Interventions:  - Encourage patient to monitor pain and request assistance  - Assess pain using appropriate pain scale  - Administer analgesics based on type and severity of pain and evaluate response  - Implement non-pharmacological measures as appropriate and evaluate response  - Consider cultural and social influences on pain and pain management  - Notify physician/advanced practitioner if interventions unsuccessful or patient reports new pain  Outcome: Progressing     Problem: INFECTION - ADULT  Goal: Absence or prevention of progression during hospitalization  Description: INTERVENTIONS:  - Assess and monitor for signs and symptoms of infection  - Monitor lab/diagnostic results  - Monitor all insertion sites, i e  indwelling lines, tubes, and drains  - Monitor endotracheal if appropriate and nasal secretions for changes in amount and color  - [place] appropriate cooling/warming therapies per order  - Administer medications as ordered  - Instruct and encourage patient and family to use good hand hygiene technique  - Identify and instruct in appropriate isolation precautions for identified infection/condition  Outcome: Progressing     Problem: SAFETY ADULT  Goal: Patient will remain free of falls  Description: INTERVENTIONS:  - Educate patient/family on patient safety including physical limitations  - Instruct patient to call for assistance with activity   - Consult OT/PT to assist with strengthening/mobility   - Keep Call bell within reach  - Keep bed low and locked with side rails adjusted as appropriate  - Keep care items and personal belongings within reach  - Initiate and maintain comfort rounds  - Make Fall Risk Sign visible to staff  - Apply yellow socks and bracelet for high fall risk patients  - Consider moving patient to room near nurses station  Outcome: Progressing

## 2022-10-30 NOTE — PROGRESS NOTES
Progress Note - Thanh Saab 63 y o  male MRN: [id number]    Unit/Bed#: [facility] 319-01 Encounter: [id number]      Assessment:  Thanh Saab is a 71-year-old male with symptomatic BPH and substantial prostatomegaly postoperative day number for robotic suprapubic prostatectomy by Dr Mery Beltrán complicated by ileus  KUB yesterday demonstrated diluted loops of small bowel  Patient developed severe vomiting  Now status post NG tube insertion yesterday  Patient is afebrile, hemodynamically stable and reports improvement with abdominal distension  Puncture sites are clean dry and intact without evidence of dehiscence  Abdomen is soft  Patient reporting loose stool  NG tube output--1 5 L in 24 hours  There was 410 mL TINA output within 24 hours  However, overnight post NG tube insertion, TINA output trended downward and there was a volume of 70 mL output from 7:00 p m  To 7:00 a m  [employer] Drainage is serous  Urinary output 875 mL  Renal function has remained normal   Downward trend in leukocytosis with WBCs of 15 K yesterday, now normalized  Plan:  · Continue postoperative care  · Out of bed  · NPO with NG tube  · DVT prophylaxis  · Pain management  · Incentive spirometry  · Will obtain CT with p o  and IV contrast today  Subjective:   “I feel better than yesterday ” Denies fever, chills, severe flank, abdominal or suprapubic pain  Objective:     Vitals: Blood pressure 130/87, pulse 81, temperature 99 °F (37 2 °C), temperature source Oral, resp  rate 18, height 5' 8" (1 727 m), weight 88 5 kg (195 lb), SpO2 96 %  ,Body mass index is 29 65 kg/m²        Intake/Output Summary (Last 24 hours) at 10/30/2022 1020  Last data filed at 10/30/2022 0900  Gross per 24 hour   Intake 1883 75 ml   Output 3805 ml   Net -1921 25 ml       Physical Exam: General appearance: alert and oriented, in no acute distress, appears stated age, cooperative and no distress  Head: Normocephalic, without obvious abnormality, atraumatic  Neck: no adenopathy, no carotid bruit, no JVD, supple, symmetrical, trachea midline and thyroid not enlarged, symmetric, no tenderness/mass/nodules  Lungs: diminished breath sounds  Heart: regular rate and rhythm, S1, S2 normal, no murmur, click, rub or gallop  Abdomen: abnormal findings:  mild tenderness in the lower abdomen and Puncture sites clean dry and intact  NG tube--bilious drainage  Extremities: extremities normal, warm and well-perfused; no cyanosis, clubbing, or edema  Pulses: 2+ and symmetric  Neurologic: Grossly normal  TINA drain--serous  Edwards catheter--hira to blush  No clots     Invasive Devices  Report    Peripheral Intravenous Line  Duration           Peripheral IV 10/26/22 Left Hand 4 days    Peripheral IV 10/26/22 Right Hand 4 days          Drain  Duration           Urethral Catheter Three way -- days    Continuous Bladder Irrigation Three-way 4 days    Closed/Suction Drain Left LLQ Bulb 19 Fr  3 days    NG/OG/Enteral Tube Nasogastric Left nare <1 day                Lab, Imaging and other studies: I have personally reviewed pertinent reports        VTE Mechanical Prophylaxis: sequential compression device

## 2022-10-30 NOTE — NURSING NOTE
Pt resting comfortably in bed, no c/o pain, no N/V  He scheduled for CT scan ,contrast  Given via NGT  NG tube clamp at moment  Until CT scan performed  Ricco Donnelly

## 2022-10-31 LAB
ANION GAP SERPL CALCULATED.3IONS-SCNC: 8 MMOL/L (ref 4–13)
BUN SERPL-MCNC: 30 MG/DL (ref 5–25)
CALCIUM SERPL-MCNC: 8.7 MG/DL (ref 8.3–10.1)
CHLORIDE SERPL-SCNC: 108 MMOL/L (ref 96–108)
CO2 SERPL-SCNC: 24 MMOL/L (ref 21–32)
CREAT SERPL-MCNC: 0.69 MG/DL (ref 0.6–1.3)
ERYTHROCYTE [DISTWIDTH] IN BLOOD BY AUTOMATED COUNT: 12.6 % (ref 11.6–15.1)
GFR SERPL CREATININE-BSD FRML MDRD: 103 ML/MIN/1.73SQ M
GLUCOSE SERPL-MCNC: 107 MG/DL (ref 65–140)
HCT VFR BLD AUTO: 45.3 % (ref 36.5–49.3)
HGB BLD-MCNC: 15.2 G/DL (ref 12–17)
MCH RBC QN AUTO: 29.2 PG (ref 26.8–34.3)
MCHC RBC AUTO-ENTMCNC: 33.6 G/DL (ref 31.4–37.4)
MCV RBC AUTO: 87 FL (ref 82–98)
PLATELET # BLD AUTO: 253 THOUSANDS/UL (ref 149–390)
PMV BLD AUTO: 10.8 FL (ref 8.9–12.7)
POTASSIUM SERPL-SCNC: 3.3 MMOL/L (ref 3.5–5.3)
RBC # BLD AUTO: 5.2 MILLION/UL (ref 3.88–5.62)
SODIUM SERPL-SCNC: 140 MMOL/L (ref 135–147)
WBC # BLD AUTO: 8.65 THOUSAND/UL (ref 4.31–10.16)

## 2022-10-31 RX ORDER — POTASSIUM CHLORIDE 14.9 MG/ML
20 INJECTION INTRAVENOUS
Status: COMPLETED | OUTPATIENT
Start: 2022-10-31 | End: 2022-11-01

## 2022-10-31 RX ADMIN — SENNOSIDES 8.6 MG: 8.6 TABLET, FILM COATED ORAL at 10:00

## 2022-10-31 RX ADMIN — SODIUM CHLORIDE 75 ML/HR: 0.9 INJECTION, SOLUTION INTRAVENOUS at 20:01

## 2022-10-31 RX ADMIN — ACETAMINOPHEN 650 MG: 325 TABLET, FILM COATED ORAL at 00:05

## 2022-10-31 RX ADMIN — DILTIAZEM HYDROCHLORIDE 120 MG: 120 CAPSULE, COATED, EXTENDED RELEASE ORAL at 10:00

## 2022-10-31 RX ADMIN — VANCOMYCIN HYDROCHLORIDE 125 MG: 500 INJECTION, POWDER, LYOPHILIZED, FOR SOLUTION INTRAVENOUS at 21:45

## 2022-10-31 RX ADMIN — VANCOMYCIN HYDROCHLORIDE 125 MG: 500 INJECTION, POWDER, LYOPHILIZED, FOR SOLUTION INTRAVENOUS at 04:45

## 2022-10-31 RX ADMIN — DOCUSATE SODIUM 100 MG: 100 CAPSULE, LIQUID FILLED ORAL at 10:00

## 2022-10-31 RX ADMIN — ACETAMINOPHEN 650 MG: 325 TABLET, FILM COATED ORAL at 05:21

## 2022-10-31 RX ADMIN — POTASSIUM CHLORIDE 20 MEQ: 14.9 INJECTION, SOLUTION INTRAVENOUS at 21:45

## 2022-10-31 RX ADMIN — ACETAMINOPHEN 650 MG: 325 TABLET, FILM COATED ORAL at 13:00

## 2022-10-31 RX ADMIN — VANCOMYCIN HYDROCHLORIDE 125 MG: 500 INJECTION, POWDER, LYOPHILIZED, FOR SOLUTION INTRAVENOUS at 17:05

## 2022-10-31 RX ADMIN — CEFTRIAXONE 2000 MG: 2 INJECTION, POWDER, FOR SOLUTION INTRAMUSCULAR; INTRAVENOUS at 13:00

## 2022-10-31 RX ADMIN — PHENAZOPYRIDINE 200 MG: 100 TABLET ORAL at 09:00

## 2022-10-31 RX ADMIN — BACITRACIN ZINC, NEOMYCIN SULFATE, AND POLYMYXIN B SULFATE 1 LARGE APPLICATION: 400; 3.5; 5 OINTMENT TOPICAL at 10:00

## 2022-10-31 RX ADMIN — POTASSIUM CHLORIDE 20 MEQ: 14.9 INJECTION, SOLUTION INTRAVENOUS at 23:14

## 2022-10-31 RX ADMIN — VANCOMYCIN HYDROCHLORIDE 125 MG: 500 INJECTION, POWDER, LYOPHILIZED, FOR SOLUTION INTRAVENOUS at 10:00

## 2022-10-31 RX ADMIN — PANTOPRAZOLE SODIUM 40 MG: 40 TABLET, DELAYED RELEASE ORAL at 05:20

## 2022-10-31 RX ADMIN — PHENAZOPYRIDINE 200 MG: 100 TABLET ORAL at 13:00

## 2022-10-31 NOTE — ASSESSMENT & PLAN NOTE
POD#4 robot assisted simple prostatectomy for refractory BPH  Complicated by ileus, NGT inserted yesterday  CT a/p overnight shows no bowel obstruction, hernia, transition point, or focal postop injury  Urine output excellent >1800cc overnight  NGT high output from initial insertion, improving, 50cc overnight  TINA drain output also diminishing, 120cc overnight    SIRI resolved  Leukocytosis resolved    NGT clamp trial this morning- reassess this afternoon  Can continue clears, ice chips, and candy/gum today  Keep TINA drain for now  Keep cano catheter 2 weeks total postop

## 2022-10-31 NOTE — PROGRESS NOTES
Progress Note - General Surgery   Samase Andrea 63 y o  male MRN: [id number]  Unit/Bed#: [facility] 319-01 Encounter: [id number]    Assessment:  62 yo M who presented for elective robotic suprapubic prostatectomy on 10/26, post op course complicated by ileus     Vitals normal on room air  UOP 1425cc  NG 1950cc bilious  TINA 180cc serous  Stool x 3 yesterday     WBC no new CBC (from 8 65)  Hb no new CBC (from 15 2)  Cr pending (from 0 69)  K pending this morning    Plan:  Suspect ileus given passing flatus and having bowel movements  Continue NPO/NGT, plan for repeat KUB today to assess transit of PO contrast to colon  Replete electrolytes  PRN antiemetics  Rest of care per primary    Subjective/Objective     Subjective: No acute events overnight, remains NPO with NG tube, passing flatus and having bowel movements, pain controlled    Objective:    Blood pressure 142/95, pulse 86, temperature 98 2 °F (36 8 °C), temperature source Oral, resp  rate 16, height 5' 8" (1 727 m), weight 88 5 kg (195 lb), SpO2 98 %  ,Body mass index is 29 65 kg/m²  Intake/Output Summary (Last 24 hours) at 11/1/2022 0601  Last data filed at 11/1/2022 0401  Gross per 24 hour   Intake 1768 ml   Output 3555 ml   Net -1787 ml       Invasive Devices  Report    Peripheral Intravenous Line  Duration           Peripheral IV 10/31/22 Dorsal (posterior); Left;Proximal Forearm 1 day          Drain  Duration           Urethral Catheter Three way -- days    Closed/Suction Drain Left LLQ Bulb 19 Fr  5 days    Continuous Bladder Irrigation Three-way 5 days    NG/OG/Enteral Tube Nasogastric Left nare 2 days                Physical Exam:   Gen:    NAD  CV:      warm, well-perfused  Lungs: No respiratory distress  Abd:     soft, NT, minimally distended, incisions c/d/i, TINA serous, NG bilious  Ext:      no CCE  Neuro: A&Ox3

## 2022-10-31 NOTE — PROGRESS NOTES
Progress Note - Urology  Elisabeth Nolasco 1960, 63 y o  male MRN: [id number]    Unit/Bed#: [facility] 319-01 Encounter: [id number]      * Benign localized prostatic hyperplasia with lower urinary tract symptoms (LUTS)  Assessment & Plan  POD#4 robot assisted simple prostatectomy for refractory BPH  Complicated by ileus, NGT inserted yesterday  CT a/p overnight shows no bowel obstruction, hernia, transition point, or focal postop injury  Urine output excellent >1800cc overnight  NGT high output from initial insertion, improving, 50cc overnight  TINA drain output also diminishing, 120cc overnight    SIRI resolved  Leukocytosis resolved    NGT clamp trial this morning- reassess this afternoon  Can continue clears, ice chips, and candy/gum today  Keep TINA drain for now  Keep cano catheter 2 weeks total postop        Subjective: Feels a bit less distended  Currently no pain  No further nausea or hiccups  Has been drinking water  Passing gas and had 2-3 liquid green bowel movements since yesterday  Has had visits from his parents over the weekend  Coping with being away from his wife who is also in a hospital right now but able to talk on the phone together  Review of Systems   Constitutional: Positive for activity change and appetite change  Negative for chills, fever and unexpected weight change  HENT: Negative  Respiratory: Negative  Negative for shortness of breath  Cardiovascular: Negative  Negative for chest pain  Gastrointestinal: Positive for abdominal distention, diarrhea and vomiting  Negative for abdominal pain, constipation, nausea and rectal pain  Endocrine: Negative  Genitourinary: Negative for decreased urine volume, difficulty urinating, dysuria, flank pain, frequency, hematuria, scrotal swelling, testicular pain and urgency  Musculoskeletal: Negative for back pain and gait problem  Skin: Negative  Allergic/Immunologic: Negative  Neurological: Negative      Hematological: Negative for adenopathy  Does not bruise/bleed easily  Objective:  Vitals: Blood pressure 124/90, pulse 101, temperature 97 5 °F (36 4 °C), temperature source Oral, resp  rate 16, height 5' 8" (1 727 m), weight 88 5 kg (195 lb), SpO2 97 %  ,Body mass index is 29 65 kg/m²  Intake/Output Summary (Last 24 hours) at 10/31/2022 1117  Last data filed at 10/31/2022 0601  Gross per 24 hour   Intake 1423 5 ml   Output 2790 ml   Net -1366 5 ml     Invasive Devices  Report    Peripheral Intravenous Line  Duration           Peripheral IV 10/31/22 Dorsal (posterior); Left;Proximal Forearm <1 day          Drain  Duration           Urethral Catheter Three way -- days    Closed/Suction Drain Left LLQ Bulb 19 Fr  5 days    Continuous Bladder Irrigation Three-way 5 days    NG/OG/Enteral Tube Nasogastric Left nare 1 day                Physical Exam  Vitals and nursing note reviewed  Constitutional:       General: He is not in acute distress  Appearance: Normal appearance  He is well-developed  He is not diaphoretic  HENT:      Head: Normocephalic and atraumatic  Nose:      Comments: Left nare nasogastric tube with green/bilious liquid output to intermittent suction canister  Cardiovascular:      Rate and Rhythm: Normal rate and regular rhythm  Heart sounds: Normal heart sounds  No murmur heard  Pulmonary:      Effort: Pulmonary effort is normal       Breath sounds: Normal breath sounds  No wheezing or rhonchi  Comments: No cough or audible wheeze  Abdominal:      General: Bowel sounds are normal  There is no distension  Palpations: Abdomen is soft  There is no mass  Tenderness: There is no abdominal tenderness  There is no right CVA tenderness or left CVA tenderness  Hernia: No hernia is present        Comments: Surgical incisions approximated healing nicely, skin glue in place    llq javid bulb drain to suction, serous drainage   Genitourinary:     Comments: Edwards catheter per urethra draining clear hira urine  Musculoskeletal:         General: Normal range of motion  Right lower leg: No edema  Left lower leg: No edema  Skin:     General: Skin is warm and dry  Capillary Refill: Capillary refill takes less than 2 seconds  Coloration: Skin is not pale  Neurological:      Mental Status: He is alert and oriented to person, place, and time        Gait: Gait normal    Psychiatric:         Speech: Speech normal          Behavior: Behavior normal          Labs:  Recent Labs     10/28/22  1203 10/29/22  0435 10/30/22  0550 10/31/22  0530   WBC 12 54* 15 50* 5 80 8 65     Recent Labs     10/28/22  1203 10/29/22  0435 10/30/22  0550 10/31/22  0530   HGB 16 7 15 3 14 6 15 2       Recent Labs     10/28/22  1203 10/29/22  0435 10/30/22  0550 10/31/22  0530   CREATININE 1 06 0 96 0 99 0 69       History:    Past Medical History:   Diagnosis Date   • Back disorder 02/26/2019    getting epidural shots, physical therapy   • BPH (benign prostatic hyperplasia)    • CAD (coronary artery disease)    • Cardiac disease    • Cardiomyopathy ([address] )    • Coronary artery disease    • Failed back syndrome    • GERD (gastroesophageal reflux disease)    • Heart attack ([address] )     Mild MI 1996    • Hypertension     Patient denies   • Myocardial infarction ([address] )     Mild   • Sleep apnea    • Umbilical hernia     Last Assessed:3/25/2015     Past Surgical History:   Procedure Laterality Date   • BACK SURGERY     • HERNIA REPAIR      Last Assessed:3/12/2014 ,lumbar   • LUMBAR LAMINECTOMY      Last Assessed:3/12/2014   • CO LAP,PROSTATECTOMY,RADICAL,W/NERVE SPARE,INCL ROBOTIC N/A 10/26/2022    Procedure: ROBOTIC ASSISTED LAPAROSCOPIC SIMPLE PROSTATECTOMY, BLADDER NECK RECONSTRUCTION;  Surgeon: Ana M Herrera MD;  Location: [facility];  Service: Urology   • PROSTATE BIOPSY  09/10/2015    needle biopsy   • SHOULDER SURGERY     • TONSILLECTOMY       Family History   Problem Relation Age of Onset   • Hypertension Father    • Diabetes Father    • Pancreatic cancer Mother    • Cancer Family    • No Known Problems Sister    • No Known Problems Brother    • No Known Problems Brother      Social History     Socioeconomic History   • Marital status: /Civil Union     Spouse name: None   • Number of children: None   • Years of education: None   • Highest education level: None   Occupational History   • Occupation:      Comment: full time   Tobacco Use   • Smoking status: Never Smoker   • Smokeless tobacco: Never Used   Vaping Use   • Vaping Use: Never used   Substance and Sexual Activity   • Alcohol use: Yes     Comment: occ / Rare   • Drug use: No   • Sexual activity: Yes     Partners: Female   Other Topics Concern   • None   Social History Narrative    Always uses seat belt    Caffeine use     Social Determinants of Health     Financial Resource Strain: Not on file   Food Insecurity: No Food Insecurity   • Worried About Running Out of Food in the Last Year: Never true   • Ran Out of Food in the Last Year: Never true   Transportation Needs: No Transportation Needs   • Lack of Transportation (Medical): No   • Lack of Transportation (Non-Medical):  No   Physical Activity: Not on file   Stress: Not on file   Social Connections: Not on file   Intimate Partner Violence: Not on file   Housing Stability: Low Risk    • Unable to Pay for Housing in the Last Year: No   • Number of Places Lived in the Last Year: 1   • Unstable Housing in the Last Year: No         Leonela Arthur  Date: 10/31/2022 Time: 11:17 AM

## 2022-10-31 NOTE — UTILIZATION REVIEW
Elective Surgical Continued Stay Review    Date: 10/30/22    POD#: 4  Current Patient Class: inpatient  Current Level of Care: med surg    Assessment/Plan: 63 y o  male, initial surgery date 10/26/22 suprapubic prostatectomy complicated by ileus  KUB yesterday demonstrated diluted loops of small bowel  Patient developed severe vomiting  Now status post NG tube insertion yesterday   1 5 L of NG tube output  Leukocytosis has resolved  Creatinine remains normal  Patient is afebrile, hemodynamically stable and reports improvement with abdominal distension  Puncture sites are clean dry and intact without evidence of dehiscence  Abdomen is soft  Patient reporting loose stool  There was 410 mL TINA output within 24 hours  However, overnight post NG tube insertion, TINA output trended downward and there was a volume of 70 mL output from 7:00 p m  To 7:00 a m  Drainage is serous  Urinary output 875 mL  Renal function has remained normal   Downward trend in leukocytosis with WBCs of 15 K yesterday, now normalized  Maintain NPO with NG tube, OOB and ambulatory, dvt ppx, pain control, inc spiromety, obtain CT with po and IV contrast today to evaluate for mechanical obstruction or other intra-abdominal process  Patient has begun to pass bowel movements  If the CT today is negative we will plan for NG tube clamp trials tomorrow  Pertinent Labs/Diagnostic Results:   CT abdomen pelvis w contrast   Final Result by Cortney Schmidt DO (10/30 2142)      Dilatation and thickening of small bowel loops without evidence of definite transition point  Findings may represent postoperative ileus versus small bowel obstruction  Recommend small bowel follow-through for further evaluation  Large amount of pneumoperitoneum and subcutaneous air which is likely due to postsurgical changes however postsurgical complication cannot be entirely excluded        Inflammatory changes around the surgical bed of the prostate which is likely due to postsurgical changes however postsurgical complication such as infection or other etiology cannot be entirely excluded  I personally discussed this study with Raine Mayra on 10/30/2022 at 9:09 PM                Workstation performed: [id number]         XR abdomen obstruction series   Final Result by Brown Burgos MD (10/30 6530)      Findings suggesting small bowel obstruction vs  adynamic ileus  Workstation performed: [id number]         XR abdomen obstruction series   Final Result by Brown Burgos MD (10/28 1112)      Small bowel distention in keeping with ileus or obstruction        Workstation performed: [id number]             Results from last 7 days   Lab Units 10/31/22  0530 10/30/22  0550 10/29/22  0435 10/28/22  1203 10/27/22  0514   WBC Thousand/uL 8 65 5 80 15 50* 12 54* 11 24*   HEMOGLOBIN g/dL 15 2 14 6 15 3 16 7 14 0   HEMATOCRIT % 45 3 44 4 46 2 48 7 42 7   PLATELETS Thousands/uL 253 234 238 253 229         Results from last 7 days   Lab Units 10/31/22  0530 10/30/22  0550 10/29/22  0435 10/28/22  1203 10/27/22  0514   SODIUM mmol/L 140 142 139 138 139   POTASSIUM mmol/L 3 3* 3 6 4 0 3 8 4 4   CHLORIDE mmol/L 108 108 108 105 108   CO2 mmol/L 24 29 29 27 23   ANION GAP mmol/L 8 5 2* 6 8   BUN mg/dL 30* 42* 30* 28* 20   CREATININE mg/dL 0 69 0 99 0 96 1 06 1 02   EGFR ml/min/1 73sq m 103 82 85 75 79   CALCIUM mg/dL 8 7 8 3 8 8 9 4 8 7   MAGNESIUM mg/dL  --  2 3  --   --   --    PHOSPHORUS mg/dL  --  3 6  --   --   --          Results from last 7 days   Lab Units 10/26/22  0746   POC GLUCOSE mg/dl 92     Results from last 7 days   Lab Units 10/31/22  0530 10/30/22  0550 10/29/22  0435 10/28/22  1203 10/27/22  0514 10/26/22  1143   GLUCOSE RANDOM mg/dL 107 121 136 135 126 137       Vital Signs:    Date/Time Temp Pulse Resp BP MAP (mmHg) SpO2 O2 Device   10/31/22 0103 -- -- -- -- -- 95 % None (Room air)   10/30/22 2300 98 8 °F (37 1 °C) 78 16 164/86 117 95 % None (Room air)   10/30/22 1539 97 9 °F (36 6 °C) 78 18 132/87 -- 94 % None (Room air)   10/30/22 0700 99 °F (37 2 °C) 81 18 130/87 93 -- --   10/29/22 2216 98 6 °F (37 °C) 94 18 133/96 -- 96 % --   10/29/22 2013 -- -- -- -- -- 96 % None (Room air)   10/29/22 1500 97 7 °F (36 5 °C) 86 18 135/92 -- 96 % --   10/29/22 0800 97 8 °F (36 6 °C) 97 18 135/92 97 96 % None (Room air)     Medications:   Scheduled Medications:  acetaminophen, 650 mg, Oral, Q6H ANDREA  cefTRIAXone, 2,000 mg, Intravenous, Q24H  diltiazem, 120 mg, Oral, Daily  docusate sodium, 100 mg, Oral, BID  neomycin-bacitracin-polymyxin b, 1 large application, Topical, BID  pantoprazole, 40 mg, Oral, Early Morning  phenazopyridine, 200 mg, Oral, TID With Meals  senna, 1 tablet, Oral, Daily  vancomycin, 125 mg, Oral, Q6H ANDREA      Continuous IV Infusions:  sodium chloride, 75 mL/hr, Intravenous, Continuous      PRN Meds:  cyclobenzaprine, 10 mg, Oral, HS PRN  diphenhydrAMINE, 12 5 mg, Oral, Q6H PRN  HYDROmorphone, 0 5 mg, Intravenous, Q4H PRN  metoclopramide, 10 mg, Intravenous, Q6H PRN  ondansetron, 8 mg, Intravenous, Q4H PRN  polyethylene glycol, 17 g, Oral, Daily PRN  simethicone, 80 mg, Oral, Q6H PRN          Discharge Plan: [facility]    Network Utilization Review Department  ATTENTION: Please call with any questions or concerns to [phone] and carefully listen to the prompts so that you are directed to the right person  All voicemails are confidential   Aida Walton all requests for admission clinical reviews, approved or denied determinations and any other requests to dedicated fax number below belonging to the campus where the patient is receiving treatment   List of dedicated fax numbers for the Facilities:  [address] [place] [address] DENIALS (Administrative/Medical Necessity) [phone]   [address] (Maternity/NICU/Pediatrics) [address] [place] [address] [facility] [address] [facility] 134 815 [facility] [phone]

## 2022-10-31 NOTE — CONSULTS
Consultation - General Surgery   Raheem Gleason 63 y o  male MRN: [id number]  Unit/Bed#: [facility] 319-01 Encounter: [id number]    Assessment/Plan     Assessment:  62 yo M who presented for elective robotic suprapubic prostatectomy on 10/26, post op course complicated by ileus    Vitals normal on room air  UOP 1700cc  NG 950cc bilious so far today  TINA 140cc serous  Stool x 3 today    WBC 8 65 (from 5 80)  Hb 15 2 (from 14 6)  Cr 0 69 (from 0 99)  K 3 3 this morning    Plan:  Suspect ileus given passing flatus and having bowel movements  Continue NPO/NGT, plan for repeat KUB tomorrow to assess transit of PO contrast to colon  Replete electrolytes  PRN antiemetics  Rest of care per primary    History of Present Illness     HPI:  Raheem Gleason is a 63 y o  male who presents with nausea and vomiting  Patient initially presented for elective robotic suprapubic prostatectomy on 10/26  Postoperatively he developed nausea and vomiting requiring NG tube insertion  KUB was obtained that showed dilated loops of small bowel  CT with p o  contrast was subsequently obtained on 10/30 showing dilated loops of small bowel but no definite transition point to suggest mechanical obstruction  Clamp trial was attempted today, however patient developed recurrent vomiting after 2 hours of having the NG tube clamped  He reports that he has been passing flatus since postop day 1 and had 3 bowel movements today, though these were loose  He denies significant abdominal pain but does report some discomfort  He denies fever/chills  Inpatient consult to Acute Care Surgery  Consult performed by: Nancy Hamilton MD  Consult ordered by: Michelle Watson PA-C         Review of Systems   Constitutional: Negative  HENT: Negative  Eyes: Negative  Respiratory: Negative  Cardiovascular: Negative  Gastrointestinal: Positive for diarrhea, nausea and vomiting  Endocrine: Negative  Genitourinary: Negative  Musculoskeletal: Negative  Skin: Negative  Neurological: Negative  Psychiatric/Behavioral: Negative             Historical Information   Past Medical History:   Diagnosis Date   • Back disorder 02/26/2019    getting epidural shots, physical therapy   • BPH (benign prostatic hyperplasia)    • CAD (coronary artery disease)    • Cardiac disease    • Cardiomyopathy ([facility] [address] )    • Coronary artery disease    • Failed back syndrome    • GERD (gastroesophageal reflux disease)    • Heart attack ([facility] [address] )     Mild MI 1996    • Hypertension     Patient denies   • Myocardial infarction ([facility] [address] )     Mild   • Sleep apnea    • Umbilical hernia     Last Assessed:3/25/2015     Past Surgical History:   Procedure Laterality Date   • BACK SURGERY     • HERNIA REPAIR      Last Assessed:3/12/2014 ,lumbar   • LUMBAR LAMINECTOMY      Last Assessed:3/12/2014   • OR LAP,PROSTATECTOMY,RADICAL,W/NERVE SPARE,INCL ROBOTIC N/A 10/26/2022    Procedure: ROBOTIC ASSISTED LAPAROSCOPIC SIMPLE PROSTATECTOMY, BLADDER NECK RECONSTRUCTION;  Surgeon: Caleb Dunne MD;  Location: [facility];  Service: Urology   • PROSTATE BIOPSY  09/10/2015    needle biopsy   • SHOULDER SURGERY     • TONSILLECTOMY       Social History   Social History     Substance and Sexual Activity   Alcohol Use Yes    Comment: occ / Rare     Social History     Substance and Sexual Activity   Drug Use No     E-Cigarette/Vaping   • E-Cigarette Use Never User      E-Cigarette/Vaping Substances     Social History     Tobacco Use   Smoking Status Never Smoker   Smokeless Tobacco Never Used     Family History:   Family History   Problem Relation Age of Onset   • Hypertension Father    • Diabetes Father    • Pancreatic cancer Mother    • Cancer Family    • No Known Problems Sister    • No Known Problems Brother    • No Known Problems Brother        Meds/Allergies   current meds:   Current Facility-Administered Medications   Medication Dose Route Frequency   • acetaminophen (TYLENOL) tablet 650 mg  650 mg Oral Q6H [address]   • cefTRIAXone (ROCEPHIN) 2,000 mg in dextrose 5 % 50 mL IVPB  2,000 mg Intravenous Q24H   • cyclobenzaprine (FLEXERIL) tablet 10 mg  10 mg Oral HS PRN   • diltiazem (CARDIZEM CD) 24 hr capsule 120 mg  120 mg Oral Daily   • diphenhydrAMINE (BENADRYL) tablet 12 5 mg  12 5 mg Oral Q6H PRN   • docusate sodium (COLACE) capsule 100 mg  100 mg Oral BID   • HYDROmorphone (DILAUDID) injection 0 5 mg  0 5 mg Intravenous Q4H PRN   • metoclopramide (REGLAN) injection 10 mg  10 mg Intravenous Q6H PRN   • neomycin-bacitracin-polymyxin b (NEOSPORIN) ointment 1 large application  1 large application Topical BID   • ondansetron (ZOFRAN) 8 mg in sodium chloride 0 9 % 50 mL IVPB  8 mg Intravenous Q4H PRN   • pantoprazole (PROTONIX) EC tablet 40 mg  40 mg Oral Early Morning   • phenazopyridine (PYRIDIUM) tablet 200 mg  200 mg Oral TID With Meals   • polyethylene glycol (MIRALAX) packet 17 g  17 g Oral Daily PRN   • potassium chloride 20 mEq IVPB (premix)  20 mEq Intravenous Q2H   • senna (SENOKOT) tablet 8 6 mg  1 tablet Oral Daily   • simethicone (MYLICON) chewable tablet 80 mg  80 mg Oral Q6H PRN   • sodium chloride 0 9 % infusion  75 mL/hr Intravenous Continuous   • vancomycin (VANCOCIN) oral solution 125 mg  125 mg Oral Q6H [address]    and PTA meds:   Prior to Admission Medications   Prescriptions Last Dose Informant Patient Reported? Taking?    EPINEPHrine (EPIPEN) 0 3 mg/0 3 mL SOAJ  Self No No   Sig: Inject 0 3 mL (0 3 mg total) into a muscle once for 1 dose   Omega-3 Fatty Acids (FISH OIL) 1000 MG CPDR 10/19/2022 Self Yes Yes   Sig: Take 1 capsule by mouth daily     aspirin (ECOTRIN) 325 mg EC tablet 10/19/2022 Self Yes Yes   Sig: Take 325 mg by mouth every other day   cyclobenzaprine (FLEXERIL) 10 mg tablet More than a month at Unknown time Self No No   Sig: Take 1 tablet (10 mg total) by mouth daily at bedtime as needed for muscle spasms   diltiazem (CARDIZEM CD) 120 mg 24 hr capsule 10/26/2022 at 0500 Self No Yes   Sig: TAKE 1 CAPSULE BY MOUTH EVERY DAY   meloxicam (MOBIC) 15 mg tablet More than a month at Unknown time  No No   Sig: TAKE 1 TABLET BY MOUTH EVERY DAY AS NEEDED   omeprazole (PriLOSEC) 40 MG capsule 10/26/2022 at 0500 Self No Yes   Sig: TAKE 1 CAPSULE BY MOUTH EVERY DAY   sildenafil (VIAGRA) 100 mg tablet 10/12/2022 at Unknown time Self No No   Sig: Take 1 tablet (100 mg total) by mouth daily as needed for erectile dysfunction   tamsulosin (FLOMAX) 0 4 mg 10/19/2022 Self No Yes   Sig: Take 2 capsules (0 8 mg total) by mouth daily with dinner      Facility-Administered Medications: None     Allergies   Allergen Reactions   • Other Anaphylaxis     [facility] - [id number]: YELLOW JACKETS       Objective   First Vitals:   Blood Pressure: 108/52 (10/26/22 0714)  Pulse: 66 (10/26/22 0714)  Temperature: 97 7 °F (36 5 °C) (10/26/22 0714)  Temp Source: Temporal (10/26/22 0714)  Respirations: 16 (10/26/22 0714)  Height: 5' 8" (172 7 cm) (10/12/22 0814)  Weight - Scale: 90 7 kg (200 lb) (10/12/22 0814)  SpO2: 95 % (10/26/22 0714)    Current Vitals:   Blood Pressure: 124/90 (10/31/22 1000)  Pulse: 101 (10/31/22 1000)  Temperature: 97 5 °F (36 4 °C) (10/31/22 1000)  Temp Source: Oral (10/31/22 1000)  Respirations: 16 (10/31/22 1000)  Height: 5' 8" (172 7 cm) (10/26/22 0714)  Weight - Scale: 88 5 kg (195 lb) (10/26/22 0714)  SpO2: 97 % (10/31/22 1000)      Intake/Output Summary (Last 24 hours) at 10/31/2022 1937  Last data filed at 10/31/2022 0601  Gross per 24 hour   Intake 1423 5 ml   Output 2520 ml   Net -1096 5 ml       Invasive Devices  Report    Peripheral Intravenous Line  Duration           Peripheral IV 10/31/22 Dorsal (posterior); Left;Proximal Forearm <1 day          Drain  Duration           Urethral Catheter Three way -- days    Closed/Suction Drain Left LLQ Bulb 19 Fr  5 days    Continuous Bladder Irrigation Three-way 5 days    NG/OG/Enteral Tube Nasogastric Left nare 2 days                Physical Exam  Constitutional: Appearance: Normal appearance  HENT:      Head: Normocephalic and atraumatic  Right Ear: External ear normal       Left Ear: External ear normal       Nose: Nose normal       Mouth/Throat:      Mouth: Mucous membranes are moist       Pharynx: Oropharynx is clear  Eyes:      Extraocular Movements: Extraocular movements intact  Conjunctiva/sclera: Conjunctivae normal       Pupils: Pupils are equal, round, and reactive to light  Cardiovascular:      Rate and Rhythm: Normal rate and regular rhythm  Pulses: Normal pulses  Pulmonary:      Effort: Pulmonary effort is normal    Abdominal:      General: There is distension (Mildly distended)  Palpations: Abdomen is soft  Tenderness: There is no abdominal tenderness  Musculoskeletal:         General: Normal range of motion  Cervical back: Normal range of motion  Skin:     General: Skin is warm and dry  Neurological:      General: No focal deficit present  Mental Status: He is alert and oriented to person, place, and time  Psychiatric:         Mood and Affect: Mood normal          Behavior: Behavior normal             Lab Results: I have personally reviewed pertinent lab results  Imaging: I have personally reviewed pertinent reports  EKG, Pathology, and Other Studies: I have personally reviewed pertinent reports

## 2022-10-31 NOTE — QUICK NOTE
Arun did not tolerate NGT clamping trial today    NGT clamped at 10:00  Vomiting began at 13:45 about 150cc bilious emesis  NGT opened back to suction additional 50cc output    He feels better  He continues to pass gas and just had another liquid bowel movement just now    Will maintain NGT To low intermittent suction today

## 2022-11-01 ENCOUNTER — APPOINTMENT (OUTPATIENT)
Dept: RADIOLOGY | Facility: HOSPITAL | Age: 62
End: 2022-11-01

## 2022-11-01 LAB
ANION GAP SERPL CALCULATED.3IONS-SCNC: 7 MMOL/L (ref 4–13)
BUN SERPL-MCNC: 27 MG/DL (ref 5–25)
CALCIUM SERPL-MCNC: 8.6 MG/DL (ref 8.3–10.1)
CHLORIDE SERPL-SCNC: 110 MMOL/L (ref 96–108)
CO2 SERPL-SCNC: 23 MMOL/L (ref 21–32)
CREAT SERPL-MCNC: 0.67 MG/DL (ref 0.6–1.3)
GFR SERPL CREATININE-BSD FRML MDRD: 104 ML/MIN/1.73SQ M
GLUCOSE SERPL-MCNC: 109 MG/DL (ref 65–140)
MAGNESIUM SERPL-MCNC: 2.4 MG/DL (ref 1.6–2.6)
PHOSPHATE SERPL-MCNC: 2.7 MG/DL (ref 2.3–4.1)
POTASSIUM SERPL-SCNC: 3.8 MMOL/L (ref 3.5–5.3)
SODIUM SERPL-SCNC: 140 MMOL/L (ref 135–147)

## 2022-11-01 PROCEDURE — 02HV33Z INSERTION OF INFUSION DEVICE INTO SUPERIOR VENA CAVA, PERCUTANEOUS APPROACH: ICD-10-PCS | Performed by: PHYSICIAN ASSISTANT

## 2022-11-01 RX ORDER — KETOROLAC TROMETHAMINE 30 MG/ML
30 INJECTION, SOLUTION INTRAMUSCULAR; INTRAVENOUS EVERY 6 HOURS SCHEDULED
Status: DISPENSED | OUTPATIENT
Start: 2022-11-01 | End: 2022-11-03

## 2022-11-01 RX ORDER — HEPARIN SODIUM 5000 [USP'U]/ML
5000 INJECTION, SOLUTION INTRAVENOUS; SUBCUTANEOUS EVERY 8 HOURS SCHEDULED
Status: DISCONTINUED | OUTPATIENT
Start: 2022-11-01 | End: 2022-11-05 | Stop reason: HOSPADM

## 2022-11-01 RX ADMIN — VANCOMYCIN HYDROCHLORIDE 125 MG: 500 INJECTION, POWDER, LYOPHILIZED, FOR SOLUTION INTRAVENOUS at 05:05

## 2022-11-01 RX ADMIN — HEPARIN SODIUM 5000 UNITS: 5000 INJECTION INTRAVENOUS; SUBCUTANEOUS at 21:52

## 2022-11-01 RX ADMIN — VANCOMYCIN HYDROCHLORIDE 125 MG: 500 INJECTION, POWDER, LYOPHILIZED, FOR SOLUTION INTRAVENOUS at 16:42

## 2022-11-01 RX ADMIN — VANCOMYCIN HYDROCHLORIDE 125 MG: 500 INJECTION, POWDER, LYOPHILIZED, FOR SOLUTION INTRAVENOUS at 08:21

## 2022-11-01 RX ADMIN — CEFTRIAXONE 2000 MG: 2 INJECTION, POWDER, FOR SOLUTION INTRAMUSCULAR; INTRAVENOUS at 13:23

## 2022-11-01 RX ADMIN — DILTIAZEM HYDROCHLORIDE 120 MG: 120 CAPSULE, COATED, EXTENDED RELEASE ORAL at 09:27

## 2022-11-01 RX ADMIN — KETOROLAC TROMETHAMINE 30 MG: 30 INJECTION, SOLUTION INTRAMUSCULAR; INTRAVENOUS at 21:52

## 2022-11-01 RX ADMIN — SODIUM CHLORIDE 75 ML/HR: 0.9 INJECTION, SOLUTION INTRAVENOUS at 12:31

## 2022-11-01 RX ADMIN — SODIUM CHLORIDE 125 ML/HR: 0.9 INJECTION, SOLUTION INTRAVENOUS at 21:53

## 2022-11-01 RX ADMIN — BACITRACIN ZINC, NEOMYCIN SULFATE, AND POLYMYXIN B SULFATE 1 LARGE APPLICATION: 400; 3.5; 5 OINTMENT TOPICAL at 08:29

## 2022-11-01 RX ADMIN — VANCOMYCIN HYDROCHLORIDE 125 MG: 500 INJECTION, POWDER, LYOPHILIZED, FOR SOLUTION INTRAVENOUS at 21:52

## 2022-11-01 RX ADMIN — KETOROLAC TROMETHAMINE 30 MG: 30 INJECTION, SOLUTION INTRAMUSCULAR; INTRAVENOUS at 16:41

## 2022-11-01 RX ADMIN — POTASSIUM CHLORIDE 20 MEQ: 14.9 INJECTION, SOLUTION INTRAVENOUS at 01:19

## 2022-11-01 NOTE — QUICK NOTE
I called and spoke to the patient regarding his ileus  He is having bowel movements and passing gas as such this is a good finding and hopefully indicates that his ileus is starting to resolve  I do recommend removing his surgical drain while leaving his catheter in place as I have seen the surgical drain occasionally cause irritation of the small bowel leading to dysmotility  The output from drain is reassuring relative to urine output  Furthermore a creatinine level on the fluid  shows that his is 6 peritoneal fluid and not urine  I have also reviewed his medication list and removed any medications that might be contributing to inhibitory gastric tone such as Benadryl and Flexeril and Dilaudid  Tylenol and Toradol will be his pain control for now  He has not been ambulating much due to his being hooked up to his NG tube  I have placed an order for the nurses to clamp his NG tube in the morning, midday and afternoon to allow him to ambulate at least 5-6 laps at each of those occasions      We are grateful to our general surgery colleagues for their input on this patient's case

## 2022-11-01 NOTE — PLAN OF CARE
Problem: PAIN - ADULT  Goal: Verbalizes/displays adequate comfort level or baseline comfort level  Description: Interventions:  - Encourage patient to monitor pain and request assistance  - Assess pain using appropriate pain scale  - Administer analgesics based on type and severity of pain and evaluate response  - Implement non-pharmacological measures as appropriate and evaluate response  - Consider cultural and social influences on pain and pain management  - Notify physician/advanced practitioner if interventions unsuccessful or patient reports new pain  Outcome: Progressing     Problem: INFECTION - ADULT  Goal: Absence or prevention of progression during hospitalization  Description: INTERVENTIONS:  - Assess and monitor for signs and symptoms of infection  - Monitor lab/diagnostic results  - Monitor all insertion sites, i e  indwelling lines, tubes, and drains  - Monitor endotracheal if appropriate and nasal secretions for changes in amount and color  - Mar Lin appropriate cooling/warming therapies per order  - Administer medications as ordered  - Instruct and encourage patient and family to use good hand hygiene technique  - Identify and instruct in appropriate isolation precautions for identified infection/condition  Outcome: Progressing  Goal: Absence of fever/infection during neutropenic period  Description: INTERVENTIONS:  - Monitor WBC    Outcome: Progressing     Problem: SAFETY ADULT  Goal: Patient will remain free of falls  Description: INTERVENTIONS:  - Educate patient/family on patient safety including physical limitations  - Instruct patient to call for assistance with activity   - Consult OT/PT to assist with strengthening/mobility   - Keep Call bell within reach  - Keep bed low and locked with side rails adjusted as appropriate  - Keep care items and personal belongings within reach  - Initiate and maintain comfort rounds  - Make Fall Risk Sign visible to staff  - Apply yellow socks and bracelet for high fall risk patients  - Consider moving patient to room near nurses station  Outcome: Progressing  Goal: Maintain or return to baseline ADL function  Description: INTERVENTIONS:  -  Assess patient's ability to carry out ADLs; assess patient's baseline for ADL function and identify physical deficits which impact ability to perform ADLs (bathing, care of mouth/teeth, toileting, grooming, dressing, etc )  - Assess/evaluate cause of self-care deficits   - Assess range of motion  - Assess patient's mobility; develop plan if impaired  - Assess patient's need for assistive devices and provide as appropriate  - Encourage maximum independence but intervene and supervise when necessary  - Involve family in performance of ADLs  - Assess for home care needs following discharge   - Consider OT consult to assist with ADL evaluation and planning for discharge  - Provide patient education as appropriate  Outcome: Progressing  Goal: Maintains/Returns to pre admission functional level  Description: INTERVENTIONS:  - Perform BMAT or MOVE assessment daily    - Set and communicate daily mobility goal to care team and patient/family/caregiver     - Collaborate with rehabilitation services on mobility goals if consulted  - Out of bed for toileting  - Record patient progress and toleration of activity level   Outcome: Progressing     Problem: DISCHARGE PLANNING  Goal: Discharge to home or other facility with appropriate resources  Description: INTERVENTIONS:  - Identify barriers to discharge w/patient and caregiver  - Arrange for needed discharge resources and transportation as appropriate  - Identify discharge learning needs (meds, wound care, etc )  - Arrange for interpretive services to assist at discharge as needed  - Refer to Case Management Department for coordinating discharge planning if the patient needs post-hospital services based on physician/advanced practitioner order or complex needs related to functional status, cognitive ability, or social support system  Outcome: Progressing     Problem: Knowledge Deficit  Goal: Patient/family/caregiver demonstrates understanding of disease process, treatment plan, medications, and discharge instructions  Description: Complete learning assessment and assess knowledge base    Interventions:  - Provide teaching at level of understanding  - Provide teaching via preferred learning methods  Outcome: Progressing     Problem: Potential for Falls  Goal: Patient will remain free of falls  Description: INTERVENTIONS:  - Educate patient/family on patient safety including physical limitations  - Instruct patient to call for assistance with activity   - Consult OT/PT to assist with strengthening/mobility   - Keep Call bell within reach  - Keep bed low and locked with side rails adjusted as appropriate  - Keep care items and personal belongings within reach  - Initiate and maintain comfort rounds  - Make Fall Risk Sign visible to staff  - Apply yellow socks and bracelet for high fall risk patients  - Consider moving patient to room near nurses station  Outcome: Progressing     Problem: Prexisting or High Potential for Compromised Skin Integrity  Goal: Skin integrity is maintained or improved  Description: INTERVENTIONS:  - Identify patients at risk for skin breakdown  - Assess and monitor skin integrity  - Assess and monitor nutrition and hydration status  - Monitor labs   - Assess for incontinence   - Turn and reposition patient  - Assist with mobility/ambulation  - Relieve pressure over bony prominences  - Avoid friction and shearing  - Provide appropriate hygiene as needed including keeping skin clean and dry  - Evaluate need for skin moisturizer/barrier cream  - Collaborate with interdisciplinary team   - Patient/family teaching  - Consider wound care consult   Outcome: Progressing

## 2022-11-01 NOTE — PROGRESS NOTES
Progress Note - urology   Vandana Madden 63 y o  male MRN: [id number]  Unit/Bed#: [facility] 319-01 Encounter: [id number]    Assessment & Plan:    Benign prostatic hypertrophy with bladder outlet obstruction:  -postop day 6 robotic assisted laparoscopic simple prostatectomy with bladder neck reconstruction with Dr Treadwell  -postoperative course complicated by continued postoperative ileus  -patient went down for additional KUB which reveals continued postoperative ileus  -Zofran and Reglan for nausea and to stimulate bowels   -pain currently being managed with Tylenol and Toradol, patient denies any current pain  -patient now 5 days without food, PICC team consulted for placement of PICC line and nutritional services consulted for TPN regimen  Appreciate their assistance in consultation  Consent signed for PICC line  -appreciate General surgery following  Recommend NG tube advancement by 5 cm  -diet currently NPO in sips of clears, will change to ice chips as may be contributing to high output from NG tube  Patient may continue to have candies and chewing gum  -IV fluids at a low rate 75  -TINA creatinine obtained and was within normal limits, TINA drain removed today to assist with resolution of ileus  -vitals continued to be stable at this time  -labs continued to be stable, no leukocytosis, hemoglobin stable, creatinine at baseline  -encourage ambulation  -patient experiencing bowel movements, describe them as green  -CT scan obtained 10/30-positive for postoperative ileus and postop surgical changes  Patient to remain inpatient until postoperative ileus has resolved      Subjective/Objective   Chief Complaint:  Tired    Subjective:   Patient currently sitting up in chair on initial examination, reports some mild nausea reports that he had an episode of vomiting after brushing his teeth this a krystal Blandon Feels better with NG tube put backed up after yesterday evening  He denies any fevers or chills    Denies any current abdominal pain, denies discomfort Edwards catheter  Objective:     Blood pressure 148/86, pulse 91, temperature 97 7 °F (36 5 °C), temperature source Oral, resp  rate 18, height 5' 8" (1 727 m), weight 88 5 kg (195 lb), SpO2 95 %  ,Body mass index is 29 65 kg/m²  Intake/Output Summary (Last 24 hours) at 11/1/2022 1027  Last data filed at 11/1/2022 0401  Gross per 24 hour   Intake 1768 ml   Output 3555 ml   Net -1787 ml       Invasive Devices  Report    Peripheral Intravenous Line  Duration           Peripheral IV 10/31/22 Dorsal (posterior); Left;Proximal Forearm 1 day          Drain  Duration           Urethral Catheter Three way -- days    Continuous Bladder Irrigation Three-way 6 days    Closed/Suction Drain Left LLQ Bulb 19 Fr  5 days    NG/OG/Enteral Tube Nasogastric Left nare 2 days              Physical Exam  Constitutional:       General: He is not in acute distress  Appearance: He is normal weight  He is ill-appearing  He is not toxic-appearing or diaphoretic  HENT:      Head: Normocephalic and atraumatic  Right Ear: External ear normal       Left Ear: External ear normal       Nose: Nose normal       Comments: NG tube in place draining bile     Mouth/Throat:      Pharynx: Oropharynx is clear  Eyes:      Conjunctiva/sclera: Conjunctivae normal    Cardiovascular:      Rate and Rhythm: Normal rate and regular rhythm  Pulses: Normal pulses  Heart sounds: No murmur heard  No friction rub  No gallop  Pulmonary:      Effort: Pulmonary effort is normal  No respiratory distress  Breath sounds: No wheezing, rhonchi or rales  Abdominal:      General: There is no distension  Palpations: Abdomen is soft  Tenderness: There is no abdominal tenderness  Comments: Surgical incisions intact, dry, no sign of wound dehiscence or underlying infection, bowel sounds present although decreased, abdomen nontender to palpation and soft    TINA drain with serous fluid, this was removed today  Genitourinary:     Comments: Urethral Edwards catheter in place draining orange-colored urine due to Pyridium  Musculoskeletal:         General: Normal range of motion  Cervical back: Normal range of motion  Skin:     General: Skin is warm and dry  Neurological:      General: No focal deficit present  Mental Status: He is alert and oriented to person, place, and time  Psychiatric:         Mood and Affect: Mood normal          Behavior: Behavior normal          Thought Content: Thought content normal          Judgment: Judgment normal            Lab, Imaging and other studies:I have personally reviewed pertinent lab results     Lab Results   Component Value Date    WBC 8 65 10/31/2022    HGB 15 2 10/31/2022    HCT 45 3 10/31/2022    MCV 87 10/31/2022     10/31/2022     Lab Results   Component Value Date    SODIUM 140 11/01/2022    K 3 8 11/01/2022     (H) 11/01/2022    CO2 23 11/01/2022    BUN 27 (H) 11/01/2022    CREATININE 0 67 11/01/2022    GLUC 109 11/01/2022    CALCIUM 8 6 11/01/2022        VTE Pharmacologic Prophylaxis:  Patient ambulatory   VTE Mechanical Prophylaxis: sequential compression device      Kathy DURAN

## 2022-11-01 NOTE — PROGRESS NOTES
General Surgery  Progress Note   Oneida Williamson 63 y o  male MRN: [id number]  Unit/Bed#: [facility] 319-01 Encounter: [id number]    Assessment:  62 yo M who presented for elective robotic suprapubic prostatectomy on 10/26, post op course complicated by ileus  Afebrile, VSS    UOP:1L  NGT:3L    Plan:  -NPO/NGT; monitor output in character   -Plan  For PICC/TPN   -monitor and replete electrolytes; K>4, mg>2, Phos>3  -Edwards per primary team  -encourage ambulation   -pain and nausea control as needed   -rest of care per primary team     Subjective/Objective     Subjective: Patient seen and examined at bedside, in no acute distress  No acute events overnight  Patient's pain is well controlled  Pt denies nausea or vomiting  Passing gas and stool  Objective:     Vitals:Blood pressure 124/86, pulse 74, temperature 97 7 °F (36 5 °C), temperature source Oral, resp  rate 18, height 5' 8" (1 727 m), weight 84 8 kg (186 lb 14 4 oz), SpO2 95 %  ,Body mass index is 28 42 kg/m²  Temp (24hrs), Av 5 °F (36 4 °C), Min:97 2 °F (36 2 °C), Max:97 7 °F (36 5 °C)  Current: Temperature: 97 7 °F (36 5 °C)      Intake/Output Summary (Last 24 hours) at 2022 0840  Last data filed at 2022 0401  Gross per 24 hour   Intake 1440 ml   Output 4175 ml   Net -2735 ml       Invasive Devices  Report    Peripherally Inserted Central Catheter Line  Duration           PICC Line 97/49/47 Right Basilic <1 day          Drain  Duration           Urethral Catheter Three way -- days    Closed/Suction Drain Left LLQ Bulb 19 Fr  6 days    Continuous Bladder Irrigation Three-way 6 days    NG/OG/Enteral Tube Nasogastric Left nare 3 days                Physical Exam:  General: No acute distress, alert and oriented  CV: Well perfused, regular rate and rhythm  Lungs: Normal work of breathing, no increased respiratory effort  Abdomen: Soft, non-tender, mildly distended not tympanic  Incision clean, dry and intact    NG tube in place as above  Extremities: No edema, clubbing or cyanosis  Skin: Warm, dry    Lab Results: Results: I have personally reviewed all pertinent laboratory/tests results  VTE Prophylaxis: Sequential compression device (Venodyne)  and Heparin    Edin Shaffer  11/2/2022

## 2022-11-01 NOTE — PROCEDURES
Insert PICC line    Date/Time: 11/1/2022 2:21 PM  Performed by: Inocencio Nolasco RN  Authorized by: Euell Aase, PA-C     Patient location:  Bedside  Other Assisting Provider: Yes (comment) GIANLUCA Cerna)    Consent:     Consent obtained:  Written (Obtained by provider)    Risks discussed:  Arterial puncture, bleeding, infection, incorrect placement, nerve damage and pneumothorax (Discussed by provider)    Alternatives discussed: Discussed by provider  Universal protocol:     Procedure explained and questions answered to patient or proxy's satisfaction: yes      Relevant documents present and verified: yes      Test results available and properly labeled: yes      Radiology Images displayed and confirmed  If images not available, report reviewed: yes      Required blood products, implants, devices, and special equipment available: yes      Site/side marked: yes      Immediately prior to procedure, a time out was called: yes      Patient identity confirmed:  Verbally with patient, arm band, provided demographic data and hospital-assigned identification number  Pre-procedure details:     Hand hygiene: Hand hygiene performed prior to insertion      Sterile barrier technique: All elements of maximal sterile technique followed      Skin preparation:  ChloraPrep    Skin preparation agent: Skin preparation agent completely dried prior to procedure    Indications:     PICC line indications: total parenteral nutrition    Sedation:     Sedation type: None  Anesthesia (see MAR for exact dosages):      Anesthesia method:  Local infiltration    Local anesthetic:  Lidocaine 1% w/o epi (3 mL administered)  Procedure details:     Location:  Basilic    Vessel type: vein      Laterality:  Right    Site selection rationale:  Largest, most patent vessel    Approach: percutaneous technique used      Patient position:  Flat    Procedural supplies:  Double lumen    Catheter size:  5 Fr    Landmarks identified: yes      Ultrasound guidance: yes      Ultrasound image availability:  Not saved (31% vessel occupancy  Unable to save image to Epic )    Sterile ultrasound techniques: Sterile gel and sterile probe covers were used      Number of attempts:  1    Successful placement: yes      Vessel of catheter tip end:  Sherlock 3CG confirmed (OK to use PICC  Sherlock 3CG results saved to chart )    Total catheter length (cm):  43    Catheter out on skin (cm):  0    Max flow rate:  999 ml/hr    Arm circumference:  31  Post-procedure details:     Post-procedure:  Dressing applied and securement device placed    Assessment:  Blood return through all ports and free fluid flow    Post-procedure complications: none      Patient tolerance of procedure:   Tolerated well, no immediate complications

## 2022-11-02 LAB
ANION GAP SERPL CALCULATED.3IONS-SCNC: 4 MMOL/L (ref 4–13)
BUN SERPL-MCNC: 29 MG/DL (ref 5–25)
CALCIUM SERPL-MCNC: 8.1 MG/DL (ref 8.3–10.1)
CHLORIDE SERPL-SCNC: 111 MMOL/L (ref 96–108)
CO2 SERPL-SCNC: 25 MMOL/L (ref 21–32)
CREAT SERPL-MCNC: 0.66 MG/DL (ref 0.6–1.3)
GFR SERPL CREATININE-BSD FRML MDRD: 105 ML/MIN/1.73SQ M
GLUCOSE SERPL-MCNC: 111 MG/DL (ref 65–140)
MAGNESIUM SERPL-MCNC: 2.3 MG/DL (ref 1.6–2.6)
PHOSPHATE SERPL-MCNC: 2.9 MG/DL (ref 2.3–4.1)
POTASSIUM SERPL-SCNC: 3.4 MMOL/L (ref 3.5–5.3)
SODIUM SERPL-SCNC: 140 MMOL/L (ref 135–147)

## 2022-11-02 RX ORDER — POTASSIUM CHLORIDE 29.8 MG/ML
40 INJECTION INTRAVENOUS ONCE
Status: COMPLETED | OUTPATIENT
Start: 2022-11-02 | End: 2022-11-02

## 2022-11-02 RX ADMIN — VANCOMYCIN HYDROCHLORIDE 125 MG: 500 INJECTION, POWDER, LYOPHILIZED, FOR SOLUTION INTRAVENOUS at 21:02

## 2022-11-02 RX ADMIN — KETOROLAC TROMETHAMINE 30 MG: 30 INJECTION, SOLUTION INTRAMUSCULAR; INTRAVENOUS at 05:12

## 2022-11-02 RX ADMIN — BACITRACIN ZINC, NEOMYCIN SULFATE, AND POLYMYXIN B SULFATE 1 LARGE APPLICATION: 400; 3.5; 5 OINTMENT TOPICAL at 18:00

## 2022-11-02 RX ADMIN — SODIUM CHLORIDE 500 ML: 0.9 INJECTION, SOLUTION INTRAVENOUS at 03:53

## 2022-11-02 RX ADMIN — VANCOMYCIN HYDROCHLORIDE 125 MG: 500 INJECTION, POWDER, LYOPHILIZED, FOR SOLUTION INTRAVENOUS at 16:39

## 2022-11-02 RX ADMIN — SENNOSIDES 8.6 MG: 8.6 TABLET, FILM COATED ORAL at 11:18

## 2022-11-02 RX ADMIN — HEPARIN SODIUM 5000 UNITS: 5000 INJECTION INTRAVENOUS; SUBCUTANEOUS at 15:05

## 2022-11-02 RX ADMIN — KETOROLAC TROMETHAMINE 30 MG: 30 INJECTION, SOLUTION INTRAMUSCULAR; INTRAVENOUS at 23:12

## 2022-11-02 RX ADMIN — HEPARIN SODIUM 5000 UNITS: 5000 INJECTION INTRAVENOUS; SUBCUTANEOUS at 21:13

## 2022-11-02 RX ADMIN — VANCOMYCIN HYDROCHLORIDE 125 MG: 500 INJECTION, POWDER, LYOPHILIZED, FOR SOLUTION INTRAVENOUS at 11:16

## 2022-11-02 RX ADMIN — VANCOMYCIN HYDROCHLORIDE 125 MG: 500 INJECTION, POWDER, LYOPHILIZED, FOR SOLUTION INTRAVENOUS at 02:46

## 2022-11-02 RX ADMIN — BACITRACIN ZINC, NEOMYCIN SULFATE, AND POLYMYXIN B SULFATE 1 LARGE APPLICATION: 400; 3.5; 5 OINTMENT TOPICAL at 11:18

## 2022-11-02 RX ADMIN — KETOROLAC TROMETHAMINE 30 MG: 30 INJECTION, SOLUTION INTRAMUSCULAR; INTRAVENOUS at 17:56

## 2022-11-02 RX ADMIN — POTASSIUM CHLORIDE 40 MEQ: 29.8 INJECTION, SOLUTION INTRAVENOUS at 15:05

## 2022-11-02 RX ADMIN — CEFTRIAXONE 2000 MG: 2 INJECTION, POWDER, FOR SOLUTION INTRAMUSCULAR; INTRAVENOUS at 11:30

## 2022-11-02 RX ADMIN — DOCUSATE SODIUM 100 MG: 100 CAPSULE, LIQUID FILLED ORAL at 11:17

## 2022-11-02 RX ADMIN — DOCUSATE SODIUM 100 MG: 100 CAPSULE, LIQUID FILLED ORAL at 17:56

## 2022-11-02 RX ADMIN — HEPARIN SODIUM 5000 UNITS: 5000 INJECTION INTRAVENOUS; SUBCUTANEOUS at 05:12

## 2022-11-02 RX ADMIN — Medication: at 21:13

## 2022-11-02 RX ADMIN — KETOROLAC TROMETHAMINE 30 MG: 30 INJECTION, SOLUTION INTRAMUSCULAR; INTRAVENOUS at 11:24

## 2022-11-02 RX ADMIN — SODIUM CHLORIDE 125 ML/HR: 0.9 INJECTION, SOLUTION INTRAVENOUS at 11:34

## 2022-11-02 RX ADMIN — SODIUM CHLORIDE 125 ML/HR: 0.9 INJECTION, SOLUTION INTRAVENOUS at 03:53

## 2022-11-02 RX ADMIN — DILTIAZEM HYDROCHLORIDE 120 MG: 120 CAPSULE, COATED, EXTENDED RELEASE ORAL at 11:17

## 2022-11-02 RX ADMIN — ACETAMINOPHEN 650 MG: 325 TABLET, FILM COATED ORAL at 17:56

## 2022-11-02 NOTE — UTILIZATION REVIEW
Elective Surgical Continued Stay Review    Date: 11/2/22     POD#: 7 robotic assisted laparoscopic simple prostatectomy with bladder neck reconstruction  Current Patient Class: Inpatient  Current Level of Care: med surg    Assessment/Plan: 63 y o  male, initial surgery date 10/26/22 suprapubic prostatectomy complicated by ileus  Pt reports that his belching has subsided  Reports improvement today but does continue to feel as though he is getting weaker  Reports continued flatus and bowel movements green in color  Abdomen soft, incisional tenderness noted, distention improved  Continue antiemetics, continue NPO, pain control  PICC line placed on 11/1 for TPN with anticipated start tonight, nutrition consult placed  Continue to monitor electrolytes and replete prn, General sx following, continue IVF but dc when TPN arranged  TINA drain removed on 11/1  Continue NG tube and monitor output, continue to monitor VS and labs, encourage ambulation  PT/OT eval  Patient to remain inpatient until postoperative ileus has resolved      Pertinent Labs/Diagnostic Results:       Results from last 7 days   Lab Units 10/31/22  0530 10/30/22  0550 10/29/22  0435 10/28/22  1203 10/27/22  0514   WBC Thousand/uL 8 65 5 80 15 50* 12 54* 11 24*   HEMOGLOBIN g/dL 15 2 14 6 15 3 16 7 14 0   HEMATOCRIT % 45 3 44 4 46 2 48 7 42 7   PLATELETS Thousands/uL 253 234 238 253 229         Results from last 7 days   Lab Units 11/02/22  0513 11/01/22  0617 10/31/22  0530 10/30/22  0550 10/29/22  0435   SODIUM mmol/L 140 140 140 142 139   POTASSIUM mmol/L 3 4* 3 8 3 3* 3 6 4 0   CHLORIDE mmol/L 111* 110* 108 108 108   CO2 mmol/L 25 23 24 29 29   ANION GAP mmol/L 4 7 8 5 2*   BUN mg/dL 29* 27* 30* 42* 30*   CREATININE mg/dL 0 66 0 67 0 69 0 99 0 96   EGFR ml/min/1 73sq m 105 104 103 82 85   CALCIUM mg/dL 8 1* 8 6 8 7 8 3 8 8   MAGNESIUM mg/dL 2 3 2 4  --  2 3  --    PHOSPHORUS mg/dL 2 9 2 7  --  3 6  --      Results from last 7 days   Lab Units 11/02/22  0513 11/01/22  0617 10/31/22  0530 10/30/22  0550 10/29/22  0435 10/28/22  1203 10/27/22  0514 10/26/22  1143   GLUCOSE RANDOM mg/dL 111 109 107 121 136 135 126 137       Vital Signs:   Date/Time Temp Pulse Resp BP MAP (mmHg) SpO2 O2 Device   11/02/22 0757 97 7 °F (36 5 °C) 74 18 124/86 101 95 % None (Room air)   11/01/22 2236 97 2 °F (36 2 °C) Abnormal  68 18 149/85 108 98 % None (Room air)   11/01/22 1500 97 7 °F (36 5 °C) -- 18 110/82 93 98 % --   11/01/22 0755 97 7 °F (36 5 °C) 91 18 148/86 113 95 % None (Room air)   10/31/22 2214 98 2 °F (36 8 °C) 86 16 142/95 114 98 % None (Room air)   10/31/22 1000 97 5 °F (36 4 °C) 101 16 124/90 101 97 % --   10/31/22 0800 -- -- -- -- -- -- None (Room air)   10/31/22 0103 -- -- -- -- -- 95 % None (Room air)     Medications:   Scheduled Medications:  acetaminophen, 650 mg, Oral, Q6H ANDREA  cefTRIAXone, 2,000 mg, Intravenous, Q24H  diltiazem, 120 mg, Oral, Daily  docusate sodium, 100 mg, Oral, BID  heparin (porcine), 5,000 Units, Subcutaneous, Q8H ANDREA  ketorolac, 30 mg, Intravenous, Q6H [facility]  neomycin-bacitracin-polymyxin b, 1 large application, Topical, BID  pantoprazole, 40 mg, Oral, Early Morning  potassium chloride, 40 mEq, Intravenous, Once  senna, 1 tablet, Oral, Daily  vancomycin, 125 mg, Oral, Q6H ANDREA      Continuous IV Infusions:  Adult TPN (STANDARD BASE/STANDARD ELECTROLYTE), , Intravenous, Continuous TPN  sodium chloride, 125 mL/hr, Intravenous, Continuous      PRN Meds:  metoclopramide, 10 mg, Intravenous, Q6H PRN  ondansetron, 8 mg, Intravenous, Q4H PRN  polyethylene glycol, 17 g, Oral, Daily PRN  simethicone, 80 mg, Oral, Q6H PRN          Discharge Plan: tbd    Network Utilization Review Department  ATTENTION: Please call with any questions or concerns to [phone] and carefully listen to the prompts so that you are directed to the right person   All voicemails are confidential   Nickie Taylor all requests for admission clinical reviews, approved or denied determinations and any other requests to dedicated fax number below belonging to the campus where the patient is receiving treatment   List of dedicated fax numbers for the Facilities:  [address] [place] [address] DENIALS (Administrative/Medical Necessity) [phone]   [address] (Maternity/NICU/Pediatrics) [phone]   [address] [phone]   [facility] [address] 77 [phone]   [address] [address] [address] [facility] 28 [phone]   [address] [facility] 134 815 [facility] [phone]

## 2022-11-02 NOTE — NUTRITION
11/02/22 1503   Recommendations/Interventions   Recommendations to Provider Replete K  Start with standard TPN day 1  Continue to monitor electrolytes and as long as lytes are stable tomorrow, advance day 2 TPN to goal 660ml 40% dextrose, 270ml 20% lipids, 600ml 15% AA to provide 1798kcal, 90g pro, 264g cho, 54g lipids, 2 16mg cho/kg/min  Check current triglycerides and monitor them weekly while on TPN  Continue to monitor weight and electrolytes

## 2022-11-02 NOTE — PROGRESS NOTES
Progress Note - urology   Melissa Fox 63 y o  male MRN: [id number]  Unit/Bed#: [facility] 319-01 Encounter: [id number]    Assessment & Plan:    Benign prostatic hypertrophy with bladder outlet obstruction:  -postop day 7 robotic assisted laparoscopic simple prostatectomy with bladder neck reconstruction with Dr Treadwell  -postoperative course complicated by continued postoperative ileus, confirmed with most recent KUB on 11/01  -Zofran and Reglan for nausea and to stimulate bowels   -pain currently being managed with Tylenol and Toradol, patient denies any current pain  -NPO with ice chips, chewing gum and hard candies  -PICC line placed yesterday for TPN, TPN ordered with anticipation to start at 9:00 p m  Tonight, nutritional state services consulted, repeat a m  Labs ordered   -general surgery recommending potassium greater than 4, magnesium greater than 2, phosphorus greater than 3  -providing patient with 40 mEq potassium chloride IV over 4 hours  Will be discontinued prior to  -IV fluids at 125 cc/hour, this will be discontinued once TPN arranged  -TINA drain removed 11/1  -continue NG tube, output in 24 hours 3 L, overnight 1 L, evaluation today this a m  Approximately 250 cc after 3-4 hours per patient, after 2 hours additional 400 in place  -vitals continued to be stable at this time  -labs continued to be stable, no leukocytosis, hemoglobin stable, creatinine at baseline  -encourage ambulation  -patient experiencing bowel movements, describe them as green  -CT scan obtained 10/30-positive for postoperative ileus and postop surgical changes  Patient to remain inpatient until postoperative ileus has resolved      Subjective/Objective   Chief Complaint:  Tired    Subjective:   Patient sitting comfortably in chair no acute distress  He reports that his belching has subsided  Reports improvement today and feels genuinely better today  Does continue to feel as though he is getting weaker    He reports he has been ambulating  Reports continued flatus and bowel movements  Objective:     Blood pressure 124/86, pulse 74, temperature 97 7 °F (36 5 °C), temperature source Oral, resp  rate 18, height 5' 8" (1 727 m), weight 84 8 kg (186 lb 14 4 oz), SpO2 95 %  ,Body mass index is 28 42 kg/m²  Intake/Output Summary (Last 24 hours) at 11/2/2022 1101  Last data filed at 11/2/2022 0401  Gross per 24 hour   Intake 1440 ml   Output 4175 ml   Net -2735 ml       Invasive Devices  Report    Peripherally Inserted Central Catheter Line  Duration           PICC Line 20/13/33 Right Basilic <1 day          Drain  Duration           Urethral Catheter Three way -- days    Closed/Suction Drain Left LLQ Bulb 19 Fr  7 days    Continuous Bladder Irrigation Three-way 7 days    NG/OG/Enteral Tube Nasogastric Left nare 3 days              Physical Exam  Constitutional:       General: He is not in acute distress  Appearance: He is normal weight  He is not ill-appearing, toxic-appearing or diaphoretic  HENT:      Head: Normocephalic and atraumatic  Right Ear: External ear normal       Left Ear: External ear normal       Nose: Nose normal       Comments: NG tube in place draining bile     Mouth/Throat:      Pharynx: Oropharynx is clear  Eyes:      General: No scleral icterus  Conjunctiva/sclera: Conjunctivae normal    Cardiovascular:      Rate and Rhythm: Normal rate and regular rhythm  Pulses: Normal pulses  Heart sounds: No murmur heard  No friction rub  No gallop  Pulmonary:      Effort: Pulmonary effort is normal  No respiratory distress  Breath sounds: No wheezing, rhonchi or rales  Abdominal:      General: There is no distension  Palpations: Abdomen is soft  Tenderness: There is no abdominal tenderness  Comments: Surgical incisions intact, dry no sign of wound dehiscence or underlying infection    Abdomen is nondistended, bowel sounds are present although decreased in all quadrants, abdomen is soft and nontender   Genitourinary:     Comments: Urethral Edwards catheter in place draining yellow colored urine  Musculoskeletal:         General: Normal range of motion  Cervical back: Normal range of motion  Skin:     General: Skin is warm and dry  Neurological:      General: No focal deficit present  Mental Status: He is alert and oriented to person, place, and time  Psychiatric:         Mood and Affect: Mood normal          Behavior: Behavior normal          Thought Content: Thought content normal          Judgment: Judgment normal            Lab, Imaging and other studies:I have personally reviewed pertinent lab results     Lab Results   Component Value Date    WBC 8 65 10/31/2022    HGB 15 2 10/31/2022    HCT 45 3 10/31/2022    MCV 87 10/31/2022     10/31/2022     Lab Results   Component Value Date    SODIUM 140 11/02/2022    K 3 4 (L) 11/02/2022     (H) 11/02/2022    CO2 25 11/02/2022    BUN 29 (H) 11/02/2022    CREATININE 0 66 11/02/2022    GLUC 111 11/02/2022    CALCIUM 8 1 (L) 11/02/2022        VTE Pharmacologic Prophylaxis:  Heparin  VTE Mechanical Prophylaxis: sequential compression device      Rajesh DURAN

## 2022-11-02 NOTE — PLAN OF CARE
Problem: PAIN - ADULT  Goal: Verbalizes/displays adequate comfort level or baseline comfort level  Description: Interventions:  - Encourage patient to monitor pain and request assistance  - Assess pain using appropriate pain scale  - Administer analgesics based on type and severity of pain and evaluate response  - Implement non-pharmacological measures as appropriate and evaluate response  - Consider cultural and social influences on pain and pain management  - Notify physician/advanced practitioner if interventions unsuccessful or patient reports new pain  Outcome: Progressing     Problem: INFECTION - ADULT  Goal: Absence or prevention of progression during hospitalization  Description: INTERVENTIONS:  - Assess and monitor for signs and symptoms of infection  - Monitor lab/diagnostic results  - Monitor all insertion sites, i e  indwelling lines, tubes, and drains  - Monitor endotracheal if appropriate and nasal secretions for changes in amount and color  - Rowley appropriate cooling/warming therapies per order  - Administer medications as ordered  - Instruct and encourage patient and family to use good hand hygiene technique  - Identify and instruct in appropriate isolation precautions for identified infection/condition  Outcome: Progressing  Goal: Absence of fever/infection during neutropenic period  Description: INTERVENTIONS:  - Monitor WBC    Outcome: Progressing     Problem: SAFETY ADULT  Goal: Patient will remain free of falls  Description: INTERVENTIONS:  - Educate patient/family on patient safety including physical limitations  - Instruct patient to call for assistance with activity   - Consult OT/PT to assist with strengthening/mobility   - Keep Call bell within reach  - Keep bed low and locked with side rails adjusted as appropriate  - Keep care items and personal belongings within reach  - Initiate and maintain comfort rounds  - Make Fall Risk Sign visible to staff  - Offer Toileting every  Hours, in advance of need  - Initiate/Maintain   alarm  - Obtain necessary fall risk management equipment:     - Apply yellow socks and bracelet for high fall risk patients  - Consider moving patient to room near nurses station  Outcome: Progressing  Goal: Maintain or return to baseline ADL function  Description: INTERVENTIONS:  -  Assess patient's ability to carry out ADLs; assess patient's baseline for ADL function and identify physical deficits which impact ability to perform ADLs (bathing, care of mouth/teeth, toileting, grooming, dressing, etc )  - Assess/evaluate cause of self-care deficits   - Assess range of motion  - Assess patient's mobility; develop plan if impaired  - Assess patient's need for assistive devices and provide as appropriate  - Encourage maximum independence but intervene and supervise when necessary  - Involve family in performance of ADLs  - Assess for home care needs following discharge   - Consider OT consult to assist with ADL evaluation and planning for discharge  - Provide patient education as appropriate  Outcome: Progressing  Goal: Maintains/Returns to pre admission functional level  Description: INTERVENTIONS:  - Perform BMAT or MOVE assessment daily    - Set and communicate daily mobility goal to care team and patient/family/caregiver  - Collaborate with rehabilitation services on mobility goals if consulted  - Perform Range of Motion    times a day  - Reposition patient every      hours    - Dangle patient    times a day  - Stand patient    times a day  - Ambulate patient    times a day  - Out of bed to chair    times a day   - Out of bed for meals    times a day  - Out of bed for toileting  - Record patient progress and toleration of activity level   Outcome: Progressing     Problem: DISCHARGE PLANNING  Goal: Discharge to home or other facility with appropriate resources  Description: INTERVENTIONS:  - Identify barriers to discharge w/patient and caregiver  - Arrange for needed discharge resources and transportation as appropriate  - Identify discharge learning needs (meds, wound care, etc )  - Arrange for interpretive services to assist at discharge as needed  - Refer to Case Management Department for coordinating discharge planning if the patient needs post-hospital services based on physician/advanced practitioner order or complex needs related to functional status, cognitive ability, or social support system  Outcome: Progressing     Problem: Knowledge Deficit  Goal: Patient/family/caregiver demonstrates understanding of disease process, treatment plan, medications, and discharge instructions  Description: Complete learning assessment and assess knowledge base    Interventions:  - Provide teaching at level of understanding  - Provide teaching via preferred learning methods  Outcome: Progressing     Problem: Potential for Falls  Goal: Patient will remain free of falls  Description: INTERVENTIONS:  - Educate patient/family on patient safety including physical limitations  - Instruct patient to call for assistance with activity   - Consult OT/PT to assist with strengthening/mobility   - Keep Call bell within reach  - Keep bed low and locked with side rails adjusted as appropriate  - Keep care items and personal belongings within reach  - Initiate and maintain comfort rounds  - Make Fall Risk Sign visible to staff  - Offer Toileting every    Hours, in advance of need  - Initiate/Maintain   alarm  - Obtain necessary fall risk management equipment:     - Apply yellow socks and bracelet for high fall risk patients  - Consider moving patient to room near nurses station  Outcome: Progressing     Problem: Prexisting or High Potential for Compromised Skin Integrity  Goal: Skin integrity is maintained or improved  Description: INTERVENTIONS:  - Identify patients at risk for skin breakdown  - Assess and monitor skin integrity  - Assess and monitor nutrition and hydration status  - Monitor labs   - Assess for incontinence   - Turn and reposition patient  - Assist with mobility/ambulation  - Relieve pressure over bony prominences  - Avoid friction and shearing  - Provide appropriate hygiene as needed including keeping skin clean and dry  - Evaluate need for skin moisturizer/barrier cream  - Collaborate with interdisciplinary team   - Patient/family teaching  - Consider wound care consult   Outcome: Progressing     Problem: Nutrition/Hydration-ADULT  Goal: Nutrient/Hydration intake appropriate for improving, restoring or maintaining nutritional needs  Description: Monitor and assess patient's nutrition/hydration status for malnutrition  Collaborate with interdisciplinary team and initiate plan and interventions as ordered  Monitor patient's weight and dietary intake as ordered or per policy  Utilize nutrition screening tool and intervene as necessary  Determine patient's food preferences and provide high-protein, high-caloric foods as appropriate       INTERVENTIONS:  - Monitor oral intake, urinary output, labs, and treatment plans  - Assess nutrition and hydration status and recommend course of action  - Evaluate amount of meals eaten  - Assist patient with eating if necessary   - Allow adequate time for meals  - Recommend/ encourage appropriate diets, oral nutritional supplements, and vitamin/mineral supplements  - Order, calculate, and assess calorie counts as needed  - Recommend, monitor, and adjust tube feedings and TPN/PPN based on assessed needs  - Assess need for intravenous fluids  - Provide specific nutrition/hydration education as appropriate  - Include patient/family/caregiver in decisions related to nutrition  Outcome: Progressing

## 2022-11-03 ENCOUNTER — APPOINTMENT (OUTPATIENT)
Dept: RADIOLOGY | Facility: HOSPITAL | Age: 62
End: 2022-11-03

## 2022-11-03 LAB
ANION GAP SERPL CALCULATED.3IONS-SCNC: 5 MMOL/L (ref 4–13)
BUN SERPL-MCNC: 24 MG/DL (ref 5–25)
CALCIUM SERPL-MCNC: 8.1 MG/DL (ref 8.3–10.1)
CHLORIDE SERPL-SCNC: 112 MMOL/L (ref 96–108)
CO2 SERPL-SCNC: 26 MMOL/L (ref 21–32)
CREAT SERPL-MCNC: 0.64 MG/DL (ref 0.6–1.3)
ERYTHROCYTE [DISTWIDTH] IN BLOOD BY AUTOMATED COUNT: 12.7 % (ref 11.6–15.1)
GFR SERPL CREATININE-BSD FRML MDRD: 106 ML/MIN/1.73SQ M
GLUCOSE SERPL-MCNC: 162 MG/DL (ref 65–140)
HCT VFR BLD AUTO: 40.8 % (ref 36.5–49.3)
HGB BLD-MCNC: 13.4 G/DL (ref 12–17)
MAGNESIUM SERPL-MCNC: 2.5 MG/DL (ref 1.6–2.6)
MCH RBC QN AUTO: 29.1 PG (ref 26.8–34.3)
MCHC RBC AUTO-ENTMCNC: 32.8 G/DL (ref 31.4–37.4)
MCV RBC AUTO: 89 FL (ref 82–98)
PHOSPHATE SERPL-MCNC: 2.8 MG/DL (ref 2.3–4.1)
PLATELET # BLD AUTO: 230 THOUSANDS/UL (ref 149–390)
PMV BLD AUTO: 10.2 FL (ref 8.9–12.7)
POTASSIUM SERPL-SCNC: 3.4 MMOL/L (ref 3.5–5.3)
RBC # BLD AUTO: 4.6 MILLION/UL (ref 3.88–5.62)
SODIUM SERPL-SCNC: 143 MMOL/L (ref 135–147)
WBC # BLD AUTO: 9.46 THOUSAND/UL (ref 4.31–10.16)

## 2022-11-03 RX ORDER — KETOROLAC TROMETHAMINE 30 MG/ML
30 INJECTION, SOLUTION INTRAMUSCULAR; INTRAVENOUS EVERY 6 HOURS PRN
Status: DISCONTINUED | OUTPATIENT
Start: 2022-11-03 | End: 2022-11-05 | Stop reason: HOSPADM

## 2022-11-03 RX ADMIN — BACITRACIN ZINC, NEOMYCIN SULFATE, AND POLYMYXIN B SULFATE 1 LARGE APPLICATION: 400; 3.5; 5 OINTMENT TOPICAL at 18:52

## 2022-11-03 RX ADMIN — VANCOMYCIN HYDROCHLORIDE 125 MG: 500 INJECTION, POWDER, LYOPHILIZED, FOR SOLUTION INTRAVENOUS at 02:44

## 2022-11-03 RX ADMIN — HEPARIN SODIUM 5000 UNITS: 5000 INJECTION INTRAVENOUS; SUBCUTANEOUS at 14:55

## 2022-11-03 RX ADMIN — BACITRACIN ZINC, NEOMYCIN SULFATE, AND POLYMYXIN B SULFATE 1 LARGE APPLICATION: 400; 3.5; 5 OINTMENT TOPICAL at 09:02

## 2022-11-03 RX ADMIN — ACETAMINOPHEN 650 MG: 325 TABLET, FILM COATED ORAL at 21:31

## 2022-11-03 RX ADMIN — VANCOMYCIN HYDROCHLORIDE 125 MG: 500 INJECTION, POWDER, LYOPHILIZED, FOR SOLUTION INTRAVENOUS at 09:03

## 2022-11-03 RX ADMIN — HEPARIN SODIUM 5000 UNITS: 5000 INJECTION INTRAVENOUS; SUBCUTANEOUS at 21:30

## 2022-11-03 RX ADMIN — HEPARIN SODIUM 5000 UNITS: 5000 INJECTION INTRAVENOUS; SUBCUTANEOUS at 05:21

## 2022-11-03 RX ADMIN — DIATRIZOATE MEGLUMINE AND DIATRIZOATE SODIUM 120 ML: 660; 100 LIQUID ORAL; RECTAL at 14:20

## 2022-11-03 RX ADMIN — SENNOSIDES 8.6 MG: 8.6 TABLET, FILM COATED ORAL at 09:01

## 2022-11-03 RX ADMIN — DOCUSATE SODIUM 100 MG: 100 CAPSULE, LIQUID FILLED ORAL at 09:02

## 2022-11-03 RX ADMIN — DILTIAZEM HYDROCHLORIDE 120 MG: 120 CAPSULE, COATED, EXTENDED RELEASE ORAL at 09:01

## 2022-11-03 NOTE — CASE MANAGEMENT
Case Management Discharge Planning Note    Patient name Claudia Rodriguez  Location [facility]97-95 MRN [id number]  : 1960 Date 11/3/2022       Current Admission Date: 10/26/2022  Current Admission Diagnosis:Benign localized prostatic hyperplasia with lower urinary tract symptoms (LUTS)   Patient Active Problem List    Diagnosis Date Noted   • Benign localized prostatic hyperplasia with lower urinary tract symptoms (LUTS) 10/25/2022   • CAD (coronary artery disease)    • Encounter to discuss test results    • Radiculopathy, lumbar region 2022   • Benign prostatic hyperplasia with nocturia 2022   • Erectile dysfunction 2022   • Mixed hyperlipidemia 2020   • Vitamin D deficiency 2020   • Cardiomyopathy ([address] ) 2020   • Intervertebral disc disorder with radiculopathy of lumbosacral region    • HNP (herniated nucleus pulposus), lumbar 2019   • Failed back syndrome 10/26/2018   • Gastroesophageal reflux disease without esophagitis 2015   • Elevated PSA 2015   • Allergic rhinitis due to pollen 2014   • Essential hypertension 2013      LOS (days): 7  Geometric Mean LOS (GMLOS) (days):   Days to GMLOS:     OBJECTIVE:  Risk of Unplanned Readmission Score: 8 33         Current admission status: Inpatient   Preferred Pharmacy:   [facility]/pharmacy #[id number]- [place], [place] - [address]  [place]  [facility] [place] [facility] [place] [address], [address] [facility]  Phone: [phone] Fax: [phone]    [address] [place] [address]  Phone: [phone] Fax: [phone]    Primary Care Provider: Chanelle Donovan MD    Primary Insurance: Debi Lima  Secondary Insurance:     DISCHARGE DETAILS:       Additional Comments: Per provider note, pt not medically cleared for d/c  Pt remains NPO and on TPN

## 2022-11-03 NOTE — PROGRESS NOTES
Progress Note - Urology   Oneida Williamson 63 y o  male MRN: [id number]  Unit/Bed#: [facility] 319-01 Encounter: [id number]    Assessment & Plan:    Benign prostatic hypertrophy with bladder outlet obstruction:  -postop day 8 robotic assisted laparoscopic simple prostatectomy with bladder neck reconstruction with Dr Treadwell  -postoperative course complicated by continued postoperative ileus, confirmed with most recent KUB on 11/01,   -Zofran and Reglan for nausea and to stimulate bowels   -pain currently being managed with Tylenol and Toradol, patient denies any current pain  -NPO with ice chips, chewing gum and hard candies  -PICC line inserted, TPN started overnight  Additional orders for TPN based off of nutritional Service recommendations and electrolytes ordered   -general surgery recommending potassium greater than 4, magnesium greater than 2, phosphorus greater than 3  -IV fluids discontinued now on TPN  -TINA drain removed 11/1  -NG tube continues to put out high output although improved from yesterday of 3 L down to 1 L in 24 hours  General surgery following appreciate their recommendations  Plan for additional KUB   -vitals continued to be stable at this time  -labs continued to be stable, no leukocytosis, hemoglobin stable, creatinine at baseline  -encourage ambulation  -patient experiencing bowel movements, describe them as green  -CT scan obtained 10/30-positive for postoperative ileus and postop surgical changes  Patient to remain inpatient until postoperative ileus has resolved      Subjective/Objective   Chief Complaint:  Tired    Subjective:   Patient is sitting comfortably in chair in no acute distress  He denies any additional belching or nausea  Reports that he continues to pass flatus, continues to passed bile/bowel movements  Denies any abdominal pain  Objective:     Blood pressure 130/82, pulse 70, temperature 97 5 °F (36 4 °C), temperature source Oral, resp   rate 18, height 5' 8" (1 727 m), weight 84 8 kg (186 lb 14 4 oz), SpO2 96 %  ,Body mass index is 28 42 kg/m²  Intake/Output Summary (Last 24 hours) at 11/3/2022 1244  Last data filed at 11/3/2022 0901  Gross per 24 hour   Intake 366 08 ml   Output 2450 ml   Net -2083 92 ml       Invasive Devices  Report    Peripherally Inserted Central Catheter Line  Duration           PICC Line 98/55/50 Right Basilic 1 day          Drain  Duration           Urethral Catheter Three way -- days    Closed/Suction Drain Left LLQ Bulb 19 Fr  8 days    Continuous Bladder Irrigation Three-way 8 days    NG/OG/Enteral Tube Nasogastric Left nare 5 days              Physical Exam  Constitutional:       General: He is not in acute distress  Appearance: He is normal weight  He is not ill-appearing, toxic-appearing or diaphoretic  HENT:      Head: Normocephalic and atraumatic  Right Ear: External ear normal       Left Ear: External ear normal       Nose: Nose normal       Comments: NG tube in place draining bile     Mouth/Throat:      Pharynx: Oropharynx is clear  Eyes:      General: No scleral icterus  Conjunctiva/sclera: Conjunctivae normal    Cardiovascular:      Rate and Rhythm: Normal rate and regular rhythm  Pulses: Normal pulses  Heart sounds: No murmur heard  No friction rub  No gallop  Pulmonary:      Effort: Pulmonary effort is normal  No respiratory distress  Breath sounds: No wheezing, rhonchi or rales  Abdominal:      General: There is no distension  Palpations: Abdomen is soft  Tenderness: There is no abdominal tenderness  Comments: Surgical incisions intact, dry no sign of wound dehiscence or underlying infection  Abdomen is nondistended, bowel sounds are present although decreased in all quadrants, abdomen is soft and nontender   Genitourinary:     Comments: Urethral Edwards catheter in place draining yellow colored urine  Musculoskeletal:         General: Normal range of motion        Cervical back: Normal range of motion  Skin:     General: Skin is warm and dry  Neurological:      General: No focal deficit present  Mental Status: He is alert and oriented to person, place, and time  Psychiatric:         Mood and Affect: Mood normal          Behavior: Behavior normal          Thought Content: Thought content normal          Judgment: Judgment normal            Lab, Imaging and other studies:I have personally reviewed pertinent lab results     Lab Results   Component Value Date    WBC 9 46 11/03/2022    HGB 13 4 11/03/2022    HCT 40 8 11/03/2022    MCV 89 11/03/2022     11/03/2022     Lab Results   Component Value Date    SODIUM 143 11/03/2022    K 3 4 (L) 11/03/2022     (H) 11/03/2022    CO2 26 11/03/2022    BUN 24 11/03/2022    CREATININE 0 64 11/03/2022    GLUC 162 (H) 11/03/2022    CALCIUM 8 1 (L) 11/03/2022        VTE Pharmacologic Prophylaxis:  Heparin  VTE Mechanical Prophylaxis: sequential compression device      Cortney DURAN

## 2022-11-03 NOTE — PROGRESS NOTES
I had a nice visit with marked this evening  He is feeling much better  He is tolerating clears at this time without further vomiting  I reviewed with him his operative findings as well as the pathophysiology of ileus and the management there of  He is happy to be progressing and I am very happy about this as well  Abdomen is soft, incisions are clean, dry, and intact  His urine is clear with good output  All questions and concerns answered and addressed  My hope is that his diet can continue to be advanced and that we will be able to discharge him to home in the coming days  He will follow-up in the office for Edwards catheter removal and review of his pathology

## 2022-11-03 NOTE — QUICK NOTE
Nasogastric tube removed at bedside  Patient tolerated the procedure well  Nursing staff was informed  Patient can resume clears diet

## 2022-11-03 NOTE — PROGRESS NOTES
General Surgery  Progress Note   Maria Fernanda Welch 63 y o  male MRN: [id number]  Unit/Bed#: [facility] 319-01 Encounter: [id number]    Assessment:  64 yo M who presented for elective robotic suprapubic prostatectomy on 10/26, post op course complicated by ileus  Continues to have high NGT output despite stopping ice chip consumption       Afebrile, VSS     UOP:1L  NGT:1L    Plan:  -NPO/NGT; monitor output in character  -continue to recommend PICC for TPN given persistently high NG tube output  -monitor and replace electrolytes  -encourage ambulation  -pain and nausea control as needed  -rest of care per primary    Subjective/Objective     Subjective: Patient seen and examined at bedside, in no acute distress  No acute events overnight  Patient's pain is well controlled  Patient reports that he did not consume any ice chips since yesterday morning  Passing gas and having diarrhea  Objective:     Vitals:Blood pressure 137/88, pulse 69, temperature 98 5 °F (36 9 °C), temperature source Oral, resp  rate 16, height 5' 8" (1 727 m), weight 84 8 kg (186 lb 14 4 oz), SpO2 98 %  ,Body mass index is 28 42 kg/m²       Temp (24hrs), Av 1 °F (36 7 °C), Min:97 7 °F (36 5 °C), Max:98 5 °F (36 9 °C)  Current: Temperature: 98 5 °F (36 9 °C)      Intake/Output Summary (Last 24 hours) at 11/3/2022 6052  Last data filed at 11/3/2022 0601  Gross per 24 hour   Intake 1366 08 ml   Output 2100 ml   Net -733 92 ml       Invasive Devices  Report    Peripherally Inserted Central Catheter Line  Duration           PICC Line 17/71/10 Right Basilic 1 day          Drain  Duration           Urethral Catheter Three way -- days    Closed/Suction Drain Left LLQ Bulb 19 Fr  7 days    Continuous Bladder Irrigation Three-way 7 days    NG/OG/Enteral Tube Nasogastric Left nare 4 days                Physical Exam:  General: No acute distress, alert and oriented  CV: Well perfused, regular rate and rhythm  Lungs: Normal work of breathing, no increased respiratory effort  Abdomen: Soft, non-tender, non-distended  Incision clean, dry and intact    Extremities: No edema, clubbing or cyanosis  Skin: Warm, dry    Lab Results: Results: I have personally reviewed all pertinent laboratory/tests results  VTE Prophylaxis: Sequential compression device (Venodyne)  and Heparin    Driss Michael  11/3/2022

## 2022-11-04 LAB
ANION GAP SERPL CALCULATED.3IONS-SCNC: 6 MMOL/L (ref 4–13)
BUN SERPL-MCNC: 24 MG/DL (ref 5–25)
CALCIUM SERPL-MCNC: 8.9 MG/DL (ref 8.3–10.1)
CHLORIDE SERPL-SCNC: 110 MMOL/L (ref 96–108)
CO2 SERPL-SCNC: 25 MMOL/L (ref 21–32)
CREAT SERPL-MCNC: 0.72 MG/DL (ref 0.6–1.3)
ERYTHROCYTE [DISTWIDTH] IN BLOOD BY AUTOMATED COUNT: 13.1 % (ref 11.6–15.1)
GFR SERPL CREATININE-BSD FRML MDRD: 101 ML/MIN/1.73SQ M
GLUCOSE SERPL-MCNC: 121 MG/DL (ref 65–140)
HCT VFR BLD AUTO: 40.5 % (ref 36.5–49.3)
HGB BLD-MCNC: 13.7 G/DL (ref 12–17)
MAGNESIUM SERPL-MCNC: 2.5 MG/DL (ref 1.6–2.6)
MCH RBC QN AUTO: 29.4 PG (ref 26.8–34.3)
MCHC RBC AUTO-ENTMCNC: 33.8 G/DL (ref 31.4–37.4)
MCV RBC AUTO: 87 FL (ref 82–98)
PHOSPHATE SERPL-MCNC: 3.3 MG/DL (ref 2.3–4.1)
PLATELET # BLD AUTO: 245 THOUSANDS/UL (ref 149–390)
PMV BLD AUTO: 10.2 FL (ref 8.9–12.7)
POTASSIUM SERPL-SCNC: 3.3 MMOL/L (ref 3.5–5.3)
RBC # BLD AUTO: 4.66 MILLION/UL (ref 3.88–5.62)
SODIUM SERPL-SCNC: 141 MMOL/L (ref 135–147)
TRIGL SERPL-MCNC: 99 MG/DL
WBC # BLD AUTO: 9.98 THOUSAND/UL (ref 4.31–10.16)

## 2022-11-04 RX ORDER — POTASSIUM CHLORIDE 20 MEQ/1
40 TABLET, EXTENDED RELEASE ORAL ONCE
Status: COMPLETED | OUTPATIENT
Start: 2022-11-04 | End: 2022-11-04

## 2022-11-04 RX ADMIN — DILTIAZEM HYDROCHLORIDE 120 MG: 120 CAPSULE, COATED, EXTENDED RELEASE ORAL at 08:30

## 2022-11-04 RX ADMIN — POTASSIUM CHLORIDE 40 MEQ: 1500 TABLET, EXTENDED RELEASE ORAL at 12:04

## 2022-11-04 RX ADMIN — DOCUSATE SODIUM 100 MG: 100 CAPSULE, LIQUID FILLED ORAL at 17:37

## 2022-11-04 RX ADMIN — ACETAMINOPHEN 650 MG: 325 TABLET, FILM COATED ORAL at 12:05

## 2022-11-04 RX ADMIN — ACETAMINOPHEN 650 MG: 325 TABLET, FILM COATED ORAL at 17:37

## 2022-11-04 RX ADMIN — PANTOPRAZOLE SODIUM 40 MG: 40 TABLET, DELAYED RELEASE ORAL at 05:15

## 2022-11-04 RX ADMIN — HEPARIN SODIUM 5000 UNITS: 5000 INJECTION INTRAVENOUS; SUBCUTANEOUS at 21:12

## 2022-11-04 RX ADMIN — ACETAMINOPHEN 650 MG: 325 TABLET, FILM COATED ORAL at 05:15

## 2022-11-04 RX ADMIN — HEPARIN SODIUM 5000 UNITS: 5000 INJECTION INTRAVENOUS; SUBCUTANEOUS at 14:05

## 2022-11-04 RX ADMIN — HEPARIN SODIUM 5000 UNITS: 5000 INJECTION INTRAVENOUS; SUBCUTANEOUS at 05:15

## 2022-11-04 RX ADMIN — BACITRACIN ZINC, NEOMYCIN SULFATE, AND POLYMYXIN B SULFATE 1 LARGE APPLICATION: 400; 3.5; 5 OINTMENT TOPICAL at 08:32

## 2022-11-04 NOTE — QUICK NOTE
I had a nice visit with Jesus Wetzel at the bedside today  He is tolerating regular diet  I think he will be appropriate for discharge home tomorrow with Edwards catheter removal on Monday      I am okay with his PICC line being removed tomorrow prior to discharge

## 2022-11-04 NOTE — PROGRESS NOTES
General Surgery  Progress Note   Claudia Rodriguez 63 y o  male MRN: [id number]  Unit/Bed#: [facility] 319-01 Encounter: [id number]    Assessment:  64 yo M who presented for elective robotic suprapubic prostatectomy on 10/26, post op course complicated by ileus   Repeat Gastrografin study on  demonstrated passage of contrast to the rectum as well as decreased distention of small-bowel loops; NG tube was removed and the patient was started on clear liquid diet  Afebrile, VSS    UOP:  850cc    Plan:  -advanced to soft diet  -discontinue TPN  -monitor and replace electrolytes  -encourage ambulation  -pain and nausea control  -rest per primary team    Subjective/Objective     Subjective: Patient seen and examined at bedside, in no acute distress  No acute events overnight  Patient's pain is well controlled  Pt denies nausea or vomiting  Has been passing gas and stool  Tolerating clear liquid diet without issue  Objective:     Vitals:Blood pressure 119/79, pulse 69, temperature 98 °F (36 7 °C), temperature source Oral, resp  rate 18, height 5' 8" (1 727 m), weight 84 8 kg (186 lb 14 4 oz), SpO2 99 %  ,Body mass index is 28 42 kg/m²       Temp (24hrs), Av 8 °F (36 6 °C), Min:97 5 °F (36 4 °C), Max:98 °F (36 7 °C)  Current: Temperature: 98 °F (36 7 °C)      Intake/Output Summary (Last 24 hours) at 2022 4673  Last data filed at 11/3/2022 1900  Gross per 24 hour   Intake --   Output 850 ml   Net -850 ml       Invasive Devices  Report    Peripherally Inserted Central Catheter Line  Duration           PICC Line 78/58/25 Right Basilic 2 days          Drain  Duration           Urethral Catheter Three way -- days    Closed/Suction Drain Left LLQ Bulb 19 Fr  8 days    Continuous Bladder Irrigation Three-way 8 days    NG/OG/Enteral Tube Nasogastric Left nare 5 days                Physical Exam:  General: No acute distress, alert and oriented  CV: Well perfused, regular rate and rhythm  Lungs: Normal work of breathing, no increased respiratory effort  Abdomen: Soft, non-tender, non-distended  Incision clean, dry and intact    Extremities: No edema, clubbing or cyanosis  Skin: Warm, dry    Lab Results: Results: I have personally reviewed all pertinent laboratory/tests results  VTE Prophylaxis: Sequential compression device Stanislaw Guthrie)  heparin    Buck Wright  11/4/2022

## 2022-11-04 NOTE — PLAN OF CARE
Problem: PAIN - ADULT  Goal: Verbalizes/displays adequate comfort level or baseline comfort level  Description: Interventions:  - Encourage patient to monitor pain and request assistance  - Assess pain using appropriate pain scale  - Administer analgesics based on type and severity of pain and evaluate response  - Implement non-pharmacological measures as appropriate and evaluate response  - Consider cultural and social influences on pain and pain management  - Notify physician/advanced practitioner if interventions unsuccessful or patient reports new pain  Outcome: Progressing     Problem: INFECTION - ADULT  Goal: Absence or prevention of progression during hospitalization  Description: INTERVENTIONS:  - Assess and monitor for signs and symptoms of infection  - Monitor lab/diagnostic results  - Monitor all insertion sites, i e  indwelling lines, tubes, and drains  - Monitor endotracheal if appropriate and nasal secretions for changes in amount and color  - [place] appropriate cooling/warming therapies per order  - Administer medications as ordered  - Instruct and encourage patient and family to use good hand hygiene technique  - Identify and instruct in appropriate isolation precautions for identified infection/condition  Outcome: Progressing  Goal: Absence of fever/infection during neutropenic period  Description: INTERVENTIONS:  - Monitor WBC    Outcome: Progressing

## 2022-11-04 NOTE — QUICK NOTE
Patient seen and re-evaluated after initiation of solid diet this morning  He states that he was able to eat most of his breakfast without any abdominal pain, bloating, nausea, vomiting or significant belching/hiccups  He states that he continues to feel well and was happened to finally be able to eat  He notes he has continued to have bowel function  On exam, his abdomen remains soft, was only minimally distended and nontender at this time  From a general surgical standpoint, will sign off, but remain available as needed for any recurrent general surgical issues  May continue solid diet as tolerated  Recommend repletion of potassium with goal K+ > 4  May wean TPN per primary service  Please call General surgery with any questions or concerns      Je Brand PA-C  11/4/2022 10:24 AM

## 2022-11-04 NOTE — PROGRESS NOTES
Progress Note - Urology   Radha Ron 63 y o  male MRN: [id number]  Unit/Bed#: [facility] 319-01 Encounter: [id number]    Assessment & Plan:    Benign prostatic hypertrophy with bladder outlet obstruction:  -postop day 9 robotic assisted laparoscopic simple prostatectomy with bladder neck reconstruction with Dr Treadwell  -postoperative course complicated by continued postoperative ileus, confirmed with most recent KUB on 11/01    Repeat KUB with Gastrografin reveals contrast down to the rectum, NG tube removed yesterday and patient started on clear liquids  Re-evaluated this a m  Tolerating clear liquids general surgery advanced to regular diet  TPN was discontinued by General surgery   -Zofran and Reglan for nausea   -pain currently being managed with Tylenol and Toradol, patient denies any current pain  -TINA drain removed 11/1  -vitals continued to be stable at this time  -labs continued to be stable, no leukocytosis, hemoglobin stable, creatinine at baseline  -encourage ambulation  -patient experiencing bowel movements, describe them as green  -CT scan obtained 10/30-positive for postoperative ileus and postop surgical changes  -potassium this a m  3 3 replenished with oral 40 mEq K     Patient has significantly improved, currently tolerating clear liquids this a m  And transition to solid foods  If patient continues to tolerate solid diet tomorrow will plan for discharge     Subjective/Objective   Chief Complaint:  Tired    Subjective:   Patient sitting in the chair comfortably no acute distress  He denies any current nausea or vomiting reports he is currently tolerating clear liquid diet  He is extremely pleased and happy at this time  He is taking things slow with his diet  Reports that he is passing flatus and continues to have bowel movements that are liquid at this time  Denies any current abdominal pain or headaches, shortness of breath chest pain  Overall he is doing extremely well       Objective: Blood pressure 123/78, pulse 75, temperature 98 7 °F (37 1 °C), temperature source Oral, resp  rate 18, height 5' 8" (1 727 m), weight 84 8 kg (186 lb 14 4 oz), SpO2 98 %  ,Body mass index is 28 42 kg/m²  Intake/Output Summary (Last 24 hours) at 11/4/2022 1334  Last data filed at 11/4/2022 1201  Gross per 24 hour   Intake 460 ml   Output 900 ml   Net -440 ml       Invasive Devices  Report    Peripherally Inserted Central Catheter Line  Duration           PICC Line 97/06/45 Right Basilic 2 days          Drain  Duration           Urethral Catheter Three way -- days    Closed/Suction Drain Left LLQ Bulb 19 Fr  9 days    Continuous Bladder Irrigation Three-way 9 days    NG/OG/Enteral Tube Nasogastric Left nare 6 days              Physical Exam  Constitutional:       General: He is not in acute distress  Appearance: He is normal weight  He is not ill-appearing, toxic-appearing or diaphoretic  HENT:      Head: Normocephalic and atraumatic  Right Ear: External ear normal       Left Ear: External ear normal       Nose: Nose normal       Comments: NG tube removed     Mouth/Throat:      Pharynx: Oropharynx is clear  Eyes:      General: No scleral icterus  Conjunctiva/sclera: Conjunctivae normal    Cardiovascular:      Rate and Rhythm: Normal rate and regular rhythm  Pulses: Normal pulses  Heart sounds: No murmur heard  No friction rub  No gallop  Pulmonary:      Effort: Pulmonary effort is normal  No respiratory distress  Breath sounds: No wheezing, rhonchi or rales  Abdominal:      General: There is no distension  Palpations: Abdomen is soft  Tenderness: There is no abdominal tenderness  Comments: Surgical incisions intact, dry, no sign of wound as the underlying infection, bowel sounds are present increasing frequency and volume  Significantly improved compared to prior examination    Abdomen is soft, nontender   Genitourinary:     Comments: Urethral Edwards catheter in place draining yellow colored urine  Musculoskeletal:         General: Normal range of motion  Cervical back: Normal range of motion  Skin:     General: Skin is warm and dry  Neurological:      General: No focal deficit present  Mental Status: He is alert and oriented to person, place, and time  Psychiatric:         Mood and Affect: Mood normal          Behavior: Behavior normal          Thought Content: Thought content normal          Judgment: Judgment normal            Lab, Imaging and other studies:I have personally reviewed pertinent lab results     Lab Results   Component Value Date    WBC 9 98 2022    HGB 13 7 2022    HCT 40 5 2022    MCV 87 2022     2022     Lab Results   Component Value Date    SODIUM 141 2022    K 3 3 (L) 2022     (H) 2022    CO2 25 2022    BUN 24 2022    CREATININE 0 72 2022    GLUC 121 2022    CALCIUM 8 9 2022        VTE Pharmacologic Prophylaxis:  Heparin  VTE Mechanical Prophylaxis: sequential compression device      Poppy DURAN

## 2022-11-04 NOTE — PLAN OF CARE
Problem: INFECTION - ADULT  Goal: Absence or prevention of progression during hospitalization  Description: INTERVENTIONS:  - Assess and monitor for signs and symptoms of infection  - Monitor lab/diagnostic results  - Monitor all insertion sites, i e  indwelling lines, tubes, and drains  - Monitor endotracheal if appropriate and nasal secretions for changes in amount and color  - [place] appropriate cooling/warming therapies per order  - Administer medications as ordered  - Instruct and encourage patient and family to use good hand hygiene technique  - Identify and instruct in appropriate isolation precautions for identified infection/condition  Outcome: Progressing

## 2022-11-05 VITALS
BODY MASS INDEX: 28.33 KG/M2 | OXYGEN SATURATION: 99 % | DIASTOLIC BLOOD PRESSURE: 77 MMHG | SYSTOLIC BLOOD PRESSURE: 110 MMHG | RESPIRATION RATE: 21 BRPM | HEART RATE: 67 BPM | HEIGHT: 68 IN | WEIGHT: 186.9 LBS | TEMPERATURE: 97.5 F

## 2022-11-05 LAB
ANION GAP SERPL CALCULATED.3IONS-SCNC: 5 MMOL/L (ref 4–13)
BUN SERPL-MCNC: 20 MG/DL (ref 5–25)
CALCIUM SERPL-MCNC: 7.4 MG/DL (ref 8.3–10.1)
CHLORIDE SERPL-SCNC: 114 MMOL/L (ref 96–108)
CO2 SERPL-SCNC: 23 MMOL/L (ref 21–32)
CREAT SERPL-MCNC: 0.59 MG/DL (ref 0.6–1.3)
GFR SERPL CREATININE-BSD FRML MDRD: 110 ML/MIN/1.73SQ M
GLUCOSE SERPL-MCNC: 96 MG/DL (ref 65–140)
POTASSIUM SERPL-SCNC: 3.1 MMOL/L (ref 3.5–5.3)
SODIUM SERPL-SCNC: 142 MMOL/L (ref 135–147)

## 2022-11-05 RX ADMIN — DILTIAZEM HYDROCHLORIDE 120 MG: 120 CAPSULE, COATED, EXTENDED RELEASE ORAL at 09:24

## 2022-11-05 RX ADMIN — BACITRACIN ZINC, NEOMYCIN SULFATE, AND POLYMYXIN B SULFATE 1 LARGE APPLICATION: 400; 3.5; 5 OINTMENT TOPICAL at 09:25

## 2022-11-05 RX ADMIN — HEPARIN SODIUM 5000 UNITS: 5000 INJECTION INTRAVENOUS; SUBCUTANEOUS at 06:32

## 2022-11-05 RX ADMIN — PANTOPRAZOLE SODIUM 40 MG: 40 TABLET, DELAYED RELEASE ORAL at 06:32

## 2022-11-05 NOTE — PLAN OF CARE
Problem: PAIN - ADULT  Goal: Verbalizes/displays adequate comfort level or baseline comfort level  Description: Interventions:  - Encourage patient to monitor pain and request assistance  - Assess pain using appropriate pain scale  - Administer analgesics based on type and severity of pain and evaluate response  - Implement non-pharmacological measures as appropriate and evaluate response  - Consider cultural and social influences on pain and pain management  - Notify physician/advanced practitioner if interventions unsuccessful or patient reports new pain  Outcome: Adequate for Discharge     Problem: INFECTION - ADULT  Goal: Absence or prevention of progression during hospitalization  Description: INTERVENTIONS:  - Assess and monitor for signs and symptoms of infection  - Monitor lab/diagnostic results  - Monitor all insertion sites, i e  indwelling lines, tubes, and drains  - Monitor endotracheal if appropriate and nasal secretions for changes in amount and color  - [place] appropriate cooling/warming therapies per order  - Administer medications as ordered  - Instruct and encourage patient and family to use good hand hygiene technique  - Identify and instruct in appropriate isolation precautions for identified infection/condition  Outcome: Adequate for Discharge  Goal: Absence of fever/infection during neutropenic period  Description: INTERVENTIONS:  - Monitor WBC    Outcome: Adequate for Discharge     Problem: SAFETY ADULT  Goal: Patient will remain free of falls  Description: INTERVENTIONS:  - Educate patient/family on patient safety including physical limitations  - Instruct patient to call for assistance with activity   - Consult OT/PT to assist with strengthening/mobility   - Keep Call bell within reach  - Keep bed low and locked with side rails adjusted as appropriate  - Keep care items and personal belongings within reach  - Initiate and maintain comfort rounds  - Make Fall Risk Sign visible to staff  - Apply yellow socks and bracelet for high fall risk patients  - Consider moving patient to room near nurses station  Outcome: Adequate for Discharge  Goal: Maintain or return to baseline ADL function  Description: INTERVENTIONS:  -  Assess patient's ability to carry out ADLs; assess patient's baseline for ADL function and identify physical deficits which impact ability to perform ADLs (bathing, care of mouth/teeth, toileting, grooming, dressing, etc )  - Assess/evaluate cause of self-care deficits   - Assess range of motion  - Assess patient's mobility; develop plan if impaired  - Assess patient's need for assistive devices and provide as appropriate  - Encourage maximum independence but intervene and supervise when necessary  - Involve family in performance of ADLs  - Assess for home care needs following discharge   - Consider OT consult to assist with ADL evaluation and planning for discharge  - Provide patient education as appropriate  Outcome: Adequate for Discharge  Goal: Maintains/Returns to pre admission functional level  Description: INTERVENTIONS:  - Perform BMAT or MOVE assessment daily    - Set and communicate daily mobility goal to care team and patient/family/caregiver     - Collaborate with rehabilitation services on mobility goals if consulted  - Out of bed for toileting  - Record patient progress and toleration of activity level   Outcome: Adequate for Discharge     Problem: DISCHARGE PLANNING  Goal: Discharge to home or other facility with appropriate resources  Description: INTERVENTIONS:  - Identify barriers to discharge w/patient and caregiver  - Arrange for needed discharge resources and transportation as appropriate  - Identify discharge learning needs (meds, wound care, etc )  - Arrange for interpretive services to assist at discharge as needed  - Refer to Case Management Department for coordinating discharge planning if the patient needs post-hospital services based on physician/advanced practitioner order or complex needs related to functional status, cognitive ability, or social support system  Outcome: Adequate for Discharge     Problem: Knowledge Deficit  Goal: Patient/family/caregiver demonstrates understanding of disease process, treatment plan, medications, and discharge instructions  Description: Complete learning assessment and assess knowledge base    Interventions:  - Provide teaching at level of understanding  - Provide teaching via preferred learning methods  Outcome: Adequate for Discharge     Problem: Potential for Falls  Goal: Patient will remain free of falls  Description: INTERVENTIONS:  - Educate patient/family on patient safety including physical limitations  - Instruct patient to call for assistance with activity   - Consult OT/PT to assist with strengthening/mobility   - Keep Call bell within reach  - Keep bed low and locked with side rails adjusted as appropriate  - Keep care items and personal belongings within reach  - Initiate and maintain comfort rounds  - Make Fall Risk Sign visible to staff  -- Apply yellow socks and bracelet for high fall risk patients  - Consider moving patient to room near nurses station  Outcome: Adequate for Discharge     Problem: Prexisting or High Potential for Compromised Skin Integrity  Goal: Skin integrity is maintained or improved  Description: INTERVENTIONS:  - Identify patients at risk for skin breakdown  - Assess and monitor skin integrity  - Assess and monitor nutrition and hydration status  - Monitor labs   - Assess for incontinence   - Turn and reposition patient  - Assist with mobility/ambulation  - Relieve pressure over bony prominences  - Avoid friction and shearing  - Provide appropriate hygiene as needed including keeping skin clean and dry  - Evaluate need for skin moisturizer/barrier cream  - Collaborate with interdisciplinary team   - Patient/family teaching  - Consider wound care consult   Outcome: Adequate for Discharge     Problem: Nutrition/Hydration-ADULT  Goal: Nutrient/Hydration intake appropriate for improving, restoring or maintaining nutritional needs  Description: Monitor and assess patient's nutrition/hydration status for malnutrition  Collaborate with interdisciplinary team and initiate plan and interventions as ordered  Monitor patient's weight and dietary intake as ordered or per policy  Utilize nutrition screening tool and intervene as necessary  Determine patient's food preferences and provide high-protein, high-caloric foods as appropriate       INTERVENTIONS:  - Monitor oral intake, urinary output, labs, and treatment plans  - Assess nutrition and hydration status and recommend course of action  - Evaluate amount of meals eaten  - Assist patient with eating if necessary   - Allow adequate time for meals  - Recommend/ encourage appropriate diets, oral nutritional supplements, and vitamin/mineral supplements  - Order, calculate, and assess calorie counts as needed  - Recommend, monitor, and adjust tube feedings and TPN/PPN based on assessed needs  - Assess need for intravenous fluids  - Provide specific nutrition/hydration education as appropriate  - Include patient/family/caregiver in decisions related to nutrition  Outcome: Adequate for Discharge

## 2022-11-05 NOTE — PROGRESS NOTES
UROLOGY PROGRESS NOTE   Patient Identifiers: Basia Mendieta (MRN [id number])  Date of Service: 11/5/2022        Assessment:   Patient status post robot assisted simple prostatectomy complicated by ileus now with return of bowel function which consistent flatus and loose BM    Plan:   -discharge to home  Plan to remove catheter in clinic early next week        Subjective:     24 HR EVENTS:     Tolerating solid diet  Passing gas and loose bowel movement  Feels eager to go home       Objective:     VITALS:    Vitals:    11/05/22 0700   BP: 110/77   Pulse: 67   Resp: 21   Temp: 97 5 °F (36 4 °C)   SpO2: 99%       INS & OUTS:  [unfilled]    LABS:  Lab Results   Component Value Date    HGB 13 7 11/04/2022    HCT 40 5 11/04/2022    WBC 9 98 11/04/2022     11/04/2022   ]    Lab Results   Component Value Date     08/07/2017    K 3 1 (L) 11/05/2022     (H) 11/05/2022    CO2 23 11/05/2022    BUN 20 11/05/2022    CREATININE 0 59 (L) 11/05/2022    CALCIUM 7 4 (L) 11/05/2022    GLUCOSE 92 08/07/2017   ]    INPATIENT MEDS:    Current Facility-Administered Medications:   •  acetaminophen (TYLENOL) tablet 650 mg, 650 mg, Oral, Q6H [address], Maycol Bustillos MD, 650 mg at 11/04/22 1737  •  diltiazem (CARDIZEM CD) 24 hr capsule 120 mg, 120 mg, Oral, Daily, Maycol Bustillos MD, 120 mg at 11/05/22 1222  •  docusate sodium (COLACE) capsule 100 mg, 100 mg, Oral, BID, Maycol Bustillos MD, 100 mg at 11/04/22 1737  •  heparin (porcine) subcutaneous injection 5,000 Units, 5,000 Units, Subcutaneous, Q8H [address]Katelyn PA-C, 5,000 Units at 11/05/22 0137  •  ketorolac (TORADOL) injection 30 mg, 30 mg, Intravenous, Q6H PRN, Bonny Ansari PA-C  •  metoclopramide (REGLAN) injection 10 mg, 10 mg, Intravenous, Q6H PRN, Digna Padron PA-C, 10 mg at 10/28/22 1627  •  neomycin-bacitracin-polymyxin b (NEOSPORIN) ointment 1 large application, 1 large application, Topical, BID, Maycol Bustillos MD, 1 large application at 11/05/22 0925  •  ondansetron (ZOFRAN) 8 mg in sodium chloride 0 9 % 50 mL IVPB, 8 mg, Intravenous, Q4H PRN, Guanako Shaw PA-C, Last Rate: 200 mL/hr at 10/29/22 1107, 8 mg at 10/29/22 1107  •  pantoprazole (PROTONIX) EC tablet 40 mg, 40 mg, Oral, Early Morning, Kimberly Zazueta MD, 40 mg at 11/05/22 5505  •  polyethylene glycol (MIRALAX) packet 17 g, 17 g, Oral, Daily PRN, Hitesh Alfonso PA-C  •  senna (SENOKOT) tablet 8 6 mg, 1 tablet, Oral, Daily, Kimberly Zazueta MD, 8 6 mg at 11/03/22 0901  •  simethicone (MYLICON) chewable tablet 80 mg, 80 mg, Oral, Q6H PRN, Christa Williamson PA-C      Physical Exam:   /77 (BP Location: Right arm)   Pulse 67   Temp 97 5 °F (36 4 °C) (Oral)   Resp 21   Ht 5' 8" (1 727 m)   Wt 84 8 kg (186 lb 14 4 oz)   SpO2 99%   BMI 28 42 kg/m²   GEN: No acute distress  RESP: breathing comfortably with no accessory muscle use  CV: no significant peripheral edema  ABD: soft, non-tender, non-distended  INCISION: clean dry and intact  DRAINS: none  BENNETT: in place draining clear yellow urine

## 2022-11-05 NOTE — PLAN OF CARE
Problem: PAIN - ADULT  Goal: Verbalizes/displays adequate comfort level or baseline comfort level  Description: Interventions:  - Encourage patient to monitor pain and request assistance  - Assess pain using appropriate pain scale  - Administer analgesics based on type and severity of pain and evaluate response  - Implement non-pharmacological measures as appropriate and evaluate response  - Consider cultural and social influences on pain and pain management  - Notify physician/advanced practitioner if interventions unsuccessful or patient reports new pain  Outcome: Progressing     Problem: SAFETY ADULT  Goal: Patient will remain free of falls  Description: INTERVENTIONS:  - Educate patient/family on patient safety including physical limitations  - Instruct patient to call for assistance with activity   - Consult OT/PT to assist with strengthening/mobility   - Keep Call bell within reach  - Keep bed low and locked with side rails adjusted as appropriate  - Keep care items and personal belongings within reach  - Initiate and maintain comfort rounds  - Make Fall Risk Sign visible to staff  - Offer Toileting every  Hours, in advance of need  - Initiate/Maintain alarm  - Obtain necessary fall risk management equipment:  - Apply yellow socks and bracelet for high fall risk patients  - Consider moving patient to room near nurses station  Outcome: Progressing     Problem: Knowledge Deficit  Goal: Patient/family/caregiver demonstrates understanding of disease process, treatment plan, medications, and discharge instructions  Description: Complete learning assessment and assess knowledge base    Interventions:  - Provide teaching at level of understanding  - Provide teaching via preferred learning methods  Outcome: Progressing     Problem: Potential for Falls  Goal: Patient will remain free of falls  Description: INTERVENTIONS:  - Educate patient/family on patient safety including physical limitations  - Instruct patient to call for assistance with activity   - Consult OT/PT to assist with strengthening/mobility   - Keep Call bell within reach  - Keep bed low and locked with side rails adjusted as appropriate  - Keep care items and personal belongings within reach  - Initiate and maintain comfort rounds  - Make Fall Risk Sign visible to staff  - Offer Toileting every Hours, in advance of need  - Initiate/Maintain alarm  - Obtain necessary fall risk management equipment:   - Apply yellow socks and bracelet for high fall risk patients  - Consider moving patient to room near nurses station  Outcome: Progressing

## 2022-11-07 ENCOUNTER — TRANSITIONAL CARE MANAGEMENT (OUTPATIENT)
Dept: FAMILY MEDICINE CLINIC | Facility: MEDICAL CENTER | Age: 62
End: 2022-11-07

## 2022-11-07 ENCOUNTER — PROCEDURE VISIT (OUTPATIENT)
Dept: UROLOGY | Facility: CLINIC | Age: 62
End: 2022-11-07

## 2022-11-07 DIAGNOSIS — N40.1 BENIGN PROSTATIC HYPERPLASIA WITH NOCTURIA: Primary | ICD-10-CM

## 2022-11-07 DIAGNOSIS — R35.1 BENIGN PROSTATIC HYPERPLASIA WITH NOCTURIA: Primary | ICD-10-CM

## 2022-11-07 NOTE — UTILIZATION REVIEW
NOTIFICATION OF ADMISSION DISCHARGE   This is a Notification of Discharge from [address]  Please be advised that this patient has been discharge from our facility  Below you will find the admission and discharge date and time including the patient’s disposition  UTILIZATION REVIEW CONTACT:  [facility]  Utilization   Network Utilization Review Department  Phone: [phone] x carefully listen to the prompts  All voicemails are confidential   Email: [email or web address]     ADMISSION INFORMATION  PRESENTATION DATE: 10/26/2022  6:03 AM  OBERVATION ADMISSION DATE:   INPATIENT ADMISSION DATE: 10/27/22 11:21 PM   DISCHARGE DATE: 11/5/2022 12:12 PM  DISPOSITION: Home/Self Care Home/Self Care      IMPORTANT INFORMATION:  Send all requests for admission clinical reviews, approved or denied determinations and any other requests to dedicated fax number below belonging to the campus where the patient is receiving treatment   List of dedicated fax numbers:  [address] DENIALS (Administrative/Medical Necessity) [phone]   [address] (Maternity/NICU/Pediatrics) [phone]   [facility] [phone]   [place] [address] [phone]   [facility]  [phone]   [address] [facility] [phone] 90 [facility] [phone]   [address] [place] [address] [phone]   [facility]  [phone]   [address] [facility] [phone]   [address] [facility] [address] [facility] [phone]

## 2022-11-07 NOTE — PROGRESS NOTES
11/7/2022    mUm Joyner  1960  [id number]    Diagnosis  Chief Complaint     Post-op          Patient presents for cano removal and void trial managed by Dr Ivana Young  Patient will follow up in a week with Dr Ryann Emanuel for his post op appt    Procedure Caon removal/voiding trial    Cano catheter removed after deflation of an intact balloon  Patient tolerated well  Encouraged patient to hydrate well    Patient doesn't want to return this afternoon as he lives about an hour away, Advised I will call him after lunch  he knows he may return if uncomfortable and unable to urinate  Patient agrees to this plan  Trocar sites look great  Well approximated, no redness or drainage and minimal pain  Educated on kegel exercises and provided patient an incontinence pad if needed  Patient called this afternoon  Patient states able to void  Reports 3 times urinating  Has urgency but no incontinence  Overall very please with outcome  No results found for this or any previous visit (from the past 4 hour(s))  There were no vitals filed for this visit          Agustin Magana

## 2022-11-14 PROBLEM — K56.7 ILEUS (HCC): Status: ACTIVE | Noted: 2022-11-14

## 2022-11-14 NOTE — PATIENT INSTRUCTIONS
PROSTATE CANCER SCREENING OVERVIEW    Prostate cancer screening involves testing for prostate cancer in men who have no symptoms of the disease  This testing can find cancer at an early stage  However, medical experts agree that prostate cancer screening should not be routinely ordered for all men and that screening can lead to both benefits and harms  This article is designed to review the advantages and disadvantages of prostate cancer screening  You should talk with your health care provider to decide what is best in your individual situation  WHAT IS PROSTATE CANCER? Prostate cancer is a cancer of the prostate, a small gland in men that is located below the bladder and in front of the rectum (figure 1)  The prostate produces fluid that helps carry sperm during ejaculation  Although many men are diagnosed with prostate cancer, most of them do not die from their cancer  Prostate cancer often grows so slowly that many men die of other causes before they even develop symptoms of prostate cancer  PROSTATE CANCER RISK FACTORS  Age -- All men are at risk for prostate cancer, but the risk greatly increases with older age  Prostate cancer is rarely found in men younger than 48years old  Ethnic background --  men develop prostate cancer more often than white and  men   men also are more likely to die of prostate cancer than white or  men  Family medical history -- Men who have a first-degree relative (a father or brother) with prostate cancer are more likely to develop the disease  Men with female relatives with breast cancer related to the breast cancer gene (BRCA) may also be more likely to develop prostate cancer  Diet -- A diet high in animal fat or low in vegetables may increase a man's risk of prostate cancer      PROSTATE CANCER SCREENING TESTS  Prostate cancer screening involves blood test that measures prostate-specific antigen (PSA)  Prostate-specific antigen (PSA) -- PSA is a protein produced by the prostate  The PSA test measures the amount of PSA in a sample of blood  Although many men with prostate cancer have an elevated PSA concentration, a high level does not necessarily mean there is a cancer  The most common cause for an elevated PSA is benign prostatic hyperplasia (BPH), a noncancerous enlargement of the prostate  Other causes include prostate infection (prostatitis), trauma (bicycle riding), and sexual activity  You should avoid ejaculating or riding a bike for at least 48 hours before having a PSA test  (See "Patient education: Benign prostatic hyperplasia (BPH) (Beyond the Basics)"  )    Rectal examination -- A rectal examination is sometimes recommended, along with measurement of the PSA, to screen for prostate cancer  However, studies have not shown that rectal examination is an effective screening test for prostate cancer when used alone  If the PSA test is positive -- A positive PSA test is not a reason to panic; noncancerous conditions are the most common causes for an abnormal test, particularly for PSA tests  On the other hand, a positive test should not be ignored  The first step in evaluating an elevated PSA is usually to repeat the test   You should avoid ejaculating and riding a bike for at least 48 hours before repeating the test  If the PSA remains elevated, a prostate biopsy or other testing is usually recommended  Prostate biopsy -- A prostate biopsy involves having a rectal ultrasound and use of a needle to obtain tissue samples from the prostate gland  The biopsy is usually performed in the office by a urologist (a doctor who specializes in treatment of urinary, bladder, and prostate issues)  After the procedure, most men feel sore and you may see some blood in the urine or semen, this can last for up to 4-6 weeks  Biopsies can rarely cause serious infections   Sometimes biopsies are guided by magnetic resonance imaging (MRI)  PROS AND CONS OF PROSTATE CANCER SCREENING    There are a number of arguments for and against prostate cancer screening  Arguments for screening -- Experts in favor of prostate cancer screening cite the following arguments:    ?Results from a large  study of prostate cancer screening found that men who had prostate-specific antigen (PSA) testing had a 20 percent lower chance of dying from prostate cancer after 13 years compared with men who did not have prostate cancer screening [1]  Men who had PSA testing also had a 30 percent lower chance of developing metastatic disease (cancer that has spread to other parts of the body) [2]  In another  study of prostate cancer screening, the mortality benefit was even larger to prostate cancer screening  (the [place] trial)    ? A substantial number of men die from prostate cancer every year and many more suffer from the complications of advanced disease  ? For men with an aggressive prostate cancer, the best chance for curing it is by finding it at an early stage and then treating it with surgery or radiation  Studies have shown that men who have prostate cancer detected by PSA screening tend to have earlier-stage cancer than men who have a cancer detected by other means  (See "Patient education: Treatment for advanced prostate cancer (Beyond the Basics)" and "Patient education: Prostate cancer treatment; stage I to III cancer (Beyond the Basics)" )    ? The five-year survival for men who have prostate cancer confined to the prostate gland (early stage) is nearly 100 percent; this drops to 27 percent for men whose cancer has spread to other areas of the body  However, many early-stage cancers are not aggressive, and the five-year survival for those will be nearly 100 percent even without any treatment [3]  ?The available screening tests are not perfect, but they are easy to perform and have fair accuracy    Arguments against screening -- Other arguments have also been made against screening:    ?Even though the  study found a benefit of prostate cancer screening, PSA testing prevented only about one prostate cancer death for every 1000 screened men after 13 years [1]  Furthermore, 76 percent of men with an abnormal PSA who had a prostate biopsy did not have prostate cancer  However, in the [place] study, the number needed to screen and treat was much lower indicating that prostate cancer screening is ineffective screening measure which decreases the morbidity and mortality of prostate cancer  ?Many prostate cancers detected with screening are unlikely to cause death or disability  Thus, a number of men will be diagnosed with cancer and potentially suffer the side effects of cancer treatment for cancers that never would have been found without prostate cancer screening  In other words, even if screening finds a cancer early, it is not clear in all cases that the cancer must be treated  IS PROSTATE CANCER SCREENING RIGHT FOR ME? The answer to this question is not the same for everyone  The table includes some questions you can ask yourself when weighing the potential risk and benefits of screening (table 1)  Professional organizations -- Major medical associations and societies, including the [employer] Task Force [4], American Cancer Society [5], American Urological Association [6], and many  cancer societies, agree that men should discuss screening with their health care providers  Men should be informed about the benefits and risks of prostate cancer screening and treatment and make decisions that best reflect their personal values and preferences  Age to first consider screening -- Screening discussions should begin at age 48 years for men at average risk for developing prostate cancer   Men with risk factors for prostate cancer (such as black men or men with a father or brother who had prostate cancer) may want to begin screening discussions at age 36 to 39 years  How often to perform screening -- Once screening begins, it should occur every two to four years (if continued testing is desired) and should include a PSA blood test   Age to stop screening -- Guidelines suggest stopping screening after age 71, though some experts would continue offering screening to very healthy men beyond that age  Screening not recommended -- Screening is generally not recommended for men whose life expectancy, or ability to undergo curative treatment, is limited by serious health problems  In these situations, the potential benefits of screening are outweighed by the likely harms  WHERE TO GET MORE INFORMATION  Your healthcare provider is the best source of information for questions and concerns related to your medical problem  This article will be updated as needed on our web site (www [email or web address]/patients)  Related topics for patients, as well as selected articles written for healthcare professionals, are also available  Some of the most relevant are listed below  Patient level information -- [employer] offers two types of patient education materials  The Basics -- The Basics patient education pieces answer the four or five key questions a patient might have about a given condition  These articles are best for patients who want a general overview and who prefer short, easy-to-read materials  Patient education: Prostate cancer screening (PSA tests) (The Basics)  Patient education: Prostate cancer (The Basics)  Patient education: Cancer screening (The Basics)  Patient education: Choosing treatment for low-risk localized prostate cancer (The Basics)  Beyond the Basics -- Beyond the Basics patient education pieces are longer, more sophisticated, and more detailed  These articles are best for patients who want in-depth information and are comfortable with some medical jargon    Patient education: Treatment for advanced prostate cancer (Beyond the Basics)  Patient education: Prostate cancer treatment; stage I to III cancer (Beyond the Basics)  Patient education: Benign prostatic hyperplasia (BPH) (Beyond the Basics)  Professional level information -- Professional level articles are designed to keep doctors and other health professionals up-to-date on the latest medical findings  These articles are thorough, long, and complex, and they contain multiple references to the research on which they are based  Professional level articles are best for people who are comfortable with a lot of medical terminology and who want to read the same materials their doctors are reading  Measurement of prostate-specific antigen  Screening for prostate cancer  The following organizations also provide reliable health information  ? [place]  8-923-4-CANCER  (www cancer gov/types/prostate)  ? American Society for Clinical Oncology (patient information website)  (www cancer  net)  ? [facility] (patient and caregiver information website)  (www nccn org/patients)  ? [address]  8-820-WEF-2345  ([employer]  org)  ? [employer]  (www [employer] gov/healthtopics  html)  ? [employer]OO[employer] Prostate Cancer Education and Support  (www ustoo  org/Detection-PSA-And-ISIDRO)

## 2022-11-14 NOTE — PROGRESS NOTES
Problem List Items Addressed This Visit        Digestive    Ileus [facility])       Genitourinary    Benign localized prostatic hyperplasia with lower urinary tract symptoms (LUTS) - Primary       Other    Elevated PSA    Relevant Orders    PSA Total, Diagnostic    Benign prostatic hyperplasia with nocturia    Erectile dysfunction    Encounter to discuss test results            Discussion:    Desmond Ornelas is doing well, he is no longer wearing a pad, his stream is improved status post a simple prostatectomy  He did have quite a severe ileus which responded to conservative management and TPN  Currently having normal bowel movements taking a stool softener  I reviewed with him his pathology which shows no concerning findings and only benign tissue  We will check a PSA to follow up his elevated PSA in 6 months  With regard to his erectile dysfunction he can continue taking Viagra, we will see what his new baseline erectile function is after undergoing simple prostatectomy  He is cleared to resume sexual activity  Should he have ongoing troubles with penetrative intercourse in 6 months we will explore intracavernosal injection therapy or placement of a penile prosthesis as he would be a candidate for this operation should he so desire  His incisions are clean, dry, and intact  I will see him in 6 months with PSA prior      Assessment and plan:     Please see problem oriented charting for the assessment plan of today's urological complaints      Hortensia Garcia MD      Chief Complaint     Chief Complaint   Patient presents with   • Post-op         History of Present Illness     Jacqueline Falk is a 63 y o  man status post a robotic simple prostatectomy on the 26th of October  He did have an ileus for right around 1 week following this which responded to TPN as well as supportive care  His Edwards catheter was removed last week  His current pad count is 0  New complaints include none      Pathology is benign and shows no prostate adenocarcinoma  106 grams of benign tissue was removed  The following portions of the patient's history were reviewed and updated as appropriate: allergies, current medications, past family history, past medical history, past social history, past surgical history and problem list     Detailed Urologic History     - please refer to HPI    Review of Systems     Review of Systems   Constitutional: Negative  HENT: Negative  Eyes: Negative  Respiratory: Negative  Cardiovascular: Negative  Gastrointestinal: Negative  Endocrine: Negative  Genitourinary: Negative  As per HPI   Musculoskeletal: Negative  Skin: Negative  Allergic/Immunologic: Negative  Neurological: Negative  Hematological: Negative  Psychiatric/Behavioral: Negative  Allergies     Allergies   Allergen Reactions   • Other Anaphylaxis     Annotation - [id number]: YELLOW [employer]       Physical Exam     Physical Exam  Vitals reviewed  Constitutional:       General: He is not in acute distress  Appearance: Normal appearance  He is not ill-appearing, toxic-appearing or diaphoretic  HENT:      Head: Normocephalic and atraumatic  Eyes:      General: No scleral icterus  Right eye: No discharge  Left eye: No discharge  Cardiovascular:      Pulses: Normal pulses  Pulmonary:      Effort: Pulmonary effort is normal    Abdominal:      General: There is no distension  Palpations: There is no mass  Comments: Incisions are clean, dry, and intact   Musculoskeletal:         General: No swelling  Skin:     Coloration: Skin is not jaundiced  Neurological:      General: No focal deficit present  Mental Status: He is alert  Psychiatric:         Mood and Affect: Mood normal          Behavior: Behavior normal          Thought Content:  Thought content normal          Judgment: Judgment normal              Vital Signs  Vitals:    11/15/22 1000   BP: 120/70   BP Location: Left arm   Patient Position: Sitting   Cuff Size: Large   Pulse: 83   Resp: 16   SpO2: 98%   Weight: 85 1 kg (187 lb 9 6 oz)   Height: 5' 8" (1 727 m)         Current Medications       Current Outpatient Medications:   •  aspirin (ECOTRIN) 325 mg EC tablet, Take 325 mg by mouth every other day, Disp: , Rfl:   •  cyclobenzaprine (FLEXERIL) 10 mg tablet, Take 1 tablet (10 mg total) by mouth daily at bedtime as needed for muscle spasms, Disp: 30 tablet, Rfl: 3  •  diltiazem (CARDIZEM CD) 120 mg 24 hr capsule, TAKE 1 CAPSULE BY MOUTH EVERY DAY, Disp: 90 capsule, Rfl: 4  •  EPINEPHrine (EPIPEN) 0 3 mg/0 3 mL SOAJ, Inject 0 3 mL (0 3 mg total) into a muscle once for 1 dose, Disp: 0 3 mL, Rfl: 1  •  meloxicam (MOBIC) 15 mg tablet, TAKE 1 TABLET BY MOUTH EVERY DAY AS NEEDED, Disp: 30 tablet, Rfl: 1  •  Omega-3 Fatty Acids (FISH OIL) 1000 MG CPDR, Take 1 capsule by mouth daily  , Disp: , Rfl:   •  omeprazole (PriLOSEC) 40 MG capsule, TAKE 1 CAPSULE BY MOUTH EVERY DAY, Disp: 90 capsule, Rfl: 1  •  senna (SENOKOT) 8 6 mg, Take 1 tablet (8 6 mg total) by mouth daily at bedtime for 5 days, Disp: 5 tablet, Rfl: 0  •  sildenafil (VIAGRA) 100 mg tablet, Take 1 tablet (100 mg total) by mouth daily as needed for erectile dysfunction, Disp: 10 tablet, Rfl: 11      Active Problems     Patient Active Problem List   Diagnosis   • Essential hypertension   • Gastroesophageal reflux disease without esophagitis   • Elevated PSA   • Allergic rhinitis due to pollen   • Failed back syndrome   • HNP (herniated nucleus pulposus), lumbar   • Intervertebral disc disorder with radiculopathy of lumbosacral region   • Cardiomyopathy ([address] )   • Mixed hyperlipidemia   • Vitamin D deficiency   • Benign prostatic hyperplasia with nocturia   • Erectile dysfunction   • Radiculopathy, lumbar region   • CAD (coronary artery disease)   • Encounter to discuss test results   • Benign localized prostatic hyperplasia with lower urinary tract symptoms (LUTS)   • Ileus [facility])         Past Medical History     Past Medical History:   Diagnosis Date   • Back disorder 02/26/2019    getting epidural shots, physical therapy   • BPH (benign prostatic hyperplasia)    • CAD (coronary artery disease)    • Cardiac disease    • Cardiomyopathy ([facility] [address] )    • Failed back syndrome    • GERD (gastroesophageal reflux disease)    • Heart attack ([facility] [address] )     Mild MI 1996    • Hypertension     Patient denies   • Myocardial infarction ([facility] [address] )     Mild   • Sleep apnea    • Umbilical hernia     Last Assessed:3/25/2015         Surgical History     Past Surgical History:   Procedure Laterality Date   • BACK SURGERY     • HERNIA REPAIR      Last Assessed:3/12/2014 ,lumbar   • LUMBAR LAMINECTOMY      Last Assessed:3/12/2014   • UT LAP,PROSTATECTOMY,RADICAL,W/NERVE SPARE,INCL ROBOTIC N/A 10/26/2022    Procedure: ROBOTIC ASSISTED LAPAROSCOPIC SIMPLE PROSTATECTOMY, BLADDER NECK RECONSTRUCTION;  Surgeon: Jamie King MD;  Location: [facility];  Service: Urology   • PROSTATE BIOPSY  09/10/2015    needle biopsy   • SHOULDER SURGERY     • TONSILLECTOMY           Family History     Family History   Problem Relation Age of Onset   • Hypertension Father    • Diabetes Father    • Pancreatic cancer Mother    • Cancer Family    • No Known Problems Sister    • No Known Problems Brother    • No Known Problems Brother          Social History     Social History     Social History     Tobacco Use   Smoking Status Never Smoker   Smokeless Tobacco Never Used         Pertinent Lab Values     Lab Results   Component Value Date    CREATININE 0 59 (L) 11/05/2022       Lab Results   Component Value Date    PSA 7 9 (H) 08/09/2022    PSA 8 5 (H) 02/16/2022    PSA 9 3 (H) 09/23/2021         Final Diagnosis   A  Prostate, Simple Prostatectomy:  - Benign prostate with glandular and stromal hyperplasia, and focal cysts    - Negative for malignancy       Electronically signed by Suzi Power DO on 11/9/2022 at 11:05 AM   106 grams benign tissue removed      Pertinent Imaging      No new imaging for my review

## 2022-11-15 ENCOUNTER — OFFICE VISIT (OUTPATIENT)
Dept: UROLOGY | Facility: CLINIC | Age: 62
End: 2022-11-15

## 2022-11-15 VITALS
HEART RATE: 83 BPM | WEIGHT: 187.6 LBS | OXYGEN SATURATION: 98 % | HEIGHT: 68 IN | RESPIRATION RATE: 16 BRPM | DIASTOLIC BLOOD PRESSURE: 70 MMHG | BODY MASS INDEX: 28.43 KG/M2 | SYSTOLIC BLOOD PRESSURE: 120 MMHG

## 2022-11-15 DIAGNOSIS — N40.1 BENIGN PROSTATIC HYPERPLASIA WITH NOCTURIA: ICD-10-CM

## 2022-11-15 DIAGNOSIS — R35.1 BENIGN PROSTATIC HYPERPLASIA WITH NOCTURIA: ICD-10-CM

## 2022-11-15 DIAGNOSIS — N40.1 BENIGN LOCALIZED PROSTATIC HYPERPLASIA WITH LOWER URINARY TRACT SYMPTOMS (LUTS): Primary | ICD-10-CM

## 2022-11-15 DIAGNOSIS — N52.9 ERECTILE DYSFUNCTION, UNSPECIFIED ERECTILE DYSFUNCTION TYPE: ICD-10-CM

## 2022-11-15 DIAGNOSIS — K56.7 ILEUS (HCC): ICD-10-CM

## 2022-11-15 DIAGNOSIS — Z71.2 ENCOUNTER TO DISCUSS TEST RESULTS: ICD-10-CM

## 2022-11-15 DIAGNOSIS — R97.20 ELEVATED PSA: ICD-10-CM

## 2022-12-05 ENCOUNTER — HOSPITAL ENCOUNTER (OUTPATIENT)
Dept: NON INVASIVE DIAGNOSTICS | Facility: CLINIC | Age: 62
Discharge: HOME | End: 2022-12-05

## 2022-12-05 VITALS
HEIGHT: 68 IN | HEART RATE: 68 BPM | SYSTOLIC BLOOD PRESSURE: 120 MMHG | DIASTOLIC BLOOD PRESSURE: 70 MMHG | BODY MASS INDEX: 28.34 KG/M2 | WEIGHT: 187 LBS

## 2022-12-05 DIAGNOSIS — I10 ESSENTIAL HYPERTENSION: ICD-10-CM

## 2022-12-05 DIAGNOSIS — I42.0 DILATED CARDIOMYOPATHY (HCC): ICD-10-CM

## 2022-12-05 LAB
AORTIC ROOT: 3.6 CM
APICAL FOUR CHAMBER EJECTION FRACTION: 61 %
ASCENDING AORTA: 4.2 CM
AV LVOT PEAK GRADIENT: 3 MMHG
AV PEAK GRADIENT: 5 MMHG
E WAVE DECELERATION TIME: 201 MS
FRACTIONAL SHORTENING: 39 (ref 28–44)
INTERVENTRICULAR SEPTUM IN DIASTOLE (PARASTERNAL SHORT AXIS VIEW): 1.2 CM
INTERVENTRICULAR SEPTUM: 1.2 CM (ref 0.6–1.1)
LAAS-AP2: 19.5 CM2
LAAS-AP4: 22.8 CM2
LEFT ATRIUM SIZE: 4.2 CM
LEFT INTERNAL DIMENSION IN SYSTOLE: 3.1 CM (ref 2.1–4)
LEFT VENTRICULAR INTERNAL DIMENSION IN DIASTOLE: 5.1 CM (ref 3.5–6)
LEFT VENTRICULAR POSTERIOR WALL IN END DIASTOLE: 1.2 CM
LEFT VENTRICULAR STROKE VOLUME: 84 ML
LVSV (TEICH): 84 ML
MV E'TISSUE VEL-SEP: 8 CM/S
MV PEAK A VEL: 0.62 M/S
MV PEAK E VEL: 58 CM/S
MV STENOSIS PRESSURE HALF TIME: 58 MS
MV VALVE AREA P 1/2 METHOD: 3.79
RIGHT ATRIUM AREA SYSTOLE A4C: 16.8 CM2
RIGHT VENTRICLE ID DIMENSION: 3.2 CM
SL CV LEFT ATRIUM LENGTH A2C: 5.7 CM
SL CV LV EF: 60
SL CV PED ECHO LEFT VENTRICLE DIASTOLIC VOLUME (MOD BIPLANE) 2D: 122 ML
SL CV PED ECHO LEFT VENTRICLE SYSTOLIC VOLUME (MOD BIPLANE) 2D: 38 ML

## 2022-12-12 ENCOUNTER — NURSE TRIAGE (OUTPATIENT)
Dept: OTHER | Facility: OTHER | Age: 62
End: 2022-12-12

## 2022-12-12 DIAGNOSIS — N40.1 BENIGN PROSTATIC HYPERPLASIA WITH LOWER URINARY TRACT SYMPTOMS, SYMPTOM DETAILS UNSPECIFIED: Primary | ICD-10-CM

## 2022-12-12 NOTE — TELEPHONE ENCOUNTER
Regarding: urgency when urinating  ----- Message from [facility] sent at 12/12/2022 10:42 AM EST -----  "When I urinate I have urgency to go and I don't feel like my bladder is filling up  I have a little pain along my belly line  I also see pink in my urine    I am unable to get an erection "

## 2022-12-12 NOTE — TELEPHONE ENCOUNTER
Patient called in to report ongoing symptoms post simple prostatectomy on 10/26/22:  1  Mild mid abdominal pain that comes and goes,  2  Urgency with frequency to void  Feels it is happening before his bladder is full  Good stream   3  Sees pink in his urine  4  Unable to get an erection; taking Viagra  Please follow up with patient  Reason for Disposition  • Urinating more frequently than usual (i e , frequency)    Answer Assessment - Initial Assessment Questions  1  SYMPTOM: "What's the main symptom you're concerned about?" (e g , frequency, incontinence)      Mid abdominal pain; urgency to void with good flow before bladder is full; sees pink in his urine (off and on), and unable to get erection  2  ONSET: "When did the symptoms start?"      Post surgery 10/26  3  PAIN: "Is there any pain?" If Yes, ask: "How bad is it?" (Scale: 1-10; mild, moderate, severe)      Mild; dull ache that comes and goes  4  CAUSE: "What do you think is causing the symptoms?"      Post op   5   OTHER SYMPTOMS: "Do you have any other symptoms?" (e g , fever, flank pain, blood in urine, pain with urination)      Blood in urine    Protocols used: URINARY SYMPTOMS-ADULT-OH

## 2022-12-12 NOTE — TELEPHONE ENCOUNTER
Please schedule next available visit to discuss his multiple complaints  Certainly blood in the urine several weeks after this procedure can be normal   Can check urine testing to ensure there is no UTI    Sounds like he is emptying his bladder adequately so I do not feel he needs a separate visit with RN for PVR

## 2022-12-13 NOTE — TELEPHONE ENCOUNTER
Called and spoke to patient  Reviewed Letty GUERRA's message below  Patient questioned if it is normal to not have an erection even with medication  Spoke with Genevieve Beltran, it may take patient's several months to regain function with the medications  Patient was thankful for this information  Patient just wanted to make sure there was nothing abnormal  Patient does not feel like he needs an appointment at this time  Patient will continue kegel exercises  Patient will get urine testing completed  Patient does not go to [facility]  Informed patient I can e-mail a copy  Patient was thankful for call and verbalized understanding

## 2022-12-16 NOTE — TELEPHONE ENCOUNTER
Contacted patient to check on urine testing  Patient states he has not received the email with his orders  Advised patient we could not email his orders  That he could access them via my chart or fax them to the lab  Patient was able to access labs via my chart and will go for urine testing next week

## 2022-12-21 ENCOUNTER — TELEPHONE (OUTPATIENT)
Dept: UROLOGY | Facility: CLINIC | Age: 62
End: 2022-12-21

## 2022-12-21 LAB
APPEARANCE UR: ABNORMAL
BACTERIA UR CULT: NORMAL
BACTERIA URNS QL MICRO: ABNORMAL
BILIRUB UR QL STRIP: NEGATIVE
CASTS URNS QL MICRO: ABNORMAL /LPF
COLOR UR: YELLOW
EPI CELLS #/AREA URNS HPF: ABNORMAL /HPF (ref 0–10)
GLUCOSE UR QL: NEGATIVE
HGB UR QL STRIP: ABNORMAL
KETONES UR QL STRIP: NEGATIVE
LEUKOCYTE ESTERASE UR QL STRIP: ABNORMAL
Lab: NO GROWTH
MICRO URNS: ABNORMAL
NITRITE UR QL STRIP: NEGATIVE
PH UR STRIP: 5.5 [PH] (ref 5–7.5)
PROT UR QL STRIP: ABNORMAL
RBC #/AREA URNS HPF: ABNORMAL /HPF (ref 0–2)
SP GR UR: 1.02 (ref 1–1.03)
UROBILINOGEN UR STRIP-ACNC: 0.2 MG/DL (ref 0.2–1)
WBC #/AREA URNS HPF: >30 /HPF (ref 0–5)

## 2022-12-21 NOTE — TELEPHONE ENCOUNTER
I spoke with patient and relayed message  Patient verbalized understanding without questions or concerns  Thank you

## 2022-12-21 NOTE — TELEPHONE ENCOUNTER
----- Message from Lyudmila Salinas PA-C sent at 12/21/2022  1:14 PM EST -----  Urine culture negative

## 2022-12-30 DIAGNOSIS — K21.9 GASTROESOPHAGEAL REFLUX DISEASE WITHOUT ESOPHAGITIS: ICD-10-CM

## 2022-12-30 RX ORDER — OMEPRAZOLE 40 MG/1
CAPSULE, DELAYED RELEASE ORAL
Qty: 90 CAPSULE | Refills: 1 | Status: SHIPPED | OUTPATIENT
Start: 2022-12-30

## 2023-05-01 DIAGNOSIS — Z91.030 ALLERGY TO YELLOW JACKETS: ICD-10-CM

## 2023-05-01 RX ORDER — EPINEPHRINE 0.3 MG/.3ML
0.3 INJECTION SUBCUTANEOUS ONCE
Qty: 0.3 ML | Refills: 1 | Status: SHIPPED | OUTPATIENT
Start: 2023-05-01 | End: 2023-05-01

## 2023-05-03 LAB
PSA SERPL-MCNC: <0.1 NG/ML (ref 0–4)
SL AMB REFLEX CRITERIA: NORMAL

## 2023-05-09 NOTE — PROGRESS NOTES
Problem List Items Addressed This Visit        Genitourinary    Benign localized prostatic hyperplasia with lower urinary tract symptoms (LUTS)       Other    Elevated PSA    Benign prostatic hyperplasia with nocturia    Erectile dysfunction    Encounter to discuss test results - Primary    Relevant Orders    POCT Measure PVR (Completed)         Discussion:    Voiding well s/p simple prostatectomy, some wakeups at night likely due to sleep apnea, following for this and optimizing PAP at night  His elevated PSA has normalized after simple prostatectomy    He has not been able to achieve erection since his simple prostatectomy, I recommend consideration of trimix therapy and also consideration of penile prosthesis  I have referred him to my partner Dr Sabi Alcocer for further discussion regarding surgery  In the meantime he will undergo trimix teaching and counseling    No need for further PSA follow up at this time        Assessment and plan:       Please see problem oriented charting for the assessment plan of today's urological complaints      Zelda Marx MD      Chief Complaint     As listed above      History of Present Illness     Zonia Arboleda is a 66 y o  man with elevated psa, bph/luts, and ED s/p robotic simple prostatectomy 10/26/22  His PSA has decreased as expected after simple prostatectomy  AUA symptom score is 3/2    PVR is 85 mL    The following portions of the patient's history were reviewed and updated as appropriate: allergies, current medications, past family history, past medical history, past social history, past surgical history and problem list       Detailed Urologic History     - please refer to HPI    Review of Systems     Review of Systems   Constitutional: Negative  HENT: Negative  Eyes: Negative  Respiratory: Negative  Cardiovascular: Negative  Gastrointestinal: Negative  Endocrine: Negative  Genitourinary: Negative           As per HPI   Musculoskeletal: Negative  Skin: Negative  Allergic/Immunologic: Negative  Neurological: Negative  Hematological: Negative  Psychiatric/Behavioral: Negative  AUA SYMPTOM SCORE    Flowsheet Row Most Recent Value   AUA SYMPTOM SCORE    How often have you had a sensation of not emptying your bladder completely after you finished urinating? 0 (P)     How often have you had to urinate again less than two hours after you finished urinating? 1 (P)     How often have you found you stopped and started again several times when you urinate? 0 (P)     How often have you found it difficult to postpone urination? 1 (P)     How often have you had a weak urinary stream? 0 (P)     How often have you had to push or strain to begin urination? 0 (P)     How many times did you most typically get up to urinate from the time you went to bed at night until the time you got up in the morning? 1 (P)     Quality of Life: If you were to spend the rest of your life with your urinary condition just the way it is now, how would you feel about that? 2 (P)     AUA SYMPTOM SCORE 3 (P)             Allergies     Allergies   Allergen Reactions   • Other Anaphylaxis     [facility] [id number]: YELLOW JACKETS       Physical Exam     Physical Exam  Vitals reviewed  Constitutional:       General: He is not in acute distress  Appearance: Normal appearance  He is not ill-appearing, toxic-appearing or diaphoretic  Eyes:      General: No scleral icterus  Right eye: No discharge  Left eye: No discharge  Pulmonary:      Effort: Pulmonary effort is normal    Abdominal:      General: There is no distension  Musculoskeletal:         General: No deformity  Skin:     Coloration: Skin is not jaundiced  Neurological:      General: No focal deficit present  Mental Status: He is alert and oriented to person, place, and time  Mental status is at baseline     Psychiatric:         Mood and Affect: Mood normal          Behavior: "Behavior normal          Thought Content:  Thought content normal          Judgment: Judgment normal              Vital Signs  Vitals:    05/11/23 1050   BP: 122/76   Pulse: 68   SpO2: 98%   Weight: 88 9 kg (196 lb)   Height: 5' 8\" (1 727 m)         Current Medications       Current Outpatient Medications:   •  aspirin (ECOTRIN) 325 mg EC tablet, Take 325 mg by mouth every other day, Disp: , Rfl:   •  cyclobenzaprine (FLEXERIL) 10 mg tablet, Take 1 tablet (10 mg total) by mouth daily at bedtime as needed for muscle spasms, Disp: 30 tablet, Rfl: 3  •  diltiazem (CARDIZEM CD) 120 mg 24 hr capsule, TAKE 1 CAPSULE BY MOUTH EVERY DAY, Disp: 90 capsule, Rfl: 4  •  EPINEPHrine (EPIPEN) 0 3 mg/0 3 mL SOAJ, Inject 0 3 mL (0 3 mg total) into a muscle once for 1 dose, Disp: 0 3 mL, Rfl: 1  •  Omega-3 Fatty Acids (FISH OIL) 1000 MG CPDR, Take 1 capsule by mouth daily  , Disp: , Rfl:   •  omeprazole (PriLOSEC) 40 MG capsule, TAKE 1 CAPSULE BY MOUTH EVERY DAY, Disp: 90 capsule, Rfl: 1  •  meloxicam (MOBIC) 15 mg tablet, TAKE 1 TABLET BY MOUTH EVERY DAY AS NEEDED (Patient not taking: Reported on 5/11/2023), Disp: 30 tablet, Rfl: 1  •  senna (SENOKOT) 8 6 mg, Take 1 tablet (8 6 mg total) by mouth daily at bedtime for 5 days (Patient not taking: Reported on 5/11/2023), Disp: 5 tablet, Rfl: 0  •  sildenafil (VIAGRA) 100 mg tablet, Take 1 tablet (100 mg total) by mouth daily as needed for erectile dysfunction, Disp: 10 tablet, Rfl: 11      Active Problems     Patient Active Problem List   Diagnosis   • Essential hypertension   • Gastroesophageal reflux disease without esophagitis   • Elevated PSA   • Allergic rhinitis due to pollen   • Failed back syndrome   • HNP (herniated nucleus pulposus), lumbar   • Intervertebral disc disorder with radiculopathy of lumbosacral region   • Cardiomyopathy ([address] )   • Mixed hyperlipidemia   • Vitamin D deficiency   • Benign prostatic hyperplasia with nocturia   • Erectile dysfunction   • Radiculopathy, " lumbar region   • CAD (coronary artery disease)   • Encounter to discuss test results   • Benign localized prostatic hyperplasia with lower urinary tract symptoms (LUTS)   • Ileus [facility])         Past Medical History     Past Medical History:   Diagnosis Date   • Back disorder 02/26/2019    getting epidural shots, physical therapy   • CAD (coronary artery disease)    • Cardiac disease    • Cardiomyopathy [address] [address] )    • Failed back syndrome    • GERD (gastroesophageal reflux disease)    • Heart attack ([facility] )     Mild MI 1996    • Hypertension     Patient denies   • Myocardial infarction ([facility] )     Mild   • Sleep apnea    • Umbilical hernia     Last Assessed:3/25/2015         Surgical History     Past Surgical History:   Procedure Laterality Date   • BACK SURGERY     • HERNIA REPAIR      Last Assessed:3/12/2014 ,lumbar   • LUMBAR LAMINECTOMY      Last Assessed:3/12/2014   • MO LAPS SURG [id number] RPBIC RAD W/NRV SPARING ROBOT N/A 10/26/2022    Procedure: ROBOTIC ASSISTED LAPAROSCOPIC SIMPLE PROSTATECTOMY, BLADDER NECK RECONSTRUCTION;  Surgeon: Hernan Franco MD;  [facility];  Service: Urology   • PROSTATE BIOPSY  09/10/2015    needle biopsy   • SHOULDER SURGERY     • TONSILLECTOMY           Family History     Family History   Problem Relation Age of Onset   • Hypertension Father    • Diabetes Father    • Pancreatic cancer Mother    • Cancer Family    • No Known Problems Sister    • No Known Problems Brother    • No Known Problems Brother          Social History     Social History     Social History     Tobacco Use   Smoking Status Never   • Passive exposure: Past   Smokeless Tobacco Never         Pertinent Lab Values     Lab Results   Component Value Date    CREATININE 0 59 (L) 11/05/2022       Lab Results   Component Value Date    PSA <0 1 05/02/2023    PSA 7 9 (H) 08/09/2022    PSA 8 5 (H) 02/16/2022             Pertinent Imaging      No new imaging for my review

## 2023-05-11 ENCOUNTER — TELEPHONE (OUTPATIENT)
Dept: UROLOGY | Facility: CLINIC | Age: 63
End: 2023-05-11

## 2023-05-11 ENCOUNTER — OFFICE VISIT (OUTPATIENT)
Dept: UROLOGY | Facility: CLINIC | Age: 63
End: 2023-05-11

## 2023-05-11 VITALS
WEIGHT: 196 LBS | HEART RATE: 68 BPM | BODY MASS INDEX: 29.7 KG/M2 | OXYGEN SATURATION: 98 % | HEIGHT: 68 IN | SYSTOLIC BLOOD PRESSURE: 122 MMHG | DIASTOLIC BLOOD PRESSURE: 76 MMHG

## 2023-05-11 DIAGNOSIS — N40.1 BENIGN PROSTATIC HYPERPLASIA WITH NOCTURIA: ICD-10-CM

## 2023-05-11 DIAGNOSIS — Z71.2 ENCOUNTER TO DISCUSS TEST RESULTS: Primary | ICD-10-CM

## 2023-05-11 DIAGNOSIS — N40.1 BENIGN LOCALIZED PROSTATIC HYPERPLASIA WITH LOWER URINARY TRACT SYMPTOMS (LUTS): ICD-10-CM

## 2023-05-11 DIAGNOSIS — R97.20 ELEVATED PSA: ICD-10-CM

## 2023-05-11 DIAGNOSIS — N52.9 ERECTILE DYSFUNCTION, UNSPECIFIED ERECTILE DYSFUNCTION TYPE: ICD-10-CM

## 2023-05-11 DIAGNOSIS — R35.1 BENIGN PROSTATIC HYPERPLASIA WITH NOCTURIA: ICD-10-CM

## 2023-05-11 LAB — POST-VOID RESIDUAL VOLUME, ML POC: 85 ML

## 2023-05-11 NOTE — LETTER
May 11, 2023     Colton Krishnamurthy MD  [facility] [address]    Patient: Blanca Au   YOB: 1960   Date of Visit: 5/11/2023       Dear Dr Wheeler Has: Thank you for referring Alonso Almaraz to me for evaluation  Below are my notes for this consultation  If you have questions, please do not hesitate to call me  I look forward to following your patient along with you  Sincerely,        Mia Darnell MD        CC: MD Mia Quintero MD  5/11/2023 11:10 AM  Sign when Signing Visit       Problem List Items Addressed This Visit        Genitourinary    Benign localized prostatic hyperplasia with lower urinary tract symptoms (LUTS)       Other    Elevated PSA    Benign prostatic hyperplasia with nocturia    Erectile dysfunction    Encounter to discuss test results - Primary    Relevant Orders    POCT Measure PVR (Completed)         Discussion:    Voiding well s/p simple prostatectomy, some wakeups at night likely due to sleep apnea, following for this and optimizing PAP at night  His elevated PSA has normalized after simple prostatectomy    He has not been able to achieve erection since his simple prostatectomy, I recommend consideration of trimix therapy and also consideration of penile prosthesis  I have referred him to my partner Dr Brock Mckeon for further discussion regarding surgery  In the meantime he will undergo trimix teaching and counseling    No need for further PSA follow up at this time        Assessment and plan:       Please see problem oriented charting for the assessment plan of today's urological complaints      Mia Darnell MD      Chief Complaint     As listed above      History of Present Illness     Blanca Au is a 66 y o  man with elevated psa, bph/luts, and ED s/p robotic simple prostatectomy 10/26/22  His PSA has decreased as expected after simple prostatectomy      AUA symptom score is 3/2    PVR is 85 mL    The following portions of the patient's history were reviewed and updated as appropriate: allergies, current medications, past family history, past medical history, past social history, past surgical history and problem list       Detailed Urologic History     - please refer to HPI    Review of Systems     Review of Systems   Constitutional: Negative  HENT: Negative  Eyes: Negative  Respiratory: Negative  Cardiovascular: Negative  Gastrointestinal: Negative  Endocrine: Negative  Genitourinary: Negative  As per HPI   Musculoskeletal: Negative  Skin: Negative  Allergic/Immunologic: Negative  Neurological: Negative  Hematological: Negative  Psychiatric/Behavioral: Negative  AUA SYMPTOM SCORE    Flowsheet Row Most Recent Value   AUA SYMPTOM SCORE    How often have you had a sensation of not emptying your bladder completely after you finished urinating? 0 (P)     How often have you had to urinate again less than two hours after you finished urinating? 1 (P)     How often have you found you stopped and started again several times when you urinate? 0 (P)     How often have you found it difficult to postpone urination? 1 (P)     How often have you had a weak urinary stream? 0 (P)     How often have you had to push or strain to begin urination? 0 (P)     How many times did you most typically get up to urinate from the time you went to bed at night until the time you got up in the morning? 1 (P)     Quality of Life: If you were to spend the rest of your life with your urinary condition just the way it is now, how would you feel about that? 2 (P)     AUA SYMPTOM SCORE 3 (P)             Allergies     Allergies   Allergen Reactions   • Other Anaphylaxis     Annotation - [id number]: YELLOW [place]       Physical Exam     Physical Exam  Vitals reviewed  Constitutional:       General: He is not in acute distress  Appearance: Normal appearance   He is not ill-appearing, toxic-appearing or "diaphoretic  Eyes:      General: No scleral icterus  Right eye: No discharge  Left eye: No discharge  Pulmonary:      Effort: Pulmonary effort is normal    Abdominal:      General: There is no distension  Musculoskeletal:         General: No deformity  Skin:     Coloration: Skin is not jaundiced  Neurological:      General: No focal deficit present  Mental Status: He is alert and oriented to person, place, and time  Mental status is at baseline  Psychiatric:         Mood and Affect: Mood normal          Behavior: Behavior normal          Thought Content:  Thought content normal          Judgment: Judgment normal              Vital Signs  Vitals:    05/11/23 1050   BP: 122/76   Pulse: 68   SpO2: 98%   Weight: 88 9 kg (196 lb)   Height: 5' 8\" (1 727 m)         Current Medications       Current Outpatient Medications:   •  aspirin (ECOTRIN) 325 mg EC tablet, Take 325 mg by mouth every other day, Disp: , Rfl:   •  cyclobenzaprine (FLEXERIL) 10 mg tablet, Take 1 tablet (10 mg total) by mouth daily at bedtime as needed for muscle spasms, Disp: 30 tablet, Rfl: 3  •  diltiazem (CARDIZEM CD) 120 mg 24 hr capsule, TAKE 1 CAPSULE BY MOUTH EVERY DAY, Disp: 90 capsule, Rfl: 4  •  EPINEPHrine (EPIPEN) 0 3 mg/0 3 mL SOAJ, Inject 0 3 mL (0 3 mg total) into a muscle once for 1 dose, Disp: 0 3 mL, Rfl: 1  •  Omega-3 Fatty Acids (FISH OIL) 1000 MG CPDR, Take 1 capsule by mouth daily  , Disp: , Rfl:   •  omeprazole (PriLOSEC) 40 MG capsule, TAKE 1 CAPSULE BY MOUTH EVERY DAY, Disp: 90 capsule, Rfl: 1  •  meloxicam (MOBIC) 15 mg tablet, TAKE 1 TABLET BY MOUTH EVERY DAY AS NEEDED (Patient not taking: Reported on 5/11/2023), Disp: 30 tablet, Rfl: 1  •  senna (SENOKOT) 8 6 mg, Take 1 tablet (8 6 mg total) by mouth daily at bedtime for 5 days (Patient not taking: Reported on 5/11/2023), Disp: 5 tablet, Rfl: 0  •  sildenafil (VIAGRA) 100 mg tablet, Take 1 tablet (100 mg total) by mouth daily as needed for " erectile dysfunction, Disp: 10 tablet, Rfl: 11      Active Problems     Patient Active Problem List   Diagnosis   • Essential hypertension   • Gastroesophageal reflux disease without esophagitis   • Elevated PSA   • Allergic rhinitis due to pollen   • Failed back syndrome   • HNP (herniated nucleus pulposus), lumbar   • Intervertebral disc disorder with radiculopathy of lumbosacral region   • Cardiomyopathy ([facility] [address] )   • Mixed hyperlipidemia   • Vitamin D deficiency   • Benign prostatic hyperplasia with nocturia   • Erectile dysfunction   • Radiculopathy, lumbar region   • CAD (coronary artery disease)   • Encounter to discuss test results   • Benign localized prostatic hyperplasia with lower urinary tract symptoms (LUTS)   • Ileus [facility])         Past Medical History     Past Medical History:   Diagnosis Date   • Back disorder 02/26/2019    getting epidural shots, physical therapy   • CAD (coronary artery disease)    • Cardiac disease    • Cardiomyopathy ([facility] [address] )    • Failed back syndrome    • GERD (gastroesophageal reflux disease)    • Heart attack ([facility] [address] )     Mild MI 1996    • Hypertension     Patient denies   • [facility] )     Mild   • Sleep apnea    • Umbilical hernia     Last Assessed:3/25/2015         Surgical History     Past Surgical History:   Procedure Laterality Date   • BACK SURGERY     • HERNIA REPAIR      Last Assessed:3/12/2014 ,lumbar   • LUMBAR LAMINECTOMY      Last Assessed:3/12/2014   • MI LAPS SURG [id number] RPBIC RAD W/NRV SPARING ROBOT N/A 10/26/2022    Procedure: ROBOTIC ASSISTED LAPAROSCOPIC SIMPLE PROSTATECTOMY, BLADDER NECK RECONSTRUCTION;  Surgeon: Jorge A Ewing MD;  Location[facility];  Service: Urology   • PROSTATE BIOPSY  09/10/2015    needle biopsy   • SHOULDER SURGERY     • TONSILLECTOMY           Family History     Family History   Problem Relation Age of Onset   • Hypertension Father    • Diabetes Father    • Pancreatic cancer Mother    • Cancer Family    • No Known Problems Sister    • No Known Problems Brother    • No Known Problems Brother          Social History     Social History     Social History     Tobacco Use   Smoking Status Never   • Passive exposure: Past   Smokeless Tobacco Never         Pertinent Lab Values     Lab Results   Component Value Date    CREATININE 0 59 (L) 11/05/2022       Lab Results   Component Value Date    PSA <0 1 05/02/2023    PSA 7 9 (H) 08/09/2022    PSA 8 5 (H) 02/16/2022             Pertinent Imaging      No new imaging for my review

## 2023-06-07 ENCOUNTER — NURSE TRIAGE (OUTPATIENT)
Dept: OTHER | Facility: OTHER | Age: 63
End: 2023-06-07

## 2023-06-07 NOTE — TELEPHONE ENCOUNTER
"Regarding: medication question  ----- Message from Jessie Dhillon sent at 6/7/2023  5:04 PM EDT -----  \"I have a few questions about a medication, I would like to talk with a nurse\"    "

## 2023-06-07 NOTE — TELEPHONE ENCOUNTER
"  Reason for Disposition  • [1] Caller has NON-URGENT medicine question about med that PCP prescribed AND [2] triager unable to answer question    Answer Assessment - Initial Assessment Questions  1  NAME of MEDICATION: \"What medicine are you calling about? \"      Trimix and pseudoephedrine    3  PRESCRIBING HCP: \"Who prescribed it? \" Reason: if prescribed by specialist, call should be referred to that group        [facility]    Protocols used: MEDICATION QUESTION CALL-ADULT-    "

## 2023-06-07 NOTE — TELEPHONE ENCOUNTER
Patient notes he is supposed to see Robson Gabriel on 6/21 for some medication teaching  He has several questions:  1  Which medications should be brought that day? 2  Does the Trimix need to be thawed and how long does it take to thaw  3  When should he take the pseudoephedrine? Please advise  Thank you

## 2023-06-08 NOTE — TELEPHONE ENCOUNTER
Returned call to patient   Answered questions pertaining to medication storage   Advised patient to contact pharmacy to confirm timing for thawing medication   Advised to bring syringes,medication  Pseudoephedrine  to his appt on 6/21/23

## 2023-06-21 ENCOUNTER — OFFICE VISIT (OUTPATIENT)
Dept: UROLOGY | Facility: CLINIC | Age: 63
End: 2023-06-21
Payer: COMMERCIAL

## 2023-06-21 ENCOUNTER — HOSPITAL ENCOUNTER (EMERGENCY)
Facility: HOSPITAL | Age: 63
Discharge: HOME | End: 2023-06-21
Attending: EMERGENCY MEDICINE | Admitting: EMERGENCY MEDICINE
Payer: COMMERCIAL

## 2023-06-21 VITALS
TEMPERATURE: 98 F | OXYGEN SATURATION: 98 % | HEART RATE: 67 BPM | RESPIRATION RATE: 18 BRPM | DIASTOLIC BLOOD PRESSURE: 84 MMHG | SYSTOLIC BLOOD PRESSURE: 113 MMHG

## 2023-06-21 VITALS
BODY MASS INDEX: 29.25 KG/M2 | WEIGHT: 193 LBS | HEART RATE: 79 BPM | SYSTOLIC BLOOD PRESSURE: 122 MMHG | HEIGHT: 68 IN | DIASTOLIC BLOOD PRESSURE: 84 MMHG | OXYGEN SATURATION: 99 %

## 2023-06-21 DIAGNOSIS — N48.30 PRIAPISM: Primary | ICD-10-CM

## 2023-06-21 DIAGNOSIS — N52.9 ERECTILE DYSFUNCTION, UNSPECIFIED ERECTILE DYSFUNCTION TYPE: Primary | ICD-10-CM

## 2023-06-21 PROCEDURE — 99213 OFFICE O/P EST LOW 20 MIN: CPT | Performed by: PHYSICIAN ASSISTANT

## 2023-06-21 PROCEDURE — 99285 EMERGENCY DEPT VISIT HI MDM: CPT

## 2023-06-21 RX ORDER — BUPIVACAINE HYDROCHLORIDE 5 MG/ML
30 INJECTION, SOLUTION EPIDURAL; INTRACAUDAL ONCE
Status: COMPLETED | OUTPATIENT
Start: 2023-06-21 | End: 2023-06-21

## 2023-06-21 RX ADMIN — BUPIVACAINE HYDROCHLORIDE 30 ML: 5 INJECTION, SOLUTION EPIDURAL; INTRACAUDAL; PERINEURAL at 20:38

## 2023-06-21 NOTE — PROGRESS NOTES
"6/21/2023  [facility] [place]  1960  [id number]      Assessment/Plan  ED  BPH s/p robotic simple prostatectomy    Discussion  Nika Alegre is a 66 y o  male being managed by Dr Anne Tsang  He was provided verbal and physical demonstration of intracavernosal injection use  He was instructed on storage, disposal, and titration of the medication  He was instructed on hospital precautions for a priapism  He was able to demonstrate understanding of injection use  All questions were answered  History of Present Illness  66 y o  male with ED presents today for intracavernosal injection teaching  Vitals:    06/21/23 1134   BP: 122/84   Pulse: 79   SpO2: 99%   Weight: 87 5 kg (193 lb)   Height: 5' 8\" (1 727 m)       Procedure    Procedure: intracavernosal injection  Indication: Erectile dysfunction s/p robotic simple prostatectomy  Discussed:  Proper technique and instruction for intracavernosal injection were explained to the patient  risks of injection including but not limited to pain, bleeding, infection, fibrosis, and priapism (including the potential complications of priapism) were discussed  Procedure: The skin overlying the injection site was then prepped with Alcohol  0 1 ml of was injected into the corpora cavernosum  Patient Status:  the patient was closely monitored for 10 minutes and reassessed  He tolerated the procedure well  Response to intracavernosal injection was fair   Complications:  No complications noted  Instructions:  the patient was counseled about priapism and instructed to return to the clinic or the emergency room if his erection lasted up to 4 hours  the patient expressed understanding of the injection technique and felt able to perform self-injection         "

## 2023-06-21 NOTE — ED NOTES
Pt took 2 tablets of sudafed around 1500 and did not not work so took two more at [facility]        Shalini Velasco, AP  06/21/23 1700

## 2023-06-21 NOTE — ED PROVIDER NOTES
History  Chief Complaint   Patient presents with   • Penis / Scrotum Problem     Pt administered penis injection for erection around 1200 pm  Pt c/o of continued painful erection since injection  Denies numbness, skin discoloration to the area  Patient is a 42-year-old male with history of BPH status post robotic prostatectomy, erectile dysfunction presenting to the emergency department for evaluation of sustained erection  Patient was seen by urology earlier today at noon  He received an intracavernosal injection at that time  Around 1230 patient states he developed an erection  He has had a sustained erection since 1230, approximately 5 hours  He tried Sudafed x4 split over 2 administrations earlier but states this is not off this did not offer him relief  He does endorse a minor ache but denies glans tenderness  Prior to Admission Medications   Prescriptions Last Dose Informant Patient Reported? Taking?    EPINEPHrine (EPIPEN) 0 3 mg/0 3 mL SOAJ  Self No No   Sig: Inject 0 3 mL (0 3 mg total) into a muscle once for 1 dose   Omega-3 Fatty Acids (FISH OIL) 1000 MG CPDR  Self Yes No   Sig: Take 1 capsule by mouth daily     aspirin (ECOTRIN) 325 mg EC tablet  Self Yes No   Sig: Take 325 mg by mouth every other day   cyclobenzaprine (FLEXERIL) 10 mg tablet  Self No No   Sig: Take 1 tablet (10 mg total) by mouth daily at bedtime as needed for muscle spasms   diltiazem (CARDIZEM CD) 120 mg 24 hr capsule  Self No No   Sig: TAKE 1 CAPSULE BY MOUTH EVERY DAY   meloxicam (MOBIC) 15 mg tablet  Self No No   Sig: TAKE 1 TABLET BY MOUTH EVERY DAY AS NEEDED   Patient not taking: Reported on 5/11/2023   omeprazole (PriLOSEC) 40 MG capsule  Self No No   Sig: TAKE 1 CAPSULE BY MOUTH EVERY DAY   senna (SENOKOT) 8 6 mg  Self No No   Sig: Take 1 tablet (8 6 mg total) by mouth daily at bedtime for 5 days   Patient not taking: Reported on 6/21/2023   sildenafil (VIAGRA) 100 mg tablet  Self No No   Sig: Take 1 tablet (100 mg total) by mouth daily as needed for erectile dysfunction      Facility-Administered Medications: None       Past Medical History:   Diagnosis Date   • Back disorder 02/26/2019    getting epidural shots, physical therapy   • CAD (coronary artery disease)    • Cardiac disease    • Cardiomyopathy ([facility] [address] )    • Failed back syndrome    • GERD (gastroesophageal reflux disease)    • Heart attack ([facility] [address] )     Mild MI 1996    • Hypertension     Patient denies   • Myocardial infarction ([facility] [address] )     Mild   • Sleep apnea    • Umbilical hernia     Last Assessed:3/25/2015       Past Surgical History:   Procedure Laterality Date   • BACK SURGERY     • HERNIA REPAIR      Last Assessed:3/12/2014 ,lumbar   • LUMBAR LAMINECTOMY      Last Assessed:3/12/2014   • TX LAPS SURG [id number] RPBIC RAD W/NRV SPARING ROBOT N/A 10/26/2022    Procedure: ROBOTIC ASSISTED LAPAROSCOPIC SIMPLE PROSTATECTOMY, BLADDER NECK RECONSTRUCTION;  Surgeon: Elizabeth Bennett MD;  Location: [facility];  Service: Urology   • PROSTATE BIOPSY  09/10/2015    needle biopsy   • SHOULDER SURGERY     • TONSILLECTOMY         Family History   Problem Relation Age of Onset   • Hypertension Father    • Diabetes Father    • Pancreatic cancer Mother    • Cancer Family    • No Known Problems Sister    • No Known Problems Brother    • No Known Problems Brother      I have reviewed and agree with the history as documented  E-Cigarette/Vaping   • E-Cigarette Use Never User      E-Cigarette/Vaping Substances   • Nicotine No    • THC No    • CBD No    • Flavoring No    • Other No    • Unknown No      Social History     Tobacco Use   • Smoking status: Never     Passive exposure: Past   • Smokeless tobacco: Never   Vaping Use   • Vaping Use: Never used   Substance Use Topics   • Alcohol use: Yes     Comment: occ / Rare   • Drug use: No        Review of Systems   Constitutional: Negative  HENT: Negative  Eyes: Negative  Respiratory: Negative  Cardiovascular: Negative  Gastrointestinal: Negative  Endocrine: Negative  Genitourinary: Positive for penile pain and penile swelling  Musculoskeletal: Negative  Skin: Negative  Allergic/Immunologic: Negative  Neurological: Negative  Hematological: Negative  Psychiatric/Behavioral: Negative  All other systems reviewed and are negative  Physical Exam  ED Triage Vitals [06/21/23 1640]   Temperature Pulse Respirations Blood Pressure SpO2   98 °F (36 7 °C) 78 18 (!) 126/104 98 %      Temp Source Heart Rate Source Patient Position - Orthostatic VS BP Location FiO2 (%)   Oral Monitor Sitting Right arm --      Pain Score       7             Orthostatic Vital Signs  Vitals:    06/21/23 1640 06/21/23 1915   BP: (!) 126/104 113/84   Pulse: 78 67   Patient Position - Orthostatic VS: Sitting        Physical Exam  Vitals and nursing note reviewed  Constitutional:       General: He is not in acute distress  Appearance: Normal appearance  He is not ill-appearing, toxic-appearing or diaphoretic  HENT:      Head: Normocephalic and atraumatic  Eyes:      General: No scleral icterus  Right eye: No discharge  Left eye: No discharge  Extraocular Movements: Extraocular movements intact  Conjunctiva/sclera: Conjunctivae normal       Pupils: Pupils are equal, round, and reactive to light  Cardiovascular:      Rate and Rhythm: Normal rate  Pulses: Normal pulses  Heart sounds: Normal heart sounds  No murmur heard  No friction rub  No gallop  Pulmonary:      Effort: Pulmonary effort is normal  No respiratory distress  Breath sounds: Normal breath sounds  No stridor  No wheezing, rhonchi or rales  Abdominal:      General: Abdomen is flat  Bowel sounds are normal  There is no distension  Palpations: Abdomen is soft  Tenderness: There is no abdominal tenderness  There is no guarding or rebound     Genitourinary:     Comments: Penis erect w/ soft, non-tender glans  Musculoskeletal:         General: No swelling  Normal range of motion  Cervical back: Normal range of motion  No rigidity  Right lower leg: No edema  Left lower leg: No edema  Skin:     General: Skin is warm and dry  Capillary Refill: Capillary refill takes less than 2 seconds  Coloration: Skin is not jaundiced  Findings: No bruising or lesion  Neurological:      General: No focal deficit present  Mental Status: He is alert and oriented to person, place, and time  Mental status is at baseline  Psychiatric:         Mood and Affect: Mood normal          Behavior: Behavior normal          Thought Content: Thought content normal          Judgment: Judgment normal          ED Medications  Medications   bupivacaine (PF) (MARCAINE) 0 5 % injection 30 mL (30 mL Infiltration Given by Other 6/21/23 2038)       Diagnostic Studies  Results Reviewed     None                 No orders to display         Procedures  Procedures      ED Course  ED Course as of 06/22/23 1541   Wed Jun 21, 2023   1713 Pt evaluated in ED, sustained erection since approx 1230  S/p sudafed x4    1938 Phenylephrine injected in both sides of penis w/ detumescence  Medical Decision Making  This is a 45-year-old male who presented to the emergency department for evaluation of sustained erection  Physical exam as noted above  Patient is in no acute distress  Patient penis is erect with soft, nontender glans  Dorsal penile block attempted with minimal anesthesia so second attempt was made with satisfactory anesthesia  Aspiration attempt was made using butterfly needle unsuccessfully  Phenylephrine was injected into the cavernosum on both sides  Detumescence was achieved after approximately 30 minutes  Patient was additionally watched in the emergency department to evaluate for recrudescence of persistent erection    Patient able to urinate without any hematuria or discomfort  Patient safe for discharge at this time with appropriate urology follow-up  Risk  Prescription drug management  Disposition  Final diagnoses:   Priapism     Time reflects when diagnosis was documented in both MDM as applicable and the Disposition within this note     Time User Action Codes Description Comment    6/21/2023  8:20 PM Dimitri Lou Add [N48 [id number]] Priapism       ED Disposition     ED Disposition   Discharge    Condition   Stable    Date/Time   Wed Jun 21, 2023  8:20 PM    Comment   Russell Mcintyre discharge to home/self care                 Follow-up Information     Follow up With Specialties Details Why Contact Info Additional [address] [facility] [address] [place] [facility] [place] [facility] Call in 1 day  Dhiraj Beard [address] [facility] [place], [address] [place], [place], [address] [facility]          Discharge Medication List as of 6/21/2023  8:21 PM      CONTINUE these medications which have NOT CHANGED    Details   aspirin (ECOTRIN) 325 mg EC tablet Take 325 mg by mouth every other day, Historical Med      cyclobenzaprine (FLEXERIL) 10 mg tablet Take 1 tablet (10 mg total) by mouth daily at bedtime as needed for muscle spasms, Starting Mon 2/17/2020, Normal      diltiazem (CARDIZEM CD) 120 mg 24 hr capsule TAKE 1 CAPSULE BY MOUTH EVERY DAY, Normal      EPINEPHrine (EPIPEN) 0 3 mg/0 3 mL SOAJ Inject 0 3 mL (0 3 mg total) into a muscle once for 1 dose, Starting Mon 5/1/2023, Normal      meloxicam (MOBIC) 15 mg tablet TAKE 1 TABLET BY MOUTH EVERY DAY AS NEEDED, Normal      Omega-3 Fatty Acids (FISH OIL) 1000 MG CPDR Take 1 capsule by mouth daily  , Historical Med      omeprazole (PriLOSEC) 40 MG capsule TAKE 1 CAPSULE BY MOUTH EVERY DAY, Normal      senna (SENOKOT) 8 6 mg Take 1 tablet (8 6 mg total) by mouth daily at bedtime for 5 days, Starting Wed 10/26/2022, Until Wed 6/21/2023, Normal sildenafil (VIAGRA) 100 mg tablet Take 1 tablet (100 mg total) by mouth daily as needed for erectile dysfunction, Starting Mon 2/28/2022, Print           No discharge procedures on file  PDMP Review     None           ED Provider  Attending physically available and evaluated Park Connor I managed the patient along with the ED Attending      Electronically Signed by         Radha Carroll DO  06/22/23 3386

## 2023-06-22 ENCOUNTER — TELEPHONE (OUTPATIENT)
Dept: UROLOGY | Facility: MEDICAL CENTER | Age: 63
End: 2023-06-22

## 2023-06-22 ENCOUNTER — OFFICE VISIT (OUTPATIENT)
Dept: UROLOGY | Facility: MEDICAL CENTER | Age: 63
End: 2023-06-22
Payer: COMMERCIAL

## 2023-06-22 VITALS
WEIGHT: 196.6 LBS | DIASTOLIC BLOOD PRESSURE: 82 MMHG | BODY MASS INDEX: 29.8 KG/M2 | HEART RATE: 61 BPM | HEIGHT: 68 IN | SYSTOLIC BLOOD PRESSURE: 130 MMHG | OXYGEN SATURATION: 100 %

## 2023-06-22 DIAGNOSIS — N52.9 ERECTILE DYSFUNCTION, UNSPECIFIED ERECTILE DYSFUNCTION TYPE: Primary | ICD-10-CM

## 2023-06-22 PROCEDURE — 99214 OFFICE O/P EST MOD 30 MIN: CPT | Performed by: STUDENT IN AN ORGANIZED HEALTH CARE EDUCATION/TRAINING PROGRAM

## 2023-06-22 NOTE — TELEPHONE ENCOUNTER
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

## 2023-06-22 NOTE — TELEPHONE ENCOUNTER
Patient had trimix injection in the office yesterday  Injected 0 1mL of trimix (10/30/1) and developed priapism  Went to ER and required 2-3 doses of phenylephrine and detumesced  He is currently flaccid  Already scheduled for visit this afternoon with Dr Daisy Castellano to discuss possible IPP  Advised not to use trimix again  Possible alprostadil monotherapy in the future

## 2023-06-22 NOTE — TELEPHONE ENCOUNTER
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

## 2023-06-23 NOTE — TELEPHONE ENCOUNTER
Spoke with patient , scheduled for 6 weeks with Galo Helms in the OS office for follow up for [facility] teaching  per Dr Ale Buchanan  Patient is asking since he had a problem with the Trimix , a new medication was to be sent in for him -wants to know if it was ? Where was it sent ?

## 2023-06-23 NOTE — ED ATTENDING ATTESTATION
6/21/2023  ICata MD, saw and evaluated the patient  I have discussed the patient with the resident/non-physician practitioner and agree with the resident's/non-physician practitioner's findings, Plan of Care, and MDM as documented in the resident's/non-physician practitioner's note, except where noted  All available labs and Radiology studies were reviewed  I was present for key portions of any procedure(s) performed by the resident/non-physician practitioner and I was immediately available to provide assistance  At this point I agree with the current assessment done in the Emergency Department    I have conducted an independent evaluation of this patient a history and physical is as follows:SEE H AND P ABOVE- AGREE WITH ER RESIDENT TX PLAN/ DISPO    ED Course  ED Course as of 06/23/23 0418   Wed Jun 21, 2023 2010 Er md  note-  informed consent given to pt and wife prior to in injections- bleeding/ infection- damage to adjacent structures-- they verbalize understanding    2012 Er md procedure note  for intercavernous  injections for priapism--  pt with  dorsal penile block  x 2 with total of 4 ml of  5 % marcaine  dorsally at 2 and 10 o'clock -- the after aspirate of approx 10 ml of blood   1 ml of phenylephrine injected  x 2 on r 1 ml of phenylephrine injected  on r cavernous x 2 with detumescence - minimal bleeding          Critical Care Time  Procedures

## 2023-06-28 ENCOUNTER — RA CDI HCC (OUTPATIENT)
Dept: OTHER | Facility: HOSPITAL | Age: 63
End: 2023-06-28

## 2023-06-28 NOTE — PROGRESS NOTES
[facility]  coding opportunities       Chart reviewed, no opportunity found: CHART REVIEWED, NO OPPORTUNITY FOUND        Patients Insurance        Commercial Insurance: [employer]

## 2023-07-02 DIAGNOSIS — I10 ESSENTIAL HYPERTENSION: ICD-10-CM

## 2023-07-03 RX ORDER — DILTIAZEM HYDROCHLORIDE 120 MG/1
CAPSULE, COATED, EXTENDED RELEASE ORAL
Qty: 90 CAPSULE | Refills: 4 | Status: SHIPPED | OUTPATIENT
Start: 2023-07-03

## 2023-07-05 ENCOUNTER — OFFICE VISIT (OUTPATIENT)
Dept: FAMILY MEDICINE CLINIC | Facility: MEDICAL CENTER | Age: 63
End: 2023-07-05
Payer: COMMERCIAL

## 2023-07-05 VITALS
DIASTOLIC BLOOD PRESSURE: 84 MMHG | WEIGHT: 189.8 LBS | SYSTOLIC BLOOD PRESSURE: 140 MMHG | TEMPERATURE: 97.6 F | HEIGHT: 68 IN | OXYGEN SATURATION: 96 % | HEART RATE: 84 BPM | BODY MASS INDEX: 28.76 KG/M2

## 2023-07-05 DIAGNOSIS — I25.10 CORONARY ARTERY DISEASE INVOLVING NATIVE HEART WITHOUT ANGINA PECTORIS, UNSPECIFIED VESSEL OR LESION TYPE: ICD-10-CM

## 2023-07-05 DIAGNOSIS — Z90.79 H/O PROSTATECTOMY: ICD-10-CM

## 2023-07-05 DIAGNOSIS — I42.0 DILATED CARDIOMYOPATHY (HCC): ICD-10-CM

## 2023-07-05 DIAGNOSIS — N52.9 ERECTILE DYSFUNCTION, UNSPECIFIED ERECTILE DYSFUNCTION TYPE: ICD-10-CM

## 2023-07-05 DIAGNOSIS — Z13.29 SCREENING FOR THYROID DISORDER: ICD-10-CM

## 2023-07-05 DIAGNOSIS — I10 ESSENTIAL HYPERTENSION: ICD-10-CM

## 2023-07-05 DIAGNOSIS — K21.9 GASTROESOPHAGEAL REFLUX DISEASE WITHOUT ESOPHAGITIS: ICD-10-CM

## 2023-07-05 DIAGNOSIS — Z00.00 PREVENTATIVE HEALTH CARE: Primary | ICD-10-CM

## 2023-07-05 DIAGNOSIS — E78.2 MIXED HYPERLIPIDEMIA: ICD-10-CM

## 2023-07-05 PROBLEM — N40.1 BENIGN LOCALIZED PROSTATIC HYPERPLASIA WITH LOWER URINARY TRACT SYMPTOMS (LUTS): Status: RESOLVED | Noted: 2022-10-25 | Resolved: 2023-07-05

## 2023-07-05 PROBLEM — K56.7 ILEUS (HCC): Status: RESOLVED | Noted: 2022-11-14 | Resolved: 2023-07-05

## 2023-07-05 PROCEDURE — 3725F SCREEN DEPRESSION PERFORMED: CPT | Performed by: FAMILY MEDICINE

## 2023-07-05 PROCEDURE — 99213 OFFICE O/P EST LOW 20 MIN: CPT | Performed by: FAMILY MEDICINE

## 2023-07-05 PROCEDURE — 99396 PREV VISIT EST AGE 40-64: CPT | Performed by: FAMILY MEDICINE

## 2023-07-05 NOTE — ASSESSMENT & PLAN NOTE
His last LDL was 111. He is not taking any medication. Continue low-fat diet, continue active lifestyle, try to lose some weight.

## 2023-07-05 NOTE — PROGRESS NOTES
ADULT ANNUAL [address] [facility]    NAME: Shireen Jenkins  AGE: 66 y.o. SEX: male  : 1960     DATE: 2023     Assessment and Plan:     Problem List Items Addressed This Visit        Digestive    Gastroesophageal reflux disease without esophagitis     He is taking omeprazole. It works well for him. He has no breakthrough symptoms. Continue omeprazole. Keep his weight down. Avoid foods and substances that exacerbate the symptoms. Cardiovascular and Mediastinum    Essential hypertension     Blood pressure is 140/84. He is taking Cardizem. Continue Cardizem, continue active lifestyle, try to lose some weight. Low-salt diet. Cardiomyopathy ([address])     I am not sure that this is a problem. His last echo in December was with an EF of 60%. Mild dilation. Nothing is progressing. Completely asymptomatic.         CAD (coronary artery disease)     He follows up with cardiology. Last echo looked good. Continue aspirin. Other    Mixed hyperlipidemia     His last LDL was 111. He is not taking any medication. Continue low-fat diet, continue active lifestyle, try to lose some weight. Relevant Orders    Lipid Panel with Direct LDL reflex    Erectile dysfunction    H/O prostatectomy     He had prostatectomy, robotic, about 6 months ago. It was not cancer, it was outflow problems. No sequela except erectile dysfunction. Last month he tried an injectable because Viagra failed. He had a sustained erection and to go to the ER. They are going to try another medication and see how he tolerates that. Other Visit Diagnoses     Preventative health care    -  Primary    Screening for thyroid disorder        Relevant Orders    TSH, 3rd generation with Free T4 reflex          Immunizations and preventive care screenings were discussed with patient today.  Appropriate education was printed on patient's after visit summary. Discussed risks and benefits of prostate cancer screening. We discussed the controversial history of PSA screening for prostate cancer in the [facility] as well as the risk of over detection and over treatment of prostate cancer by way of PSA screening. The patient understands that PSA blood testing is an imperfect way to screen for prostate cancer and that elevated PSA levels in the blood may also be caused by infection, inflammation, prostatic trauma or manipulation, urological procedures, or by benign prostatic enlargement. The role of the digital rectal examination in prostate cancer screening was also discussed and I discussed with him that there is large interobserver variability in the findings of digital rectal examination. Counseling:  Alcohol/drug use: discussed moderation in alcohol intake, the recommendations for healthy alcohol use, and avoidance of illicit drug use. Sexual health: discussed sexually transmitted diseases, partner selection, use of condoms, avoidance of unintended pregnancy, and contraceptive alternatives. Exercise: the importance of regular exercise/physical activity was discussed. Recommend exercise 3-5 times per week for at least 30 minutes. BMI Counseling: Body mass index is 28.86 kg/m². The BMI is above normal. Nutrition recommendations include encouraging healthy choices of fruits and vegetables and reducing intake of saturated and trans fat. Exercise recommendations include moderate physical activity 150 minutes/week and exercising 3-5 times per week. Rationale for BMI follow-up plan is due to patient being overweight or obese. Depression Screening and Follow-up Plan: Patient was screened for depression during today's encounter. They screened negative with a PHQ-2 score of 0. No follow-ups on file.      Chief Complaint:     Chief Complaint   Patient presents with   • Annual Exam      History of Present Illness:     Adult Annual Physical Patient here for a comprehensive physical exam. The patient reports no problems. This is a pleasant 42-year-old man. He lives with his wife. He works in construction. They had 2 children, one adopted. He is up-to-date with colorectal cancer screening and prostate cancer screening. Diet and Physical Activity  Diet/Nutrition: well balanced diet. Exercise: walking. Depression Screening  PHQ-2/9 Depression Screening    Little interest or pleasure in doing things: 0 - not at all  Feeling down, depressed, or hopeless: 0 - not at all  PHQ-2 Score: 0  PHQ-2 Interpretation: Negative depression screen       General Health  Sleep: sleeps well. Hearing: normal - bilateral.  Vision: no vision problems. Dental: regular dental visits.  Health  Symptoms include: erectile dysfunction     Review of Systems:     Review of Systems   Constitutional: Negative for activity change, fatigue and fever. HENT: Negative for congestion, ear discharge, ear pain, postnasal drip, rhinorrhea, sinus pain, sneezing and sore throat. Eyes: Negative for photophobia, pain, discharge and redness. Respiratory: Negative for apnea, cough, shortness of breath and wheezing. Cardiovascular: Negative for chest pain and palpitations. Gastrointestinal: Negative for abdominal pain, blood in stool, constipation, diarrhea, nausea and vomiting. Endocrine: Negative for polydipsia, polyphagia and polyuria. Genitourinary: Negative for decreased urine volume, difficulty urinating, dysuria, frequency, penile discharge, penile pain and urgency. Musculoskeletal: Positive for back pain. Negative for arthralgias, gait problem, joint swelling and neck pain. Skin: Negative for color change and rash. Neurological: Negative for dizziness, tremors, seizures, weakness and headaches. Psychiatric/Behavioral: Negative for agitation and sleep disturbance. The patient is not nervous/anxious.        Past Medical History:     Past Medical History:   Diagnosis Date   • Back disorder 02/26/2019    getting epidural shots, physical therapy   • Benign localized prostatic hyperplasia with lower urinary tract symptoms (LUTS) 10/25/2022   • CAD (coronary artery disease)    • Cardiac disease    • Cardiomyopathy ([address])    • Failed back syndrome    • GERD (gastroesophageal reflux disease)    • Heart attack ([address])     Mild MI 1996    • Hypertension     Patient denies   • Myocardial infarction ([address])     Mild   • Sleep apnea    • Umbilical hernia     Last Assessed:3/25/2015      Past Surgical History:     Past Surgical History:   Procedure Laterality Date   • BACK SURGERY     • HERNIA REPAIR      Last Assessed:3/12/2014 ,lumbar   • LUMBAR LAMINECTOMY      Last Assessed:3/12/2014   • ID LAPS SURG [id number] RPBIC RAD W/NRV SPARING ROBOT N/A 10/26/2022    Procedure: ROBOTIC ASSISTED LAPAROSCOPIC SIMPLE PROSTATECTOMY, BLADDER NECK RECONSTRUCTION;  Surgeon: Ceci Mota MD;  Location: [facility];  Service: Urology   • PROSTATE BIOPSY  09/10/2015    needle biopsy   • SHOULDER SURGERY     • TONSILLECTOMY        Family History:     Family History   Problem Relation Age of Onset   • Pancreatic cancer Mother    • Hypertension Father    • Diabetes Father    • No Known Problems Sister    • No Known Problems Brother    • No Known Problems Son    • No Known Problems Son    • Cancer Family       Social History:     Social History     Socioeconomic History   • Marital status: /Civil Union     Spouse name: None   • Number of children: None   • Years of education: None   • Highest education level: None   Occupational History   • Occupation:      Comment: full time   Tobacco Use   • Smoking status: Never     Passive exposure: Past   • Smokeless tobacco: Never   Vaping Use   • Vaping Use: Never used   Substance and Sexual Activity   • Alcohol use: Yes     Comment: occ / Rare   • Drug use: No   • Sexual activity: Yes     Partners: Female   Other Topics Concern   • None   Social History Narrative    Always uses seat belt    Caffeine use     Social Determinants of Health     Financial Resource Strain: Not on file   Food Insecurity: No Food Insecurity (10/28/2022)    Hunger Vital Sign    • Worried About Running Out of Food in the Last Year: Never true    • Ran Out of Food in the Last Year: Never true   Transportation Needs: No Transportation Needs (10/28/2022)    PRAPARE - Transportation    • Lack of Transportation (Medical): No    • Lack of Transportation (Non-Medical):  No   Physical Activity: Not on file   Stress: Not on file   Social Connections: Not on file   Intimate Partner Violence: Not on file   Housing Stability: Low Risk  (10/28/2022)    Housing Stability Vital Sign    • Unable to Pay for Housing in the Last Year: No    • Number of Places Lived in the Last Year: 1    • Unstable Housing in the Last Year: No      Current Medications:     Current Outpatient Medications   Medication Sig Dispense Refill   • aspirin (ECOTRIN) 325 mg EC tablet Take 325 mg by mouth every other day     • cyclobenzaprine (FLEXERIL) 10 mg tablet Take 1 tablet (10 mg total) by mouth daily at bedtime as needed for muscle spasms 30 tablet 3   • diltiazem (CARDIZEM CD) 120 mg 24 hr capsule TAKE 1 CAPSULE BY MOUTH EVERY DAY 90 capsule 4   • EPINEPHrine (EPIPEN) 0.3 mg/0.3 mL SOAJ Inject 0.3 mL (0.3 mg total) into a muscle once for 1 dose 0.3 mL 1   • meloxicam (MOBIC) 15 mg tablet TAKE 1 TABLET BY MOUTH EVERY DAY AS NEEDED 30 tablet 1   • Omega-3 Fatty Acids (FISH OIL) 1000 MG CPDR Take 1 capsule by mouth daily       • omeprazole (PriLOSEC) 40 MG capsule TAKE 1 CAPSULE BY MOUTH EVERY DAY 90 capsule 1   • sildenafil (VIAGRA) 100 mg tablet Take 1 tablet (100 mg total) by mouth daily as needed for erectile dysfunction 10 tablet 11   • senna (SENOKOT) 8.6 mg Take 1 tablet (8.6 mg total) by mouth daily at bedtime for 5 days 5 tablet 0     No current facility-administered medications for this visit. Allergies: Allergies   Allergen Reactions   • Other Anaphylaxis     [facility] [id number]: YELLOW JACKETS      Physical Exam:     /84 (BP Location: Left arm, Patient Position: Sitting, Cuff Size: Large)   Pulse 84   Temp 97.6 °F (36.4 °C)   Ht 5' 8" (1.727 m)   Wt 86.1 kg (189 lb 12.8 oz)   SpO2 96%   BMI 28.86 kg/m²     Physical Exam  Vitals and nursing note reviewed. Constitutional:       General: He is not in acute distress. Appearance: Normal appearance. He is well-developed. He is not ill-appearing. HENT:      Head: Normocephalic. Right Ear: Tympanic membrane, ear canal and external ear normal.      Left Ear: Tympanic membrane, ear canal and external ear normal.      Nose: Nose normal. No rhinorrhea. Mouth/Throat:      Mouth: Mucous membranes are moist.      Pharynx: Uvula midline. No oropharyngeal exudate. Tonsils: No tonsillar exudate. Eyes:      General: Lids are normal.      Conjunctiva/sclera: Conjunctivae normal.      Pupils: Pupils are equal, round, and reactive to light. Neck:      Thyroid: No thyromegaly. Vascular: No carotid bruit. Trachea: Trachea normal.   Cardiovascular:      Rate and Rhythm: Normal rate and regular rhythm. Pulses: Normal pulses. Heart sounds: Normal heart sounds, S1 normal and S2 normal. No murmur heard. Pulmonary:      Effort: Pulmonary effort is normal. No respiratory distress. Breath sounds: Normal breath sounds. Abdominal:      General: Bowel sounds are normal.      Palpations: Abdomen is soft. Tenderness: There is no abdominal tenderness. Hernia: No hernia is present. Musculoskeletal:         General: Normal range of motion. Cervical back: Normal range of motion and neck supple. Lymphadenopathy:      Cervical: No cervical adenopathy. Skin:     General: Skin is warm and dry. Neurological:      General: No focal deficit present.       Mental Status: He is alert and oriented to person, place, and time. Cranial Nerves: No cranial nerve deficit. Sensory: No sensory deficit. Gait: Gait normal.      Deep Tendon Reflexes: Reflexes are normal and symmetric. Psychiatric:         Speech: Speech normal.         Behavior: Behavior normal. Behavior is cooperative. Thought Content:  Thought content normal.          Reno Victoria MD  [id number] [facility]

## 2023-07-05 NOTE — ASSESSMENT & PLAN NOTE
He had prostatectomy, robotic, about 6 months ago. It was not cancer, it was outflow problems. No sequela except erectile dysfunction. Last month he tried an injectable because Viagra failed. He had a sustained erection and to go to the ER. They are going to try another medication and see how he tolerates that.

## 2023-07-05 NOTE — ASSESSMENT & PLAN NOTE
He is taking omeprazole. It works well for him. He has no breakthrough symptoms. Continue omeprazole. Keep his weight down. Avoid foods and substances that exacerbate the symptoms.

## 2023-07-05 NOTE — ASSESSMENT & PLAN NOTE
I am not sure that this is a problem. His last echo in December was with an EF of 60%. Mild dilation. Nothing is progressing. Completely asymptomatic.

## 2023-07-05 NOTE — ASSESSMENT & PLAN NOTE
Blood pressure is 140/84. He is taking Cardizem. Continue Cardizem, continue active lifestyle, try to lose some weight. Low-salt diet.

## 2023-07-06 DIAGNOSIS — K21.9 GASTROESOPHAGEAL REFLUX DISEASE WITHOUT ESOPHAGITIS: ICD-10-CM

## 2023-07-06 RX ORDER — OMEPRAZOLE 40 MG/1
CAPSULE, DELAYED RELEASE ORAL
Qty: 90 CAPSULE | Refills: 1 | Status: SHIPPED | OUTPATIENT
Start: 2023-07-06

## 2023-07-07 LAB
CHOLEST SERPL-MCNC: 170 MG/DL (ref 100–199)
HDLC SERPL-MCNC: 41 MG/DL
LDLC SERPL CALC-MCNC: 116 MG/DL (ref 0–99)
SL AMB VLDL CHOLESTEROL CALC: 13 MG/DL (ref 5–40)
TRIGL SERPL-MCNC: 70 MG/DL (ref 0–149)
TSH SERPL DL<=0.005 MIU/L-ACNC: 1.17 UIU/ML (ref 0.45–4.5)

## 2023-07-09 NOTE — PROGRESS NOTES
Urology Ambulatory Progress Note  6/22/2023    Jenae Beltran  1960  [id number]      Assessment/Plan:  Problem List Items Addressed This Visit        Other    Erectile dysfunction - Primary     Assessment:  Erectile dysfunction with history of priapism after 0.1 mL of Trimix standard. The good news is there is likely a dose somewhere between the dose he received in office and 0.1 mL of alprostadil 2.5 mcg. In the situations I will often offer alprostadil monotherapy in hopes that we can find a dose that we will allow him to achieve erection without priapism. It will sometimes take longer to find the appropriate dose due to intentional slow titration. I recommend he hold off on injections for about a month to allow him to heal.  He can then follow-up closer to home for repeat in office injection. We did discuss inflatable penile prosthesis. He was appreciative to hear the information/option but would like to proceed with monotherapy alprostadil first which I think is very reasonable. Plan:  • RX for Alprostadil 5mcg/mL sent to [facility]. I advised starting with 0.1 mL and titrating up very slowly  • Patient will follow-up with Hyacinth Terry at our [facility] office for repeat injection/teaching in about 1 month                Chief Complaint: Follow-up for IPP discussion    History of Present Illness  This is a very pleasant 59-year-old male presenting for discussion of penile prosthesis. He presents to the appointment with his wife. He has history of BPH and has previously had a simple prostatectomy. He had a recent office visit June 21, 2023 for [facility]. He received 0.1 mL of Trimix standard at office visit. He unfortunately had to report to the emergency room later that evening due to priapism. His priapism was medically treated in the emergency room and he was discharged home. He has a little bit sore now and still healing but has not had a recurrent erection since discharge.     Previous PSA values:  Lab Results   Component Value Date    PSA <0.1 05/02/2023    PSA 7.9 (H) 08/09/2022    PSA 8.5 (H) 02/16/2022    PSA 6.6 (H) 02/15/2019    PSA 6.7 (H) 12/26/2017    PSA 3.4 06/09/2014       Past Medical History  Past Medical History:   Diagnosis Date   • Back disorder 02/26/2019    getting epidural shots, physical therapy   • Benign localized prostatic hyperplasia with lower urinary tract symptoms (LUTS) 10/25/2022   • CAD (coronary artery disease)    • Cardiac disease    • Cardiomyopathy ([address])    • Failed back syndrome    • GERD (gastroesophageal reflux disease)    • Heart attack ([address])     Mild MI 1996    • Hypertension     Patient denies   • Myocardial infarction ([address])     Mild   • Sleep apnea    • Umbilical hernia     Last Assessed:3/25/2015       Past Surgical History  Past Surgical History:   Procedure Laterality Date   • BACK SURGERY     • HERNIA REPAIR      Last Assessed:3/12/2014 ,lumbar   • LUMBAR LAMINECTOMY      Last Assessed:3/12/2014   • [place] LAPS SURG [id number] RPBIC RAD W/NRV SPARING ROBOT N/A 10/26/2022    Procedure: ROBOTIC ASSISTED LAPAROSCOPIC SIMPLE PROSTATECTOMY, BLADDER NECK RECONSTRUCTION;  Surgeon: Carlos Enrique Saunders MD;  Location[facility];  Service: Urology   • PROSTATE BIOPSY  09/10/2015    needle biopsy   • SHOULDER SURGERY     • TONSILLECTOMY         Physical Exam  /82 (BP Location: Right arm, Patient Position: Sitting, Cuff Size: Adult)   Pulse 61   Ht 5' 8" (1.727 m)   Wt 89.2 kg (196 lb 9.6 oz)   SpO2 100%   BMI 29.89 kg/m²     General:  Healthy appearing male in no acute distress. Head:  Normocephalic, atraumatic. Cardiovascular:  Regular rate  Respiratory:  Patient has unlabored respirations. Yin Espana MD  [facility]    Portions of the above record have been created with voice recognition software. Occasional wrong word or "sound alike" substitution may have occurred due to the inherent limitations of voice recognition software. Please read the chart carefully and recognize, using context, where substitution may have occurred. For further clarification, please contact me directly.

## 2023-07-09 NOTE — ASSESSMENT & PLAN NOTE
Assessment:  Erectile dysfunction with history of priapism after 0.1 mL of Trimix standard. The good news is there is likely a dose somewhere between the dose he received in office and 0.1 mL of alprostadil 2.5 mcg. In the situations I will often offer alprostadil monotherapy in hopes that we can find a dose that we will allow him to achieve erection without priapism. It will sometimes take longer to find the appropriate dose due to intentional slow titration. I recommend he hold off on injections for about a month to allow him to heal.  He can then follow-up closer to home for repeat in office injection. We did discuss inflatable penile prosthesis. He was appreciative to hear the information/option but would like to proceed with monotherapy alprostadil first which I think is very reasonable. Plan:  • RX for Alprostadil 5mcg/mL sent to [facility].   I advised starting with 0.1 mL and titrating up very slowly  • Patient will follow-up with Aashish Ferrer at our [facility] office for repeat injection/teaching in about 1 month

## 2023-08-01 ENCOUNTER — APPOINTMENT (EMERGENCY)
Dept: RADIOLOGY | Facility: HOSPITAL | Age: 63
End: 2023-08-01
Payer: COMMERCIAL

## 2023-08-01 ENCOUNTER — HOSPITAL ENCOUNTER (EMERGENCY)
Facility: HOSPITAL | Age: 63
Discharge: HOME | End: 2023-08-01
Attending: EMERGENCY MEDICINE
Payer: COMMERCIAL

## 2023-08-01 VITALS
DIASTOLIC BLOOD PRESSURE: 87 MMHG | HEART RATE: 65 BPM | OXYGEN SATURATION: 96 % | TEMPERATURE: 98 F | BODY MASS INDEX: 28.83 KG/M2 | RESPIRATION RATE: 16 BRPM | WEIGHT: 189.6 LBS | SYSTOLIC BLOOD PRESSURE: 135 MMHG

## 2023-08-01 DIAGNOSIS — R07.9 CHEST PAIN, UNSPECIFIED: Primary | ICD-10-CM

## 2023-08-01 LAB
2HR DELTA HS TROPONIN: 0 NG/L
ALBUMIN SERPL BCP-MCNC: 4.2 G/DL (ref 3.5–5)
ALP SERPL-CCNC: 48 U/L (ref 34–104)
ALT SERPL W P-5'-P-CCNC: 21 U/L (ref 7–52)
ANION GAP SERPL CALCULATED.3IONS-SCNC: 7 MMOL/L
AST SERPL W P-5'-P-CCNC: 18 U/L (ref 13–39)
ATRIAL RATE: 65 BPM
BASOPHILS # BLD AUTO: 0.05 THOUSANDS/ÂΜL (ref 0–0.1)
BASOPHILS NFR BLD AUTO: 1 % (ref 0–1)
BILIRUB SERPL-MCNC: 0.5 MG/DL (ref 0.2–1)
BUN SERPL-MCNC: 19 MG/DL (ref 5–25)
CALCIUM SERPL-MCNC: 9.4 MG/DL (ref 8.4–10.2)
CARDIAC TROPONIN I PNL SERPL HS: 2 NG/L
CARDIAC TROPONIN I PNL SERPL HS: 2 NG/L
CHLORIDE SERPL-SCNC: 104 MMOL/L (ref 96–108)
CO2 SERPL-SCNC: 29 MMOL/L (ref 21–32)
CREAT SERPL-MCNC: 0.92 MG/DL (ref 0.6–1.3)
D DIMER PPP FEU-MCNC: <0.27 UG/ML FEU
EOSINOPHIL # BLD AUTO: 0.14 THOUSAND/ÂΜL (ref 0–0.61)
EOSINOPHIL NFR BLD AUTO: 3 % (ref 0–6)
ERYTHROCYTE [DISTWIDTH] IN BLOOD BY AUTOMATED COUNT: 12.7 % (ref 11.6–15.1)
GFR SERPL CREATININE-BSD FRML MDRD: 89 ML/MIN/1.73SQ M
GLUCOSE SERPL-MCNC: 95 MG/DL (ref 65–140)
HCT VFR BLD AUTO: 46 % (ref 36.5–49.3)
HGB BLD-MCNC: 15.6 G/DL (ref 12–17)
IMM GRANULOCYTES # BLD AUTO: 0 THOUSAND/UL (ref 0–0.2)
IMM GRANULOCYTES NFR BLD AUTO: 0 % (ref 0–2)
LYMPHOCYTES # BLD AUTO: 2.23 THOUSANDS/ÂΜL (ref 0.6–4.47)
LYMPHOCYTES NFR BLD AUTO: 49 % (ref 14–44)
MCH RBC QN AUTO: 29 PG (ref 26.8–34.3)
MCHC RBC AUTO-ENTMCNC: 33.9 G/DL (ref 31.4–37.4)
MCV RBC AUTO: 86 FL (ref 82–98)
MONOCYTES # BLD AUTO: 0.57 THOUSAND/ÂΜL (ref 0.17–1.22)
MONOCYTES NFR BLD AUTO: 13 % (ref 4–12)
NEUTROPHILS # BLD AUTO: 1.54 THOUSANDS/ÂΜL (ref 1.85–7.62)
NEUTS SEG NFR BLD AUTO: 34 % (ref 43–75)
NRBC BLD AUTO-RTO: 0 /100 WBCS
P AXIS: 40 DEGREES
PLATELET # BLD AUTO: 182 THOUSANDS/UL (ref 149–390)
PMV BLD AUTO: 10.6 FL (ref 8.9–12.7)
POTASSIUM SERPL-SCNC: 4.1 MMOL/L (ref 3.5–5.3)
PR INTERVAL: 188 MS
PROT SERPL-MCNC: 6.9 G/DL (ref 6.4–8.4)
QRS AXIS: 9 DEGREES
QRSD INTERVAL: 110 MS
QT INTERVAL: 408 MS
QTC INTERVAL: 424 MS
RBC # BLD AUTO: 5.38 MILLION/UL (ref 3.88–5.62)
SODIUM SERPL-SCNC: 140 MMOL/L (ref 135–147)
T WAVE AXIS: 41 DEGREES
VENTRICULAR RATE: 65 BPM
WBC # BLD AUTO: 4.53 THOUSAND/UL (ref 4.31–10.16)

## 2023-08-01 PROCEDURE — 85379 FIBRIN DEGRADATION QUANT: CPT

## 2023-08-01 PROCEDURE — 84484 ASSAY OF TROPONIN QUANT: CPT | Performed by: EMERGENCY MEDICINE

## 2023-08-01 PROCEDURE — 80053 COMPREHEN METABOLIC PANEL: CPT | Performed by: EMERGENCY MEDICINE

## 2023-08-01 PROCEDURE — 93010 ELECTROCARDIOGRAM REPORT: CPT | Performed by: INTERNAL MEDICINE

## 2023-08-01 PROCEDURE — 71045 X-RAY EXAM CHEST 1 VIEW: CPT

## 2023-08-01 PROCEDURE — 85025 COMPLETE CBC W/AUTO DIFF WBC: CPT | Performed by: EMERGENCY MEDICINE

## 2023-08-01 PROCEDURE — 99285 EMERGENCY DEPT VISIT HI MDM: CPT

## 2023-08-01 PROCEDURE — 93005 ELECTROCARDIOGRAM TRACING: CPT

## 2023-08-01 PROCEDURE — 36415 COLL VENOUS BLD VENIPUNCTURE: CPT

## 2023-08-01 NOTE — ED PROVIDER NOTES
History  Chief Complaint   Patient presents with   • Chest Pain     Pt states left sided chest pressure started this morning when he woke up. States pain radiates to left back/flank. Denies jaw pain, does not radiate to arm. Pt denies sob, states it hurts when he takes a deep breath in. Pt states feeling lightheaded and "foggy". Pt tested positive for COVID on Friday. Denies N/V.     70-year-old male past medical history of hypertension, CAD, cardiomyopathy presented for evaluation of chest pressure this morning. Patient reported waking up morning, and while walking in his living room, he felt sudden onset of mid sternal chest pressure radiating to his back as well as a feeling of dizziness. Patient denies shortness of breath, nausea, vomiting, or diarrhea. Patient reported testing positive for COVID 4 days ago, he also reports that it hurts when he takes a deep breath. Patient seen at bedside in no acute distress, reports feeling lightheaded and foggy. Prior to Admission Medications   Prescriptions Last Dose Informant Patient Reported? Taking?    EPINEPHrine (EPIPEN) 0.3 mg/0.3 mL SOAJ  Self No No   Sig: Inject 0.3 mL (0.3 mg total) into a muscle once for 1 dose   Omega-3 Fatty Acids (FISH OIL) 1000 MG CPDR 7/31/2023 Self Yes Yes   Sig: Take 1 capsule by mouth daily     aspirin (ECOTRIN) 325 mg EC tablet 8/1/2023 Self Yes Yes   Sig: Take 325 mg by mouth every other day   cyclobenzaprine (FLEXERIL) 10 mg tablet  Self No No   Sig: Take 1 tablet (10 mg total) by mouth daily at bedtime as needed for muscle spasms   diltiazem (CARDIZEM CD) 120 mg 24 hr capsule 7/31/2023  No Yes   Sig: TAKE 1 CAPSULE BY MOUTH EVERY DAY   meloxicam (MOBIC) 15 mg tablet  Self No No   Sig: TAKE 1 TABLET BY MOUTH EVERY DAY AS NEEDED   omeprazole (PriLOSEC) 40 MG capsule 7/31/2023  No Yes   Sig: TAKE 1 CAPSULE BY MOUTH EVERY DAY   senna (SENOKOT) 8.6 mg  Self No No   Sig: Take 1 tablet (8.6 mg total) by mouth daily at bedtime for 5 days   sildenafil (VIAGRA) 100 mg tablet  Self No No   Sig: Take 1 tablet (100 mg total) by mouth daily as needed for erectile dysfunction      Facility-Administered Medications: None       Past Medical History:   Diagnosis Date   • Back disorder 02/26/2019    getting epidural shots, physical therapy   • Benign localized prostatic hyperplasia with lower urinary tract symptoms (LUTS) 10/25/2022   • CAD (coronary artery disease)    • Cardiac disease    • Cardiomyopathy ([address])    • Failed back syndrome    • GERD (gastroesophageal reflux disease)    • Heart attack ([address])     Mild MI 1996    • Hypertension     Patient denies   • Myocardial infarction ([address])     Mild   • Sleep apnea    • Umbilical hernia     Last Assessed:3/25/2015       Past Surgical History:   Procedure Laterality Date   • BACK SURGERY     • HERNIA REPAIR      Last Assessed:3/12/2014 ,lumbar   • LUMBAR LAMINECTOMY      Last Assessed:3/12/2014   • RI LAPS SURG [id number] RPBIC RAD W/NRV SPARING ROBOT N/A 10/26/2022    Procedure: ROBOTIC ASSISTED LAPAROSCOPIC SIMPLE PROSTATECTOMY, BLADDER NECK RECONSTRUCTION;  Surgeon: Arie Thomson MD;  Location: [facility];  Service: Urology   • PROSTATE BIOPSY  09/10/2015    needle biopsy   • SHOULDER SURGERY     • TONSILLECTOMY         Family History   Problem Relation Age of Onset   • Pancreatic cancer Mother    • Hypertension Father    • Diabetes Father    • No Known Problems Sister    • No Known Problems Brother    • No Known Problems Son    • No Known Problems Son    • Cancer Family      I have reviewed and agree with the history as documented.     E-Cigarette/Vaping   • E-Cigarette Use Never User      E-Cigarette/Vaping Substances   • Nicotine No    • THC No    • CBD No    • Flavoring No    • Other No    • Unknown No      Social History     Tobacco Use   • Smoking status: Never     Passive exposure: Past   • Smokeless tobacco: Never   Vaping Use   • Vaping Use: Never used   Substance Use Topics   • Alcohol use: Yes     Comment: occ / Rare   • Drug use: No        Review of Systems   Constitutional: Negative for chills and fever. HENT: Negative for ear pain and sore throat. Eyes: Negative for pain and visual disturbance. Respiratory: Negative for cough and shortness of breath. Pain with deep breathing. Cardiovascular: Positive for chest pain (Mid chest pressure). Negative for palpitations. Gastrointestinal: Negative for abdominal pain and vomiting. Genitourinary: Negative for dysuria and hematuria. Musculoskeletal: Negative for arthralgias and back pain. Skin: Negative for color change and rash. Neurological: Negative for seizures and syncope. All other systems reviewed and are negative. Physical Exam  ED Triage Vitals   Temperature Pulse Respirations Blood Pressure SpO2   08/01/23 1156 08/01/23 0927 08/01/23 0927 08/01/23 0927 08/01/23 0927   98 °F (36.7 °C) 68 18 145/83 98 %      Temp Source Heart Rate Source Patient Position - Orthostatic VS BP Location FiO2 (%)   08/01/23 1156 08/01/23 0930 08/01/23 0930 08/01/23 0930 --   Oral Monitor Sitting Right arm       Pain Score       08/01/23 0927       7             Orthostatic Vital Signs  Vitals:    08/01/23 0927 08/01/23 0930 08/01/23 1156   BP: 145/83 145/83 135/87   Pulse: 68 65 65   Patient Position - Orthostatic VS:  Sitting Lying       Physical Exam  Vitals and nursing note reviewed. Constitutional:       General: He is not in acute distress. Appearance: He is well-developed. HENT:      Head: Normocephalic and atraumatic. Eyes:      Conjunctiva/sclera: Conjunctivae normal.   Cardiovascular:      Rate and Rhythm: Normal rate and regular rhythm. Heart sounds: No murmur heard. Pulmonary:      Effort: Pulmonary effort is normal. No respiratory distress. Breath sounds: Normal breath sounds. Abdominal:      Palpations: Abdomen is soft. Tenderness: There is no abdominal tenderness.    Musculoskeletal: General: No swelling. Cervical back: Neck supple. Skin:     General: Skin is warm and dry. Capillary Refill: Capillary refill takes less than 2 seconds. Neurological:      Mental Status: He is alert. Psychiatric:         Mood and Affect: Mood normal.         ED Medications  Medications - No data to display    Diagnostic Studies  Results Reviewed     Procedure Component Value Units Date/Time    HS Troponin I 2hr [[id number]]  (Normal) Collected: 08/01/23 1144    Lab Status: Final result Specimen: Blood from Arm, Left Updated: 08/01/23 1214     hs TnI 2hr 2 ng/L      Delta 2hr hsTnI 0 ng/L     D-dimer, quantitative [[id number]]  (Normal) Collected: 08/01/23 0932    Lab Status: Final result Specimen: Blood from Arm, Left Updated: 08/01/23 1133     D-Dimer, Quant <0.27 ug/ml FEU     Narrative: In the evaluation for possible pulmonary embolism, in the appropriate (Well's Score of 4 or less) patient, the age adjusted d-dimer cutoff for this patient can be calculated as:    Age x 0.01 (in ug/mL) for Age-adjusted D-dimer exclusion threshold for a patient over 50 years.     HS Troponin 0hr (reflex protocol) [[id number]]  (Normal) Collected: 08/01/23 0932    Lab Status: Final result Specimen: Blood from Arm, Left Updated: 08/01/23 1023     hs TnI 0hr 2 ng/L     Comprehensive metabolic panel [[id number]] Collected: 08/01/23 0932    Lab Status: Final result Specimen: Blood from Arm, Left Updated: 08/01/23 0958     Sodium 140 mmol/L      Potassium 4.1 mmol/L      Chloride 104 mmol/L      CO2 29 mmol/L      ANION GAP 7 mmol/L      BUN 19 mg/dL      Creatinine 0.92 mg/dL      Glucose 95 mg/dL      Calcium 9.4 mg/dL      AST 18 U/L      ALT 21 U/L      Alkaline Phosphatase 48 U/L      Total Protein 6.9 g/dL      Albumin 4.2 g/dL      Total Bilirubin 0.50 mg/dL      eGFR 89 ml/min/1.73sq m     Narrative:      [facility] (CKD):   •  Stage 1 with normal or high GFR (GFR > 90 mL/min/1.73 square meters)  •  Stage 2 Mild CKD (GFR = 60-89 mL/min/1.73 square meters)  •  Stage 3A Moderate CKD (GFR = 45-59 mL/min/1.73 square meters)  •  Stage 3B Moderate CKD (GFR = 30-44 mL/min/1.73 square meters)  •  Stage 4 Severe CKD (GFR = 15-29 mL/min/1.73 square meters)  •  Stage 5 End Stage CKD (GFR <15 mL/min/1.73 square meters)  Note: GFR calculation is accurate only with a steady state creatinine    CBC and differential [[id number]]  (Abnormal) Collected: 08/01/23 0932    Lab Status: Final result Specimen: Blood from Arm, Left Updated: 08/01/23 0945     WBC 4.53 Thousand/uL      RBC 5.38 Million/uL      Hemoglobin 15.6 g/dL      Hematocrit 46.0 %      MCV 86 fL      MCH 29.0 pg      MCHC 33.9 g/dL      RDW 12.7 %      MPV 10.6 fL      Platelets 447 Thousands/uL      nRBC 0 /100 WBCs      Neutrophils Relative 34 %      Immat GRANS % 0 %      Lymphocytes Relative 49 %      Monocytes Relative 13 %      Eosinophils Relative 3 %      Basophils Relative 1 %      Neutrophils Absolute 1.54 Thousands/µL      Immature Grans Absolute 0.00 Thousand/uL      Lymphocytes Absolute 2.23 Thousands/µL      Monocytes Absolute 0.57 Thousand/µL      Eosinophils Absolute 0.14 Thousand/µL      Basophils Absolute 0.05 Thousands/µL                  XR chest 1 view portable   ED Interpretation by Brandon Lew MD (08/01 1031)   This was independently interpreted by me. No acute cardiopulmonary abnormality. No costovertebral angle. No effusion or pneumothorax seen.             Procedures  ECG 12 Lead Documentation Only    Date/Time: 8/1/2023 12:35 PM    Performed by: Brandon Lew MD  Authorized by: Brandon Lew MD    Indications / Diagnosis:  Chest pain  ECG reviewed by me, the ED Provider: yes    Patient location:  ED  Previous ECG:     Previous ECG:  Compared to current    Comparison ECG info:  October 26, 2022    Similarity:  Changes noted    Comparison to cardiac monitor: Yes    Interpretation:     Interpretation: abnormal    Rate:     ECG rate:  65 bpm    ECG rate assessment: normal    Rhythm:     Rhythm: sinus rhythm    Ectopy:     Ectopy: none    QRS:     QRS axis:  Normal    QRS intervals:  Normal  Conduction:     Conduction: abnormal      Abnormal conduction: incomplete RBBB    ST segments:     ST segments:  Normal  T waves:     T waves: normal    Comments:      Ventricular rate 65 bpm, NC interval 188 ms, QRS duration 110 ms,  ms, QTc 424 ms,  Sinus rhythm there is incomplete right bundle branch block. ED Course  ED Course as of 08/01/23 1501   Tue Aug 01, 2023   84 72-year-old male past medical history of heart attack, hypertension on Cardizem presented for evaluation of mid chest pressure after waking up this morning. 1024 Ordered CBC CMP EKG chest x-ray D-dimer troponin   1029 Comprehensive metabolic panel  Wnl, no SIRI normal anion gap normal T. bili. 1030 CBC and differential(!)  wnl   1030 hs TnI 0hr: 2   1138 D-Dimer, Quant: <0.27   1216 hs TnI 2hr: 2  Second troponin of 2 delta 0   1224 Patient with heart score of 3. Patient discharged with follow-up with his cardiologist.  Return precautions discussed. 1234 Differential diagnoses include ACS, PE, dissection among others.              HEART Risk Score    Flowsheet Row Most Recent Value   Heart Score Risk Calculator    History 1 Filed at: 08/01/2023 1216   ECG 0 Filed at: 08/01/2023 1216   Age 1 Filed at: 08/01/2023 1216   Risk Factors 1 Filed at: 08/01/2023 1216   Troponin 0 Filed at: 08/01/2023 1216   HEART Score 3 Filed at: 08/01/2023 1216              [address] for PE    Flowsheet Row Most Recent Value   PERC Rule for PE    Age >=50 1 Filed at: 08/01/2023 1000   HR >=100 0 Filed at: 08/01/2023 1000   O2 Sat on room air < 95% 0 Filed at: 08/01/2023 1000   History of PE or DVT 0 Filed at: 08/01/2023 1000   Recent trauma or surgery 0 Filed at: 08/01/2023 1000   Hemoptysis 0 Filed at: 08/01/2023 1000   Exogenous estrogen 0 Filed at: 08/01/2023 1000   Unilateral leg swelling 0 Filed at: 08/01/2023 1000   PERC Rule for PE Results 1 Filed at: 08/01/2023 1000                  Wells' Criteria for PE    Flowsheet Row Most Recent Value   Wells' Criteria for PE    Clinical signs and symptoms of DVT 0 Filed at: 08/01/2023 1001   PE is primary diagnosis or equally likely 0 Filed at: 08/01/2023 1001   HR >100 0 Filed at: 08/01/2023 1001   Immobilization at least 3 days or Surgery in the previous 4 weeks 0 Filed at: 08/01/2023 1001   Previous, objectively diagnosed PE or DVT 0 Filed at: 08/01/2023 1001   Hemoptysis 0 Filed at: 08/01/2023 1001   Malignancy with treatment within 6 months or palliative 0 Filed at: 08/01/2023 1001   Wells' Criteria Total 0 Filed at: 08/01/2023 1001            Medical Decision Making  Patient presents with:  Chest Pain: Pt states left sided chest pressure started this morning when he woke up. States pain radiates to left back/flank. Denies jaw pain, does not radiate to arm. Pt denies sob, states it hurts when he takes a deep breath in. Pt states feeling lightheaded and "foggy". Pt tested positive for COVID on Friday. Denies N/V. Patient seen and examined noted to have chest pain with deep breathing. Temp:  (98 °F (36.7 °C)) 98 °F (36.7 °C)  HR:  (65-68) 65  Resp:  (16-18) 16  BP: (135-145)/(83-87) 135/87    Differential diagnosis includes but is not limited to ACS, PE, dissection, among others.     Due to patient's history and presentation the following laboratory evidence was collected:  Recent Results (from the past 12 hour(s))  -ECG 12 lead:   Collection Time: 08/01/23  9:29 AM       Result                      Value             Ref Range           Ventricular Rate            65                BPM                 Atrial Rate                 65                BPM                 LA Interval                 188               ms                  QRSD Interval 110               ms                  QT Interval                 408               ms                  QTC Interval                424               ms                  P Axis                      40                degrees             QRS Axis                    9                 degrees             T Wave Axis                 41                degrees        -CBC and differential:   Collection Time: 08/01/23  9:32 AM       Result                      Value             Ref Range           WBC                         4.53              4.31 - 10.16*       RBC                         5.38              3.88 - 5.62 *       Hemoglobin                  15.6              12.0 - 17.0 *       Hematocrit                  46.0              36.5 - 49.3 %       MCV                         86                82 - 98 fL          MCH                         29.0              26.8 - 34.3 *       MCHC                        33.9              31.4 - 37.4 *       RDW                         12.7              11.6 - 15.1 %       MPV                         10.6              8.9 - 12.7 fL       Platelets                   182               149 - 390 Th*       nRBC                        0                 /100 WBCs           Neutrophils Relative        34 (L)            43 - 75 %           Immat GRANS %               0                 0 - 2 %             Lymphocytes Relative        49 (H)            14 - 44 %           Monocytes Relative          13 (H)            4 - 12 %            Eosinophils Relative        3                 0 - 6 %             Basophils Relative          1                 0 - 1 %             Neutrophils Absolute        1.54 (L)          1.85 - 7.62 *       Immature Grans Absolute     0.00              0.00 - 0.20 *       Lymphocytes Absolute        2.23              0.60 - 4.47 *       Monocytes Absolute          0.57              0.17 - 1.22 *       Eosinophils Absolute        0.14              0.00 - 0.61 * Basophils Absolute          0.05              0.00 - 0.10 *  -Comprehensive metabolic panel:   Collection Time: 08/01/23  9:32 AM       Result                      Value             Ref Range           Sodium                      140               135 - 147 mm*       Potassium                   4.1               3.5 - 5.3 mm*       Chloride                    104               96 - 108 mmo*       CO2                         29                21 - 32 mmol*       ANION GAP                   7                 mmol/L              BUN                         19                5 - 25 mg/dL        Creatinine                  0.92              0.60 - 1.30 *       Glucose                     95                65 - 140 mg/*       Calcium                     9.4               8.4 - 10.2 m*       AST                         18                13 - 39 U/L         ALT                         21                7 - 52 U/L          Alkaline Phosphatase        48                34 - 104 U/L        Total Protein               6.9               6.4 - 8.4 g/*       Albumin                     4.2               3.5 - 5.0 g/*       Total Bilirubin             0.50              0.20 - 1.00 *       eGFR                        89                ml/min/1.73s*  -HS Troponin 0hr (reflex protocol):   Collection Time: 08/01/23  9:32 AM       Result                      Value             Ref Range           hs TnI 0hr                  2                 "Refer to [facility]*  -D-dimer, quantitative:   Collection Time: 08/01/23  9:32 AM       Result                      Value             Ref Range           D-Dimer, Quant              <0.27             <0.50 ug/ml *  -HS Troponin I 2hr:   Collection Time: 08/01/23 11:44 AM       Result                      Value             Ref Range           hs TnI 2hr                  2                 "Refer to [facility]*       Delta 2hr hsTnI             0                 <20 ng/L         Due to patient's history and presentation the following imaging was collected:  XR chest 1 view portable   ED Interpretation    This was independently interpreted by me. No acute cardiopulmonary abnormality. No costovertebral angle. No effusion or pneumothorax seen. No results found. EKG: interpreted by myself as above. Patient appears well, is nontoxic appearing, he expresses understanding and agrees with plan of care at this time. In light of this patient would benefit from outpatient management. Patient with heart score of 3. Patient discharged with a follow-up with his cardiologist.  Return precaution discussed. Chest pain, unspecified: acute illness or injury  Amount and/or Complexity of Data Reviewed  Independent Historian: spouse  Labs: ordered. Decision-making details documented in ED Course. Radiology: ordered and independent interpretation performed. Disposition  Final diagnoses:   Chest pain, unspecified     Time reflects when diagnosis was documented in both MDM as applicable and the Disposition within this note     Time User Action Codes Description Comment    8/1/2023 12:20 PM Neal Riddle Add [R07.9] Chest pain, unspecified       ED Disposition     ED Disposition   Discharge    Condition   Stable    Date/Time   Tue Aug 1, 2023 12:23 PM    Comment   Lili Peralta discharge to home/self care.                Follow-up Information     Follow up With Specialties Details Why [address], MD Family Medicine   [address]  [phone]            Discharge Medication List as of 8/1/2023 12:23 PM      CONTINUE these medications which have NOT CHANGED    Details   aspirin (ECOTRIN) 325 mg EC tablet Take 325 mg by mouth every other day, Historical Med      diltiazem (CARDIZEM CD) 120 mg 24 hr capsule TAKE 1 CAPSULE BY MOUTH EVERY DAY, Normal      Omega-3 Fatty Acids (FISH OIL) 1000 MG CPDR Take 1 capsule by mouth daily  , Historical Med omeprazole (PriLOSEC) 40 MG capsule TAKE 1 CAPSULE BY MOUTH EVERY DAY, Normal      cyclobenzaprine (FLEXERIL) 10 mg tablet Take 1 tablet (10 mg total) by mouth daily at bedtime as needed for muscle spasms, Starting Mon 2/17/2020, Normal      EPINEPHrine (EPIPEN) 0.3 mg/0.3 mL SOAJ Inject 0.3 mL (0.3 mg total) into a muscle once for 1 dose, Starting Mon 5/1/2023, Normal      meloxicam (MOBIC) 15 mg tablet TAKE 1 TABLET BY MOUTH EVERY DAY AS NEEDED, Normal      senna (SENOKOT) 8.6 mg Take 1 tablet (8.6 mg total) by mouth daily at bedtime for 5 days, Starting Wed 10/26/2022, Until Wed 6/21/2023, Normal      sildenafil (VIAGRA) 100 mg tablet Take 1 tablet (100 mg total) by mouth daily as needed for erectile dysfunction, Starting Mon 2/28/2022, Print           No discharge procedures on file. PDMP Review     None           ED Provider  Attending physically available and evaluated Shireen Jenkins. I managed the patient along with the ED Attending.     Electronically Signed by         Misty Green MD  08/01/23 0996

## 2023-08-01 NOTE — ED PROCEDURE NOTE
Procedure  POC Cardiac US    Date/Time: 8/1/2023 11:03 AM    Performed by: Timothy Lee MD  Authorized by: Timothy Lee MD    Patient location:  ED  Other Assisting Provider: Yes (comment) Arliss Bloch)    Procedure details:     Exam Type:  Diagnostic    Indications: chest pain      Assessment / Evaluation for: cardiac function and pericardial effusion      Exam Type: initial exam      Image quality: limited diagnostic      Image availability:  Images available in PACS  Patient Details:     Cardiac Rhythm:  Regular  Cardiac findings:     Echo technique: limited 2D      Views obtained: parasternal long axis, parasternal short axis, subcostal and apical      Pericardial effusion: absent      Tamponade physiology: absent      Wall motion: normal      LV systolic function: normal    POC Lung US    Date/Time: 8/1/2023 11:03 AM    Performed by: Timothy Lee MD  Authorized by: Tmiothy Lee MD    Patient location:  ED  Other Assisting Provider: Yes (comment) Arliss Bloch)    Procedure details:     Exam Type:  Diagnostic and educational    Indications: chest pain      Assessment / Evaluation for:  Pleural effusion    Structures Visualized: pleural line, rib, left hemithorax and right hemithorax      Exam Type: initial exam      Image quality: limited diagnostic      Image availability:  Images available in PACS  Left Hemithorax Findings:     Left pleura visualized:  Visualized    Left Hemithorax Findings: normal    Right Lung Findings:     Right pleural visualized:  Visualized    Right hemithorax findings: normal                       Timothy Lee MD  08/01/23 1107

## 2023-08-01 NOTE — DISCHARGE INSTRUCTIONS
Your work-up today in the emergency room are unremarkable. Follow-up with your cardiology. Follow-up with your PCP. Return sooner to the ED if you experience worsening symptoms.

## 2023-08-01 NOTE — ED ATTENDING ATTESTATION
8/1/2023  I, [place] DO, saw and evaluated the patient. I have discussed the patient with the resident/non-physician practitioner and agree with the resident's/non-physician practitioner's findings, Plan of Care, and MDM as documented in the resident's/non-physician practitioner's note, except where noted. All available labs and Radiology studies were reviewed. I was present for key portions of any procedure(s) performed by the resident/non-physician practitioner and I was immediately available to provide assistance. At this point I agree with the current assessment done in the Emergency Department. I have conducted an independent evaluation of this patient a history and physical is as follows:    72-year-old male coming into the ED for evaluation of left-sided squeezing, pressure-like chest pain that started this morning around 8:30 AM.  He states the discomfort lasted for about 2 hours and is now resolving. He states the pain is worse when he takes a very deep breath in but otherwise no other associated symptoms. He did test positive for COVID-19 4 days ago. PE:  The patient is well appearing, non-toxic, in NAD. Head: normocephalic, atraumatic. HEENT: mucous membranes moist.  Lungs: CTA b/l, no resp distress. Heart: RRR. No M/R/G. Abdomen: NT, ND, no R/R/G. Neuro: CN2-12 intact, GCS 15. Normal strength and sensation, normal speech and gait. Cap refill < 2 sec, skin warm and dry. No rashes or lesions. ED work-up for chest pain unremarkable, low heart score, normal EKG, troponin 2, and a 2 hours also 2. Normal x-ray. Stable for discharge home, follow-up with cardiology outpatient. Doubt ACS, suspect GI versus musculoskeletal etiology.     ED Course         Critical Care Time  Procedures

## 2023-08-03 ENCOUNTER — TELEPHONE (OUTPATIENT)
Dept: CARDIOLOGY CLINIC | Facility: CLINIC | Age: 63
End: 2023-08-03

## 2023-08-03 NOTE — TELEPHONE ENCOUNTER
Patient was last seen one year ago. He went to the ER on 8/1 with c/o chest pain and had some testing done. It was thought symptoms were not cardiac related and possibly GI. He asked if you would review labs , ekg and let him know if any concerns. I scheduled him for a follow up with you on your first available which is 9/25. He will let us know if any symptoms before that.

## 2023-08-09 ENCOUNTER — OFFICE VISIT (OUTPATIENT)
Dept: UROLOGY | Facility: CLINIC | Age: 63
End: 2023-08-09
Payer: COMMERCIAL

## 2023-08-09 VITALS
SYSTOLIC BLOOD PRESSURE: 110 MMHG | HEIGHT: 68 IN | DIASTOLIC BLOOD PRESSURE: 80 MMHG | OXYGEN SATURATION: 98 % | HEART RATE: 73 BPM | BODY MASS INDEX: 28.19 KG/M2 | WEIGHT: 186 LBS

## 2023-08-09 DIAGNOSIS — N52.9 ERECTILE DYSFUNCTION, UNSPECIFIED ERECTILE DYSFUNCTION TYPE: Primary | ICD-10-CM

## 2023-08-09 PROCEDURE — 99213 OFFICE O/P EST LOW 20 MIN: CPT | Performed by: PHYSICIAN ASSISTANT

## 2023-08-09 NOTE — PROGRESS NOTES
1. Erectile dysfunction, unspecified erectile dysfunction type              Assessment and plan:       1. Erectile dysfunction  -Patient unfortunately developed priapism with Trimix (10/30/1)  -Patient was provided with alprostadil 5 mcg and a 0.05 mL injection was performed today in the office. Response with mild fullness. He will increase in 0.05 mL increments    2. History of elevated PSA  -PSA has become undetectable after recent simple prostatectomy, pathology was negative  -Ongoing PSA surveillance  -new family history of prostate cancer    3. BPH  -Status post robotic simple prostatectomy  - voiding well    Nga Reid PA-C      Chief Complaint     Chief Complaint   Patient presents with   • Follow-up         History of Present Illness     Allan Kawasaki is a 66 y.o. male presenting today for follow-up. Patient has a history of enlarged prostate and elevated PSA. Robotic assisted simple prostatectomy and bladder neck reconstruction 10/26/2022 with Dr. Polly Veloz. Voiding well at this time. Patient unfortunate developed erectile dysfunction postoperatively. In June he was taught intracavernosal injections with the standard Trimix dose of 10, 30, 1. Unfortunately with point 1 mL injection this precipitated priapism in which she presented to the emergency department and ultimately required drainage. Return for repeat injection with alprostadil monotherapy, 5mcg. Laboratory     Lab Results   Component Value Date    CREATININE 0.92 08/01/2023       Lab Results   Component Value Date    PSA <0.1 05/02/2023    PSA 7.9 (H) 08/09/2022    PSA 8.5 (H) 02/16/2022       Review of Systems     Review of Systems   Constitutional: Negative for activity change, appetite change, chills, diaphoresis, fatigue, fever and unexpected weight change. Respiratory: Negative for chest tightness and shortness of breath. Cardiovascular: Negative for chest pain, palpitations and leg swelling. Gastrointestinal: Negative for abdominal distention, abdominal pain, constipation, diarrhea, nausea and vomiting. Genitourinary: Negative for decreased urine volume, difficulty urinating, dysuria, enuresis, flank pain, frequency, genital sores, hematuria and urgency. Musculoskeletal: Negative for back pain, gait problem and myalgias. Skin: Negative for color change, pallor, rash and wound. Psychiatric/Behavioral: Negative for behavioral problems. The patient is not nervous/anxious. AUA SYMPTOM SCORE    Flowsheet Row Most Recent Value   AUA SYMPTOM SCORE    How often have you had a sensation of not emptying your bladder completely after you finished urinating? 1 (P)     How often have you had to urinate again less than two hours after you finished urinating? 1 (P)     How often have you found you stopped and started again several times when you urinate? 0 (P)     How often have you found it difficult to postpone urination? 1 (P)     How often have you had a weak urinary stream? 0 (P)     How often have you had to push or strain to begin urination? 0 (P)     How many times did you most typically get up to urinate from the time you went to bed at night until the time you got up in the morning? 1 (P)     Quality of Life: If you were to spend the rest of your life with your urinary condition just the way it is now, how would you feel about that? 1 (P)     AUA SYMPTOM SCORE 4 (P)           Allergies     Allergies   Allergen Reactions   • Other Anaphylaxis     [facility] - [id number]: YELLOW JACKETS       Physical Exam     Physical Exam  Constitutional:       General: He is not in acute distress. Appearance: Normal appearance. He is normal weight. He is not ill-appearing, toxic-appearing or diaphoretic. HENT:      Head: Normocephalic and atraumatic. Eyes:      General:         Right eye: No discharge. Left eye: No discharge.       Conjunctiva/sclera: Conjunctivae normal.   Pulmonary: Effort: Pulmonary effort is normal. No respiratory distress. Genitourinary:     Comments: 0.05mL injected - 2/10 fullness after 10 minutes  Musculoskeletal:         General: No swelling or tenderness. Normal range of motion. Skin:     General: Skin is warm and dry. Coloration: Skin is not jaundiced or pale. Neurological:      General: No focal deficit present. Mental Status: He is alert and oriented to person, place, and time. Psychiatric:         Mood and Affect: Mood normal.         Behavior: Behavior normal.         Thought Content:  Thought content normal.       Vital Signs     Vitals:    08/09/23 1126   BP: 110/80   BP Location: Left arm   Patient Position: Sitting   Cuff Size: Standard   Pulse: 73   SpO2: 98%   Weight: 84.4 kg (186 lb)   Height: 5' 8" (1.727 m)         Current Medications       Current Outpatient Medications:   •  aspirin (ECOTRIN) 325 mg EC tablet, Take 325 mg by mouth every other day, Disp: , Rfl:   •  cyclobenzaprine (FLEXERIL) 10 mg tablet, Take 1 tablet (10 mg total) by mouth daily at bedtime as needed for muscle spasms, Disp: 30 tablet, Rfl: 3  •  diltiazem (CARDIZEM CD) 120 mg 24 hr capsule, TAKE 1 CAPSULE BY MOUTH EVERY DAY, Disp: 90 capsule, Rfl: 4  •  meloxicam (MOBIC) 15 mg tablet, TAKE 1 TABLET BY MOUTH EVERY DAY AS NEEDED, Disp: 30 tablet, Rfl: 1  •  Omega-3 Fatty Acids (FISH OIL) 1000 MG CPDR, Take 1 capsule by mouth daily  , Disp: , Rfl:   •  omeprazole (PriLOSEC) 40 MG capsule, TAKE 1 CAPSULE BY MOUTH EVERY DAY, Disp: 90 capsule, Rfl: 1  •  sildenafil (VIAGRA) 100 mg tablet, Take 1 tablet (100 mg total) by mouth daily as needed for erectile dysfunction, Disp: 10 tablet, Rfl: 11  •  EPINEPHrine (EPIPEN) 0.3 mg/0.3 mL SOAJ, Inject 0.3 mL (0.3 mg total) into a muscle once for 1 dose, Disp: 0.3 mL, Rfl: 1  •  senna (SENOKOT) 8.6 mg, Take 1 tablet (8.6 mg total) by mouth daily at bedtime for 5 days, Disp: 5 tablet, Rfl: 0      Active Problems     Patient Active Problem List   Diagnosis   • Essential hypertension   • Gastroesophageal reflux disease without esophagitis   • Elevated PSA   • Allergic rhinitis due to pollen   • Failed back syndrome   • HNP (herniated nucleus pulposus), lumbar   • Intervertebral disc disorder with radiculopathy of lumbosacral region   • Cardiomyopathy [facility])   • Mixed hyperlipidemia   • Vitamin D deficiency   • Benign prostatic hyperplasia with nocturia   • Erectile dysfunction   • Radiculopathy, lumbar region   • CAD (coronary artery disease)   • H/O prostatectomy         Past Medical History     Past Medical History:   Diagnosis Date   • Back disorder 02/26/2019    getting epidural shots, physical therapy   • Benign localized prostatic hyperplasia with lower urinary tract symptoms (LUTS) 10/25/2022   • CAD (coronary artery disease)    • Cardiac disease    • Cardiomyopathy ([address])    • Failed back syndrome    • GERD (gastroesophageal reflux disease)    • Heart attack ([address])     Mild MI 1996    • Hypertension     Patient denies   • Myocardial infarction ([address])     Mild   • Sleep apnea    • Umbilical hernia     Last Assessed:3/25/2015         Surgical History     Past Surgical History:   Procedure Laterality Date   • BACK SURGERY     • HERNIA REPAIR      Last Assessed:3/12/2014 ,lumbar   • LUMBAR LAMINECTOMY      Last Assessed:3/12/2014   • [place] LAPS SURG [id number] RPBIC RAD W/NRV SPARING ROBOT N/A 10/26/2022    Procedure: ROBOTIC ASSISTED LAPAROSCOPIC SIMPLE PROSTATECTOMY, BLADDER NECK RECONSTRUCTION;  Surgeon: Juliann Mendez MD;  [facility];  Service: Urology   • PROSTATE BIOPSY  09/10/2015    needle biopsy   • SHOULDER SURGERY     • TONSILLECTOMY           Family History     Family History   Problem Relation Age of Onset   • Pancreatic cancer Mother    • Hypertension Father    • Diabetes Father    • No Known Problems Sister    • No Known Problems Brother    • No Known Problems Son    • No Known Problems Son    • Cancer Family Social History     Social History       Radiology

## 2023-09-25 ENCOUNTER — OFFICE VISIT (OUTPATIENT)
Dept: CARDIOLOGY CLINIC | Facility: CLINIC | Age: 63
End: 2023-09-25
Payer: COMMERCIAL

## 2023-09-25 VITALS
HEIGHT: 68 IN | HEART RATE: 67 BPM | WEIGHT: 182 LBS | DIASTOLIC BLOOD PRESSURE: 80 MMHG | SYSTOLIC BLOOD PRESSURE: 122 MMHG | BODY MASS INDEX: 27.58 KG/M2

## 2023-09-25 DIAGNOSIS — I42.0 DILATED CARDIOMYOPATHY (HCC): ICD-10-CM

## 2023-09-25 DIAGNOSIS — I25.119 CORONARY ARTERY DISEASE INVOLVING NATIVE HEART WITH ANGINA PECTORIS, UNSPECIFIED VESSEL OR LESION TYPE (HCC): ICD-10-CM

## 2023-09-25 DIAGNOSIS — I10 ESSENTIAL HYPERTENSION: ICD-10-CM

## 2023-09-25 DIAGNOSIS — R07.2 PRECORDIAL PAIN: Primary | ICD-10-CM

## 2023-09-25 PROCEDURE — 99214 OFFICE O/P EST MOD 30 MIN: CPT | Performed by: INTERNAL MEDICINE

## 2023-09-25 NOTE — PROGRESS NOTES
Cardiology Follow Up    Randall Garsia  1960  [address]  [id number] [phone]    1. Precordial pain  Stress test only, exercise      2. Essential hypertension        3. Coronary artery disease involving native heart with angina pectoris, unspecified vessel or lesion type ([address])        4. Dilated cardiomyopathy ([address])              Discussion/Summary: His CP is somewhat atypical for myocardial ischemia. I have reviewed his medications and made no changes. He will be scheduled for a regular EST. RTO 1 year. Interval History: He was seen in the ER 8/1/2023 for SSCP. This was about 2 weeks after having COVID. There was a pleuritic component to the pain. Troponin levels were normal. ECG did not show ischemia. He has not had any pain since. He has a known NICM. ECHO 12/2022 - EF 60 %, ascending aorta 4.2 cm    He had previous cath which shown minimal CAD. His weight is down from 201 to 182 lbs.     Patient Active Problem List   Diagnosis   • Essential hypertension   • Gastroesophageal reflux disease without esophagitis   • Elevated PSA   • Allergic rhinitis due to pollen   • Failed back syndrome   • HNP (herniated nucleus pulposus), lumbar   • Intervertebral disc disorder with radiculopathy of lumbosacral region   • Cardiomyopathy [facility])   • Mixed hyperlipidemia   • Vitamin D deficiency   • Benign prostatic hyperplasia with nocturia   • Erectile dysfunction   • Radiculopathy, lumbar region   • CAD (coronary artery disease)   • H/O prostatectomy     Past Medical History:   Diagnosis Date   • Back disorder 02/26/2019    getting epidural shots, physical therapy   • Benign localized prostatic hyperplasia with lower urinary tract symptoms (LUTS) 10/25/2022   • CAD (coronary artery disease)    • Cardiac disease    • Cardiomyopathy [facility])    • Failed back syndrome    • GERD (gastroesophageal reflux disease)    • Heart attack ([address])     Mild MI 1996    • Hypertension     Patient denies   • Myocardial infarction ([address])     Mild   • Sleep apnea    • Umbilical hernia     Last Assessed:3/25/2015     Social History     Socioeconomic History   • Marital status: /Civil Union     Spouse name: Not on file   • Number of children: Not on file   • Years of education: Not on file   • Highest education level: Not on file   Occupational History   • Occupation:      Comment: full time   Tobacco Use   • Smoking status: Never     Passive exposure: Past   • Smokeless tobacco: Never   Vaping Use   • Vaping Use: Never used   Substance and Sexual Activity   • Alcohol use: Yes     Comment: occ / Rare   • Drug use: No   • Sexual activity: Yes     Partners: Female   Other Topics Concern   • Not on file   Social History Narrative    Always uses seat belt    Caffeine use     Social Determinants of Health     Financial Resource Strain: Not on file   Food Insecurity: No Food Insecurity (10/28/2022)    Hunger Vital Sign    • Worried About Running Out of Food in the Last Year: Never true    • Ran Out of Food in the Last Year: Never true   Transportation Needs: No Transportation Needs (10/28/2022)    PRAPARE - Transportation    • Lack of Transportation (Medical): No    • Lack of Transportation (Non-Medical):  No   Physical Activity: Not on file   Stress: Not on file   Social Connections: Not on file   Intimate Partner Violence: Not on file   Housing Stability: Low Risk  (10/28/2022)    Housing Stability Vital Sign    • Unable to Pay for Housing in the Last Year: No    • Number of Places Lived in the Last Year: 1    • Unstable Housing in the Last Year: No      Family History   Problem Relation Age of Onset   • Pancreatic cancer Mother    • Hypertension Father    • Diabetes Father    • No Known Problems Sister    • No Known Problems Brother    • No Known Problems Son    • No Known Problems Son    • Cancer Family      Past Surgical History:   Procedure Laterality Date   • BACK SURGERY     • HERNIA REPAIR      Last Assessed:3/12/2014 ,lumbar   • LUMBAR LAMINECTOMY      Last Assessed:3/12/2014   • [place] LAPS SURG [id number] RPBIC RAD W/NRV SPARING ROBOT N/A 10/26/2022    Procedure: ROBOTIC ASSISTED LAPAROSCOPIC SIMPLE PROSTATECTOMY, BLADDER NECK RECONSTRUCTION;  Surgeon: Arizona Lombard, MD;  Location[facility];  Service: Urology   • PROSTATE BIOPSY  09/10/2015    needle biopsy   • SHOULDER SURGERY     • TONSILLECTOMY         Current Outpatient Medications:   •  aspirin (ECOTRIN) 325 mg EC tablet, Take 325 mg by mouth every other day, Disp: , Rfl:   •  cyclobenzaprine (FLEXERIL) 10 mg tablet, Take 1 tablet (10 mg total) by mouth daily at bedtime as needed for muscle spasms, Disp: 30 tablet, Rfl: 3  •  diltiazem (CARDIZEM CD) 120 mg 24 hr capsule, TAKE 1 CAPSULE BY MOUTH EVERY DAY, Disp: 90 capsule, Rfl: 4  •  EPINEPHrine (EPIPEN) 0.3 mg/0.3 mL SOAJ, Inject 0.3 mL (0.3 mg total) into a muscle once for 1 dose, Disp: 0.3 mL, Rfl: 1  •  Omega-3 Fatty Acids (FISH OIL) 1000 MG CPDR, Take 1 capsule by mouth daily  , Disp: , Rfl:   •  omeprazole (PriLOSEC) 40 MG capsule, TAKE 1 CAPSULE BY MOUTH EVERY DAY, Disp: 90 capsule, Rfl: 1  •  sildenafil (VIAGRA) 100 mg tablet, Take 1 tablet (100 mg total) by mouth daily as needed for erectile dysfunction, Disp: 10 tablet, Rfl: 11  •  meloxicam (MOBIC) 15 mg tablet, TAKE 1 TABLET BY MOUTH EVERY DAY AS NEEDED (Patient not taking: Reported on 9/25/2023), Disp: 30 tablet, Rfl: 1  •  senna (SENOKOT) 8.6 mg, Take 1 tablet (8.6 mg total) by mouth daily at bedtime for 5 days, Disp: 5 tablet, Rfl: 0  Allergies   Allergen Reactions   • Other Anaphylaxis     [facility] - [id number]: YELLOW JACKETS     Vitals:    09/25/23 0807   BP: 122/80   BP Location: Right arm   Patient Position: Sitting   Cuff Size: Large   Pulse: 67   Weight: 82.6 kg (182 lb)   Height: 5' 8" (1.727 m)     Weight (last 2 days)     Date/Time Weight    09/25/23 0807 82.6 (182)         Blood pressure 122/80, pulse 67, height 5' 8" (1.727 m), weight 82.6 kg (182 lb). , Body mass index is 27.67 kg/m².     Labs:  Admission on 08/01/2023, Discharged on 08/01/2023   Component Date Value   • Ventricular Rate 08/01/2023 65    • Atrial Rate 08/01/2023 65    • DE Interval 08/01/2023 188    • QRSD Interval 08/01/2023 110    • QT Interval 08/01/2023 408    • QTC Interval 08/01/2023 424    • P Axis 08/01/2023 40    • QRS Axis 08/01/2023 9    • T Wave Axis 08/01/2023 41    • WBC 08/01/2023 4.53    • RBC 08/01/2023 5.38    • Hemoglobin 08/01/2023 15.6    • Hematocrit 08/01/2023 46.0    • MCV 08/01/2023 86    • MCH 08/01/2023 29.0    • MCHC 08/01/2023 33.9    • RDW 08/01/2023 12.7    • MPV 08/01/2023 10.6    • Platelets 43/47/0290 182    • nRBC 08/01/2023 0    • Neutrophils Relative 08/01/2023 34 (L)    • Immat GRANS % 08/01/2023 0    • Lymphocytes Relative 08/01/2023 49 (H)    • Monocytes Relative 08/01/2023 13 (H)    • Eosinophils Relative 08/01/2023 3    • Basophils Relative 08/01/2023 1    • Neutrophils Absolute 08/01/2023 1.54 (L)    • Immature Grans Absolute 08/01/2023 0.00    • Lymphocytes Absolute 08/01/2023 2.23    • Monocytes Absolute 08/01/2023 0.57    • Eosinophils Absolute 08/01/2023 0.14    • Basophils Absolute 08/01/2023 0.05    • Sodium 08/01/2023 140    • Potassium 08/01/2023 4.1    • Chloride 08/01/2023 104    • CO2 08/01/2023 29    • ANION GAP 08/01/2023 7    • BUN 08/01/2023 19    • Creatinine 08/01/2023 0.92    • Glucose 08/01/2023 95    • Calcium 08/01/2023 9.4    • AST 08/01/2023 18    • ALT 08/01/2023 21    • Alkaline Phosphatase 08/01/2023 48    • Total Protein 08/01/2023 6.9    • Albumin 08/01/2023 4.2    • Total Bilirubin 08/01/2023 0.50    • eGFR 08/01/2023 89    • hs TnI 0hr 08/01/2023 2    • D-Dimer, Quant 08/01/2023 <0.27    • hs TnI 2hr 08/01/2023 2    • Delta 2hr hsTnI 08/01/2023 0    Orders Only on 07/06/2023 Component Date Value   • Cholesterol, Total 07/06/2023 170    • Triglycerides 07/06/2023 70    • HDL 07/06/2023 41    • VLDL Cholesterol Calcula* 07/06/2023 13    • LDL Calculated 07/06/2023 116 (H)    • TSH 07/06/2023 1.170    Office Visit on 05/11/2023   Component Date Value   • POST-VOID RESIDUAL VOLUM* 05/11/2023 85    Orders Only on 05/02/2023   Component Date Value   • Prostate Specific Antige* 05/02/2023 <0.1    • Reflex Criteria 05/02/2023 Comment      Imaging: No results found. Review of Systems:  Review of Systems   Constitutional: Negative for diaphoresis, fatigue, fever and unexpected weight change. HENT: Negative. Respiratory: Negative for cough, shortness of breath and wheezing. Cardiovascular: Negative for chest pain, palpitations and leg swelling. Gastrointestinal: Negative for abdominal pain, diarrhea and nausea. Musculoskeletal: Negative for gait problem and myalgias. Skin: Negative for rash. Neurological: Negative for dizziness and numbness. Psychiatric/Behavioral: Negative. Physical Exam:  Physical Exam  Constitutional:       Appearance: He is well-developed. HENT:      Head: Normocephalic and atraumatic. Eyes:      Pupils: Pupils are equal, round, and reactive to light. Neck:      Vascular: No JVD. Cardiovascular:      Rate and Rhythm: Regular rhythm. Pulses: Normal pulses. Carotid pulses are 2+ on the right side and 2+ on the left side. Heart sounds: S1 normal and S2 normal.   Pulmonary:      Effort: Pulmonary effort is normal.      Breath sounds: Normal breath sounds. No wheezing or rales. Abdominal:      General: Bowel sounds are normal.      Palpations: Abdomen is soft. Tenderness: There is no abdominal tenderness. Musculoskeletal:         General: No tenderness. Normal range of motion. Cervical back: Normal range of motion and neck supple. Skin:     General: Skin is warm.    Neurological:      Mental Status: He is alert and oriented to person, place, and time. Cranial Nerves: No cranial nerve deficit. Deep Tendon Reflexes: Reflexes are normal and symmetric.

## 2023-10-02 ENCOUNTER — HOSPITAL ENCOUNTER (OUTPATIENT)
Dept: NON INVASIVE DIAGNOSTICS | Facility: CLINIC | Age: 63
Discharge: HOME | End: 2023-10-02
Payer: COMMERCIAL

## 2023-10-02 VITALS
HEART RATE: 69 BPM | HEIGHT: 68 IN | SYSTOLIC BLOOD PRESSURE: 130 MMHG | BODY MASS INDEX: 27.58 KG/M2 | OXYGEN SATURATION: 97 % | DIASTOLIC BLOOD PRESSURE: 90 MMHG | WEIGHT: 182 LBS

## 2023-10-02 DIAGNOSIS — R07.2 PRECORDIAL PAIN: ICD-10-CM

## 2023-10-02 LAB
MAX HR PERCENT: 91 %
MAX HR: 146 BPM
RATE PRESSURE PRODUCT: NORMAL
SL CV STRESS RECOVERY BP: NORMAL MMHG
SL CV STRESS RECOVERY HR: 83 BPM
SL CV STRESS RECOVERY O2 SAT: 99 %
SL CV STRESS STAGE REACHED: 4
STRESS ANGINA INDEX: 0
STRESS BASELINE BP: NORMAL MMHG
STRESS BASELINE HR: 69 BPM
STRESS O2 SAT REST: 97 %
STRESS PEAK HR: 146 BPM
STRESS POST ESTIMATED WORKLOAD: 13.4 METS
STRESS POST EXERCISE DUR MIN: 10 MIN
STRESS POST EXERCISE DUR SEC: 16 SEC
STRESS POST O2 SAT PEAK: 99 %
STRESS POST PEAK BP: 172 MMHG

## 2023-10-02 PROCEDURE — 93018 CV STRESS TEST I&R ONLY: CPT | Performed by: INTERNAL MEDICINE

## 2023-10-02 PROCEDURE — 93016 CV STRESS TEST SUPVJ ONLY: CPT | Performed by: INTERNAL MEDICINE

## 2023-10-02 PROCEDURE — 93017 CV STRESS TEST TRACING ONLY: CPT

## 2023-10-04 ENCOUNTER — HOSPITAL ENCOUNTER (EMERGENCY)
Facility: HOSPITAL | Age: 63
Discharge: HOME | End: 2023-10-05
Attending: EMERGENCY MEDICINE | Admitting: EMERGENCY MEDICINE
Payer: COMMERCIAL

## 2023-10-04 DIAGNOSIS — N48.30 PRIAPISM: Primary | ICD-10-CM

## 2023-10-04 LAB
CHEST PAIN STATEMENT: NORMAL
MAX DIASTOLIC BP: 96 MMHG
MAX HEART RATE: 146 BPM
MAX PREDICTED HEART RATE: 159 BPM
MAX. SYSTOLIC BP: 172 MMHG
PROTOCOL NAME: NORMAL
TARGET HR FORMULA: NORMAL
TEST INDICATION: NORMAL
TIME IN EXERCISE PHASE: NORMAL

## 2023-10-04 PROCEDURE — 99284 EMERGENCY DEPT VISIT MOD MDM: CPT

## 2023-10-04 PROCEDURE — 99284 EMERGENCY DEPT VISIT MOD MDM: CPT | Performed by: EMERGENCY MEDICINE

## 2023-10-04 RX ORDER — KETAMINE HCL IN NACL, ISO-OSM 100MG/10ML
20 SYRINGE (ML) INJECTION ONCE
Status: COMPLETED | OUTPATIENT
Start: 2023-10-05 | End: 2023-10-05

## 2023-10-04 RX ORDER — PHENYLEPHRINE HCL IN 0.9% NACL 1 MG/10 ML
100 SYRINGE (ML) INTRAVENOUS
Status: DISCONTINUED | OUTPATIENT
Start: 2023-10-04 | End: 2023-10-05 | Stop reason: HOSPADM

## 2023-10-05 VITALS
RESPIRATION RATE: 21 BRPM | SYSTOLIC BLOOD PRESSURE: 121 MMHG | OXYGEN SATURATION: 94 % | HEART RATE: 69 BPM | DIASTOLIC BLOOD PRESSURE: 60 MMHG | TEMPERATURE: 97 F

## 2023-10-05 LAB
ALBUMIN SERPL BCP-MCNC: 4.1 G/DL (ref 3.5–5)
ALP SERPL-CCNC: 39 U/L (ref 34–104)
ALT SERPL W P-5'-P-CCNC: 23 U/L (ref 7–52)
ANION GAP SERPL CALCULATED.3IONS-SCNC: 6 MMOL/L
AST SERPL W P-5'-P-CCNC: 21 U/L (ref 13–39)
BASOPHILS # BLD AUTO: 0.05 THOUSANDS/ÂΜL (ref 0–0.1)
BASOPHILS NFR BLD AUTO: 1 % (ref 0–1)
BILIRUB SERPL-MCNC: 0.31 MG/DL (ref 0.2–1)
BUN SERPL-MCNC: 27 MG/DL (ref 5–25)
CALCIUM SERPL-MCNC: 9.1 MG/DL (ref 8.4–10.2)
CHLORIDE SERPL-SCNC: 109 MMOL/L (ref 96–108)
CO2 SERPL-SCNC: 24 MMOL/L (ref 21–32)
CREAT SERPL-MCNC: 1.18 MG/DL (ref 0.6–1.3)
EOSINOPHIL # BLD AUTO: 0.21 THOUSAND/ÂΜL (ref 0–0.61)
EOSINOPHIL NFR BLD AUTO: 3 % (ref 0–6)
ERYTHROCYTE [DISTWIDTH] IN BLOOD BY AUTOMATED COUNT: 12.8 % (ref 11.6–15.1)
GFR SERPL CREATININE-BSD FRML MDRD: 66 ML/MIN/1.73SQ M
GLUCOSE SERPL-MCNC: 122 MG/DL (ref 65–140)
HCT VFR BLD AUTO: 39 % (ref 36.5–49.3)
HGB BLD-MCNC: 13.4 G/DL (ref 12–17)
IMM GRANULOCYTES # BLD AUTO: 0.01 THOUSAND/UL (ref 0–0.2)
IMM GRANULOCYTES NFR BLD AUTO: 0 % (ref 0–2)
LYMPHOCYTES # BLD AUTO: 1.94 THOUSANDS/ÂΜL (ref 0.6–4.47)
LYMPHOCYTES NFR BLD AUTO: 32 % (ref 14–44)
MCH RBC QN AUTO: 29.1 PG (ref 26.8–34.3)
MCHC RBC AUTO-ENTMCNC: 34.4 G/DL (ref 31.4–37.4)
MCV RBC AUTO: 85 FL (ref 82–98)
MONOCYTES # BLD AUTO: 0.54 THOUSAND/ÂΜL (ref 0.17–1.22)
MONOCYTES NFR BLD AUTO: 9 % (ref 4–12)
NEUTROPHILS # BLD AUTO: 3.36 THOUSANDS/ÂΜL (ref 1.85–7.62)
NEUTS SEG NFR BLD AUTO: 55 % (ref 43–75)
NRBC BLD AUTO-RTO: 0 /100 WBCS
PLATELET # BLD AUTO: 205 THOUSANDS/UL (ref 149–390)
PMV BLD AUTO: 10.5 FL (ref 8.9–12.7)
POTASSIUM SERPL-SCNC: 3.8 MMOL/L (ref 3.5–5.3)
PROT SERPL-MCNC: 6.1 G/DL (ref 6.4–8.4)
RBC # BLD AUTO: 4.61 MILLION/UL (ref 3.88–5.62)
SODIUM SERPL-SCNC: 139 MMOL/L (ref 135–147)
WBC # BLD AUTO: 6.11 THOUSAND/UL (ref 4.31–10.16)

## 2023-10-05 PROCEDURE — 85025 COMPLETE CBC W/AUTO DIFF WBC: CPT | Performed by: EMERGENCY MEDICINE

## 2023-10-05 PROCEDURE — 36415 COLL VENOUS BLD VENIPUNCTURE: CPT | Performed by: EMERGENCY MEDICINE

## 2023-10-05 PROCEDURE — 80053 COMPREHEN METABOLIC PANEL: CPT | Performed by: EMERGENCY MEDICINE

## 2023-10-05 RX ADMIN — Medication 20 MG: at 00:00

## 2023-10-05 RX ADMIN — Medication 100 MCG: at 00:42

## 2023-10-05 RX ADMIN — Medication 100 MCG: at 02:11

## 2023-10-05 NOTE — ED PROVIDER NOTES
History  Chief Complaint   Patient presents with   • Penis Pain     Pt reports erection for 4 hours. Pt reports hx of this a few months ago and was told to D/C that medication that caused it. Pt reports taking a triple dose of that medication tonight in error. 80-year-old male patient presents emergency department for evaluation of priapism ongoing for approximately 4 hours. Patient has had this happen before. The patient has an injectable erectile dysfunction medication however this time he took the wrong erectile dysfunction medication which is caused priapism in the past.  There is no discoloration of the penis. Patient is prepped and draped in normal sterile fashion the area was cleaned with iodine. 18-gauge butterfly needle was introduced into the penis and the large amount of blood was expressed. Coban was placed over the penis and the patient had a reaccumulation of the blood. The initial decompression was on the lateral aspect of the left side and a repeat decompression was done on the lateral aspect of the right side. Both times Evan-Synephrine was injected into the penis with reduction of the priapism. After the second aspiration there is a full resolution of the priapism. The patient was kept here in observation for approximately 30 more minutes and had no return of the priapism and was discharged home with close follow-up with urology      History provided by:  Patient   used: No    Penis Pain  Severity:  Mild  Onset quality:  Gradual  Timing:  Constant  Progression:  Worsening  Chronicity:  New  Associated symptoms: no congestion, no diarrhea, no myalgias and no rhinorrhea        Prior to Admission Medications   Prescriptions Last Dose Informant Patient Reported? Taking?    EPINEPHrine (EPIPEN) 0.3 mg/0.3 mL SOAJ  Self No No   Sig: Inject 0.3 mL (0.3 mg total) into a muscle once for 1 dose   Omega-3 Fatty Acids (FISH OIL) 1000 MG CPDR  Self Yes No   Sig: Take 1 capsule by mouth daily     aspirin (ECOTRIN) 325 mg EC tablet  Self Yes No   Sig: Take 325 mg by mouth every other day   cyclobenzaprine (FLEXERIL) 10 mg tablet  Self No No   Sig: Take 1 tablet (10 mg total) by mouth daily at bedtime as needed for muscle spasms   diltiazem (CARDIZEM CD) 120 mg 24 hr capsule  Self No No   Sig: TAKE 1 CAPSULE BY MOUTH EVERY DAY   meloxicam (MOBIC) 15 mg tablet  Self No No   Sig: TAKE 1 TABLET BY MOUTH EVERY DAY AS NEEDED   Patient not taking: Reported on 9/25/2023   omeprazole (PriLOSEC) 40 MG capsule  Self No No   Sig: TAKE 1 CAPSULE BY MOUTH EVERY DAY   senna (SENOKOT) 8.6 mg  Self No No   Sig: Take 1 tablet (8.6 mg total) by mouth daily at bedtime for 5 days   sildenafil (VIAGRA) 100 mg tablet  Self No No   Sig: Take 1 tablet (100 mg total) by mouth daily as needed for erectile dysfunction      Facility-Administered Medications: None       Past Medical History:   Diagnosis Date   • Back disorder 02/26/2019    getting epidural shots, physical therapy   • Benign localized prostatic hyperplasia with lower urinary tract symptoms (LUTS) 10/25/2022   • CAD (coronary artery disease)    • Cardiac disease    • Cardiomyopathy ([address])    • Failed back syndrome    • GERD (gastroesophageal reflux disease)    • Heart attack ([address])     Mild MI 1996    • Hypertension     Patient denies   • Myocardial infarction ([address])     Mild   • Sleep apnea    • Umbilical hernia     Last Assessed:3/25/2015       Past Surgical History:   Procedure Laterality Date   • BACK SURGERY     • HERNIA REPAIR      Last Assessed:3/12/2014 ,lumbar   • LUMBAR LAMINECTOMY      Last Assessed:3/12/2014   • [place] LAPS SURG [id number] RPBIC RAD W/NRV SPARING ROBOT N/A 10/26/2022    Procedure: ROBOTIC ASSISTED LAPAROSCOPIC SIMPLE PROSTATECTOMY, BLADDER NECK RECONSTRUCTION;  Surgeon: Altaf Boone MD;  Location: [facility];  Service: Urology   • PROSTATE BIOPSY  09/10/2015    needle biopsy   • SHOULDER SURGERY     • TONSILLECTOMY         Family History   Problem Relation Age of Onset   • Pancreatic cancer Mother    • Hypertension Father    • Diabetes Father    • No Known Problems Sister    • No Known Problems Brother    • No Known Problems Son    • No Known Problems Son    • Cancer Family      I have reviewed and agree with the history as documented. E-Cigarette/Vaping   • E-Cigarette Use Never User      E-Cigarette/Vaping Substances   • Nicotine No    • THC No    • CBD No    • Flavoring No    • Other No    • Unknown No      Social History     Tobacco Use   • Smoking status: Never     Passive exposure: Past   • Smokeless tobacco: Never   Vaping Use   • Vaping Use: Never used   Substance Use Topics   • Alcohol use: Yes     Comment: occ / Rare   • Drug use: No       Review of Systems   HENT: Negative for congestion and rhinorrhea. Gastrointestinal: Negative for diarrhea. Genitourinary: Positive for penile pain. Musculoskeletal: Negative for myalgias. All other systems reviewed and are negative. Physical Exam  Physical Exam  Vitals and nursing note reviewed. Constitutional:       General: He is not in acute distress. Appearance: He is well-developed. He is not diaphoretic. HENT:      Head: Normocephalic and atraumatic. Right Ear: External ear normal.      Left Ear: External ear normal.   Eyes:      General: No scleral icterus. Right eye: No discharge. Left eye: No discharge. Conjunctiva/sclera: Conjunctivae normal.   Neck:      Thyroid: No thyromegaly. Vascular: No JVD. Trachea: No tracheal deviation. Cardiovascular:      Rate and Rhythm: Normal rate and regular rhythm. Pulmonary:      Effort: Pulmonary effort is normal. No respiratory distress. Breath sounds: Normal breath sounds. No stridor. No wheezing or rales. Abdominal:      General: Bowel sounds are normal. There is no distension. Palpations: Abdomen is soft. Tenderness: There is no abdominal tenderness.    Musculoskeletal: General: No tenderness or deformity. Normal range of motion. Cervical back: Normal range of motion and neck supple. Skin:     General: Skin is warm and dry. Neurological:      Mental Status: He is alert and oriented to person, place, and time. Cranial Nerves: No cranial nerve deficit.       Coordination: Coordination normal.   Psychiatric:         Behavior: Behavior normal.         Vital Signs  ED Triage Vitals [10/04/23 2346]   Temperature Pulse Respirations Blood Pressure SpO2   (!) 97 °F (36.1 °C) 73 18 146/84 97 %      Temp Source Heart Rate Source Patient Position - Orthostatic VS BP Location FiO2 (%)   Oral Monitor Sitting Left arm --      Pain Score       --           Vitals:    10/04/23 2346 10/05/23 0245   BP: 146/84 121/60   Pulse: 73 69   Patient Position - Orthostatic VS: Sitting          Visual Acuity  Visual Acuity    Flowsheet Row Most Recent Value   L Pupil Size (mm) 3   R Pupil Size (mm) 3          ED Medications  Medications   Ketamine HCl 20 mg (20 mg Intravenous Given by Other 10/5/23 0000)       Diagnostic Studies  Results Reviewed     Procedure Component Value Units Date/Time    Comprehensive metabolic panel [[id number]]  (Abnormal) Collected: 10/05/23 0005    Lab Status: Final result Specimen: Blood from Arm, Right Updated: 10/05/23 0030     Sodium 139 mmol/L      Potassium 3.8 mmol/L      Chloride 109 mmol/L      CO2 24 mmol/L      ANION GAP 6 mmol/L      BUN 27 mg/dL      Creatinine 1.18 mg/dL      Glucose 122 mg/dL      Calcium 9.1 mg/dL      AST 21 U/L      ALT 23 U/L      Alkaline Phosphatase 39 U/L      Total Protein 6.1 g/dL      Albumin 4.1 g/dL      Total Bilirubin 0.31 mg/dL      eGFR 66 ml/min/1.73sq m     Narrative:      [place] guidelines for Chronic Kidney Disease (CKD):   •  Stage 1 with normal or high GFR (GFR > 90 mL/min/1.73 square meters)  •  Stage 2 Mild CKD (GFR = 60-89 mL/min/1.73 square meters)  •  Stage 3A Moderate CKD (GFR = 45-59 mL/min/1.73 square meters)  •  Stage 3B Moderate CKD (GFR = 30-44 mL/min/1.73 square meters)  •  Stage 4 Severe CKD (GFR = 15-29 mL/min/1.73 square meters)  •  Stage 5 End Stage CKD (GFR <15 mL/min/1.73 square meters)  Note: GFR calculation is accurate only with a steady state creatinine    CBC and differential [[id number]] Collected: 10/05/23 0005    Lab Status: Final result Specimen: Blood from Arm, Right Updated: 10/05/23 0015     WBC 6.11 Thousand/uL      RBC 4.61 Million/uL      Hemoglobin 13.4 g/dL      Hematocrit 39.0 %      MCV 85 fL      MCH 29.1 pg      MCHC 34.4 g/dL      RDW 12.8 %      MPV 10.5 fL      Platelets 093 Thousands/uL      nRBC 0 /100 WBCs      Neutrophils Relative 55 %      Immat GRANS % 0 %      Lymphocytes Relative 32 %      Monocytes Relative 9 %      Eosinophils Relative 3 %      Basophils Relative 1 %      Neutrophils Absolute 3.36 Thousands/µL      Immature Grans Absolute 0.01 Thousand/uL      Lymphocytes Absolute 1.94 Thousands/µL      Monocytes Absolute 0.54 Thousand/µL      Eosinophils Absolute 0.21 Thousand/µL      Basophils Absolute 0.05 Thousands/µL                  No orders to display              Procedures  Procedures         ED Course                               SBIRT 20yo+    Flowsheet Row Most Recent Value   Initial Alcohol Screen: US AUDIT-C     1. How often do you have a drink containing alcohol? 0 Filed at: 10/04/2023 2351   2. How many drinks containing alcohol do you have on a typical day you are drinking? 0 Filed at: 10/04/2023 2351   3a. Male UNDER 65: How often do you have five or more drinks on one occasion? 0 Filed at: 10/04/2023 2351   Audit-C Score 0 Filed at: 10/04/2023 2351   SHAHRIAR: How many times in the past year have you. .. Used an illegal drug or used a prescription medication for non-medical reasons?  Never Filed at: 10/04/2023 2351                    MDM    Disposition  Final diagnoses:   Priapism     Time reflects when diagnosis was documented in both MDM as applicable and the Disposition within this note     Time User Action Codes Description Comment    10/5/2023  2:56 AM Sarah Bhardwaj Add [N48.30] Priapism       ED Disposition     ED Disposition   Discharge    Condition   Stable    Date/Time   Thu Oct 5, 2023  2:55 AM    Comment   Stephen Math discharge to home/self care. Follow-up Information     Follow up With Specialties Details Why Ant Nicolas MD Urology   [address] [place] [address]  [phone]            Discharge Medication List as of 10/5/2023  2:56 AM      CONTINUE these medications which have NOT CHANGED    Details   aspirin (ECOTRIN) 325 mg EC tablet Take 325 mg by mouth every other day, Historical Med      cyclobenzaprine (FLEXERIL) 10 mg tablet Take 1 tablet (10 mg total) by mouth daily at bedtime as needed for muscle spasms, Starting Mon 2/17/2020, Normal      diltiazem (CARDIZEM CD) 120 mg 24 hr capsule TAKE 1 CAPSULE BY MOUTH EVERY DAY, Normal      EPINEPHrine (EPIPEN) 0.3 mg/0.3 mL SOAJ Inject 0.3 mL (0.3 mg total) into a muscle once for 1 dose, Starting Mon 5/1/2023, Normal      meloxicam (MOBIC) 15 mg tablet TAKE 1 TABLET BY MOUTH EVERY DAY AS NEEDED, Normal      Omega-3 Fatty Acids (FISH OIL) 1000 MG CPDR Take 1 capsule by mouth daily  , Historical Med      omeprazole (PriLOSEC) 40 MG capsule TAKE 1 CAPSULE BY MOUTH EVERY DAY, Normal      senna (SENOKOT) 8.6 mg Take 1 tablet (8.6 mg total) by mouth daily at bedtime for 5 days, Starting Wed 10/26/2022, Until Wed 6/21/2023, Normal      sildenafil (VIAGRA) 100 mg tablet Take 1 tablet (100 mg total) by mouth daily as needed for erectile dysfunction, Starting Mon 2/28/2022, Print             No discharge procedures on file.     PDMP Review     None          ED Provider  Electronically Signed by           Chiquita Saeed DO  10/05/23 4833

## 2023-12-29 DIAGNOSIS — K21.9 GASTROESOPHAGEAL REFLUX DISEASE WITHOUT ESOPHAGITIS: ICD-10-CM

## 2023-12-29 RX ORDER — OMEPRAZOLE 40 MG/1
CAPSULE, DELAYED RELEASE ORAL
Qty: 90 CAPSULE | Refills: 1 | Status: SHIPPED | OUTPATIENT
Start: 2023-12-29

## 2024-02-26 NOTE — PROGRESS NOTES
2/27/2024      Chief Complaint   Patient presents with    Follow-up     Assessment and Plan    ED  - Not candidate for Trimix due to priapism   - Doing well with low dose injectable Alprostadil 5 mcg. No episodes of priapism with this.   - Consider IPP if any issues in the future    2. BPH s/p robotic simple prostatectomy and bladder neck reconstruction on 10/26/22  - PVR today - 11 mL   - Tissue pathology negative for malignancy  - No urinary complaints    3. Prostate cancer screening  - Most recent PSA from 5/2/23 was <0.1   - Monitor yearly     History of Present Illness  Arun Harper is a 64 y.o. male here for follow up evaluation of above.      Patient has a history of enlarged prostate and elevated PSA.  Robotic assisted simple prostatectomy and bladder neck reconstruction 10/26/2022 with Dr. Treadwell. PSA now undetectable since surgery. He denies any bothersome urinary complaints.      Patient unfortunate developed erectile dysfunction postoperatively.  In June he was taught intracavernosal injections with the standard Trimix dose of 10, 30, 1.  Unfortunately with 0.1 mL injection this precipitated priapism in which he presented to the emergency department and ultimately required drainage. Trimix was discontinued and he was switched to injecting low dose alprostadil monotherapy, 5mcg. He is currently uses 3-3.5 units of the alprostadil with good benefit without any episodes of priapism. He did have episode of priapism in October 2023 due to accidentally injecting wrong vial of the Trimix instead of the alprostadil.     Review of Systems   Constitutional:  Negative for chills and fever.   Respiratory:  Negative for shortness of breath.    Cardiovascular:  Negative for chest pain.   Gastrointestinal:  Negative for abdominal pain.   Genitourinary:  Negative for difficulty urinating, dysuria, flank pain, frequency, hematuria, penile pain, penile swelling and urgency.   Neurological:  Negative for dizziness.            AUA SYMPTOM SCORE      Flowsheet Row Most Recent Value   AUA SYMPTOM SCORE    How often have you had a sensation of not emptying your bladder completely after you finished urinating? 0 (P)    How often have you had to urinate again less than two hours after you finished urinating? 3 (P)    How often have you found you stopped and started again several times when you urinate? 0 (P)    How often have you found it difficult to postpone urination? 1 (P)    How often have you had a weak urinary stream? 1 (P)    How often have you had to push or strain to begin urination? 0 (P)    How many times did you most typically get up to urinate from the time you went to bed at night until the time you got up in the morning? 1 (P)    Quality of Life: If you were to spend the rest of your life with your urinary condition just the way it is now, how would you feel about that? 2 (P)    AUA SYMPTOM SCORE 6 (P)                Past Medical History  Past Medical History:   Diagnosis Date    Back disorder 02/26/2019    getting epidural shots, physical therapy    Benign localized prostatic hyperplasia with lower urinary tract symptoms (LUTS) 10/25/2022    CAD (coronary artery disease)     Cardiac disease     Cardiomyopathy (HCC)     Failed back syndrome     GERD (gastroesophageal reflux disease)     Heart attack (HCC)     Mild MI 1996     Hypertension     Patient denies    Myocardial infarction (HCC)     Mild    Sleep apnea     Umbilical hernia     Last Assessed:3/25/2015       Past Social History  Past Surgical History:   Procedure Laterality Date    BACK SURGERY      HERNIA REPAIR      Last Assessed:3/12/2014 ,lumbar    LUMBAR LAMINECTOMY      Last Assessed:3/12/2014    [place] LAPS SURG [id number] RPBIC RAD W/NRV SPARING ROBOT N/A 10/26/2022    Procedure: ROBOTIC ASSISTED LAPAROSCOPIC SIMPLE PROSTATECTOMY, BLADDER NECK RECONSTRUCTION;  Surgeon: Tyler Treadwell MD;  Location: [facility];  Service: Urology    PROSTATE BIOPSY   09/10/2015    needle biopsy    SHOULDER SURGERY      TONSILLECTOMY       Social History     Tobacco Use   Smoking Status Never    Passive exposure: Past   Smokeless Tobacco Never       Past Family History  Family History   Problem Relation Age of Onset    Pancreatic cancer Mother     Hypertension Father     Diabetes Father     No Known Problems Sister     No Known Problems Brother     No Known Problems Son     No Known Problems Son     Cancer Family        Past Social history  Social History     Socioeconomic History    Marital status: /Civil Union     Spouse name: Not on file    Number of children: Not on file    Years of education: Not on file    Highest education level: Not on file   Occupational History    Occupation:      Comment: full time   Tobacco Use    Smoking status: Never     Passive exposure: Past    Smokeless tobacco: Never   Vaping Use    Vaping status: Never Used   Substance and Sexual Activity    Alcohol use: Yes     Comment: occ / Rare    Drug use: No    Sexual activity: Yes     Partners: Female   Other Topics Concern    Not on file   Social History Narrative    Always uses seat belt    Caffeine use     Social Determinants of Health     Financial Resource Strain: Not on file   Food Insecurity: No Food Insecurity (10/28/2022)    Hunger Vital Sign     Worried About Running Out of Food in the Last Year: Never true     Ran Out of Food in the Last Year: Never true   Transportation Needs: No Transportation Needs (10/28/2022)    PRAPARE - Transportation     Lack of Transportation (Medical): No     Lack of Transportation (Non-Medical): No   Physical Activity: Not on file   Stress: Not on file   Social Connections: Not on file   Intimate Partner Violence: Not on file   Housing Stability: Low Risk  (10/28/2022)    Housing Stability Vital Sign     Unable to Pay for Housing in the Last Year: No     Number of Places Lived in the Last Year: 1     Unstable Housing in the Last Year: No  "      Current Medications  Current Outpatient Medications   Medication Sig Dispense Refill    aspirin (ECOTRIN) 325 mg EC tablet Take 325 mg by mouth every other day      calcipotriene (DOVONEX) 0.005 % cream PLEASE SEE ATTACHED FOR DETAILED DIRECTIONS      cyclobenzaprine (FLEXERIL) 10 mg tablet Take 1 tablet (10 mg total) by mouth daily at bedtime as needed for muscle spasms 30 tablet 3    diltiazem (CARDIZEM CD) 120 mg 24 hr capsule TAKE 1 CAPSULE BY MOUTH EVERY DAY 90 capsule 4    EPINEPHrine (EPIPEN) 0.3 mg/0.3 mL SOAJ Inject 0.3 mL (0.3 mg total) into a muscle once for 1 dose 0.3 mL 1    Omega-3 Fatty Acids (FISH OIL) 1000 MG CPDR Take 1 capsule by mouth daily        omeprazole (PriLOSEC) 40 MG capsule TAKE 1 CAPSULE BY MOUTH EVERY DAY 90 capsule 1    sildenafil (VIAGRA) 100 mg tablet Take 1 tablet (100 mg total) by mouth daily as needed for erectile dysfunction 10 tablet 11    meloxicam (MOBIC) 15 mg tablet TAKE 1 TABLET BY MOUTH EVERY DAY AS NEEDED (Patient not taking: Reported on 9/25/2023) 30 tablet 1     No current facility-administered medications for this visit.       Allergies  Allergies   Allergen Reactions    Other Anaphylaxis     [facility] - [id number]: YELLOW JACKETS         The following portions of the patient's history were reviewed and updated as appropriate: allergies, current medications, past medical history, past social history, past surgical history and problem list.      Vitals  Vitals:    02/27/24 0721   BP: 130/82   Pulse: 63   SpO2: 98%   Weight: 86.2 kg (190 lb)   Height: 5' 8\" (1.727 m)           Physical Exam  Physical Exam  Constitutional:       Appearance: Normal appearance.   HENT:      Head: Normocephalic and atraumatic.      Right Ear: External ear normal.      Left Ear: External ear normal.      Nose: Nose normal.   Eyes:      General: No scleral icterus.     Conjunctiva/sclera: Conjunctivae normal.   Cardiovascular:      Pulses: Normal pulses.   Pulmonary:      Effort: " Pulmonary effort is normal.   Musculoskeletal:         General: Normal range of motion.      Cervical back: Normal range of motion.   Neurological:      General: No focal deficit present.      Mental Status: He is alert and oriented to person, place, and time.   Psychiatric:         Mood and Affect: Mood normal.         Behavior: Behavior normal.         Thought Content: Thought content normal.         Judgment: Judgment normal.           Results  Recent Results (from the past 1 hour(s))   POCT Measure PVR    Collection Time: 02/27/24  7:23 AM   Result Value Ref Range    POST-VOID RESIDUAL VOLUME, ML POC 11 mL   ]  Lab Results   Component Value Date    PSA <0.1 05/02/2023    PSA 7.9 (H) 08/09/2022    PSA 8.5 (H) 02/16/2022     Lab Results   Component Value Date    GLUCOSE 92 08/07/2017    CALCIUM 9.1 10/05/2023     08/07/2017    K 3.8 10/05/2023    CO2 24 10/05/2023     (H) 10/05/2023    BUN 27 (H) 10/05/2023    CREATININE 1.18 10/05/2023     Lab Results   Component Value Date    WBC 6.11 10/05/2023    HGB 13.4 10/05/2023    HCT 39.0 10/05/2023    MCV 85 10/05/2023     10/05/2023           Orders  Orders Placed This Encounter   Procedures    POCT Measure PVR       Letty Clinton

## 2024-02-27 ENCOUNTER — OFFICE VISIT (OUTPATIENT)
Dept: UROLOGY | Facility: CLINIC | Age: 64
End: 2024-02-27
Payer: COMMERCIAL

## 2024-02-27 VITALS
SYSTOLIC BLOOD PRESSURE: 130 MMHG | HEART RATE: 63 BPM | OXYGEN SATURATION: 98 % | BODY MASS INDEX: 28.79 KG/M2 | WEIGHT: 190 LBS | HEIGHT: 68 IN | DIASTOLIC BLOOD PRESSURE: 82 MMHG

## 2024-02-27 DIAGNOSIS — Z12.5 SCREENING FOR PROSTATE CANCER: ICD-10-CM

## 2024-02-27 DIAGNOSIS — N40.1 BENIGN LOCALIZED PROSTATIC HYPERPLASIA WITH LOWER URINARY TRACT SYMPTOMS (LUTS): ICD-10-CM

## 2024-02-27 DIAGNOSIS — N52.9 ERECTILE DYSFUNCTION, UNSPECIFIED ERECTILE DYSFUNCTION TYPE: Primary | ICD-10-CM

## 2024-02-27 LAB — POST-VOID RESIDUAL VOLUME, ML POC: 11 ML

## 2024-02-27 PROCEDURE — 51798 US URINE CAPACITY MEASURE: CPT | Performed by: PHYSICIAN ASSISTANT

## 2024-02-27 PROCEDURE — 99213 OFFICE O/P EST LOW 20 MIN: CPT | Performed by: PHYSICIAN ASSISTANT

## 2024-02-27 RX ORDER — CALCIPOTRIENE 50 UG/G
CREAM TOPICAL
COMMUNITY
Start: 2024-01-17

## 2024-04-10 DIAGNOSIS — Z91.030 ALLERGY TO YELLOW JACKETS: ICD-10-CM

## 2024-04-10 RX ORDER — EPINEPHRINE 0.3 MG/.3ML
0.3 INJECTION SUBCUTANEOUS ONCE
Qty: 0.3 ML | Refills: 1 | Status: SHIPPED | OUTPATIENT
Start: 2024-04-10 | End: 2024-04-10

## 2024-04-10 NOTE — TELEPHONE ENCOUNTER
Reason for call:   [x] Refill   [] Prior Auth  [] Other:     Office:   [x] PCP/Provider -   [] Specialty/Provider -     Medication:     EPINEPHrine (EPIPEN) 0.3 mg/0.3 mL SOAJ       Dose/Frequency: Inject 0.3 mL (0.3 mg total) into a muscle once for 1 dose     Quantity: : 0.3 mL     Pharmacy: [facility]/pharm[facility] #[id number] - [place] - [address] [phone]     Does the patient have enough for 3 days?   [x] Yes   [] No - Send as HP to POD

## 2024-06-28 DIAGNOSIS — K21.9 GASTROESOPHAGEAL REFLUX DISEASE WITHOUT ESOPHAGITIS: ICD-10-CM

## 2024-06-28 RX ORDER — OMEPRAZOLE 40 MG/1
CAPSULE, DELAYED RELEASE ORAL
Qty: 90 CAPSULE | Refills: 1 | Status: SHIPPED | OUTPATIENT
Start: 2024-06-28

## 2024-07-01 ENCOUNTER — APPOINTMENT (OUTPATIENT)
Dept: LAB | Facility: MEDICAL CENTER | Age: 64
End: 2024-07-01
Payer: COMMERCIAL

## 2024-07-01 DIAGNOSIS — Z13.29 SCREENING FOR THYROID DISORDER: ICD-10-CM

## 2024-07-01 DIAGNOSIS — E78.2 MIXED HYPERLIPIDEMIA: ICD-10-CM

## 2024-07-01 LAB
CHOLEST SERPL-MCNC: 156 MG/DL
HDLC SERPL-MCNC: 44 MG/DL
LDLC SERPL CALC-MCNC: 98 MG/DL (ref 0–100)
TRIGL SERPL-MCNC: 69 MG/DL
TSH SERPL DL<=0.05 MIU/L-ACNC: 1.35 UIU/ML (ref 0.45–4.5)

## 2024-07-01 PROCEDURE — 80061 LIPID PANEL: CPT

## 2024-07-01 PROCEDURE — 36415 COLL VENOUS BLD VENIPUNCTURE: CPT

## 2024-07-01 PROCEDURE — 84443 ASSAY THYROID STIM HORMONE: CPT

## 2024-07-02 ENCOUNTER — RA CDI HCC (OUTPATIENT)
Dept: OTHER | Facility: HOSPITAL | Age: 64
End: 2024-07-02

## 2024-07-09 ENCOUNTER — OFFICE VISIT (OUTPATIENT)
Dept: FAMILY MEDICINE CLINIC | Facility: MEDICAL CENTER | Age: 64
End: 2024-07-09
Payer: COMMERCIAL

## 2024-07-09 VITALS
HEART RATE: 64 BPM | TEMPERATURE: 97.2 F | RESPIRATION RATE: 18 BRPM | OXYGEN SATURATION: 99 % | WEIGHT: 186 LBS | BODY MASS INDEX: 28.19 KG/M2 | DIASTOLIC BLOOD PRESSURE: 92 MMHG | SYSTOLIC BLOOD PRESSURE: 122 MMHG | HEIGHT: 68 IN

## 2024-07-09 DIAGNOSIS — R35.1 BENIGN PROSTATIC HYPERPLASIA WITH NOCTURIA: ICD-10-CM

## 2024-07-09 DIAGNOSIS — K21.9 GASTROESOPHAGEAL REFLUX DISEASE WITHOUT ESOPHAGITIS: ICD-10-CM

## 2024-07-09 DIAGNOSIS — I42.0 DILATED CARDIOMYOPATHY (HCC): ICD-10-CM

## 2024-07-09 DIAGNOSIS — I25.119 CORONARY ARTERY DISEASE INVOLVING NATIVE HEART WITH ANGINA PECTORIS, UNSPECIFIED VESSEL OR LESION TYPE (HCC): ICD-10-CM

## 2024-07-09 DIAGNOSIS — M51.17 INTERVERTEBRAL DISC DISORDER WITH RADICULOPATHY OF LUMBOSACRAL REGION: ICD-10-CM

## 2024-07-09 DIAGNOSIS — E55.9 VITAMIN D DEFICIENCY: ICD-10-CM

## 2024-07-09 DIAGNOSIS — E78.2 MIXED HYPERLIPIDEMIA: ICD-10-CM

## 2024-07-09 DIAGNOSIS — I10 ESSENTIAL HYPERTENSION: Primary | ICD-10-CM

## 2024-07-09 DIAGNOSIS — N40.1 BENIGN PROSTATIC HYPERPLASIA WITH NOCTURIA: ICD-10-CM

## 2024-07-09 PROBLEM — M54.16 RADICULOPATHY, LUMBAR REGION: Status: RESOLVED | Noted: 2022-06-13 | Resolved: 2024-07-09

## 2024-07-09 PROCEDURE — 99214 OFFICE O/P EST MOD 30 MIN: CPT | Performed by: INTERNAL MEDICINE

## 2024-07-09 NOTE — PROGRESS NOTES
INTERNAL MEDICINE OFFICE VISIT  [facility] [place] [address]  Tel: [phone]      NAME: Arun Harper  AGE: 64 y.o.  SEX: male  : 1960   MRN: [id number]    DATE: 2024  TIME: 9:41 AM      Assessment and Plan:  1. Essential hypertension  Blood pressure is stable, continue diltiazem at the same dose    2. Mixed hyperlipidemia  Continue a low-fat diet, not on any medication  - CBC and differential; Future  - Comprehensive metabolic panel; Future  - Lipid panel; Future  - TSH, 3rd generation; Future    3. Coronary artery disease involving native heart with angina pectoris, unspecified vessel or lesion type (HCC)  Was advised to follow-up with cardiology regularly    4. Dilated cardiomyopathy (HCC)  Follow-up with cardiology    5. Benign prostatic hyperplasia with nocturia  Has been following up with urology and has no major symptoms except for urinary frequency    6. Gastroesophageal reflux disease without esophagitis  Continue Prilosec, symptoms are well-controlled     7. Intervertebral disc disorder with radiculopathy of lumbosacral region    Takes Flexeril as needed.  Does not take any pain medication.  Continue the same      8. Vitamin D deficiency  Continue vitamin D during the desouza  - Vitamin D 25 hydroxy; Future      - Counseling Documentation: patient was counseled regarding: diagnostic results, instructions for management, risk factor reductions, prognosis, patient and family education, risks and benefits of treatment options, and importance of compliance with treatment  - Medication Side Effects: Adverse side effects of medications were reviewed with the patient/guardian today.      Return for follow up visit in 6 months or earlier, if needed.      Chief Complaint:  Chief Complaint   Patient presents with    Physical Exam     Annual   Est care         History of Present Illness:   Blood pressure is stable on medication  Has  not been following up with the cardiologist very regularly but has been diagnosed with dilated cardiomyopathy and coronary artery disease.  Follows up with urology for the BPH symptoms  Has a history of back surgery and the radiculopathy is much better but still has lower back pain off-and-on      Active Problem List:  Patient Active Problem List   Diagnosis    Essential hypertension    Gastroesophageal reflux disease without esophagitis    Elevated PSA    Allergic rhinitis due to pollen    Failed back syndrome    HNP (herniated nucleus pulposus), lumbar    Intervertebral disc disorder with radiculopathy of lumbosacral region    Dilated cardiomyopathy (HCC)    Mixed hyperlipidemia    Vitamin D deficiency    Benign prostatic hyperplasia with nocturia    Erectile dysfunction    CAD (coronary artery disease)    H/O prostatectomy         Past Medical History:  Past Medical History:   Diagnosis Date    Back disorder 02/26/2019    getting epidural shots, physical therapy    Benign localized prostatic hyperplasia with lower urinary tract symptoms (LUTS) 10/25/2022    CAD (coronary artery disease)     Cardiac disease     Cardiomyopathy (HCC)     Failed back syndrome     GERD (gastroesophageal reflux disease)     Heart attack (HCC)     Mild MI 1996     Hypertension     Patient denies    Myocardial infarction (HCC)     Mild    Sleep apnea     Umbilical hernia     Last Assessed:3/25/2015         Past Surgical History:  Past Surgical History:   Procedure Laterality Date    BACK SURGERY      HERNIA REPAIR      Last Assessed:3/12/2014 ,lumbar    LUMBAR LAMINECTOMY      Last Assessed:3/12/2014    [place] LAPS SURG [id number] RPBIC RAD W/NRV SPARING ROBOT N/A 10/26/2022    Procedure: ROBOTIC ASSISTED LAPAROSCOPIC SIMPLE PROSTATECTOMY, BLADDER NECK RECONSTRUCTION;  Surgeon: Tyler Treadwell MD;  Location: [facility];  Service: Urology    PROSTATE BIOPSY  09/10/2015    needle biopsy    SHOULDER SURGERY      TONSILLECTOMY           Family  History:  Family History   Problem Relation Age of Onset    Pancreatic cancer Mother     Hypertension Father     Diabetes Father     No Known Problems Sister     No Known Problems Brother     No Known Problems Son     No Known Problems Son     Cancer Family          Social History:  Social History     Socioeconomic History    Marital status: /Civil Union     Spouse name: None    Number of children: None    Years of education: None    Highest education level: None   Occupational History    Occupation:      Comment: full time   Tobacco Use    Smoking status: Never     Passive exposure: Past    Smokeless tobacco: Never   Vaping Use    Vaping status: Never Used   Substance and Sexual Activity    Alcohol use: Yes     Comment: occ / Rare    Drug use: No    Sexual activity: Yes     Partners: Female   Other Topics Concern    None   Social History Narrative    Always uses seat belt    Caffeine use     Social Determinants of Health     Financial Resource Strain: Not on file   Food Insecurity: No Food Insecurity (10/28/2022)    Hunger Vital Sign     Worried About Running Out of Food in the Last Year: Never true     Ran Out of Food in the Last Year: Never true   Transportation Needs: No Transportation Needs (10/28/2022)    PRAPARE - Transportation     Lack of Transportation (Medical): No     Lack of Transportation (Non-Medical): No   Physical Activity: Not on file   Stress: Not on file   Social Connections: Not on file   Intimate Partner Violence: Not on file   Housing Stability: Low Risk  (10/28/2022)    Housing Stability Vital Sign     Unable to Pay for Housing in the Last Year: No     Number of Places Lived in the Last Year: 1     Unstable Housing in the Last Year: No         Allergies:  Allergies   Allergen Reactions    Other Anaphylaxis     Annotation - [id number]: YELLOW JACKETS         Medications:    Current Outpatient Medications:     aspirin (ECOTRIN) 325 mg EC tablet, Take 325 mg by mouth every  other day, Disp: , Rfl:     cyclobenzaprine (FLEXERIL) 10 mg tablet, Take 1 tablet (10 mg total) by mouth daily at bedtime as needed for muscle spasms, Disp: 30 tablet, Rfl: 3    diltiazem (CARDIZEM CD) 120 mg 24 hr capsule, TAKE 1 CAPSULE BY MOUTH EVERY DAY, Disp: 90 capsule, Rfl: 4    Omega-3 Fatty Acids (FISH OIL) 1000 MG CPDR, Take 1 capsule by mouth daily  , Disp: , Rfl:     omeprazole (PriLOSEC) 40 MG capsule, TAKE 1 CAPSULE BY MOUTH EVERY DAY, Disp: 90 capsule, Rfl: 1    sildenafil (VIAGRA) 100 mg tablet, Take 1 tablet (100 mg total) by mouth daily as needed for erectile dysfunction, Disp: 10 tablet, Rfl: 11    EPINEPHrine (EPIPEN) 0.3 mg/0.3 mL SOAJ, Inject 0.3 mL (0.3 mg total) into a muscle once for 1 dose, Disp: 0.3 mL, Rfl: 1      The following portions of the patient's history were reviewed and updated as appropriate: past medical history, past surgical history, family history, social history, allergies, current medications and active problem list.      Review of Systems:  Constitutional: Denies fever, chills, weight gain, weight loss, fatigue  Eyes: Denies eye redness, eye discharge, double vision, change in visual acuity  ENT: Denies hearing loss, tinnitus, sneezing, nasal congestion, nasal discharge, sore throat   Respiratory: Denies cough, expectoration, hemoptysis, shortness of breath, wheezing  Cardiovascular: Denies chest pain, palpitations, lower extremity swelling, orthopnea, PND  Gastrointestinal: Denies abdominal pain, heartburn, nausea, vomiting, hematemesis, diarrhea, bloody stools  Genito-Urinary: Denies dysuria, frequency, difficulty in micturition, nocturia, incontinence  Musculoskeletal: Denies back pain, joint pain, muscle pain  Neurologic: Denies confusion, lightheadedness, syncope, headache, focal weakness, sensory changes, seizures  Endocrine: Denies polyuria, polydipsia, temperature intolerance  Allergy and Immunology: Denies hives, insect bite sensitivity  Hematological and  Lymphatic: Denies bleeding problems, swollen glands   Psychological: Denies depression, suicidal ideation, anxiety, panic, mood swings  Dermatological: Denies pruritus, rash, skin lesion changes      Vitals:  Vitals:    07/09/24 0917   BP: 122/92   Pulse: 64   Resp: 18   Temp: (!) 97.2 °F (36.2 °C)   SpO2: 99%       Body mass index is 28.28 kg/m².    Weight (last 2 days)       Date/Time Weight    07/09/24 0917 84.4 (186)              Physical Examination:  General: Patient is not in acute distress. Awake, alert, responding to commands. No weight gain or loss  Head: Normocephalic. Atraumatic  Eyes: Conjunctiva and lids with no swelling, erythema or discharge. Both pupils normal sized, round and reactive to light. Sclera nonicteric  ENT: External examination of nose and ear normal. Otoscopic examination shows translucent tympanic membranes with patent canals without erythema. Oropharynx moist with no erythema, edema, exudate or lesions  Neck: Supple. JVP not raised. Trachea midline. No masses. No thyromegaly  Lungs: No signs of increased work of breathing or respiratory distress. Bilateral bronchovascular breath sounds with no crackles or rhonchi  Chest wall: No tenderness  Cardiovascular: Normal PMI. No thrills. Regular rate and rhythm. S1 and S2 normal. No murmur, rub or gallop  Gastrointestinal: Abdomen soft, nontender. No guarding or rigidity. Liver and spleen not palpable. Bowel sounds present  Neurologic: Cranial nerves II-XII intact. Cortical functions normal. Motor system - Reflexes 2+ and symmetrical. Sensations normal  Musculoskeletal: Gait normal. No joint tenderness  Integumentary: Skin normal with no rash or lesions  Lymphatic: No palpable lymph nodes in neck, axilla or groin  Extremities: No clubbing, cyanosis, edema or varicosities  Psychological: Judgement and insight normal. Mood and affect normal      Laboratory Results:  CBC with diff:   Lab Results   Component Value Date    WBC 6.11 10/05/2023     "WBC 4.4 08/07/2017    RBC 4.61 10/05/2023    RBC 5.06 06/09/2014    HGB 13.4 10/05/2023    HGB 15.2 08/07/2017    HCT 39.0 10/05/2023    HCT 45.0 08/07/2017    MCV 85 10/05/2023    MCV 88 08/07/2017    MCH 29.1 10/05/2023    MCH 29.6 08/07/2017    RDW 12.8 10/05/2023    RDW 13.1 08/07/2017     10/05/2023     08/07/2017       CMP:  Lab Results   Component Value Date    CREATININE 1.18 10/05/2023    CREATININE 0.92 08/07/2017    BUN 27 (H) 10/05/2023    BUN 23 09/29/2022     08/07/2017    K 3.8 10/05/2023    K 4.5 09/29/2022     (H) 10/05/2023     09/29/2022    CO2 24 10/05/2023    CO2 25 09/29/2022    GLUCOSE 92 08/07/2017    PROT 6.3 08/07/2017    ALKPHOS 39 10/05/2023    ALKPHOS 49 08/07/2017    ALT 23 10/05/2023    ALT 28 04/04/2022    AST 21 10/05/2023    AST 22 04/04/2022       Lab Results   Component Value Date    MG 2.5 11/04/2022    PHOS 3.3 11/04/2022       No results found for: \"TROPONINI\", \"CKMB\", \"CKTOTAL\"    Lipid Profile:   Lab Results   Component Value Date    CHOL 185 08/07/2017    CHOL 182 08/03/2016     Lab Results   Component Value Date    HDL 44 07/01/2024    HDL 41 07/06/2023     Lab Results   Component Value Date    LDLCALC 98 07/01/2024    LDLCALC 116 (H) 07/06/2023     Lab Results   Component Value Date    TRIG 69 07/01/2024    TRIG 70 07/06/2023       Imaging Results:  Stress strip  Confirmed by BASSAM ALCANTAR ([id number]),  Marilyn Moya ([id number]) on 10/4/2023 10:31:44 AM       Health Maintenance:  Health Maintenance   Topic Date Due    Pneumococcal Vaccine: Pediatrics (0 to 5 Years) and At-Risk Patients (6 to 64 Years) (1 of 2 - PCV) Never done    DTaP,Tdap,and Td Vaccines (1 - Tdap) Never done    Zoster Vaccine (1 of 2) Never done    RSV Vaccine Age 60+ Years (1 - 1-dose 60+ series) Never done    COVID-19 Vaccine (2 - 2023-24 season) 09/01/2023    Annual Physical  07/05/2024    Influenza Vaccine (1) 09/01/2024    Colorectal Cancer Screening  03/02/2025    " Depression Screening  07/09/2025    HIV Screening  Completed    Hepatitis C Screening  Completed    RSV Vaccine age 0-20 Months  Aged Out    HIB Vaccine  Aged Out    IPV Vaccine  Aged Out    Hepatitis A Vaccine  Aged Out    Meningococcal ACWY Vaccine  Aged Out    HPV Vaccine  Aged Out     Immunization History   Administered Date(s) Administered    COVID-19 [employer] vaccine 0.5 mL 07/20/2021         Dylan Bah MD  7/9/2024,9:41 AM

## 2024-09-26 DIAGNOSIS — I10 ESSENTIAL HYPERTENSION: ICD-10-CM

## 2024-09-26 RX ORDER — DILTIAZEM HYDROCHLORIDE 120 MG/1
CAPSULE, COATED, EXTENDED RELEASE ORAL
Qty: 90 CAPSULE | Refills: 4 | Status: SHIPPED | OUTPATIENT
Start: 2024-09-26

## 2024-11-12 ENCOUNTER — OFFICE VISIT (OUTPATIENT)
Dept: CARDIOLOGY CLINIC | Facility: CLINIC | Age: 64
End: 2024-11-12
Payer: COMMERCIAL

## 2024-11-12 VITALS
SYSTOLIC BLOOD PRESSURE: 110 MMHG | HEART RATE: 61 BPM | DIASTOLIC BLOOD PRESSURE: 80 MMHG | BODY MASS INDEX: 26.24 KG/M2 | WEIGHT: 173.1 LBS | OXYGEN SATURATION: 96 % | HEIGHT: 68 IN

## 2024-11-12 DIAGNOSIS — I42.0 DILATED CARDIOMYOPATHY (HCC): ICD-10-CM

## 2024-11-12 DIAGNOSIS — I10 ESSENTIAL HYPERTENSION: ICD-10-CM

## 2024-11-12 DIAGNOSIS — I77.810 ASCENDING AORTA DILATATION (HCC): ICD-10-CM

## 2024-11-12 DIAGNOSIS — I25.119 CORONARY ARTERY DISEASE INVOLVING NATIVE HEART WITH ANGINA PECTORIS, UNSPECIFIED VESSEL OR LESION TYPE (HCC): Primary | ICD-10-CM

## 2024-11-12 PROCEDURE — 99213 OFFICE O/P EST LOW 20 MIN: CPT | Performed by: INTERNAL MEDICINE

## 2024-11-12 PROCEDURE — 93000 ELECTROCARDIOGRAM COMPLETE: CPT | Performed by: INTERNAL MEDICINE

## 2024-11-12 NOTE — PROGRESS NOTES
Cardiology Follow Up    Arun Harper  1960  [id number]  [facility] [place]  [address]  [place] [address] [phone]    1. Coronary artery disease involving native heart with angina pectoris, unspecified vessel or lesion type (HCC)  POCT ECG      2. Essential hypertension  POCT ECG      3. Dilated cardiomyopathy (HCC)        4. Ascending aorta dilatation (HCC)  Echo complete w/ contrast if indicated            Discussion/Summary: All of his assessed cardiac problems are stable. I have reviewed his medications and made no changes. He will be scheduled for a repeat ECHO.  RTO 1 year.      Interval History: He has not had any cardiac problems since his last OV. He is not retired. He stays very busy and denies CP, SOB, palpitations.  He is walking 2 miles daily.  /80  ECHO 12/2022- EF 60%, ascending aorta 4.2 cm    EST 9/2023 - No ischemia. Very good exercise tolerance      He had previous cath which shown minimal CAD.     Weight is down from 182 to 173 lbs        Patient Active Problem List   Diagnosis    Essential hypertension    Gastroesophageal reflux disease without esophagitis    Elevated PSA    Allergic rhinitis due to pollen    Failed back syndrome    HNP (herniated nucleus pulposus), lumbar    Intervertebral disc disorder with radiculopathy of lumbosacral region    Dilated cardiomyopathy (HCC)    Mixed hyperlipidemia    Vitamin D deficiency    Benign prostatic hyperplasia with nocturia    Erectile dysfunction    CAD (coronary artery disease)    H/O prostatectomy    Ascending aorta dilatation (HCC)     Past Medical History:   Diagnosis Date    Back disorder 02/26/2019    getting epidural shots, physical therapy    Benign localized prostatic hyperplasia with lower urinary tract symptoms (LUTS) 10/25/2022    CAD (coronary artery disease)     Cardiac disease     Cardiomyopathy (HCC)     Failed back syndrome     GERD  (gastroesophageal reflux disease)     Heart attack (HCC)     Mild MI 1996     Hypertension     Patient denies    Myocardial infarction (HCC)     Mild    Sleep apnea     Umbilical hernia     Last Assessed:3/25/2015     Social History     Socioeconomic History    Marital status: /Civil Union     Spouse name: Not on file    Number of children: Not on file    Years of education: Not on file    Highest education level: Not on file   Occupational History    Occupation:      Comment: full time   Tobacco Use    Smoking status: Never     Passive exposure: Past    Smokeless tobacco: Never   Vaping Use    Vaping status: Never Used   Substance and Sexual Activity    Alcohol use: Yes     Comment: occ / Rare    Drug use: No    Sexual activity: Yes     Partners: Female   Other Topics Concern    Not on file   Social History Narrative    Always uses seat belt    Caffeine use     Social Determinants of Health     Financial Resource Strain: Not on file   Food Insecurity: No Food Insecurity (10/28/2022)    Hunger Vital Sign     Worried About Running Out of Food in the Last Year: Never true     Ran Out of Food in the Last Year: Never true   Transportation Needs: No Transportation Needs (10/28/2022)    PRAPARE - Transportation     Lack of Transportation (Medical): No     Lack of Transportation (Non-Medical): No   Physical Activity: Not on file   Stress: Not on file   Social Connections: Not on file   Intimate Partner Violence: Not on file   Housing Stability: Low Risk  (10/28/2022)    Housing Stability Vital Sign     Unable to Pay for Housing in the Last Year: No     Number of Places Lived in the Last Year: 1     Unstable Housing in the Last Year: No      Family History   Problem Relation Age of Onset    Pancreatic cancer Mother     Hypertension Father     Diabetes Father     No Known Problems Sister     No Known Problems Brother     No Known Problems Son     No Known Problems Son     Cancer Family      Past Surgical  "History:   Procedure Laterality Date    BACK SURGERY      HERNIA REPAIR      Last Assessed:3/12/2014 ,lumbar    LUMBAR LAMINECTOMY      Last Assessed:3/12/2014    [place] LAPS SURG [id number] RPBIC RAD W/NRV SPARING ROBOT N/A 10/26/2022    Procedure: ROBOTIC ASSISTED LAPAROSCOPIC SIMPLE PROSTATECTOMY, BLADDER NECK RECONSTRUCTION;  Surgeon: Tyler Treadwell MD;  Location: [facility];  Service: Urology    PROSTATE BIOPSY  09/10/2015    needle biopsy    SHOULDER SURGERY      TONSILLECTOMY         Current Outpatient Medications:     aspirin (ECOTRIN) 325 mg EC tablet, Take 325 mg by mouth every other day, Disp: , Rfl:     cyclobenzaprine (FLEXERIL) 10 mg tablet, Take 1 tablet (10 mg total) by mouth daily at bedtime as needed for muscle spasms, Disp: 30 tablet, Rfl: 3    diltiazem (CARDIZEM CD) 120 mg 24 hr capsule, TAKE 1 CAPSULE BY MOUTH EVERY DAY, Disp: 90 capsule, Rfl: 4    Omega-3 Fatty Acids (FISH OIL) 1000 MG CPDR, Take 1 capsule by mouth daily  , Disp: , Rfl:     omeprazole (PriLOSEC) 40 MG capsule, TAKE 1 CAPSULE BY MOUTH EVERY DAY, Disp: 90 capsule, Rfl: 1    sildenafil (VIAGRA) 100 mg tablet, Take 1 tablet (100 mg total) by mouth daily as needed for erectile dysfunction, Disp: 10 tablet, Rfl: 11    EPINEPHrine (EPIPEN) 0.3 mg/0.3 mL SOAJ, Inject 0.3 mL (0.3 mg total) into a muscle once for 1 dose, Disp: 0.3 mL, Rfl: 1  Allergies   Allergen Reactions    Other Anaphylaxis     Annotation - [id number]: YELLOW JACKETS     Vitals:    11/12/24 0850   BP: 110/80   BP Location: Right arm   Patient Position: Sitting   Cuff Size: Standard   Pulse: 61   SpO2: 96%   Weight: 78.5 kg (173 lb 1.6 oz)   Height: 5' 8\" (1.727 m)     Weight (last 2 days)       Date/Time Weight    11/12/24 0850 78.5 (173.1)           Blood pressure 110/80, pulse 61, height 5' 8\" (1.727 m), weight 78.5 kg (173 lb 1.6 oz), SpO2 96%., Body mass index is 26.32 kg/m².    Labs:  Appointment on 07/01/2024   Component Date Value    TSH 3RD [facility] 07/01/2024 " 1.349     Cholesterol 07/01/2024 156     Triglycerides 07/01/2024 69     HDL, Direct 07/01/2024 44     LDL Calculated 07/01/2024 98      Imaging: No results found.    Review of Systems:  Review of Systems   Constitutional:  Negative for diaphoresis, fatigue, fever and unexpected weight change.   HENT: Negative.     Respiratory:  Negative for cough, shortness of breath and wheezing.    Cardiovascular:  Negative for chest pain, palpitations and leg swelling.   Gastrointestinal:  Negative for abdominal pain, diarrhea and nausea.   Musculoskeletal:  Negative for gait problem and myalgias.   Skin:  Negative for rash.   Neurological:  Negative for dizziness and numbness.   Psychiatric/Behavioral: Negative.         Physical Exam:  Physical Exam  Constitutional:       Appearance: He is well-developed.   HENT:      Head: Normocephalic and atraumatic.   Eyes:      Pupils: Pupils are equal, round, and reactive to light.   Neck:      Vascular: No JVD.   Cardiovascular:      Rate and Rhythm: Regular rhythm.      Pulses: Normal pulses.           Carotid pulses are 2+ on the right side and 2+ on the left side.     Heart sounds: S1 normal and S2 normal.   Pulmonary:      Effort: Pulmonary effort is normal.      Breath sounds: Normal breath sounds. No wheezing or rales.   Abdominal:      General: Bowel sounds are normal.      Palpations: Abdomen is soft.   Musculoskeletal:         General: No tenderness. Normal range of motion.      Cervical back: Normal range of motion and neck supple.   Skin:     General: Skin is warm.   Neurological:      Mental Status: He is alert and oriented to person, place, and time.      Cranial Nerves: No cranial nerve deficit.      Deep Tendon Reflexes: Reflexes are normal and symmetric.

## 2024-12-12 ENCOUNTER — TELEPHONE (OUTPATIENT)
Age: 64
End: 2024-12-12

## 2024-12-12 NOTE — TELEPHONE ENCOUNTER
Patient calling to report an incident that happened on an airplane on his way back from [place] yesterday. He states his BP dropped to 80/40 and he lost consciousness for about 3 minutes. He wants to know if this is expected due to being on a plane, or if there is something else he should be worried about. Please advise.

## 2024-12-13 ENCOUNTER — HOSPITAL ENCOUNTER (OUTPATIENT)
Dept: NON INVASIVE DIAGNOSTICS | Facility: CLINIC | Age: 64
Discharge: HOME | End: 2024-12-13
Payer: COMMERCIAL

## 2024-12-13 VITALS
HEART RATE: 61 BPM | SYSTOLIC BLOOD PRESSURE: 110 MMHG | BODY MASS INDEX: 26.23 KG/M2 | HEIGHT: 68 IN | DIASTOLIC BLOOD PRESSURE: 80 MMHG | WEIGHT: 173.06 LBS

## 2024-12-13 DIAGNOSIS — I77.810 ASCENDING AORTA DILATATION (HCC): ICD-10-CM

## 2024-12-13 LAB
AORTIC ROOT: 3.5 CM
APICAL FOUR CHAMBER EJECTION FRACTION: 63 %
ASCENDING AORTA: 4.2 CM
AV REGURGITATION PRESSURE HALF TIME: 620 MS
BSA FOR ECHO PROCEDURE: 1.92 M2
DOP CALC LVOT AREA: 4.52 CM2
DOP CALC LVOT DIAMETER: 2.4 CM
E WAVE DECELERATION TIME: 227 MS
E/A RATIO: 1.11
FRACTIONAL SHORTENING: 26 (ref 28–44)
INTERVENTRICULAR SEPTUM IN DIASTOLE (PARASTERNAL SHORT AXIS VIEW): 0.9 CM
INTERVENTRICULAR SEPTUM: 0.9 CM (ref 0.6–1.1)
LAAS-AP2: 28.8 CM2
LAAS-AP4: 20.3 CM2
LEFT ATRIUM AREA SYSTOLE SINGLE PLANE A4C: 22.9 CM2
LEFT ATRIUM SIZE: 4 CM
LEFT ATRIUM VOLUME (MOD BIPLANE): 81 ML
LEFT ATRIUM VOLUME INDEX (MOD BIPLANE): 42.2 ML/M2
LEFT INTERNAL DIMENSION IN SYSTOLE: 3.4 CM (ref 2.1–4)
LEFT VENTRICULAR INTERNAL DIMENSION IN DIASTOLE: 4.6 CM (ref 3.5–6)
LEFT VENTRICULAR POSTERIOR WALL IN END DIASTOLE: 0.9 CM
LEFT VENTRICULAR STROKE VOLUME: 51 ML
LVSV (TEICH): 51 ML
MV E'TISSUE VEL-SEP: 9 CM/S
MV PEAK A VEL: 0.66 M/S
MV PEAK E VEL: 73 CM/S
MV STENOSIS PRESSURE HALF TIME: 66 MS
MV VALVE AREA P 1/2 METHOD: 3.33
RIGHT ATRIUM AREA SYSTOLE A4C: 21.8 CM2
RIGHT VENTRICLE ID DIMENSION: 3.2 CM
SINOTUBULAR JUNCTION: 3.2 CM
SL CV AV DECELERATION TIME RETROGRADE: 2138 MS
SL CV AV PEAK GRADIENT RETROGRADE: 55 MMHG
SL CV LEFT ATRIUM LENGTH A2C: 6.3 CM
SL CV LV EF: 55
SL CV PED ECHO LEFT VENTRICLE DIASTOLIC VOLUME (MOD BIPLANE) 2D: 98 ML
SL CV PED ECHO LEFT VENTRICLE SYSTOLIC VOLUME (MOD BIPLANE) 2D: 47 ML
SL CV SINUS OF VALSALVA 2D: 4.3 CM
STJ: 3.2 CM
TR MAX PG: 25 MMHG
TR PEAK VELOCITY: 2.5 M/S
TRICUSPID ANNULAR PLANE SYSTOLIC EXCURSION: 2.6 CM
TRICUSPID VALVE PEAK REGURGITATION VELOCITY: 2.51 M/S

## 2024-12-13 PROCEDURE — 93306 TTE W/DOPPLER COMPLETE: CPT

## 2024-12-13 PROCEDURE — 93306 TTE W/DOPPLER COMPLETE: CPT | Performed by: INTERNAL MEDICINE

## 2024-12-16 ENCOUNTER — NURSE TRIAGE (OUTPATIENT)
Age: 64
End: 2024-12-16

## 2024-12-16 NOTE — TELEPHONE ENCOUNTER
"Reason for Conversation: Spoke with patient and wife, state patient had a syncopal episode 12-11-24 on a flight home from [place], 5hrs into flight, felt nauseous, was going to get up and he passed completely out; unresponsive to sternal rub, BP 70/40, glucose 88, pulseox 90% on 100% oxygen.     Became responsive ~2-3mins later, disoriented, took 2hrs for him to return to his normal. Physician was on board assisting, spoke with medical command on the ground, did administer a SL anti-emetic.    Inquired how he was feeling now, states ok but his head still feels fuzzy. States had 1 other episode previously similar but did not pass out that time; did not seek treatment for it.    Patient called to report this episode on 12-12-24 & again on 12-13-24 and has received no response from Dr Alvarez; states he and his wife are very upset over this and want it known.    No upcoming openings for Dr Alvarez; able to schedule him with WILMER Cheryl tomorrow AM.    VS/Weight: (Note: Please include date/time vitals/weight were measured)  none provided at time of call    Pain: No    Risk Factors: Hypertension and Other CAD, dilated cardiomyopathy, ascending aorta dilatation    Recent relevant testing and date of testing: Echo and Labs December, july    Medication: Cardiazem 120mg daily    Upcoming Office Visit: Yes 12-17-24 with WILMER    Last Office Visit: 11-12-24      Reason for Disposition   Patient wants to be seen    Answer Assessment - Initial Assessment Questions  1. ONSET: \"How long were you unconscious?\" (e.g., minutes, seconds) \"When did it happen?\"      2-3mins event happened on a plane from [place] last week  2. CONTENT: \"What happened during the period of unconsciousness?\" (e.g., seizure activity)       Felt nauseated just prior to is all he remembers. His wife stated the flight attendants took his BP 70/40, pulse ox 90%, blood glucose 88, unresponsive to sternal rub also. After 2-3mins he did wake up but was disoriented, " "took him 2hrs to return to his baseline. Feels a bit better but states his head still feels fuzzy.  3. MENTAL STATUS: \"Alert and oriented now?\" (e.g., oriented x 3 = name, month, location)       yes  4. TRIGGER: \"What do you think caused the fainting?\" \"What were you doing just before you fainted?\"  (e.g., exercise, sudden standing up, prolonged standing)      Unknown; on a plane 5hrs into journey home from [place]  5. RECURRENT SYMPTOM: \"Have you ever passed out before?\" If Yes, ask: \"When was the last time?\" and \"What happened that time?\"       Had pre-syncopal episode 1x before  6. INJURY: \"Did you hurt yourself when you fell?\"       denies  7. CARDIAC SYMPTOMS: \"Have you had any of the following symptoms: chest pain, difficulty breathing, palpitations?\"      denies  8. NEUROLOGIC SYMPTOMS: \"Have you had any of the following symptoms: headache, numbness, vertigo, weakness?\"      Head feels fuzzy  9. GI SYMPTOMS: \"Have you had any of the following symptoms: abdomen pain, vomiting, diarrhea, blood in stools?\"      Had nausea prior to syncope  10. OTHER SYMPTOMS: \"Do you have any other symptoms?\"        denies    Protocols used: Fainting-Adult-[place]    "

## 2024-12-17 ENCOUNTER — OFFICE VISIT (OUTPATIENT)
Dept: CARDIOLOGY CLINIC | Facility: CLINIC | Age: 64
End: 2024-12-17
Payer: COMMERCIAL

## 2024-12-17 VITALS
HEIGHT: 68 IN | SYSTOLIC BLOOD PRESSURE: 104 MMHG | OXYGEN SATURATION: 99 % | WEIGHT: 177.2 LBS | HEART RATE: 62 BPM | BODY MASS INDEX: 26.86 KG/M2 | DIASTOLIC BLOOD PRESSURE: 76 MMHG

## 2024-12-17 DIAGNOSIS — I25.119 CORONARY ARTERY DISEASE INVOLVING NATIVE HEART WITH ANGINA PECTORIS, UNSPECIFIED VESSEL OR LESION TYPE (HCC): ICD-10-CM

## 2024-12-17 DIAGNOSIS — R55 SYNCOPE, UNSPECIFIED SYNCOPE TYPE: Primary | ICD-10-CM

## 2024-12-17 DIAGNOSIS — I42.0 DILATED CARDIOMYOPATHY (HCC): ICD-10-CM

## 2024-12-17 DIAGNOSIS — I10 ESSENTIAL HYPERTENSION: ICD-10-CM

## 2024-12-17 PROCEDURE — 93000 ELECTROCARDIOGRAM COMPLETE: CPT | Performed by: NURSE PRACTITIONER

## 2024-12-17 PROCEDURE — 99214 OFFICE O/P EST MOD 30 MIN: CPT | Performed by: NURSE PRACTITIONER

## 2024-12-17 RX ORDER — ZINC GLUCONATE 50 MG
50 TABLET ORAL DAILY
COMMUNITY

## 2024-12-17 RX ORDER — OMEGA-3S/DHA/EPA/FISH OIL/D3 300MG-1000
1000 CAPSULE ORAL DAILY
COMMUNITY

## 2024-12-17 RX ORDER — MULTIVITAMIN WITH IRON
500 TABLET ORAL DAILY
COMMUNITY

## 2024-12-17 NOTE — ASSESSMENT & PLAN NOTE
Blood pressure 104/68  .  Cardizem  mg daily  DASH diet  Structured home blood pressure monitoring goal systolic blood pressure 1 10-1 30  Heart rate 60-80

## 2024-12-17 NOTE — ASSESSMENT & PLAN NOTE
2007 CP. [facility] RCA spasm with catheter engagement.  Placed on Cardizem no further CP.

## 2024-12-17 NOTE — PROGRESS NOTES
Cardiology Follow Up    Griselda Harper  1960  [id number]  [facility]  [address]  [place] [address] [phone]      Assessment & Plan  Syncope, unspecified syncope type  Per HPI  /68, HR 58 BPM, possibly due to hypotension and bradycardia on Cardizem CD 120mg daily.  Stop Cardizem CD1 20 mg daily  2-week live Zio evaluate heart rate rhythm rule out heart block or arrhythmia contributing to syncope  Carotid Dopplers rule out carotid stenosis contributing to syncope  CBC BMP mag and TSH to be done in the near future    Essential hypertension  Blood pressure 104/68  .  Cardizem  mg daily  DASH diet  Structured home blood pressure monitoring goal systolic blood pressure 1 10-1 30  Heart rate 60-80  Coronary artery disease involving native heart with angina pectoris, unspecified vessel or lesion type (HCC)  2007 CP. LHC RCA spasm with catheter engagement.  Placed on Cardizem no further CP.    Dilated cardiomyopathy (HCC)  DCM improved to normal LVEF 55% by TTE on 12/13/24     Follow up in our office in 2 weeks         Interval History:   Mr Griselda Harper presents to our office with 2 episodes of syncope.  According to griselda this past July he was in the kitchen talking to his wife and flet like he was going to pass out.  He became pale lightheaded dizzy and diaphoretic.  He Layed down, elevated his legs and felt better.  This past Wednesday he and his wife were flying home from [place].  He was sitting in his seat and nauseus.  Griselda planned to get up and walk around.  The next thing he remembers were people standing over him.  His wife found him slummped over in his seat.  She picked up his torso and his eyes rolled back.  According to his wife Griselda was diaphoretic, gray, purple lips, and did not respond to sternal rub.  A Doctor sitting behind him came to help.  Vital signs showed  BP 70/40, pulse low, oxygen sat  90% on 100% oxygen.  He woke up and was disoriented for 3 minutes.  He layed down for the rest of the flight.  When they landed he felt better and did not go to the Emergency room.  Arun denies chest pain or palpitations.  He was feeling well during his visit in [place] denied any recent illness with nausea vomiting or diarrhea.  Arun denies taking Flexeril only PRN.  He took Viagra 2 days prior to his flight home.      Medical History   Primary Cardiologist Dr Alvarez  2007 CP and CM, RCA spasm with catheter engagement.  Placed on Cardizem no further CP.    2007 CM recovered    Hypertension  Hyperlipidemia 7/01/24  TG 69 HDL 44 LDL 98  GERD  BPH     12/13/24 TTE: Left ventricular cavity size normal LVEF 55% systolic function normal diastolic function normal.  Left atrium mildly dilated right atrium mildly dilated, mild aortic valve regurgitation, mild mitral valve regurgitation, mild tricuspid valve regurgitation.  Aortic root is mildly dilated ascending aorta is mildly dilated.      Patient Active Problem List   Diagnosis    Essential hypertension    Gastroesophageal reflux disease without esophagitis    Elevated PSA    Allergic rhinitis due to pollen    Failed back syndrome    HNP (herniated nucleus pulposus), lumbar    Intervertebral disc disorder with radiculopathy of lumbosacral region    Dilated cardiomyopathy (HCC)    Mixed hyperlipidemia    Vitamin D deficiency    Benign prostatic hyperplasia with nocturia    Erectile dysfunction    CAD (coronary artery disease)    H/O prostatectomy    Ascending aorta dilatation (HCC)     Past Medical History:   Diagnosis Date    Back disorder 02/26/2019    getting epidural shots, physical therapy    Benign localized prostatic hyperplasia with lower urinary tract symptoms (LUTS) 10/25/2022    CAD (coronary artery disease)     Cardiac disease     Cardiomyopathy (HCC)     Failed back syndrome     GERD (gastroesophageal reflux disease)     Heart attack (HCC)     Mild MI  1996     Hypertension     Patient denies    Myocardial infarction (HCC)     Mild    Sleep apnea     Umbilical hernia     Last Assessed:3/25/2015     Social History     Socioeconomic History    Marital status: /Civil Union     Spouse name: Not on file    Number of children: Not on file    Years of education: Not on file    Highest education level: Not on file   Occupational History    Occupation:      Comment: full time   Tobacco Use    Smoking status: Never     Passive exposure: Past    Smokeless tobacco: Never   Vaping Use    Vaping status: Never Used   Substance and Sexual Activity    Alcohol use: Yes     Comment: occ / Rare    Drug use: No    Sexual activity: Yes     Partners: Female   Other Topics Concern    Not on file   Social History Narrative    Always uses seat belt    Caffeine use     Social Drivers of Health     Financial Resource Strain: Not on file   Food Insecurity: No Food Insecurity (10/28/2022)    Hunger Vital Sign     Worried About Running Out of Food in the Last Year: Never true     Ran Out of Food in the Last Year: Never true   Transportation Needs: No Transportation Needs (10/28/2022)    PRAPARE - Transportation     Lack of Transportation (Medical): No     Lack of Transportation (Non-Medical): No   Physical Activity: Not on file   Stress: Not on file   Social Connections: Not on file   Intimate Partner Violence: Not on file   Housing Stability: Low Risk  (10/28/2022)    Housing Stability Vital Sign     Unable to Pay for Housing in the Last Year: No     Number of Places Lived in the Last Year: 1     Unstable Housing in the Last Year: No      Family History   Problem Relation Age of Onset    Pancreatic cancer Mother     Hypertension Father     Diabetes Father     No Known Problems Sister     No Known Problems Brother     No Known Problems Son     No Known Problems Son     Cancer Family      Past Surgical History:   Procedure Laterality Date    BACK SURGERY      HERNIA REPAIR       Last Assessed:3/12/2014 ,lumbar    LUMBAR LAMINECTOMY      Last Assessed:3/12/2014    FL LAPS SURG [id number] RPBIC RAD W/NRV SPARING ROBOT N/A 10/26/2022    Procedure: ROBOTIC ASSISTED LAPAROSCOPIC SIMPLE PROSTATECTOMY, BLADDER NECK RECONSTRUCTION;  Surgeon: Tyler Treadwell MD;  Location: [facility];  Service: Urology    PROSTATE BIOPSY  09/10/2015    needle biopsy    SHOULDER SURGERY      TONSILLECTOMY         Current Outpatient Medications:     aspirin (ECOTRIN) 325 mg EC tablet, Take 325 mg by mouth every other day, Disp: , Rfl:     cholecalciferol (VITAMIN D3) 400 units tablet, Take 1,000 Units by mouth daily, Disp: , Rfl:     cyclobenzaprine (FLEXERIL) 10 mg tablet, Take 1 tablet (10 mg total) by mouth daily at bedtime as needed for muscle spasms, Disp: 30 tablet, Rfl: 3    diltiazem (CARDIZEM CD) 120 mg 24 hr capsule, TAKE 1 CAPSULE BY MOUTH EVERY DAY, Disp: 90 capsule, Rfl: 4    Omega-3 Fatty Acids (FISH OIL) 1000 MG CPDR, Take 1 capsule by mouth daily  , Disp: , Rfl:     omeprazole (PriLOSEC) 40 MG capsule, TAKE 1 CAPSULE BY MOUTH EVERY DAY, Disp: 90 capsule, Rfl: 1    sildenafil (VIAGRA) 100 mg tablet, Take 1 tablet (100 mg total) by mouth daily as needed for erectile dysfunction, Disp: 10 tablet, Rfl: 11    vitamin B-12 (VITAMIN B-12) 500 mcg tablet, Take 500 mcg by mouth daily, Disp: , Rfl:     zinc gluconate 50 mg tablet, Take 50 mg by mouth daily, Disp: , Rfl:     EPINEPHrine (EPIPEN) 0.3 mg/0.3 mL SOAJ, Inject 0.3 mL (0.3 mg total) into a muscle once for 1 dose (Patient not taking: Reported on 12/17/2024), Disp: 0.3 mL, Rfl: 1  Allergies   Allergen Reactions    Other Anaphylaxis     Annotation - [id number]: YELLOW JACKETS       Labs:  Hospital Outpatient Visit on 12/13/2024   Component Date Value    AV peak gradient 12/13/2024 55     Triscuspid Valve Regurgi* 12/13/2024 25.0     Sinus of Valsalva, 2D 12/13/2024 4.3     RAA A4C 12/13/2024 21.8     HARPAL A4C 12/13/2024 22.9     LA Volume Index (BP)  12/13/2024 42.2     MV Peak A Koby 12/13/2024 0.66     MV stenosis pressure 1/2* 12/13/2024 66     MV Peak E Koby 12/13/2024 73     LVOT diameter 12/13/2024 2.4     E wave deceleration time 12/13/2024 227     E/A ratio 12/13/2024 1.11     MV valve area p 1/2 meth* 12/13/2024 3.33     AV regurgitation pressur* 12/13/2024 620     TR Peak Koby 12/13/2024 2.5     RVID d 12/13/2024 3.2     A4C EF 12/13/2024 63     Tricuspid valve peak reg* 12/13/2024 2.51     Left ventricular stroke * 12/13/2024 51.00     IVSd 12/13/2024 0.90     Tricuspid annular plane * 12/13/2024 2.60     Ao root 12/13/2024 3.50     Ao STJ 12/13/2024 3.20     LVPWd 12/13/2024 0.90     LA size 12/13/2024 4     Asc Ao 12/13/2024 4.2     STJ 12/13/2024 3.2     LA volume (BP) 12/13/2024 81     FS 12/13/2024 26     LVIDS 12/13/2024 3.40     IVS 12/13/2024 0.9     LVIDd 12/13/2024 4.60     LA length (A2C) 12/13/2024 6.30     AV Deceleration Time 12/13/2024 2,138     LEFT VENTRICLE SYSTOLIC * 12/13/2024 47     LV DIASTOLIC VOLUME (MOD* 12/13/2024 98     Left Atrium Area-systoli* 12/13/2024 20.3     Left Atrium Area-systoli* 12/13/2024 28.8     MV E' Tissue Velocity Se* 12/13/2024 9     LVSV, 2D 12/13/2024 51     BSA 12/13/2024 1.92     LVOT area 12/13/2024 4.52     LV EF 12/13/2024 55      Imaging: Echo complete w/ contrast if indicated  Result Date: 12/13/2024  Narrative:   Left Ventricle: Left ventricular cavity size is normal. Wall thickness is normal. The left ventricular ejection fraction is 55%. Systolic function is normal. Wall motion is normal. Diastolic function is normal.   Left Atrium: The atrium is mildly dilated.   Right Atrium: The atrium is mildly dilated.   Aortic Valve: There is mild regurgitation.   Mitral Valve: There is mild regurgitation.   Tricuspid Valve: There is mild regurgitation.   Aorta: The aortic root is mildly dilated. The ascending aorta is mildly dilated.   Prior TTE study available for comparison. Prior study date: 12/5/2022.  No significant changes noted compared to the prior study.       Review of Systems:  Review of Systems   Neurological:  Positive for syncope.   All other systems reviewed and are negative.      Physical Exam:  Physical Exam  Vitals reviewed.   Constitutional:       Appearance: Normal appearance.   HENT:      Head: Normocephalic.   Neck:      Vascular: No carotid bruit.   Cardiovascular:      Rate and Rhythm: Normal rate and regular rhythm.      Pulses: Normal pulses.      Heart sounds: Normal heart sounds.   Pulmonary:      Effort: Pulmonary effort is normal.      Breath sounds: Normal breath sounds.   Musculoskeletal:         General: Normal range of motion.      Cervical back: Normal range of motion and neck supple.      Right lower leg: No edema.      Left lower leg: No edema.   Skin:     General: Skin is warm and dry.      Capillary Refill: Capillary refill takes less than 2 seconds.   Neurological:      General: No focal deficit present.      Mental Status: He is alert and oriented to person, place, and time.   Psychiatric:         Mood and Affect: Mood normal.         Behavior: Behavior normal.

## 2024-12-18 ENCOUNTER — RESULTS FOLLOW-UP (OUTPATIENT)
Dept: FAMILY MEDICINE CLINIC | Facility: MEDICAL CENTER | Age: 64
End: 2024-12-18

## 2024-12-18 ENCOUNTER — APPOINTMENT (OUTPATIENT)
Dept: LAB | Facility: MEDICAL CENTER | Age: 64
End: 2024-12-18
Payer: COMMERCIAL

## 2024-12-18 DIAGNOSIS — E55.9 VITAMIN D DEFICIENCY: ICD-10-CM

## 2024-12-18 DIAGNOSIS — E78.2 MIXED HYPERLIPIDEMIA: ICD-10-CM

## 2024-12-18 DIAGNOSIS — R55 SYNCOPE, UNSPECIFIED SYNCOPE TYPE: ICD-10-CM

## 2024-12-18 DIAGNOSIS — Z12.5 SCREENING FOR PROSTATE CANCER: ICD-10-CM

## 2024-12-18 LAB
25(OH)D3 SERPL-MCNC: 112.3 NG/ML (ref 30–100)
ALBUMIN SERPL BCG-MCNC: 4.3 G/DL (ref 3.5–5)
ALP SERPL-CCNC: 40 U/L (ref 34–104)
ALT SERPL W P-5'-P-CCNC: 18 U/L (ref 7–52)
ANION GAP SERPL CALCULATED.3IONS-SCNC: 7 MMOL/L (ref 4–13)
AST SERPL W P-5'-P-CCNC: 20 U/L (ref 13–39)
BASOPHILS # BLD AUTO: 0.05 THOUSANDS/ÂΜL (ref 0–0.1)
BASOPHILS NFR BLD AUTO: 1 % (ref 0–1)
BILIRUB SERPL-MCNC: 0.5 MG/DL (ref 0.2–1)
BUN SERPL-MCNC: 25 MG/DL (ref 5–25)
CALCIUM SERPL-MCNC: 9.5 MG/DL (ref 8.4–10.2)
CHLORIDE SERPL-SCNC: 104 MMOL/L (ref 96–108)
CHOLEST SERPL-MCNC: 150 MG/DL (ref ?–200)
CO2 SERPL-SCNC: 29 MMOL/L (ref 21–32)
CREAT SERPL-MCNC: 0.9 MG/DL (ref 0.6–1.3)
EOSINOPHIL # BLD AUTO: 0.15 THOUSAND/ÂΜL (ref 0–0.61)
EOSINOPHIL NFR BLD AUTO: 3 % (ref 0–6)
ERYTHROCYTE [DISTWIDTH] IN BLOOD BY AUTOMATED COUNT: 12.7 % (ref 11.6–15.1)
GFR SERPL CREATININE-BSD FRML MDRD: 91 ML/MIN/1.73SQ M
GLUCOSE P FAST SERPL-MCNC: 94 MG/DL (ref 65–99)
HCT VFR BLD AUTO: 46.1 % (ref 36.5–49.3)
HDLC SERPL-MCNC: 46 MG/DL
HGB BLD-MCNC: 15.1 G/DL (ref 12–17)
IMM GRANULOCYTES # BLD AUTO: 0.02 THOUSAND/UL (ref 0–0.2)
IMM GRANULOCYTES NFR BLD AUTO: 0 % (ref 0–2)
LDLC SERPL CALC-MCNC: 91 MG/DL (ref 0–100)
LYMPHOCYTES # BLD AUTO: 1.46 THOUSANDS/ÂΜL (ref 0.6–4.47)
LYMPHOCYTES NFR BLD AUTO: 30 % (ref 14–44)
MAGNESIUM SERPL-MCNC: 2.1 MG/DL (ref 1.9–2.7)
MCH RBC QN AUTO: 28.9 PG (ref 26.8–34.3)
MCHC RBC AUTO-ENTMCNC: 32.8 G/DL (ref 31.4–37.4)
MCV RBC AUTO: 88 FL (ref 82–98)
MONOCYTES # BLD AUTO: 0.54 THOUSAND/ÂΜL (ref 0.17–1.22)
MONOCYTES NFR BLD AUTO: 11 % (ref 4–12)
NEUTROPHILS # BLD AUTO: 2.6 THOUSANDS/ÂΜL (ref 1.85–7.62)
NEUTS SEG NFR BLD AUTO: 55 % (ref 43–75)
NONHDLC SERPL-MCNC: 104 MG/DL
NRBC BLD AUTO-RTO: 0 /100 WBCS
PLATELET # BLD AUTO: 207 THOUSANDS/UL (ref 149–390)
PMV BLD AUTO: 12 FL (ref 8.9–12.7)
POTASSIUM SERPL-SCNC: 4.6 MMOL/L (ref 3.5–5.3)
PROT SERPL-MCNC: 6.4 G/DL (ref 6.4–8.4)
PSA SERPL-MCNC: 0.04 NG/ML (ref 0–4)
RBC # BLD AUTO: 5.22 MILLION/UL (ref 3.88–5.62)
SODIUM SERPL-SCNC: 140 MMOL/L (ref 135–147)
TRIGL SERPL-MCNC: 63 MG/DL (ref ?–150)
TSH SERPL DL<=0.05 MIU/L-ACNC: 1.38 UIU/ML (ref 0.45–4.5)
WBC # BLD AUTO: 4.82 THOUSAND/UL (ref 4.31–10.16)

## 2024-12-18 PROCEDURE — 85025 COMPLETE CBC W/AUTO DIFF WBC: CPT

## 2024-12-18 PROCEDURE — 83735 ASSAY OF MAGNESIUM: CPT

## 2024-12-18 PROCEDURE — 84153 ASSAY OF PSA TOTAL: CPT

## 2024-12-18 PROCEDURE — 36415 COLL VENOUS BLD VENIPUNCTURE: CPT

## 2024-12-18 PROCEDURE — 84443 ASSAY THYROID STIM HORMONE: CPT

## 2024-12-18 PROCEDURE — 80061 LIPID PANEL: CPT

## 2024-12-18 PROCEDURE — 82306 VITAMIN D 25 HYDROXY: CPT

## 2024-12-18 PROCEDURE — 80053 COMPREHEN METABOLIC PANEL: CPT

## 2024-12-19 ENCOUNTER — RESULTS FOLLOW-UP (OUTPATIENT)
Dept: CARDIOLOGY CLINIC | Facility: CLINIC | Age: 64
End: 2024-12-19

## 2024-12-19 NOTE — TELEPHONE ENCOUNTER
----- Message from LIO Herzog sent at 12/18/2024  4:04 PM EST -----  Please call Arun and inform him TSH within normal limits    Spoke with pt re: normal TSH

## 2024-12-24 ENCOUNTER — HOSPITAL ENCOUNTER (OUTPATIENT)
Dept: VASCULAR ULTRASOUND | Facility: HOSPITAL | Age: 64
Discharge: HOME | End: 2024-12-24
Payer: COMMERCIAL

## 2024-12-24 DIAGNOSIS — R55 SYNCOPE, UNSPECIFIED SYNCOPE TYPE: ICD-10-CM

## 2024-12-24 PROCEDURE — 93880 EXTRACRANIAL BILAT STUDY: CPT | Performed by: SURGERY

## 2024-12-24 PROCEDURE — 93880 EXTRACRANIAL BILAT STUDY: CPT

## 2025-01-09 ENCOUNTER — OFFICE VISIT (OUTPATIENT)
Dept: FAMILY MEDICINE CLINIC | Facility: MEDICAL CENTER | Age: 65
End: 2025-01-09
Payer: COMMERCIAL

## 2025-01-09 VITALS
BODY MASS INDEX: 27.07 KG/M2 | HEIGHT: 68 IN | HEART RATE: 57 BPM | WEIGHT: 178.6 LBS | DIASTOLIC BLOOD PRESSURE: 60 MMHG | OXYGEN SATURATION: 99 % | SYSTOLIC BLOOD PRESSURE: 100 MMHG | TEMPERATURE: 97.7 F | RESPIRATION RATE: 18 BRPM

## 2025-01-09 DIAGNOSIS — Z00.00 ENCOUNTER FOR WELLNESS EXAMINATION IN ADULT: ICD-10-CM

## 2025-01-09 DIAGNOSIS — E78.2 MIXED HYPERLIPIDEMIA: ICD-10-CM

## 2025-01-09 DIAGNOSIS — I42.0 DILATED CARDIOMYOPATHY (HCC): ICD-10-CM

## 2025-01-09 DIAGNOSIS — I10 ESSENTIAL HYPERTENSION: ICD-10-CM

## 2025-01-09 DIAGNOSIS — K21.9 GASTROESOPHAGEAL REFLUX DISEASE WITHOUT ESOPHAGITIS: ICD-10-CM

## 2025-01-09 DIAGNOSIS — R55 SYNCOPE, UNSPECIFIED SYNCOPE TYPE: Primary | ICD-10-CM

## 2025-01-09 PROBLEM — E55.9 VITAMIN D DEFICIENCY: Status: RESOLVED | Noted: 2020-11-20 | Resolved: 2025-01-09

## 2025-01-09 PROCEDURE — 99396 PREV VISIT EST AGE 40-64: CPT | Performed by: INTERNAL MEDICINE

## 2025-01-09 PROCEDURE — 99214 OFFICE O/P EST MOD 30 MIN: CPT | Performed by: INTERNAL MEDICINE

## 2025-01-09 RX ORDER — OMEPRAZOLE 40 MG/1
40 CAPSULE, DELAYED RELEASE ORAL DAILY
Qty: 90 CAPSULE | Refills: 1 | Status: SHIPPED | OUTPATIENT
Start: 2025-01-09

## 2025-01-09 NOTE — ASSESSMENT & PLAN NOTE
About a month ago patient was coming back from [place] and while on the plane had a syncopal attack.  The cause is not known but he was seen by cardiology.  An echo and carotid artery vascular study were done which did not show any abnormality.  Patient does say that he feels more fatigued and foggy.  His blood pressure today was on the lower side and I told him to keep himself well-hydrated and to wear compression stockings

## 2025-01-09 NOTE — PROGRESS NOTES
Adult Annual Physical  Name: Arun Harper      : 1960      MRN: [id number]  Encounter Provider: Dylan Bah MD  Encounter Date: 2025   Encounter department: [facility] [place]    Assessment & Plan  Syncope, unspecified syncope type  About a month ago patient was coming back from [place] and while on the plane had a syncopal attack.  The cause is not known but he was seen by cardiology.  An echo and carotid artery vascular study were done which did not show any abnormality.  Patient does say that he feels more fatigued and foggy.  His blood pressure today was on the lower side and I told him to keep himself well-hydrated and to wear compression stockings       Dilated cardiomyopathy (HCC)  Follow-up with cardiology       Mixed hyperlipidemia  Continue on low-fat diet  Orders:    Comprehensive metabolic panel; Future    CBC and differential; Future    Lipid panel; Future    TSH, 3rd generation; Future    Essential hypertension  Not on any blood pressure medication but blood pressure remains on the lower side.  Previously he was on diltiazem which was discontinued by the cardiologist but his blood pressure and heart rate continue to remain low       Gastroesophageal reflux disease without esophagitis  Continue omeprazole       Encounter for wellness examination in adult         Immunizations and preventive care screenings were discussed with patient today. Appropriate education was printed on patient's after visit summary.        Counseling:  Alcohol/drug use: discussed moderation in alcohol intake, the recommendations for healthy alcohol use, and avoidance of illicit drug use.  Dental Health: discussed importance of regular tooth brushing, flossing, and dental visits.  Injury prevention: discussed safety/seat belts, safety helmets, smoke detectors, carbon monoxide detectors, and smoking near bedding or upholstery.  Sexual health: discussed sexually transmitted diseases, partner  selection, use of condoms, avoidance of unintended pregnancy, and contraceptive alternatives.  Exercise: the importance of regular exercise/physical activity was discussed. Recommend exercise 3-5 times per week for at least 30 minutes.       Depression Screening and Follow-up Plan: Patient was screened for depression during today's encounter. They screened negative with a PHQ-2 score of 0.        History of Present Illness   Patient is here for follow-up of hyperlipidemia, hypertension, GERD.  About a month ago he had a syncope while on the plane back from [place].  He said his blood pressure and blood glucose levels were low at that time.  He does not remember the event but his wife who is a nurse was with him and said that he was out for 2 to 3 minutes.  Following that he got better but still has weakness and fogginess continuing.      Adult Annual Physical:  Patient presents for annual physical.     Diet and Physical Activity:  - Diet/Nutrition: well balanced diet, consuming 3-5 servings of fruits/vegetables daily and adequate whole grain intake.  - Exercise: walking.    Depression Screening:  - PHQ-2 Score: 0    General Health:  - Sleep: 7-8 hours of sleep on average.  - Hearing: normal hearing bilateral ears.  - Vision: no vision problems and wears glasses.  - Dental: regular dental visits.     Health:    - Urinary symptoms: urinary frequency.     Advanced Care Planning:  - Has an advanced directive?: no      Review of Systems   Constitutional:  Negative for chills, diaphoresis, fatigue and fever.   HENT:  Negative for congestion, ear discharge, ear pain, hearing loss, postnasal drip, rhinorrhea, sinus pressure, sinus pain, sneezing, sore throat and voice change.    Eyes:  Negative for pain, discharge, redness and visual disturbance.   Respiratory:  Negative for cough, chest tightness, shortness of breath and wheezing.    Cardiovascular:  Negative for chest pain, palpitations and leg swelling.  "  Gastrointestinal:  Negative for abdominal distention, abdominal pain, blood in stool, constipation, diarrhea, nausea and vomiting.   Endocrine: Negative for cold intolerance, heat intolerance, polydipsia, polyphagia and polyuria.   Genitourinary:  Negative for dysuria, flank pain, frequency, hematuria and urgency.   Musculoskeletal:  Negative for arthralgias, back pain, gait problem, joint swelling, myalgias, neck pain and neck stiffness.   Skin:  Negative for rash.   Neurological:  Positive for dizziness and light-headedness. Negative for tremors, syncope, facial asymmetry, speech difficulty, weakness, numbness and headaches.   Hematological:  Does not bruise/bleed easily.   Psychiatric/Behavioral:  Negative for behavioral problems, confusion and sleep disturbance. The patient is not nervous/anxious.      Medical History Reviewed by provider this encounter:  Tobacco  Allergies  Meds  Problems  Med Hx  Surg Hx  Fam Hx     .    Objective   /60 (BP Location: Left arm, Patient Position: Sitting, Cuff Size: Standard)   Pulse 57   Temp 97.7 °F (36.5 °C) (Temporal)   Resp 18   Ht 5' 8\" (1.727 m)   Wt 81 kg (178 lb 9.6 oz)   SpO2 99%   BMI 27.16 kg/m²     Physical Exam  Constitutional:       General: He is not in acute distress.     Appearance: He is well-developed. He is not diaphoretic.   HENT:      Head: Normocephalic and atraumatic.      Right Ear: External ear normal.      Left Ear: External ear normal.      Nose: Nose normal.   Eyes:      General: No scleral icterus.        Right eye: No discharge.         Left eye: No discharge.      Conjunctiva/sclera: Conjunctivae normal.   Cardiovascular:      Rate and Rhythm: Normal rate and regular rhythm.      Heart sounds: Normal heart sounds. No murmur heard.     No friction rub. No gallop.   Pulmonary:      Effort: Pulmonary effort is normal. No respiratory distress.      Breath sounds: Normal breath sounds. No wheezing or rales.   Abdominal:      " General: Bowel sounds are normal. There is no distension.      Palpations: Abdomen is soft.      Tenderness: There is no abdominal tenderness. There is no guarding or rebound.   Musculoskeletal:         General: No tenderness.   Skin:     General: Skin is warm and dry.      Findings: No erythema or rash.   Neurological:      Mental Status: He is alert and oriented to person, place, and time.      Cranial Nerves: No cranial nerve deficit.      Sensory: No sensory deficit.      Motor: No abnormal muscle tone.   Psychiatric:         Behavior: Behavior normal.

## 2025-01-09 NOTE — ASSESSMENT & PLAN NOTE
Not on any blood pressure medication but blood pressure remains on the lower side.  Previously he was on diltiazem which was discontinued by the cardiologist but his blood pressure and heart rate continue to remain low

## 2025-01-09 NOTE — ASSESSMENT & PLAN NOTE
Continue on low-fat diet  Orders:    Comprehensive metabolic panel; Future    CBC and differential; Future    Lipid panel; Future    TSH, 3rd generation; Future

## 2025-01-16 ENCOUNTER — CLINICAL SUPPORT (OUTPATIENT)
Dept: CARDIOLOGY CLINIC | Facility: CLINIC | Age: 65
End: 2025-01-16
Payer: COMMERCIAL

## 2025-01-16 DIAGNOSIS — R55 SYNCOPE, UNSPECIFIED SYNCOPE TYPE: ICD-10-CM

## 2025-01-16 PROCEDURE — 93248 EXT ECG>7D<15D REV&INTERPJ: CPT | Performed by: INTERNAL MEDICINE

## 2025-02-05 ENCOUNTER — TELEPHONE (OUTPATIENT)
Age: 65
End: 2025-02-05

## 2025-02-11 ENCOUNTER — OFFICE VISIT (OUTPATIENT)
Dept: CARDIOLOGY CLINIC | Facility: CLINIC | Age: 65
End: 2025-02-11
Payer: COMMERCIAL

## 2025-02-11 VITALS
SYSTOLIC BLOOD PRESSURE: 112 MMHG | WEIGHT: 176.6 LBS | OXYGEN SATURATION: 99 % | DIASTOLIC BLOOD PRESSURE: 78 MMHG | BODY MASS INDEX: 26.76 KG/M2 | HEIGHT: 68 IN | HEART RATE: 60 BPM

## 2025-02-11 DIAGNOSIS — I10 ESSENTIAL HYPERTENSION: ICD-10-CM

## 2025-02-11 DIAGNOSIS — I25.119 CORONARY ARTERY DISEASE INVOLVING NATIVE HEART WITH ANGINA PECTORIS, UNSPECIFIED VESSEL OR LESION TYPE (HCC): ICD-10-CM

## 2025-02-11 DIAGNOSIS — I77.810 ASCENDING AORTA DILATATION (HCC): Primary | ICD-10-CM

## 2025-02-11 DIAGNOSIS — T73.3XXA FATIGUE DUE TO EXCESSIVE EXERTION, INITIAL ENCOUNTER: ICD-10-CM

## 2025-02-11 DIAGNOSIS — I49.5 TACHY-BRADY SYNDROME (HCC): ICD-10-CM

## 2025-02-11 DIAGNOSIS — I42.0 DILATED CARDIOMYOPATHY (HCC): ICD-10-CM

## 2025-02-11 PROCEDURE — 99214 OFFICE O/P EST MOD 30 MIN: CPT | Performed by: INTERNAL MEDICINE

## 2025-02-11 RX ORDER — CHLORAL HYDRATE 500 MG
CAPSULE ORAL
COMMUNITY

## 2025-02-12 PROBLEM — I49.5 TACHY-BRADY SYNDROME (HCC): Status: ACTIVE | Noted: 2025-02-12

## 2025-02-12 PROBLEM — T73.3XXA FATIGUE DUE TO EXCESSIVE EXERTION: Status: ACTIVE | Noted: 2025-02-12

## 2025-02-12 NOTE — PROGRESS NOTES
Cardiology Follow Up    Arun Harper  1960  [id number]  [facility]  [address] [place] [id number] [phone]    1. Ascending aorta dilatation (HCC)        2. Dilated cardiomyopathy (HCC)        3. Essential hypertension        4. Fatigue due to excessive exertion, initial encounter        5. Tachy-marquis syndrome (HCC)              Discussion/Summary:We discussed his ZIO results suggesting tachy marquis syndrome. We discussed proceeding with pacemaker placement versus extended monitoring with a ILR.  We decided to proceed with a ILR  I have reviewed his medications and made no changes.  RTO 6 months.      Interval History: He is having episodes of syncope which occur suddenly. He has a prodrome of lightheadedness and diaphoresis. One episode happened whiling he was standing in the kitchen. He was able to lie down and the symptoms passed.  His next episode [place] airplane. He felt nauseous and got up to walk around and passed out.  He felt poorly after coming to after about 3 minutes.    ECHO 12/13/2024 - EF 55%, mildly dilated aortic root    ZIO 1/16/2025 - Average HR 71 BPM. ( Range 40 - 143 )   SVT up to 20 seconds  HR into the 40s at 4:40 am and 8:14 am ( he was up and wake at this time )    He had no dizziness / syncope during the ZIO monitoring.    He is noticing some exertional fatigue.    /78  HR 60    He was previously on Cardizem which was stopped.             Patient Active Problem List   Diagnosis    Essential hypertension    Gastroesophageal reflux disease without esophagitis    Elevated PSA    Allergic rhinitis due to pollen    Failed back syndrome    HNP (herniated nucleus pulposus), lumbar    Intervertebral disc disorder with radiculopathy of lumbosacral region    Dilated cardiomyopathy (HCC)    Mixed hyperlipidemia    Benign prostatic hyperplasia with nocturia    Erectile  dysfunction    CAD (coronary artery disease)    H/O prostatectomy    Ascending aorta dilatation (HCC)    Syncope    Fatigue due to excessive exertion    Tachy-marquis syndrome (HCC)     Past Medical History:   Diagnosis Date    Back disorder 02/26/2019    getting epidural shots, physical therapy    Benign localized prostatic hyperplasia with lower urinary tract symptoms (LUTS) 10/25/2022    CAD (coronary artery disease)     Cardiac disease     Cardiomyopathy (HCC)     Failed back syndrome     GERD (gastroesophageal reflux disease)     Heart attack (HCC)     Mild MI 1996     Hypertension     Patient denies    Myocardial infarction (HCC)     Mild    Sleep apnea     Umbilical hernia     Last Assessed:3/25/2015     Social History     Socioeconomic History    Marital status: /Civil Union     Spouse name: Not on file    Number of children: Not on file    Years of education: Not on file    Highest education level: Not on file   Occupational History    Occupation:      Comment: full time   Tobacco Use    Smoking status: Never     Passive exposure: Past    Smokeless tobacco: Never   Vaping Use    Vaping status: Never Used   Substance and Sexual Activity    Alcohol use: Yes     Comment: occ / Rare    Drug use: No    Sexual activity: Yes     Partners: Female   Other Topics Concern    Not on file   Social History Narrative    Always uses seat belt    Caffeine use     Social Drivers of Health     Financial Resource Strain: Not on file   Food Insecurity: No Food Insecurity (10/28/2022)    Hunger Vital Sign     Worried About Running Out of Food in the Last Year: Never true     Ran Out of Food in the Last Year: Never true   Transportation Needs: No Transportation Needs (10/28/2022)    PRAPARE - Transportation     Lack of Transportation (Medical): No     Lack of Transportation (Non-Medical): No   Physical Activity: Not on file   Stress: Not on file   Social Connections: Not on file   Intimate Partner Violence: Not  on file   Housing Stability: Low Risk  (10/28/2022)    Housing Stability Vital Sign     Unable to Pay for Housing in the Last Year: No     Number of Places Lived in the Last Year: 1     Unstable Housing in the Last Year: No      Family History   Problem Relation Age of Onset    Pancreatic cancer Mother     Hypertension Father     Diabetes Father     No Known Problems Sister     No Known Problems Brother     No Known Problems Son     No Known Problems Son     Cancer Family      Past Surgical History:   Procedure Laterality Date    BACK SURGERY      HERNIA REPAIR      Last Assessed:3/12/2014 ,lumbar    LUMBAR LAMINECTOMY      Last Assessed:3/12/2014    [place] LAPS SURG [id number] RPBIC RAD W/NRV SPARING ROBOT N/A 10/26/2022    Procedure: ROBOTIC ASSISTED LAPAROSCOPIC SIMPLE PROSTATECTOMY, BLADDER NECK RECONSTRUCTION;  Surgeon: Tyler Treadwell MD;  Location: [facility];  Service: Urology    PROSTATE BIOPSY  09/10/2015    needle biopsy    SHOULDER SURGERY      TONSILLECTOMY         Current Outpatient Medications:     aspirin (ECOTRIN) 325 mg EC tablet, Take 325 mg by mouth every other day, Disp: , Rfl:     cyclobenzaprine (FLEXERIL) 10 mg tablet, Take 1 tablet (10 mg total) by mouth daily at bedtime as needed for muscle spasms, Disp: 30 tablet, Rfl: 3    EPINEPHrine (EPIPEN) 0.3 mg/0.3 mL SOAJ, Inject 0.3 mL (0.3 mg total) into a muscle once for 1 dose, Disp: 0.3 mL, Rfl: 1    Omega-3 Fatty Acids (fish oil) 1,000 mg, Take by mouth, Disp: , Rfl:     omeprazole (PriLOSEC) 40 MG capsule, Take 1 capsule (40 mg total) by mouth daily, Disp: 90 capsule, Rfl: 1    sildenafil (VIAGRA) 100 mg tablet, Take 1 tablet (100 mg total) by mouth daily as needed for erectile dysfunction, Disp: 10 tablet, Rfl: 11    vitamin B-12 (VITAMIN B-12) 500 mcg tablet, Take 500 mcg by mouth daily, Disp: , Rfl:     zinc gluconate 50 mg tablet, Take 50 mg by mouth daily, Disp: , Rfl:     Omega-3 Fatty Acids (FISH OIL) 1000 MG CPDR, Take 1 capsule by mouth  "daily   (Patient not taking: Reported on 2/11/2025), Disp: , Rfl:   Allergies   Allergen Reactions    Other Anaphylaxis     Annotation - [id number]: YELLOW JACKETS     Vitals:    02/11/25 1633   BP: 112/78   BP Location: Left arm   Patient Position: Sitting   Cuff Size: Standard   Pulse: 60   SpO2: 99%   Weight: 80.1 kg (176 lb 9.6 oz)   Height: 5' 8\" (1.727 m)     Weight (last 2 days)       Date/Time Weight    02/11/25 1633 80.1 (176.6)           Blood pressure 112/78, pulse 60, height 5' 8\" (1.727 m), weight 80.1 kg (176 lb 9.6 oz), SpO2 99%., Body mass index is 26.85 kg/m².    Labs:  Appointment on 12/18/2024   Component Date Value    PSA, Diagnostic 12/18/2024 0.045     WBC 12/18/2024 4.82     RBC 12/18/2024 5.22     Hemoglobin 12/18/2024 15.1     Hematocrit 12/18/2024 46.1     MCV 12/18/2024 88     MCH 12/18/2024 28.9     MCHC 12/18/2024 32.8     RDW 12/18/2024 12.7     MPV 12/18/2024 12.0     Platelets 12/18/2024 207     nRBC 12/18/2024 0     Segmented % 12/18/2024 55     Immature Grans % 12/18/2024 0     Lymphocytes % 12/18/2024 30     Monocytes % 12/18/2024 11     Eosinophils Relative 12/18/2024 3     Basophils Relative 12/18/2024 1     Absolute Neutrophils 12/18/2024 2.60     Absolute Immature Grans 12/18/2024 0.02     Absolute Lymphocytes 12/18/2024 1.46     Absolute Monocytes 12/18/2024 0.54     Eosinophils Absolute 12/18/2024 0.15     Basophils Absolute 12/18/2024 0.05     Sodium 12/18/2024 140     Potassium 12/18/2024 4.6     Chloride 12/18/2024 104     CO2 12/18/2024 29     ANION GAP 12/18/2024 7     BUN 12/18/2024 25     Creatinine 12/18/2024 0.90     Glucose, Fasting 12/18/2024 94     Calcium 12/18/2024 9.5     AST 12/18/2024 20     ALT 12/18/2024 18     Alkaline Phosphatase 12/18/2024 40     Total Protein 12/18/2024 6.4     Albumin 12/18/2024 4.3     Total Bilirubin 12/18/2024 0.50     eGFR 12/18/2024 91     Cholesterol 12/18/2024 150     Triglycerides 12/18/2024 63     HDL, Direct 12/18/2024 46     " LDL Calculated 12/18/2024 91     Non-HDL-Chol (CHOL-HDL) 12/18/2024 104     Vit D, 25-Hydroxy 12/18/2024 112.3 (H)     Magnesium 12/18/2024 2.1     TSH 3RD GENERATON 12/18/2024 1.381    Hospital Outpatient Visit on 12/13/2024   Component Date Value    AV peak gradient 12/13/2024 55     Triscuspid Valve Regurgi* 12/13/2024 25.0     Sinus of Valsalva, 2D 12/13/2024 4.3     RAA A4C 12/13/2024 21.8     HARPAL A4C 12/13/2024 22.9     LA Volume Index (BP) 12/13/2024 42.2     MV Peak A Koby 12/13/2024 0.66     MV stenosis pressure 1/2* 12/13/2024 66     MV Peak E Koby 12/13/2024 73     LVOT diameter 12/13/2024 2.4     E wave deceleration time 12/13/2024 227     E/A ratio 12/13/2024 1.11     MV valve area p 1/2 meth* 12/13/2024 3.33     AV regurgitation pressur* 12/13/2024 620     TR Peak Koby 12/13/2024 2.5     RVID d 12/13/2024 3.2     A4C EF 12/13/2024 63     Tricuspid valve peak reg* 12/13/2024 2.51     Left ventricular stroke * 12/13/2024 51.00     IVSd 12/13/2024 0.90     Tricuspid annular plane * 12/13/2024 2.60     Ao root 12/13/2024 3.50     Ao STJ 12/13/2024 3.20     LVPWd 12/13/2024 0.90     LA size 12/13/2024 4     Asc Ao 12/13/2024 4.2     STJ 12/13/2024 3.2     LA volume (BP) 12/13/2024 81     FS 12/13/2024 26     LVIDS 12/13/2024 3.40     IVS 12/13/2024 0.9     LVIDd 12/13/2024 4.60     LA length (A2C) 12/13/2024 6.30     AV Deceleration Time 12/13/2024 2,138     LEFT VENTRICLE SYSTOLIC * 12/13/2024 47     LV DIASTOLIC VOLUME (MOD* 12/13/2024 98     Left Atrium Area-systoli* 12/13/2024 20.3     Left Atrium Area-systoli* 12/13/2024 28.8     MV E' Tissue Velocity Se* 12/13/2024 9     LVSV, 2D 12/13/2024 51     BSA 12/13/2024 1.92     LVOT area 12/13/2024 4.52     LV EF 12/13/2024 55      Imaging: No results found.    Review of Systems:  Review of Systems   Constitutional:  Positive for fatigue. Negative for diaphoresis, fever and unexpected weight change.   HENT: Negative.     Respiratory:  Negative for cough,  shortness of breath and wheezing.    Cardiovascular:  Negative for chest pain, palpitations and leg swelling.   Gastrointestinal:  Negative for abdominal pain, diarrhea and nausea.   Musculoskeletal:  Negative for gait problem and myalgias.   Skin:  Negative for rash.   Neurological:  Positive for light-headedness. Negative for dizziness and numbness.   Psychiatric/Behavioral: Negative.         Physical Exam:  Physical Exam  Constitutional:       Appearance: He is well-developed.   HENT:      Head: Normocephalic and atraumatic.   Eyes:      Pupils: Pupils are equal, round, and reactive to light.   Neck:      Vascular: No JVD.   Cardiovascular:      Rate and Rhythm: Regular rhythm.      Pulses: Normal pulses.           Carotid pulses are 2+ on the right side and 2+ on the left side.     Heart sounds: S1 normal and S2 normal.   Pulmonary:      Effort: Pulmonary effort is normal.      Breath sounds: Normal breath sounds. No wheezing or rales.   Abdominal:      General: Bowel sounds are normal.      Palpations: Abdomen is soft.   Musculoskeletal:         General: No tenderness. Normal range of motion.      Cervical back: Normal range of motion and neck supple.   Skin:     General: Skin is warm.   Neurological:      Mental Status: He is alert and oriented to person, place, and time.      Cranial Nerves: No cranial nerve deficit.      Deep Tendon Reflexes: Reflexes are normal and symmetric.

## 2025-02-13 ENCOUNTER — PREP FOR PROCEDURE (OUTPATIENT)
Age: 65
End: 2025-02-13

## 2025-02-13 ENCOUNTER — TELEPHONE (OUTPATIENT)
Age: 65
End: 2025-02-13

## 2025-02-13 DIAGNOSIS — Z12.11 SCREENING FOR COLON CANCER: Primary | ICD-10-CM

## 2025-02-13 NOTE — TELEPHONE ENCOUNTER
Scheduled date of colonoscopy (as of today):03.03.25    Physician performing colonoscopy:Jose Martin    Location of colonoscopy:[place]    Bowel prep reviewed with patient:Marshall/Stephanie    Instructions reviewed with patient by:Lsims and sent to email

## 2025-02-13 NOTE — TELEPHONE ENCOUNTER
02/13/25  Screened by: Jeremy Mckeon    Referring Provider     Pre- Screening:     There is no height or weight on file to calculate BMI.  Has patient been referred for a routine screening Colonoscopy? no  Is the patient between 45-75 years old? yes      Previous Colonoscopy yes   If yes:    Date: 2015    Does the patient want to see a Gastroenterologist prior to their procedure OR are they having any GI symptoms? no    Has the patient been hospitalized or had abdominal surgery in the past 6 months? no    Does the patient use supplemental oxygen? no    Does the patient take Coumadin, Lovenox, Plavix, Elliquis, Xarelto, or other blood thinning medication? no    Has the patient had a stroke, cardiac event, or stent placed in the past year? no    If patient is between 45yrs - 49yrs, please advise patient that we will have to confirm benefits & coverage with their insurance company for a routine screening colonoscopy.

## 2025-02-19 ENCOUNTER — TELEPHONE (OUTPATIENT)
Age: 65
End: 2025-02-19

## 2025-02-19 NOTE — TELEPHONE ENCOUNTER
Received call from patient stating he was informed at his LOV that Dr. Alvarez wanted him to have a procedure to place a device in his chest to monitor his heart. Not mention of this in Dr. Alvarez's last office note 2/11/25. Please advise.

## 2025-02-25 ENCOUNTER — TELEPHONE (OUTPATIENT)
Dept: CARDIOLOGY CLINIC | Facility: CLINIC | Age: 65
End: 2025-02-25

## 2025-02-25 DIAGNOSIS — I49.5 TACHY-BRADY SYNDROME (HCC): ICD-10-CM

## 2025-02-25 DIAGNOSIS — R55 SYNCOPE, UNSPECIFIED SYNCOPE TYPE: Primary | ICD-10-CM

## 2025-02-25 NOTE — TELEPHONE ENCOUNTER
----- Message from Bola Alvarez MD sent at 2/24/2025  2:18 PM EST -----  He needs an ILR placed ASAP for syncope / tachy marquis syndrome.    Thx

## 2025-02-25 NOTE — TELEPHONE ENCOUNTER
ZIO REQUIRED:     Patient referred for a loop recorder implant procedure.     Patient is cover under [employer] which requires patient to have 30 day's Zio monitor prior loop recorder approval.     Patient had 14 day's Zio from 12/20/2024 to 01/03/2025, he will need another two weeks for loop recorder implant submission.      We will contact the patient to schedule the procedure when results in chart in order to submit for approval.     Thank you.

## 2025-02-26 NOTE — ASSESSMENT & PLAN NOTE
- Not candidate for Trimix due to priapism   - No longer using Alprostadil injections and doing well with Viagra alone which was refilled.   Orders:    POCT Measure PVR    sildenafil (VIAGRA) 100 mg tablet; Take 1 tablet (100 mg total) by mouth as needed for erectile dysfunction

## 2025-02-26 NOTE — ASSESSMENT & PLAN NOTE
- s/p robotic simple prostatectomy and bladder neck reconstruction on 10/26/22   - Tissue pathology negative for malignancy   - PVR 21 mL         10/22/2021    Shawn Méndez MD  [address]  [facility] [address]    RE: Tennille Lee       Dear Colleague,    I had the pleasure of seeing Tennille eLe in the [facility].      Cardiology Clinic Progress Note    Service Date: October 22, 2021    Primary Cardiologist: Dr. Gill      Reason for Visit: Follow-up of stress test and echocardiogram results    HPI:   I had the pleasure of meeting Ms. Tennille Lee in the clinic today. She is a very pleasant 82 year old female with a past medical history notable for hypertension, hyperlipidemia, JOSE on CPAP, and mild coronary artery disease. She had a coronary angiogram in 2012 that showed LAD narrowing up to 20%, diagonal up to 40% stenosis in a smaller vessel. The circumflex had 40-50% narrowing. The right coronary artery had 20% narrowing. At that time, her ejection fraction was normal.     The patient has followed with Dr. Gill for her cardiology care and is apparently actually a cousin of Dr. Gill. She spends her thomson down in [place] and is planning to leave within the next month or two.  She was seen by Dr. Gill earlier this month and reported some progressive exertional dyspnea over the past couple of years to the point where simply walking from the parking ramp over to the office, she sometimes has to stop to rest and catch her breath.      She otherwise has not had chest pain, dizziness, syncope, palpitations, or edema.  A recent BMP and her lipid panel were unremarkable. An echocardiogram to look for any valvular dysfunction or LV or RV dysfunction. Nuclear stress test was also recommended to rule out the possibility of ischemia.    The echocardiogram was completed on 10/08/2021 showing normal LV and RV structure, function, and size with LVEF 60-65%. Grade I or early diastolic dysfunction was noted. The IVC was normal in size with preserved respiratory variability suggesting against  "significant volume overload.    The nuclear stress test was just completed yesterday on 10/21/2021 and was read as \"probably negative\" for inducible myocardial ischemia or infarction with normal LV function again noted.     Today, Ms. Lee presents to the clinic accompanied by her , Monty, in follow up of recent test results. They spend their thomson in the [place], [place] area and are planning to take off early next week.  Tennille tells me that she has been feeling generally well since she checked in with Dr. Gill earlier this fall.  She continues to have occasional mild exertional dyspnea but actually feels like her symptoms have been getting a little bit better of late and have not been significantly limiting for her. She notes that she has been making more of an effort to try to watch and limit her sodium intake.  Beyond her mild shortness of breath, she otherwise denies symptoms of chest pain, palpitations, dizziness, presyncope, syncope, significant lower extremity edema, orthopnea, or PND.  She wears CPAP for management of her sleep apnea and has been using this faithfully every night.    ASSESSMENT AND PLAN:  1. Dyspnea on exertion  - We reviewed the results of her echocardiogram and stress test in detail today. I suspect her symptoms are likely multifactorial in part due to obesity and physical deconditioning possibly with a component of mild diastolic dysfunction. She does not have signs or symptoms to suggest significant volume overload or acute CHF at this time.  - Recommend she continue to try to stay physically active and continue with good management of her blood pressure, limiting sodium in her diet, and with treatment with CPAP for her sleep apnea.  - Reviewed to call if her symptoms worsen or she has other signs of fluid retention and we could consider a trial of a low-dose diuretic.    2. Coronary artery disease   - Stable with no clear anginal symptoms at this time and with the " nuclear stress test yesterday showing no evidence of inducible ischemia or infarction with normal LV function.    3. Essential hypertension, well controlled    4. Hyperlipidemia LDL goal <70  -Treated on low-dose atorvastatin with most recent LDL of 54 in 09/2021.    5. Obesity, BMI 34    6. JOSE (obstructive sleep apnea), treated with CPAP    Thank you for the opportunity to participate in this pleasant patient's care. I will plan to have her see Dr. Gill for a routine annual follow-up visit around this time next year.  I encouraged the patient to call the clinic with questions or concerns in the meantime, particularly should she notice increasing exertional dyspnea, lower extremity edema, or weight gain concerning for fluid retention, and we would be happy to see her sooner if needed for any concerns in the meantime.    33 total minutes was spent today including chart review, precharting, history and exam, post visit documentation, and reviewing studies as outlined above.     VITO Farooq, CNP   Nurse Practitioner  [facility]  Pager: [phone]  Text Page  (8am - 5pm, M-F)    Orders this Visit:  No orders of the defined types were placed in this encounter.    No orders of the defined types were placed in this encounter.    There are no discontinued medications.  Encounter Diagnoses   Name Primary?     Dyspnea on exertion Yes     Coronary artery disease involving native coronary artery of native heart without angina pectoris      Essential hypertension      Hyperlipidemia LDL goal <70      JOSE (obstructive sleep apnea)      Coronary artery disease involving autologous artery coronary bypass graft, unspecified whether angina present      CURRENT MEDICATIONS:  Current Outpatient Medications   Medication Sig Dispense Refill     atorvastatin (LIPITOR) 10 MG tablet Correct sig: take 1 tab (10mg) 5 days/week, take 1/2 tab (5mg) 2 days/week 80 tablet 0     Cholecalciferol (VITAMIN D3 PO) Take  1,000 Units by mouth daily. 3x/week        Coenzyme Q10 (COQ-10 PO) Take 100 mg by mouth three times a week        cyanocobalamin (VITAMIN  B-12) 1000 MCG tablet Take 1,000 mcg by mouth daily       cyclobenzaprine (FLEXERIL) 5 MG tablet Take 1 tablet (5 mg) by mouth At Bedtime       ezetimibe (ZETIA) 10 MG tablet Take 1 tablet (10 mg) by mouth daily 90 tablet 3     gabapentin (NEURONTIN) 100 MG capsule Take 300 mg by mouth daily        LORazepam (ATIVAN) 1 MG tablet Take 1 tablet (1 mg) by mouth every 8 hours as needed for anxiety 30 tablet 0     MAGNESIUM OXIDE PO Take 250 mg by mouth. 3x/week        omeprazole (PRILOSEC) 20 MG DR capsule Take 1 capsule (20 mg) by mouth daily 90 capsule 2     traZODone (DESYREL) 100 MG tablet Take 100 mg by mouth At Bedtime       calcium carbonate-vitamin D (CALCIUM + D) 600-200 MG-UNIT TABS Take by mouth daily (Patient not taking: Reported on 10/7/2021) 60 tablet      ALLERGIES  Allergies   Allergen Reactions     Blue Dyes Other (See Comments)     Disperse Blue 106- Blue textile Dye     Myrbetriq [Mirabegron] Rash     Amoxicillin      Balsam [place] [Amberderm]      Clindamycin Hcl      Eugenol      Flu Virus Vaccine      Neomycin Sulfate      PAST MEDICAL, SURGICAL, FAMILY HISTORY:  History was reviewed and updated as needed, see medical record.    SOCIAL HISTORY:  Social History     Socioeconomic History     Marital status:      Spouse name: Not on file     Number of children: 3     Years of education: Not on file     Highest education level: Not on file   Occupational History     Occupation: homemaker   Tobacco Use     Smoking status: Former Smoker     Packs/day: 1.00     Types: Cigarettes     Start date:      Quit date: 1960     Years since quittin.4     Smokeless tobacco: Never Used   Substance and Sexual Activity     Alcohol use: No     Alcohol/week: 0.0 standard drinks     Drug use: No     Sexual activity: Not Currently   Other Topics Concern      Parent/sibling w/ CABG, MI or angioplasty before 65F 55M? Yes      Service Not Asked     Blood Transfusions Not Asked     Caffeine Concern No     Comment: decaf:  0-2 cups a day     Occupational Exposure Not Asked     Hobby Hazards Not Asked     Sleep Concern Yes     Comment: soemtimes takes lorazepam     Stress Concern Yes     Weight Concern Yes     Special Diet No     Back Care Not Asked     Exercise Yes     Comment: water aerobics x4 days a week     Bike Helmet Not Asked     Seat Belt Yes     Self-Exams Not Asked   Social History Narrative     Not on file     Social Determinants of Health     Financial Resource Strain:      Difficulty of Paying Living Expenses:    Food Insecurity:      Worried About Running Out of Food in the Last Year:      Ran Out of Food in the Last Year:    Transportation Needs:      Lack of Transportation (Medical):      Lack of Transportation (Non-Medical):    Physical Activity:      Days of Exercise per Week:      Minutes of Exercise per Session:    Stress:      Feeling of Stress :    Social Connections:      Frequency of Communication with Friends and Family:      Frequency of Social Gatherings with Friends and Family:      Attends Mandaeism Services:      Active Member of Clubs or Organizations:      Attends Club or Organization Meetings:      Marital Status:    Intimate Partner Violence:      Fear of Current or Ex-Partner:      Emotionally Abused:      Physically Abused:      Sexually Abused:      Review of Systems:  Skin:  Positive for bruising   Eyes:  Positive for glasses  ENT:  Negative    Respiratory:  Positive for dyspnea on exertion  Cardiovascular:  chest pain;Negative;palpitations;edema;lightheadedness;dizziness    Gastroenterology: Negative    Genitourinary:  Positive for urinary frequency;incontinence  Musculoskeletal:  Positive for back pain  Neurologic:  Negative headaches  Psychiatric:  Negative    Heme/Lymph/Imm:  Positive for allergies  Endocrine:  Negative  "thyroid disorder;diabetes     Physical Exam:  Vitals: /85   Pulse 84   Ht 1.613 m (5' 3.5\")   Wt 90.7 kg (200 lb)   BMI 34.87 kg/m     Wt Readings from Last 4 Encounters:   10/22/21 90.7 kg (200 lb)   10/21/21 92 kg (202 lb 12.8 oz)   10/07/21 90.3 kg (199 lb)   07/09/21 91.2 kg (201 lb)     CONSTITUTIONAL: Appears her stated age, well nourished, and in no acute distress.  HEENT: Pupils equal, round. Sclerae nonicteric.    NECK: Supple, challenging to assess JVP due to body habitus but obvious JVD appreciated.  C/V: Regular rate and rhythm, normal S1 and S2, no S3 or S4, no murmur, rub or gallop.   RESP: Respirations are unlabored. Lungs are clear to auscultation bilaterally without wheezing, rales, or rhonchi.  EXTREM: No clubbing, cyanosis, or lower extremity edema bilaterally.   NEURO: Alert and oriented, cooperative. Gait steady. No gross focal deficits.   PSYCH: Affect appropriate. Mentation normal. Responds to questions appropriately.  SKIN: Warm and dry. No apparent rashes or bruising.    Recent Lab Results reviewed today:  LIPID RESULTS:  Lab Results   Component Value Date    CHOL 152 09/27/2021    CHOL 165 10/04/2019    HDL 77 09/27/2021    HDL 71 10/04/2019    LDL 54 09/27/2021    LDL 69 10/04/2019    TRIG 106 09/27/2021    TRIG 127 10/04/2019    CHOLHDLRATIO 2.7 08/25/2015     LIVER ENZYME RESULTS:  Lab Results   Component Value Date    AST 11 06/24/2016    ALT 23 09/27/2021    ALT 23 10/04/2019     CBC RESULTS:  Lab Results   Component Value Date    WBC 5.5 09/14/2018    RBC 4.68 09/14/2018    HGB 13.2 09/14/2018    HCT 40.2 09/14/2018    MCV 86 09/14/2018    MCH 28.2 09/14/2018    MCHC 32.8 09/14/2018    RDW 13.0 09/14/2018     09/14/2018     BMP RESULTS:  Lab Results   Component Value Date     09/27/2021     10/04/2019    POTASSIUM 4.0 09/27/2021    POTASSIUM 4.0 10/04/2019    CHLORIDE 107 09/27/2021    CHLORIDE 109 10/04/2019    CO2 29 09/27/2021    CO2 32 10/04/2019    " ANIONGAP 3 09/27/2021    ANIONGAP 1 (L) 10/04/2019    GLC 98 09/27/2021     (H) 10/04/2019    BUN 14 09/27/2021    BUN 19 10/04/2019    CR 1.03 09/27/2021    CR 0.90 10/04/2019    GFRESTIMATED 51 (L) 09/27/2021    GFRESTIMATED 61 10/04/2019    GFRESTBLACK 70 10/04/2019    YUNIOR 9.0 09/27/2021    YUNIOR 9.4 10/04/2019      CC  Agus Gill MD  [address]  [place] [phone]    This note was completed in part using Dragon voice recognition software. Although reviewed after completion, some word and grammatical errors may occur.      Thank you for allowing me to participate in the care of your patient.      Sincerely,     Dmitry Castillo NP     [facility]  cc:   Agus Gill MD  [id number]  [place] [id number]

## 2025-02-26 NOTE — PROGRESS NOTES
Name: Arun Harper      : 1960      MRN: [id number]  Encounter Provider: Letty Clinton PA-C  Encounter Date: 2025   Encounter department: [facility] [place]  :  Assessment & Plan  Other male erectile dysfunction  - Not candidate for Trimix due to priapism   - No longer using Alprostadil injections and doing well with Viagra alone which was refilled.   Orders:    POCT Measure PVR    sildenafil (VIAGRA) 100 mg tablet; Take 1 tablet (100 mg total) by mouth as needed for erectile dysfunction    Benign prostatic hyperplasia with nocturia  - s/p robotic simple prostatectomy and bladder neck reconstruction on 10/26/22   - Tissue pathology negative for malignancy   - PVR 21 mL        Screening for prostate cancer  - History of elevated PSA prior to simple prostatectomy   - PSA from 24 was 0.045  - Continue yearly screening  Orders:    PSA, Total Screen; Future    Urinary urgency  - Offered trial of OAB medication which he defers at this time  - Recommend limiting bladder irritants and nighttime fluid restriction           History of Present Illness   Arun Harper is a 65 y.o. male who presents for follow up.     Patient has a history of enlarged prostate and elevated PSA.  Robotic assisted simple prostatectomy and bladder neck reconstruction 10/26/2022 with Dr. Treadwell. PSA now undetectable since surgery.      Patient unfortunate developed erectile dysfunction postoperatively.  In  he was taught intracavernosal injections with the standard Trimix dose of 10, 30, 1.  Unfortunately with 0.1 mL injection this precipitated priapism in which he presented to the emergency department and ultimately required drainage. Trimix was discontinued and he was switched to injecting low dose alprostadil monotherapy, 5mcg. Since last visit he stopped using injections and reports he has been doing well with Viagra alone.     He does note some urinary urgency, frequency and dribbling today.        AUA SYMPTOM SCORE      Flowsheet Row Most Recent Value   AUA SYMPTOM SCORE    How often have you had a sensation of not emptying your bladder completely after you finished urinating? 1 (P)     How often have you had to urinate again less than two hours after you finished urinating? 5 (P)     How often have you found you stopped and started again several times when you urinate? 0 (P)     How often have you found it difficult to postpone urination? 2 (P)     How often have you had a weak urinary stream? 2 (P)     How often have you had to push or strain to begin urination? 1 (P)     How many times did you most typically get up to urinate from the time you went to bed at night until the time you got up in the morning? 2 (P)     Quality of Life: If you were to spend the rest of your life with your urinary condition just the way it is now, how would you feel about that? 2 (P)     AUA SYMPTOM SCORE 13 (P)            Review of Systems   Constitutional:  Negative for chills and fever.   Respiratory:  Negative for shortness of breath.    Cardiovascular:  Negative for chest pain.   Gastrointestinal:  Negative for abdominal pain.   Genitourinary:  Positive for frequency and urgency. Negative for difficulty urinating, dysuria, flank pain and hematuria.   Neurological:  Negative for dizziness.          Objective   There were no vitals taken for this visit.    Physical Exam  Constitutional:       Appearance: Normal appearance.   HENT:      Head: Normocephalic and atraumatic.      Right Ear: External ear normal.      Left Ear: External ear normal.      Nose: Nose normal.   Eyes:      General: No scleral icterus.     Conjunctiva/sclera: Conjunctivae normal.   Cardiovascular:      Pulses: Normal pulses.   Pulmonary:      Effort: Pulmonary effort is normal.   Musculoskeletal:         General: Normal range of motion.      Cervical back: Normal range of motion.   Neurological:      General: No focal deficit present.      Mental  Status: He is alert and oriented to person, place, and time.   Psychiatric:         Mood and Affect: Mood normal.         Behavior: Behavior normal.         Thought Content: Thought content normal.         Judgment: Judgment normal.         Results   Lab Results   Component Value Date    PSA 0.045 12/18/2024    PSA <0.1 05/02/2023    PSA 7.9 (H) 08/09/2022     Lab Results   Component Value Date    GLUCOSE 92 08/07/2017    CALCIUM 9.5 12/18/2024     08/07/2017    K 4.6 12/18/2024    CO2 29 12/18/2024     12/18/2024    BUN 25 12/18/2024    CREATININE 0.90 12/18/2024     Lab Results   Component Value Date    WBC 4.82 12/18/2024    HGB 15.1 12/18/2024    HCT 46.1 12/18/2024    MCV 88 12/18/2024     12/18/2024       Office Urine Dip  No results found for this or any previous visit (from the past hour).

## 2025-02-27 ENCOUNTER — OFFICE VISIT (OUTPATIENT)
Dept: UROLOGY | Facility: CLINIC | Age: 65
End: 2025-02-27
Payer: COMMERCIAL

## 2025-02-27 VITALS
SYSTOLIC BLOOD PRESSURE: 148 MMHG | DIASTOLIC BLOOD PRESSURE: 84 MMHG | OXYGEN SATURATION: 98 % | BODY MASS INDEX: 27.89 KG/M2 | HEART RATE: 100 BPM | HEIGHT: 68 IN | WEIGHT: 184 LBS | TEMPERATURE: 97.5 F

## 2025-02-27 DIAGNOSIS — R35.1 BENIGN PROSTATIC HYPERPLASIA WITH NOCTURIA: ICD-10-CM

## 2025-02-27 DIAGNOSIS — Z12.5 SCREENING FOR PROSTATE CANCER: ICD-10-CM

## 2025-02-27 DIAGNOSIS — N52.8 OTHER MALE ERECTILE DYSFUNCTION: Primary | ICD-10-CM

## 2025-02-27 DIAGNOSIS — N40.1 BENIGN PROSTATIC HYPERPLASIA WITH NOCTURIA: ICD-10-CM

## 2025-02-27 DIAGNOSIS — R39.15 URINARY URGENCY: ICD-10-CM

## 2025-02-27 LAB — POST-VOID RESIDUAL VOLUME, ML POC: 21 ML

## 2025-02-27 PROCEDURE — 99214 OFFICE O/P EST MOD 30 MIN: CPT | Performed by: PHYSICIAN ASSISTANT

## 2025-02-27 PROCEDURE — 51798 US URINE CAPACITY MEASURE: CPT | Performed by: PHYSICIAN ASSISTANT

## 2025-02-27 RX ORDER — SILDENAFIL 100 MG/1
100 TABLET, FILM COATED ORAL AS NEEDED
Qty: 30 TABLET | Refills: 2 | Status: SHIPPED | OUTPATIENT
Start: 2025-02-27

## 2025-02-27 NOTE — ASSESSMENT & PLAN NOTE
- Not candidate for Trimix due to priapism   - Doing well with low dose injectable Alprostadil 5 mcg. No episodes of priapism with this.   - Consider IPP if any issues in the future  Orders:    sildenafil (VIAGRA) 100 mg tablet; Take 1 tablet (100 mg total) by mouth as needed for erectile dysfunction

## 2025-03-03 ENCOUNTER — ANESTHESIA EVENT (OUTPATIENT)
Dept: GASTROENTEROLOGY | Facility: HOSPITAL | Age: 65
End: 2025-03-03
Payer: COMMERCIAL

## 2025-03-03 ENCOUNTER — ANESTHESIA (OUTPATIENT)
Dept: GASTROENTEROLOGY | Facility: HOSPITAL | Age: 65
End: 2025-03-03
Payer: COMMERCIAL

## 2025-03-03 ENCOUNTER — HOSPITAL ENCOUNTER (OUTPATIENT)
Dept: GASTROENTEROLOGY | Facility: HOSPITAL | Age: 65
Setting detail: OUTPATIENT SURGERY
Discharge: HOME | End: 2025-03-03
Attending: COLON & RECTAL SURGERY
Payer: COMMERCIAL

## 2025-03-03 VITALS
HEART RATE: 63 BPM | SYSTOLIC BLOOD PRESSURE: 113 MMHG | DIASTOLIC BLOOD PRESSURE: 80 MMHG | HEIGHT: 67 IN | BODY MASS INDEX: 27.06 KG/M2 | RESPIRATION RATE: 16 BRPM | OXYGEN SATURATION: 97 % | WEIGHT: 172.4 LBS | TEMPERATURE: 97.9 F

## 2025-03-03 DIAGNOSIS — Z12.11 SCREENING FOR COLON CANCER: ICD-10-CM

## 2025-03-03 PROCEDURE — G0121 COLON CA SCRN NOT HI RSK IND: HCPCS | Performed by: COLON & RECTAL SURGERY

## 2025-03-03 RX ORDER — PROPOFOL 10 MG/ML
INJECTION, EMULSION INTRAVENOUS AS NEEDED
Status: DISCONTINUED | OUTPATIENT
Start: 2025-03-03 | End: 2025-03-03

## 2025-03-03 RX ORDER — SODIUM CHLORIDE, SODIUM LACTATE, POTASSIUM CHLORIDE, CALCIUM CHLORIDE 600; 310; 30; 20 MG/100ML; MG/100ML; MG/100ML; MG/100ML
INJECTION, SOLUTION INTRAVENOUS CONTINUOUS PRN
Status: DISCONTINUED | OUTPATIENT
Start: 2025-03-03 | End: 2025-03-03

## 2025-03-03 RX ADMIN — PROPOFOL 20 MG: 10 INJECTION, EMULSION INTRAVENOUS at 09:34

## 2025-03-03 RX ADMIN — PROPOFOL 20 MG: 10 INJECTION, EMULSION INTRAVENOUS at 09:28

## 2025-03-03 RX ADMIN — PROPOFOL 120 MG: 10 INJECTION, EMULSION INTRAVENOUS at 09:26

## 2025-03-03 RX ADMIN — PROPOFOL 20 MG: 10 INJECTION, EMULSION INTRAVENOUS at 09:30

## 2025-03-03 RX ADMIN — SODIUM CHLORIDE, SODIUM LACTATE, POTASSIUM CHLORIDE, AND CALCIUM CHLORIDE: .6; .31; .03; .02 INJECTION, SOLUTION INTRAVENOUS at 08:42

## 2025-03-03 RX ADMIN — PROPOFOL 20 MG: 10 INJECTION, EMULSION INTRAVENOUS at 09:32

## 2025-03-03 RX ADMIN — PROPOFOL 20 MG: 10 INJECTION, EMULSION INTRAVENOUS at 09:36

## 2025-03-03 NOTE — H&P
History and Physical -  [facility]  Arun Harper 65 y.o. male MRN: [id number]                  HPI: Arun Harper is a 65 y.o. year old male who presents for Screening ncolonoscopy      REVIEW OF SYSTEMS: Per the HPI, and otherwise unremarkable.    Historical Information   Past Medical History:   Diagnosis Date    Back disorder 02/26/2019    getting epidural shots, physical therapy    Benign localized prostatic hyperplasia with lower urinary tract symptoms (LUTS) 10/25/2022    CAD (coronary artery disease)     Cardiac disease     Cardiomyopathy (HCC)     Failed back syndrome     GERD (gastroesophageal reflux disease)     Heart attack (HCC)     Mild MI 1996     Hypertension     Patient denies    Myocardial infarction (HCC)     Mild    Sleep apnea     Umbilical hernia     Last Assessed:3/25/2015     Past Surgical History:   Procedure Laterality Date    BACK SURGERY      HERNIA REPAIR      Last Assessed:3/12/2014 ,lumbar    LUMBAR LAMINECTOMY      Last Assessed:3/12/2014    OK LAPS SURG [id number] RPBIC RAD W/NRV SPARING ROBOT N/A 10/26/2022    Procedure: ROBOTIC ASSISTED LAPAROSCOPIC SIMPLE PROSTATECTOMY, BLADDER NECK RECONSTRUCTION;  Surgeon: Tyler Treadwell MD;  Location: [facility];  Service: Urology    PROSTATE BIOPSY  09/10/2015    needle biopsy    SHOULDER SURGERY      TONSILLECTOMY       Social History   Social History     Substance and Sexual Activity   Alcohol Use Yes    Comment: occ / Rare     Social History     Substance and Sexual Activity   Drug Use No     Social History     Tobacco Use   Smoking Status Never    Passive exposure: Past   Smokeless Tobacco Never     Family History   Problem Relation Age of Onset    Pancreatic cancer Mother     Hypertension Father     Diabetes Father     No Known Problems Sister     No Known Problems Brother     No Known Problems Son     No Known Problems Son     Cancer Family        Meds/Allergies     Not in a hospital admission.    Allergies   Allergen  Reactions    Other Anaphylaxis     Annotation - [id number]: YELLOW JACKETS       Objective     There were no vitals taken for this visit.      PHYSICAL EXAM    Gen: NAD  CV: RRR  CHEST: Clear  ABD: soft, NT/ND  EXT: no edema  Neuro: AAO      ASSESSMENT/PLAN:  This is a 65 y.o. year old male here for screening colon neoplasia     PLAN:   Procedure: colonoscopy

## 2025-03-03 NOTE — ANESTHESIA POSTPROCEDURE EVALUATION
Post-Op Assessment Note    CV Status:  Stable  Pain Score: 0    Pain management: adequate       Mental Status:  Sleepy   Hydration Status:  Euvolemic   PONV Controlled:  Controlled   Airway Patency:  Patent     Post Op Vitals Reviewed: Yes    No anethesia notable event occurred.    Staff: Anesthesiologist, CRNA           Last Filed PACU Vitals:  Vitals Value Taken Time   Temp     Pulse     BP     Resp     SpO2

## 2025-03-03 NOTE — TELEPHONE ENCOUNTER
Good morning,  I just want to follow up into this patient ZIO request in order to get loop recorder implant submit to his insurance company.  Please let me know if this was mail out or placed to patient.    We will contact patient to scheduled his loop procedure after results available in his chart.  Thank you

## 2025-03-03 NOTE — ANESTHESIA PREPROCEDURE EVALUATION
Procedure:  COLONOSCOPY    Relevant Problems   CARDIO   (+) Ascending aorta dilatation (HCC)   (+) CAD (coronary artery disease)   (+) Essential hypertension   (+) Mixed hyperlipidemia   (+) Tachy-marquis syndrome (HCC)      GI/HEPATIC   (+) Gastroesophageal reflux disease without esophagitis      MUSCULOSKELETAL   (+) Failed back syndrome        Physical Exam    Airway    Mallampati score: III  TM Distance: >3 FB  Neck ROM: full     Dental   No notable dental hx     Cardiovascular  Cardiovascular exam normal    Pulmonary  Pulmonary exam normal     Other Findings    Cardiac stent '94  Controlled GERD  2016 echo normal      Anesthesia Plan  ASA Score- 3     Anesthesia Type- IV sedation with anesthesia with ASA Monitors.         Additional Monitors:     Airway Plan:     Comment: I have seen the patient and reviewed the history.  Patient to receive IV sedation with full ASA monitors.  Risks discussed with the patient, consent signed.  .       Plan Factors-Exercise tolerance (METS): >4 METS.    Chart reviewed.    Patient summary reviewed.    Patient is not a current smoker.              Induction- intravenous.    Postoperative Plan-         Informed Consent- Anesthetic plan and risks discussed with patient.  I personally reviewed this patient with the CRNA. Discussed and agreed on the Anesthesia Plan with the CRNA..

## 2025-03-04 DIAGNOSIS — R55 SYNCOPE, UNSPECIFIED SYNCOPE TYPE: Primary | ICD-10-CM

## 2025-03-04 NOTE — TELEPHONE ENCOUNTER
Calledthe patient letting him know that a second 14 days Zio was ordered for him, patient is aware and understood

## 2025-03-18 ENCOUNTER — TELEPHONE (OUTPATIENT)
Age: 65
End: 2025-03-18

## 2025-03-18 NOTE — TELEPHONE ENCOUNTER
Received a call from [email or web address] .  Patient is wearing a Zio AT monitor.  Zio is receiving delayed information from patient's monitor due to patchy cell phone service.  Patient has 4 more days left to wear the device.  Last information received was yesterday.

## 2025-03-27 ENCOUNTER — TELEPHONE (OUTPATIENT)
Dept: CARDIOLOGY CLINIC | Facility: CLINIC | Age: 65
End: 2025-03-27

## 2025-04-16 DIAGNOSIS — Z91.030 ALLERGY TO YELLOW JACKETS: ICD-10-CM

## 2025-04-16 RX ORDER — EPINEPHRINE 0.3 MG/.3ML
0.3 INJECTION SUBCUTANEOUS ONCE
Qty: 0.3 ML | Refills: 1 | Status: SHIPPED | OUTPATIENT
Start: 2025-04-16 | End: 2025-04-16

## 2025-04-16 NOTE — TELEPHONE ENCOUNTER
Patient needs refill for : EPINEPHrine (EPIPEN) 0.3 mg/0.3 mL SOAJ     Please send to pharmacy on file

## 2025-04-22 ENCOUNTER — PREP FOR PROCEDURE (OUTPATIENT)
Dept: CARDIOLOGY CLINIC | Facility: CLINIC | Age: 65
End: 2025-04-22

## 2025-04-22 DIAGNOSIS — R55 SYNCOPE, UNSPECIFIED SYNCOPE TYPE: Primary | ICD-10-CM

## 2025-04-22 DIAGNOSIS — I49.5 TACHY-BRADY SYNDROME (HCC): ICD-10-CM

## 2025-04-23 ENCOUNTER — TELEPHONE (OUTPATIENT)
Age: 65
End: 2025-04-23

## 2025-04-23 DIAGNOSIS — I42.0 DILATED CARDIOMYOPATHY (HCC): ICD-10-CM

## 2025-04-23 DIAGNOSIS — R55 SYNCOPE, UNSPECIFIED SYNCOPE TYPE: ICD-10-CM

## 2025-04-23 DIAGNOSIS — I25.119 CORONARY ARTERY DISEASE INVOLVING NATIVE HEART WITH ANGINA PECTORIS, UNSPECIFIED VESSEL OR LESION TYPE (HCC): Primary | ICD-10-CM

## 2025-04-29 DIAGNOSIS — R06.02 SOB (SHORTNESS OF BREATH): ICD-10-CM

## 2025-04-29 DIAGNOSIS — R55 SYNCOPE AND COLLAPSE: Primary | ICD-10-CM

## 2025-05-02 ENCOUNTER — HOSPITAL ENCOUNTER (OUTPATIENT)
Dept: NON INVASIVE DIAGNOSTICS | Facility: CLINIC | Age: 65
Discharge: HOME | End: 2025-05-02
Payer: COMMERCIAL

## 2025-05-02 VITALS
BODY MASS INDEX: 27 KG/M2 | SYSTOLIC BLOOD PRESSURE: 134 MMHG | HEIGHT: 67 IN | HEART RATE: 68 BPM | WEIGHT: 172 LBS | OXYGEN SATURATION: 97 % | DIASTOLIC BLOOD PRESSURE: 82 MMHG

## 2025-05-02 DIAGNOSIS — R55 SYNCOPE AND COLLAPSE: ICD-10-CM

## 2025-05-02 DIAGNOSIS — R06.02 SOB (SHORTNESS OF BREATH): ICD-10-CM

## 2025-05-02 LAB
CHEST PAIN STATEMENT: NORMAL
MAX DIASTOLIC BP: 88 MMHG
MAX HR PERCENT: 89 %
MAX HR: 141 BPM
MAX PREDICTED HEART RATE: 157 BPM
PROTOCOL NAME: NORMAL
RATE PRESSURE PRODUCT: NORMAL
REASON FOR TERMINATION: NORMAL
SL CV REST NUCLEAR ISOTOPE DOSE: 10.73 MCI
SL CV STRESS NUCLEAR ISOTOPE DOSE: 33 MCI
SL CV STRESS RECOVERY BP: NORMAL MMHG
SL CV STRESS RECOVERY HR: 80 BPM
SL CV STRESS RECOVERY O2 SAT: 99 %
SPECT HRT GATED+EF W RNC IV: 62 %
STRESS ANGINA INDEX: 0
STRESS BASELINE BP: NORMAL MMHG
STRESS BASELINE HR: 68 BPM
STRESS O2 SAT REST: 97 %
STRESS PEAK HR: 141 BPM
STRESS POST ESTIMATED WORKLOAD: 15.3 METS
STRESS POST EXERCISE DUR MIN: 12 MIN
STRESS POST EXERCISE DUR MIN: 12 MIN
STRESS POST EXERCISE DUR SEC: 31 SEC
STRESS POST EXERCISE DUR SEC: 31 SEC
STRESS POST O2 SAT PEAK: 96 %
STRESS POST PEAK BP: 162 MMHG
STRESS POST PEAK HR: 141 BPM
STRESS POST PEAK SYSTOLIC BP: 168 MMHG
STRESS/REST PERFUSION RATIO: 0.89
TARGET HR FORMULA: NORMAL
TEST INDICATION: NORMAL

## 2025-05-02 PROCEDURE — 78452 HT MUSCLE IMAGE SPECT MULT: CPT | Performed by: INTERNAL MEDICINE

## 2025-05-02 PROCEDURE — 78452 HT MUSCLE IMAGE SPECT MULT: CPT

## 2025-05-02 PROCEDURE — A9502 TC99M TETROFOSMIN: HCPCS

## 2025-05-02 PROCEDURE — 93017 CV STRESS TEST TRACING ONLY: CPT

## 2025-05-02 PROCEDURE — 93016 CV STRESS TEST SUPVJ ONLY: CPT | Performed by: INTERNAL MEDICINE

## 2025-05-02 PROCEDURE — 93018 CV STRESS TEST I&R ONLY: CPT | Performed by: INTERNAL MEDICINE

## 2025-05-09 ENCOUNTER — HOSPITAL ENCOUNTER (OUTPATIENT)
Facility: HOSPITAL | Age: 65
Setting detail: OUTPATIENT SURGERY
Discharge: HOME | End: 2025-05-09
Attending: INTERNAL MEDICINE | Admitting: INTERNAL MEDICINE
Payer: COMMERCIAL

## 2025-05-09 VITALS
DIASTOLIC BLOOD PRESSURE: 79 MMHG | HEART RATE: 54 BPM | RESPIRATION RATE: 20 BRPM | SYSTOLIC BLOOD PRESSURE: 119 MMHG | OXYGEN SATURATION: 98 %

## 2025-05-09 DIAGNOSIS — R55 SYNCOPE, UNSPECIFIED SYNCOPE TYPE: ICD-10-CM

## 2025-05-09 DIAGNOSIS — I49.5 TACHY-BRADY SYNDROME (HCC): ICD-10-CM

## 2025-05-09 PROCEDURE — 33285 INSJ SUBQ CAR RHYTHM MNTR: CPT | Performed by: INTERNAL MEDICINE

## 2025-05-09 PROCEDURE — C1764 EVENT RECORDER, CARDIAC: HCPCS | Performed by: INTERNAL MEDICINE

## 2025-05-09 DEVICE — ICM LNQ22 LINQ II PRIME US
Type: IMPLANTABLE DEVICE | Site: CHEST  WALL | Status: FUNCTIONAL
Brand: LINQ II™

## 2025-05-12 ENCOUNTER — TELEPHONE (OUTPATIENT)
Dept: CARDIOLOGY CLINIC | Facility: CLINIC | Age: 65
End: 2025-05-12

## 2025-05-12 NOTE — TELEPHONE ENCOUNTER
Left voicemail message for patient requesting call back to schedule site check for implanted loop device

## 2025-05-23 ENCOUNTER — RESULTS FOLLOW-UP (OUTPATIENT)
Dept: CARDIOLOGY CLINIC | Facility: CLINIC | Age: 65
End: 2025-05-23

## 2025-05-23 ENCOUNTER — IN-CLINIC DEVICE VISIT (OUTPATIENT)
Dept: CARDIOLOGY CLINIC | Facility: CLINIC | Age: 65
End: 2025-05-23

## 2025-05-23 DIAGNOSIS — R55 SYNCOPE AND COLLAPSE: Primary | ICD-10-CM

## 2025-05-23 NOTE — PROGRESS NOTES
"Results for orders placed or performed in visit on 05/23/25   Cardiac EP device report    Narrative    MDT LNQ22 ILR/ ACTIVE SYSTEM IS MRI CONDITIONAL  [facility]. WOUND CHECK: INCISION CLEAN AND DRY WITH EDGES APPROXIMATED; SUTURES REMOVED; WOUND CARE AND RESTRICTIONS REVIEWED WITH PATIENT (PIC IN MEDIA). PRESENTING RHYTHM NSR @ 76 BPM. R WAVES MEASURE 0.35 mV. BATTERY STATUS \"OK.\" NO PATIENT OR DEVICE ACTIVATED EPISODES. PVC BURDEN <1%. NO PROGRAMMING CHANGES MADE TO DEVICE PARAMETERS. NORMAL DEVICE FUNCTION. CJC         "

## 2025-05-28 NOTE — ASSESSMENT & PLAN NOTE
DCM improved to normal LVEF 55% by TTE on 12/13/24     Follow up in our office in 2 weeks    Abdomen soft, non-tender, no guarding. rectum appears normal, small amt of stool in vault, No mass felt, no bleeding

## 2025-07-07 ENCOUNTER — APPOINTMENT (OUTPATIENT)
Dept: LAB | Facility: MEDICAL CENTER | Age: 65
End: 2025-07-07
Payer: COMMERCIAL

## 2025-07-07 DIAGNOSIS — E78.2 MIXED HYPERLIPIDEMIA: ICD-10-CM

## 2025-07-07 LAB
ALBUMIN SERPL BCG-MCNC: 4.1 G/DL (ref 3.5–5)
ALP SERPL-CCNC: 40 U/L (ref 34–104)
ALT SERPL W P-5'-P-CCNC: 15 U/L (ref 7–52)
ANION GAP SERPL CALCULATED.3IONS-SCNC: 6 MMOL/L (ref 4–13)
AST SERPL W P-5'-P-CCNC: 21 U/L (ref 13–39)
BASOPHILS # BLD AUTO: 0.04 THOUSANDS/ÂΜL (ref 0–0.1)
BASOPHILS NFR BLD AUTO: 1 % (ref 0–1)
BILIRUB SERPL-MCNC: 0.54 MG/DL (ref 0.2–1)
BUN SERPL-MCNC: 25 MG/DL (ref 5–25)
CALCIUM SERPL-MCNC: 9.2 MG/DL (ref 8.4–10.2)
CHLORIDE SERPL-SCNC: 105 MMOL/L (ref 96–108)
CHOLEST SERPL-MCNC: 155 MG/DL (ref ?–200)
CO2 SERPL-SCNC: 29 MMOL/L (ref 21–32)
CREAT SERPL-MCNC: 0.91 MG/DL (ref 0.6–1.3)
EOSINOPHIL # BLD AUTO: 0.11 THOUSAND/ÂΜL (ref 0–0.61)
EOSINOPHIL NFR BLD AUTO: 3 % (ref 0–6)
ERYTHROCYTE [DISTWIDTH] IN BLOOD BY AUTOMATED COUNT: 12.8 % (ref 11.6–15.1)
GFR SERPL CREATININE-BSD FRML MDRD: 89 ML/MIN/1.73SQ M
GLUCOSE P FAST SERPL-MCNC: 91 MG/DL (ref 65–99)
HCT VFR BLD AUTO: 42.3 % (ref 36.5–49.3)
HDLC SERPL-MCNC: 47 MG/DL
HGB BLD-MCNC: 14 G/DL (ref 12–17)
IMM GRANULOCYTES # BLD AUTO: 0.02 THOUSAND/UL (ref 0–0.2)
IMM GRANULOCYTES NFR BLD AUTO: 1 % (ref 0–2)
LDLC SERPL CALC-MCNC: 97 MG/DL (ref 0–100)
LYMPHOCYTES # BLD AUTO: 1.47 THOUSANDS/ÂΜL (ref 0.6–4.47)
LYMPHOCYTES NFR BLD AUTO: 35 % (ref 14–44)
MCH RBC QN AUTO: 29.1 PG (ref 26.8–34.3)
MCHC RBC AUTO-ENTMCNC: 33.1 G/DL (ref 31.4–37.4)
MCV RBC AUTO: 88 FL (ref 82–98)
MONOCYTES # BLD AUTO: 0.52 THOUSAND/ÂΜL (ref 0.17–1.22)
MONOCYTES NFR BLD AUTO: 13 % (ref 4–12)
NEUTROPHILS # BLD AUTO: 2.01 THOUSANDS/ÂΜL (ref 1.85–7.62)
NEUTS SEG NFR BLD AUTO: 47 % (ref 43–75)
NONHDLC SERPL-MCNC: 108 MG/DL
NRBC BLD AUTO-RTO: 0 /100 WBCS
PLATELET # BLD AUTO: 197 THOUSANDS/UL (ref 149–390)
PMV BLD AUTO: 11.6 FL (ref 8.9–12.7)
POTASSIUM SERPL-SCNC: 4.3 MMOL/L (ref 3.5–5.3)
PROT SERPL-MCNC: 6 G/DL (ref 6.4–8.4)
RBC # BLD AUTO: 4.81 MILLION/UL (ref 3.88–5.62)
SODIUM SERPL-SCNC: 140 MMOL/L (ref 135–147)
TRIGL SERPL-MCNC: 54 MG/DL (ref ?–150)
TSH SERPL DL<=0.05 MIU/L-ACNC: 1.76 UIU/ML (ref 0.45–4.5)
WBC # BLD AUTO: 4.17 THOUSAND/UL (ref 4.31–10.16)

## 2025-07-07 PROCEDURE — 80053 COMPREHEN METABOLIC PANEL: CPT

## 2025-07-07 PROCEDURE — 80061 LIPID PANEL: CPT

## 2025-07-07 PROCEDURE — 85025 COMPLETE CBC W/AUTO DIFF WBC: CPT

## 2025-07-07 PROCEDURE — 84443 ASSAY THYROID STIM HORMONE: CPT

## 2025-07-07 PROCEDURE — 36415 COLL VENOUS BLD VENIPUNCTURE: CPT

## 2025-07-09 ENCOUNTER — OFFICE VISIT (OUTPATIENT)
Dept: FAMILY MEDICINE CLINIC | Facility: MEDICAL CENTER | Age: 65
End: 2025-07-09
Payer: COMMERCIAL

## 2025-07-09 VITALS
DIASTOLIC BLOOD PRESSURE: 76 MMHG | WEIGHT: 166.8 LBS | TEMPERATURE: 98.2 F | RESPIRATION RATE: 17 BRPM | HEART RATE: 67 BPM | SYSTOLIC BLOOD PRESSURE: 108 MMHG | HEIGHT: 67 IN | BODY MASS INDEX: 26.18 KG/M2 | OXYGEN SATURATION: 99 %

## 2025-07-09 DIAGNOSIS — R97.20 ELEVATED PSA: ICD-10-CM

## 2025-07-09 DIAGNOSIS — I10 ESSENTIAL HYPERTENSION: Primary | ICD-10-CM

## 2025-07-09 DIAGNOSIS — K21.9 GASTROESOPHAGEAL REFLUX DISEASE WITHOUT ESOPHAGITIS: ICD-10-CM

## 2025-07-09 DIAGNOSIS — M54.50 CHRONIC BILATERAL LOW BACK PAIN WITHOUT SCIATICA: ICD-10-CM

## 2025-07-09 DIAGNOSIS — G89.29 CHRONIC BILATERAL LOW BACK PAIN WITHOUT SCIATICA: ICD-10-CM

## 2025-07-09 DIAGNOSIS — I25.119 CORONARY ARTERY DISEASE INVOLVING NATIVE HEART WITH ANGINA PECTORIS, UNSPECIFIED VESSEL OR LESION TYPE (HCC): ICD-10-CM

## 2025-07-09 PROBLEM — R35.1 BENIGN PROSTATIC HYPERPLASIA WITH NOCTURIA: Status: RESOLVED | Noted: 2022-02-28 | Resolved: 2025-07-09

## 2025-07-09 PROBLEM — N40.1 BENIGN PROSTATIC HYPERPLASIA WITH NOCTURIA: Status: RESOLVED | Noted: 2022-02-28 | Resolved: 2025-07-09

## 2025-07-09 PROBLEM — R55 SYNCOPE: Status: RESOLVED | Noted: 2025-01-09 | Resolved: 2025-07-09

## 2025-07-09 PROCEDURE — 99214 OFFICE O/P EST MOD 30 MIN: CPT | Performed by: INTERNAL MEDICINE

## 2025-07-09 RX ORDER — OMEPRAZOLE 40 MG/1
40 CAPSULE, DELAYED RELEASE ORAL DAILY
Qty: 90 CAPSULE | Refills: 1 | Status: SHIPPED | OUTPATIENT
Start: 2025-07-09

## 2025-07-09 NOTE — ASSESSMENT & PLAN NOTE
Orders:    omeprazole (PriLOSEC) 40 MG capsule; Take 1 capsule (40 mg total) by mouth daily  Continue omeprazole, it is working well

## 2025-07-09 NOTE — ASSESSMENT & PLAN NOTE
Orders:    CBC and differential; Future    Comprehensive metabolic panel; Future    Lipid panel; Future    TSH, 3rd generation; Future  Blood pressure remains on the lower side even though he is not on blood pressure medication.  He was told to keep himself better hydrated

## 2025-07-09 NOTE — PROGRESS NOTES
Name: Arun Harper      : 1960      MRN: [id number]  Encounter Provider: Dylan Bah MD  Encounter Date: 2025   Encounter department: [facility] [place]  :  Assessment & Plan  Essential hypertension    Orders:    CBC and differential; Future    Comprehensive metabolic panel; Future    Lipid panel; Future    TSH, 3rd generation; Future  Blood pressure remains on the lower side even though he is not on blood pressure medication.  He was told to keep himself better hydrated  Coronary artery disease involving native heart with angina pectoris, unspecified vessel or lesion type (HCC)  Follow-up with cardiology, remains stable       Gastroesophageal reflux disease without esophagitis    Orders:    omeprazole (PriLOSEC) 40 MG capsule; Take 1 capsule (40 mg total) by mouth daily  Continue omeprazole, it is working well  Elevated PSA  Follow-up with urology       Chronic bilateral low back pain without sciatica  Stable, takes medication as needed only              History of Present Illness   HPI  Patient is here for follow-up of hypertension, GERD, chronic back pain.  Has been following up with cardiology for the CAD, cardiomyopathy and rhythm disorders.  Follows up with urology for the elevated PSA.  Does not have any major complaints      Review of Systems   Constitutional:  Negative for chills, diaphoresis, fatigue and fever.   HENT:  Negative for congestion, ear discharge, ear pain, hearing loss, postnasal drip, rhinorrhea, sinus pressure, sinus pain, sneezing, sore throat and voice change.    Eyes:  Negative for pain, discharge, redness and visual disturbance.   Respiratory:  Negative for cough, chest tightness, shortness of breath and wheezing.    Cardiovascular:  Negative for chest pain, palpitations and leg swelling.   Gastrointestinal:  Negative for abdominal distention, abdominal pain, blood in stool, constipation, diarrhea, nausea and vomiting.   Endocrine: Negative for cold  "intolerance, heat intolerance, polydipsia, polyphagia and polyuria.   Genitourinary:  Negative for dysuria, flank pain, frequency, hematuria and urgency.   Musculoskeletal:  Negative for arthralgias, back pain, gait problem, joint swelling, myalgias, neck pain and neck stiffness.   Skin:  Negative for rash.   Neurological:  Negative for dizziness, tremors, syncope, facial asymmetry, speech difficulty, weakness, light-headedness, numbness and headaches.   Hematological:  Does not bruise/bleed easily.   Psychiatric/Behavioral:  Negative for behavioral problems, confusion and sleep disturbance. The patient is not nervous/anxious.        Objective   /76 (BP Location: Left arm, Patient Position: Sitting, Cuff Size: Large)   Pulse 67   Temp 98.2 °F (36.8 °C) (Temporal)   Resp 17   Ht 5' 7\" (1.702 m)   Wt 75.7 kg (166 lb 12.8 oz)   SpO2 99%   BMI 26.12 kg/m²      Physical Exam  Constitutional:       General: He is not in acute distress.     Appearance: He is well-developed. He is not diaphoretic.   HENT:      Head: Normocephalic and atraumatic.      Right Ear: External ear normal.      Left Ear: External ear normal.      Nose: Nose normal.     Eyes:      General: No scleral icterus.        Right eye: No discharge.         Left eye: No discharge.      Conjunctiva/sclera: Conjunctivae normal.       Cardiovascular:      Rate and Rhythm: Normal rate and regular rhythm.      Heart sounds: Normal heart sounds. No murmur heard.     No friction rub. No gallop.   Pulmonary:      Effort: Pulmonary effort is normal. No respiratory distress.      Breath sounds: Normal breath sounds. No wheezing or rales.   Abdominal:      General: Bowel sounds are normal. There is no distension.      Palpations: Abdomen is soft.      Tenderness: There is no abdominal tenderness. There is no guarding or rebound.     Musculoskeletal:         General: No tenderness.     Skin:     General: Skin is warm and dry.      Findings: No erythema or " rash.     Neurological:      Mental Status: He is alert and oriented to person, place, and time.      Cranial Nerves: No cranial nerve deficit.      Sensory: No sensory deficit.      Motor: No abnormal muscle tone.     Psychiatric:         Behavior: Behavior normal.

## (undated) DEVICE — SURGIFLO ENDOSCOPIC APPICATOR: Brand: ETHICON

## (undated) DEVICE — NEEDLE 25G X 1 1/2

## (undated) DEVICE — CANNULA SEAL

## (undated) DEVICE — LUBRICANT SURGILUBE TUBE 4 OZ  FLIP TOP

## (undated) DEVICE — HEMOSTATIC MATRIX SURGIFLO 8ML W/THROMBIN

## (undated) DEVICE — TISSUE RETRIEVAL SYSTEM: Brand: INZII RETRIEVAL SYSTEM

## (undated) DEVICE — COLUMN DRAPE

## (undated) DEVICE — CHLORAPREP HI-LITE 26ML ORANGE

## (undated) DEVICE — ENDOPATH PNEUMONEEDLE INSUFFLATION NEEDLES WITH LUER LOCK CONNECTORS 120MM: Brand: ENDOPATH

## (undated) DEVICE — GAUZE SPONGES,USP TYPE VII GAUZE, 12 PLY: Brand: CURITY

## (undated) DEVICE — URIMETER 2500ML

## (undated) DEVICE — INTENDED FOR TISSUE SEPARATION, AND OTHER PROCEDURES THAT REQUIRE A SHARP SURGICAL BLADE TO PUNCTURE OR CUT.: Brand: BARD-PARKER SAFETY BLADES SIZE 11, STERILE

## (undated) DEVICE — SUT VICRYL 2-0 SH 27 IN UNDYED J417H

## (undated) DEVICE — Device: Brand: CENTURION

## (undated) DEVICE — GLOVE INDICATOR PI UNDERGLOVE SZ 8 BLUE

## (undated) DEVICE — CYSTO TUBING TUR Y IRRIGATION

## (undated) DEVICE — LARGE NEEDLE DRIVER: Brand: ENDOWRIST

## (undated) DEVICE — PROGRASP FORCEPS: Brand: ENDOWRIST

## (undated) DEVICE — SUT PDS II 0 CT-1 27 IN Z340H

## (undated) DEVICE — TROCAR PORT ACCESS 12 X120MM W/BLDLS OPTICAL TIP AIRSEAL

## (undated) DEVICE — SUT VLOC 90 3-0 90 CV-23 L9 IN VLOCM1944

## (undated) DEVICE — SYRINGE 10ML LL

## (undated) DEVICE — TROCAR: Brand: KII FIOS FIRST ENTRY

## (undated) DEVICE — SUT MONOCRYL 4-0 PS-2 27 IN Y426H

## (undated) DEVICE — JP CHANNEL DRAIN 19FR, FULL FLUTES: Brand: JACKSON-PRATT

## (undated) DEVICE — KIT, BETHLEHEM ROBOTIC PROST: Brand: CARDINAL HEALTH

## (undated) DEVICE — GLOVE SRG BIOGEL ECLIPSE 7.5

## (undated) DEVICE — AIRSEAL TUBE SMOKE EVAC LUMENX3 FILTERED

## (undated) DEVICE — INTENDED FOR TISSUE SEPARATION, AND OTHER PROCEDURES THAT REQUIRE A SHARP SURGICAL BLADE TO PUNCTURE OR CUT.: Brand: BARD-PARKER SAFETY BLADES SIZE 15, STERILE

## (undated) DEVICE — 40595 XL TRENDELENBURG POSITIONING KIT: Brand: 40595 XL TRENDELENBURG POSITIONING KIT

## (undated) DEVICE — CHLORHEXIDINE 4PCT 4 OZ

## (undated) DEVICE — JACKSON-PRATT 100CC BULB RESERVOIR: Brand: CARDINAL HEALTH

## (undated) DEVICE — ASTOUND STANDARD SURGICAL GOWN, XL: Brand: CONVERTORS

## (undated) DEVICE — PREMIUM DRY TRAY LF: Brand: MEDLINE INDUSTRIES, INC.

## (undated) DEVICE — VISUALIZATION SYSTEM: Brand: CLEARIFY

## (undated) DEVICE — CATH FOLEY COUNCIL 18FR 5ML 2 WAY LUBRICATH

## (undated) DEVICE — BLADELESS OBTURATOR: Brand: WECK VISTA

## (undated) DEVICE — HEM-O-LOK CLIP CARTRIDGE LARGE DA VINCI SI/XI

## (undated) DEVICE — Device

## (undated) DEVICE — MONOPOLAR CURVED SCISSORS: Brand: ENDOWRIST

## (undated) DEVICE — TIP COVER ACCESSORY

## (undated) DEVICE — ARM DRAPE

## (undated) DEVICE — SUT VICRYL 2-0 CT-1 27 IN J259H

## (undated) DEVICE — SUT VLOC 90 3-0 CV-23 9 IN VLOCM0844

## (undated) DEVICE — LUBRICANT INST ELECTROLUBE ANTISTK WO PAD

## (undated) DEVICE — SUT SILK 0 CT-1 30 IN 424H

## (undated) DEVICE — ADHESIVE SKIN HIGH VISCOSITY EXOFIN 1ML

## (undated) DEVICE — 3000CC GUARDIAN II: Brand: GUARDIAN